# Patient Record
Sex: FEMALE | Race: WHITE | NOT HISPANIC OR LATINO
[De-identification: names, ages, dates, MRNs, and addresses within clinical notes are randomized per-mention and may not be internally consistent; named-entity substitution may affect disease eponyms.]

---

## 2017-09-13 ENCOUNTER — APPOINTMENT (OUTPATIENT)
Dept: OBGYN | Facility: CLINIC | Age: 42
End: 2017-09-13

## 2018-01-09 ENCOUNTER — APPOINTMENT (OUTPATIENT)
Dept: OBGYN | Facility: CLINIC | Age: 43
End: 2018-01-09

## 2018-03-05 ENCOUNTER — EMERGENCY (EMERGENCY)
Facility: HOSPITAL | Age: 43
LOS: 1 days | Discharge: HOME | End: 2018-03-05
Attending: EMERGENCY MEDICINE | Admitting: EMERGENCY MEDICINE
Payer: COMMERCIAL

## 2018-03-05 VITALS
TEMPERATURE: 99 F | OXYGEN SATURATION: 100 % | RESPIRATION RATE: 16 BRPM | HEART RATE: 70 BPM | DIASTOLIC BLOOD PRESSURE: 67 MMHG | SYSTOLIC BLOOD PRESSURE: 110 MMHG

## 2018-03-05 DIAGNOSIS — Z98.51 TUBAL LIGATION STATUS: Chronic | ICD-10-CM

## 2018-03-05 DIAGNOSIS — Z98.89 OTHER SPECIFIED POSTPROCEDURAL STATES: Chronic | ICD-10-CM

## 2018-03-05 DIAGNOSIS — D36.9 BENIGN NEOPLASM, UNSPECIFIED SITE: Chronic | ICD-10-CM

## 2018-03-05 LAB
ALBUMIN SERPL ELPH-MCNC: 4.3 G/DL — SIGNIFICANT CHANGE UP (ref 3.3–5)
ALP SERPL-CCNC: 44 U/L — SIGNIFICANT CHANGE UP (ref 40–120)
ALT FLD-CCNC: 18 U/L — SIGNIFICANT CHANGE UP (ref 4–33)
AST SERPL-CCNC: 35 U/L — HIGH (ref 4–32)
BASE EXCESS BLDV CALC-SCNC: 0.3 MMOL/L — SIGNIFICANT CHANGE UP
BASOPHILS # BLD AUTO: 0.03 K/UL — SIGNIFICANT CHANGE UP (ref 0–0.2)
BASOPHILS NFR BLD AUTO: 0.5 % — SIGNIFICANT CHANGE UP (ref 0–2)
BILIRUB SERPL-MCNC: < 0.2 MG/DL — LOW (ref 0.2–1.2)
BLOOD GAS VENOUS - CREATININE: 0.64 MG/DL — SIGNIFICANT CHANGE UP (ref 0.5–1.3)
BUN SERPL-MCNC: 8 MG/DL — SIGNIFICANT CHANGE UP (ref 7–23)
CALCIUM SERPL-MCNC: 8.9 MG/DL — SIGNIFICANT CHANGE UP (ref 8.4–10.5)
CHLORIDE BLDV-SCNC: 110 MMOL/L — HIGH (ref 96–108)
CHLORIDE SERPL-SCNC: 106 MMOL/L — SIGNIFICANT CHANGE UP (ref 98–107)
CO2 SERPL-SCNC: 24 MMOL/L — SIGNIFICANT CHANGE UP (ref 22–31)
CREAT SERPL-MCNC: 0.7 MG/DL — SIGNIFICANT CHANGE UP (ref 0.5–1.3)
D DIMER BLD IA.RAPID-MCNC: 201 NG/ML — SIGNIFICANT CHANGE UP
EOSINOPHIL # BLD AUTO: 0.13 K/UL — SIGNIFICANT CHANGE UP (ref 0–0.5)
EOSINOPHIL NFR BLD AUTO: 2 % — SIGNIFICANT CHANGE UP (ref 0–6)
GAS PNL BLDV: 139 MMOL/L — SIGNIFICANT CHANGE UP (ref 136–146)
GLUCOSE BLDV-MCNC: 93 — SIGNIFICANT CHANGE UP (ref 70–99)
GLUCOSE SERPL-MCNC: 90 MG/DL — SIGNIFICANT CHANGE UP (ref 70–99)
HCO3 BLDV-SCNC: 23 MMOL/L — SIGNIFICANT CHANGE UP (ref 20–27)
HCT VFR BLD CALC: 41.7 % — SIGNIFICANT CHANGE UP (ref 34.5–45)
HCT VFR BLDV CALC: 43.1 % — SIGNIFICANT CHANGE UP (ref 34.5–45)
HGB BLD-MCNC: 14.1 G/DL — SIGNIFICANT CHANGE UP (ref 11.5–15.5)
HGB BLDV-MCNC: 14 G/DL — SIGNIFICANT CHANGE UP (ref 11.5–15.5)
IMM GRANULOCYTES # BLD AUTO: 0.01 # — SIGNIFICANT CHANGE UP
IMM GRANULOCYTES NFR BLD AUTO: 0.2 % — SIGNIFICANT CHANGE UP (ref 0–1.5)
LACTATE BLDV-MCNC: 0.9 MMOL/L — SIGNIFICANT CHANGE UP (ref 0.5–2)
LYMPHOCYTES # BLD AUTO: 2.26 K/UL — SIGNIFICANT CHANGE UP (ref 1–3.3)
LYMPHOCYTES # BLD AUTO: 35 % — SIGNIFICANT CHANGE UP (ref 13–44)
MCHC RBC-ENTMCNC: 31.1 PG — SIGNIFICANT CHANGE UP (ref 27–34)
MCHC RBC-ENTMCNC: 33.8 % — SIGNIFICANT CHANGE UP (ref 32–36)
MCV RBC AUTO: 91.9 FL — SIGNIFICANT CHANGE UP (ref 80–100)
MONOCYTES # BLD AUTO: 0.35 K/UL — SIGNIFICANT CHANGE UP (ref 0–0.9)
MONOCYTES NFR BLD AUTO: 5.4 % — SIGNIFICANT CHANGE UP (ref 2–14)
NEUTROPHILS # BLD AUTO: 3.68 K/UL — SIGNIFICANT CHANGE UP (ref 1.8–7.4)
NEUTROPHILS NFR BLD AUTO: 56.9 % — SIGNIFICANT CHANGE UP (ref 43–77)
NRBC # FLD: 0 — SIGNIFICANT CHANGE UP
PCO2 BLDV: 46 MMHG — SIGNIFICANT CHANGE UP (ref 41–51)
PH BLDV: 7.36 PH — SIGNIFICANT CHANGE UP (ref 7.32–7.43)
PLATELET # BLD AUTO: 301 K/UL — SIGNIFICANT CHANGE UP (ref 150–400)
PMV BLD: 9.6 FL — SIGNIFICANT CHANGE UP (ref 7–13)
PO2 BLDV: 30 MMHG — LOW (ref 35–40)
POTASSIUM BLDV-SCNC: 3.7 MMOL/L — SIGNIFICANT CHANGE UP (ref 3.4–4.5)
POTASSIUM SERPL-MCNC: SIGNIFICANT CHANGE UP MMOL/L (ref 3.5–5.3)
POTASSIUM SERPL-SCNC: SIGNIFICANT CHANGE UP MMOL/L (ref 3.5–5.3)
PROT SERPL-MCNC: 7.1 G/DL — SIGNIFICANT CHANGE UP (ref 6–8.3)
RBC # BLD: 4.54 M/UL — SIGNIFICANT CHANGE UP (ref 3.8–5.2)
RBC # FLD: 11.9 % — SIGNIFICANT CHANGE UP (ref 10.3–14.5)
SAO2 % BLDV: 55.8 % — LOW (ref 60–85)
SODIUM SERPL-SCNC: 140 MMOL/L — SIGNIFICANT CHANGE UP (ref 135–145)
TROPONIN T SERPL-MCNC: < 0.06 NG/ML — SIGNIFICANT CHANGE UP (ref 0–0.06)
TROPONIN T SERPL-MCNC: < 0.06 NG/ML — SIGNIFICANT CHANGE UP (ref 0–0.06)
WBC # BLD: 6.46 K/UL — SIGNIFICANT CHANGE UP (ref 3.8–10.5)
WBC # FLD AUTO: 6.46 K/UL — SIGNIFICANT CHANGE UP (ref 3.8–10.5)

## 2018-03-05 PROCEDURE — 99285 EMERGENCY DEPT VISIT HI MDM: CPT | Mod: 25

## 2018-03-05 PROCEDURE — 93010 ELECTROCARDIOGRAM REPORT: CPT

## 2018-03-05 PROCEDURE — 71046 X-RAY EXAM CHEST 2 VIEWS: CPT | Mod: 26

## 2018-03-05 RX ORDER — ACETAMINOPHEN 500 MG
650 TABLET ORAL ONCE
Qty: 0 | Refills: 0 | Status: COMPLETED | OUTPATIENT
Start: 2018-03-05 | End: 2018-03-05

## 2018-03-05 RX ORDER — IBUPROFEN 200 MG
600 TABLET ORAL ONCE
Qty: 0 | Refills: 0 | Status: COMPLETED | OUTPATIENT
Start: 2018-03-05 | End: 2018-03-05

## 2018-03-05 RX ORDER — ASPIRIN/CALCIUM CARB/MAGNESIUM 324 MG
324 TABLET ORAL DAILY
Qty: 0 | Refills: 0 | Status: DISCONTINUED | OUTPATIENT
Start: 2018-03-05 | End: 2018-03-09

## 2018-03-05 RX ADMIN — Medication 600 MILLIGRAM(S): at 19:24

## 2018-03-05 RX ADMIN — Medication 600 MILLIGRAM(S): at 22:34

## 2018-03-05 RX ADMIN — Medication 650 MILLIGRAM(S): at 23:59

## 2018-03-05 RX ADMIN — Medication 324 MILLIGRAM(S): at 18:31

## 2018-03-05 NOTE — ED PROVIDER NOTE - PLAN OF CARE
Follow up with your Doctor in 1-2 days.  Follow up with your cardiologist in 1-2 days.  Return to the ER for any persistent/worsening or new symptoms, chest pain, shortness of breath, palpitations, dizziness or any concerning symptoms.

## 2018-03-05 NOTE — ED PROVIDER NOTE - FAMILY HISTORY
Family history of lung cancer     Family history of cervical cancer     Family history of carcinoma in situ of anal canal     Family history of hypertension     Family history of hyperlipidemia     Mother  Still living? Unknown  Family history of acute myocardial infarction, Age at diagnosis: Age Unknown

## 2018-03-05 NOTE — ED ADULT TRIAGE NOTE - CHIEF COMPLAINT QUOTE
Pt c/o of left side pain under her breast pain radiates to her back for the past three days pt also states she feels sob no acute respiratory distress noted o2sat 100% lungs clear on ascultation.

## 2018-03-05 NOTE — ED PROVIDER NOTE - MEDICAL DECISION MAKING DETAILS
42 yo F w/ L sided chest pain, worse with exertion, associated with shortness of breath. Will obtain cbc, cmp, vbg, delta trop, d dimer, cxr, ekg. Treat pain, reassess. 44 yo F w/ L sided chest pain, worse with exertion, associated with shortness of breath. Will obtain cbc, cmp, vbg, delta trop, d dimer, cxr, ekg. Treat pain, reassess.    Cabot: 43F with PMH of HTN, anxiety, depression, prior CP with positive stress test but negative cath in 2016 who comes in with 3 days of constant L sided CP radiating to the L back.  + pleuritic and exertional.  No N/V/diaphoresis. No PE risk factors. No FH of early heart disease. + smoker.  On exam, HDS, NAD, lungs cTAB, heart sounds normal, abd benign, no chest wall TTP, BLE with no edema or TTP, no skin rash.  DDx includes MSK pain, less likely ACS or PE.  EKG unchanged from prior.  Will check basic labs, trops x 2, dimer, CXR. Spoke with her cardiologist, Dr. Venegas, who is comfortable with this plan.

## 2018-03-05 NOTE — ED PROVIDER NOTE - PMH
Anemia  bitamin b12 deficiency  Anxiety States    Benign Hypertension    Carcinoid Tumor of Ovary, Benign     Deliv    Depression with anxiety    Hypertension  Dx   Ovarian cyst  bilateral  Ovarian Cyst    Tubal Ligation

## 2018-03-05 NOTE — ED PROVIDER NOTE - ATTENDING CONTRIBUTION TO CARE
I, Jennifer Cabot, MD, have performed a history and physical exam of the patient and discussed their management with the resident. I reviewed the resident's note and agree with the documented findings and plan of care. My medical decision making and observations are found above.    Cabot: 43F with PMH of HTN, anxiety, depression, prior CP with positive stress test but negative cath in 2016 who comes in with 3 days of constant L sided CP radiating to the L back.  + pleuritic and exertional.  No N/V/diaphoresis. No PE risk factors. No FH of early heart disease. + smoker.  On exam, HDS, NAD, lungs cTAB, heart sounds normal, abd benign, no chest wall TTP, BLE with no edema or TTP, no skin rash.  DDx includes MSK pain, less likely ACS or PE.  EKG unchanged from prior.  Will check basic labs, trops x 2, dimer, CXR. Spoke with her cardiologist, Dr. Venegas, who is comfortable with this plan.

## 2018-03-05 NOTE — ED PROVIDER NOTE - PROGRESS NOTE DETAILS
Spoke with patient's cardiologist Dr. Venegas. In light of previous negative cardiac cath and chest pain that is pleuritic in nature, would feel comfortable with discharge from ER after negative delta trop and following up with patient in the office. Patient is also in agreement with this plan.

## 2018-03-05 NOTE — ED ADULT NURSE NOTE - OBJECTIVE STATEMENT
Receive pt in intake, alert and oriented x 3 c/o L under breast pain and upper back pain.  denies sob, dizziness, chills.  IV placed.  LAbs sent.  VS see charting.  SKin intact

## 2018-03-05 NOTE — ED PROVIDER NOTE - OBJECTIVE STATEMENT
42 yo F w/ PMH of Anemia, vitamin B12 deficiency, Anxiety, HTN, Depression, presenting w/ chief complaint of L-sided chest pain. Pain is located under the L breast, and radiates to the back. Pain is 8/10 severity, sharp in quality, worse with exertion, worse with deep breaths, and associated with mild shortness of breath. Patient is a long time cigarette smoker, and has a positive family history of father with MI. Pain has been going on for 3 days, and began after coughing 3 days ago. Denies HA, fever, vision change, weakness, numbness, tingling, lightheadedness, dizziness, vertigo, ab pain, n/v/d/c, urinary symptoms. 42 yo F w/ PMH of Anemia, vitamin B12 deficiency, Anxiety, HTN, Depression, chest pain w/ positive stress test but negative cardiac cath, presenting w/ chief complaint of L-sided chest pain. Pain is located under the L breast, and radiates to the back. Pain is 8/10 severity, sharp in quality, worse with exertion, worse with deep breaths, and associated with mild shortness of breath. Patient is a long time cigarette smoker, and has a positive family history of father with MI. Pain has been going on for 3 days, and began after coughing 3 days ago. Denies HA, fever, vision change, weakness, numbness, tingling, lightheadedness, dizziness, vertigo, ab pain, n/v/d/c, urinary symptoms.

## 2018-03-05 NOTE — ED PROVIDER NOTE - CARE PLAN
Principal Discharge DX:	Chest pain Principal Discharge DX:	Chest pain  Assessment and plan of treatment:	Follow up with your Doctor in 1-2 days.  Follow up with your cardiologist in 1-2 days.  Return to the ER for any persistent/worsening or new symptoms, chest pain, shortness of breath, palpitations, dizziness or any concerning symptoms.

## 2018-03-05 NOTE — ED PROVIDER NOTE - PSH
Carcinoid Tumor of Ovary, Benign    Dermoid cyst  ovarian, bilateral  Previous  Delivery, Delivered    S/P     S/P ovarian cystectomy  bilateral  S/P tubal ligation    Status Post Ovarian Cystectomy    Tubal Ligation

## 2018-03-06 VITALS
OXYGEN SATURATION: 100 % | RESPIRATION RATE: 15 BRPM | SYSTOLIC BLOOD PRESSURE: 128 MMHG | DIASTOLIC BLOOD PRESSURE: 85 MMHG | HEART RATE: 76 BPM

## 2018-03-06 RX ORDER — CYCLOBENZAPRINE HYDROCHLORIDE 10 MG/1
1 TABLET, FILM COATED ORAL
Qty: 9 | Refills: 0
Start: 2018-03-06 | End: 2018-03-08

## 2018-03-17 ENCOUNTER — APPOINTMENT (OUTPATIENT)
Dept: RADIOLOGY | Facility: IMAGING CENTER | Age: 43
End: 2018-03-17

## 2018-03-30 ENCOUNTER — APPOINTMENT (OUTPATIENT)
Dept: PULMONOLOGY | Facility: CLINIC | Age: 43
End: 2018-03-30

## 2018-04-27 ENCOUNTER — APPOINTMENT (OUTPATIENT)
Dept: PULMONOLOGY | Facility: CLINIC | Age: 43
End: 2018-04-27

## 2018-07-09 ENCOUNTER — TRANSCRIPTION ENCOUNTER (OUTPATIENT)
Age: 43
End: 2018-07-09

## 2018-08-09 ENCOUNTER — EMERGENCY (EMERGENCY)
Facility: HOSPITAL | Age: 43
LOS: 1 days | Discharge: HOME | End: 2018-08-09
Attending: EMERGENCY MEDICINE
Payer: COMMERCIAL

## 2018-08-09 VITALS
SYSTOLIC BLOOD PRESSURE: 113 MMHG | RESPIRATION RATE: 16 BRPM | OXYGEN SATURATION: 98 % | TEMPERATURE: 98 F | DIASTOLIC BLOOD PRESSURE: 71 MMHG | HEART RATE: 73 BPM | WEIGHT: 134.04 LBS | HEIGHT: 61 IN

## 2018-08-09 VITALS
SYSTOLIC BLOOD PRESSURE: 122 MMHG | OXYGEN SATURATION: 98 % | RESPIRATION RATE: 18 BRPM | DIASTOLIC BLOOD PRESSURE: 81 MMHG | TEMPERATURE: 98 F | HEART RATE: 79 BPM

## 2018-08-09 DIAGNOSIS — Z98.89 OTHER SPECIFIED POSTPROCEDURAL STATES: Chronic | ICD-10-CM

## 2018-08-09 DIAGNOSIS — Z98.51 TUBAL LIGATION STATUS: Chronic | ICD-10-CM

## 2018-08-09 DIAGNOSIS — D36.9 BENIGN NEOPLASM, UNSPECIFIED SITE: Chronic | ICD-10-CM

## 2018-08-09 LAB
ALBUMIN SERPL ELPH-MCNC: 4.3 G/DL — SIGNIFICANT CHANGE UP (ref 3.5–5)
ALP SERPL-CCNC: 67 U/L — SIGNIFICANT CHANGE UP (ref 40–120)
ALT FLD-CCNC: 22 U/L DA — SIGNIFICANT CHANGE UP (ref 10–60)
ANION GAP SERPL CALC-SCNC: 7 MMOL/L — SIGNIFICANT CHANGE UP (ref 5–17)
AST SERPL-CCNC: 17 U/L — SIGNIFICANT CHANGE UP (ref 10–40)
BILIRUB SERPL-MCNC: 0.5 MG/DL — SIGNIFICANT CHANGE UP (ref 0.2–1.2)
BUN SERPL-MCNC: 10 MG/DL — SIGNIFICANT CHANGE UP (ref 7–18)
CALCIUM SERPL-MCNC: 9.2 MG/DL — SIGNIFICANT CHANGE UP (ref 8.4–10.5)
CHLORIDE SERPL-SCNC: 107 MMOL/L — SIGNIFICANT CHANGE UP (ref 96–108)
CO2 SERPL-SCNC: 24 MMOL/L — SIGNIFICANT CHANGE UP (ref 22–31)
CREAT SERPL-MCNC: 0.75 MG/DL — SIGNIFICANT CHANGE UP (ref 0.5–1.3)
GLUCOSE SERPL-MCNC: 89 MG/DL — SIGNIFICANT CHANGE UP (ref 70–99)
HCG SERPL-ACNC: <1 MIU/ML — SIGNIFICANT CHANGE UP
HCT VFR BLD CALC: 46.6 % — HIGH (ref 34.5–45)
HGB BLD-MCNC: 15.7 G/DL — HIGH (ref 11.5–15.5)
MAGNESIUM SERPL-MCNC: 2.1 MG/DL — SIGNIFICANT CHANGE UP (ref 1.6–2.6)
MCHC RBC-ENTMCNC: 31.5 PG — SIGNIFICANT CHANGE UP (ref 27–34)
MCHC RBC-ENTMCNC: 33.7 GM/DL — SIGNIFICANT CHANGE UP (ref 32–36)
MCV RBC AUTO: 93.7 FL — SIGNIFICANT CHANGE UP (ref 80–100)
PHOSPHATE SERPL-MCNC: 3.2 MG/DL — SIGNIFICANT CHANGE UP (ref 2.5–4.5)
PLATELET # BLD AUTO: 369 K/UL — SIGNIFICANT CHANGE UP (ref 150–400)
POTASSIUM SERPL-MCNC: 4.1 MMOL/L — SIGNIFICANT CHANGE UP (ref 3.5–5.3)
POTASSIUM SERPL-SCNC: 4.1 MMOL/L — SIGNIFICANT CHANGE UP (ref 3.5–5.3)
PROT SERPL-MCNC: 8.1 G/DL — SIGNIFICANT CHANGE UP (ref 6–8.3)
RBC # BLD: 4.97 M/UL — SIGNIFICANT CHANGE UP (ref 3.8–5.2)
RBC # FLD: 11.4 % — SIGNIFICANT CHANGE UP (ref 10.3–14.5)
SODIUM SERPL-SCNC: 138 MMOL/L — SIGNIFICANT CHANGE UP (ref 135–145)
WBC # BLD: 7.5 K/UL — SIGNIFICANT CHANGE UP (ref 3.8–10.5)
WBC # FLD AUTO: 7.5 K/UL — SIGNIFICANT CHANGE UP (ref 3.8–10.5)

## 2018-08-09 PROCEDURE — 80053 COMPREHEN METABOLIC PANEL: CPT

## 2018-08-09 PROCEDURE — 99284 EMERGENCY DEPT VISIT MOD MDM: CPT

## 2018-08-09 PROCEDURE — 83735 ASSAY OF MAGNESIUM: CPT

## 2018-08-09 PROCEDURE — 85027 COMPLETE CBC AUTOMATED: CPT

## 2018-08-09 PROCEDURE — 84702 CHORIONIC GONADOTROPIN TEST: CPT

## 2018-08-09 PROCEDURE — 36415 COLL VENOUS BLD VENIPUNCTURE: CPT

## 2018-08-09 PROCEDURE — 93005 ELECTROCARDIOGRAM TRACING: CPT

## 2018-08-09 PROCEDURE — 99284 EMERGENCY DEPT VISIT MOD MDM: CPT | Mod: 25

## 2018-08-09 PROCEDURE — 84100 ASSAY OF PHOSPHORUS: CPT

## 2018-08-09 RX ORDER — SODIUM CHLORIDE 9 MG/ML
1000 INJECTION INTRAMUSCULAR; INTRAVENOUS; SUBCUTANEOUS ONCE
Qty: 0 | Refills: 0 | Status: COMPLETED | OUTPATIENT
Start: 2018-08-09 | End: 2018-08-09

## 2018-08-09 RX ADMIN — SODIUM CHLORIDE 4000 MILLILITER(S): 9 INJECTION INTRAMUSCULAR; INTRAVENOUS; SUBCUTANEOUS at 12:51

## 2018-08-09 NOTE — ED PROVIDER NOTE - OBJECTIVE STATEMENT
42 y/o F pt with a significant PMHx of HTN, depression, anxiety, syndrome X and no significant PSHx presents to the ED c/o 2 hours of blurry vision in her R eye. Pt states her vision was normal when she woke up at 0500; when pt woke up again at 0700, her R eye felt blurry. Pt notes she also felt generally lightheaded over the past day. Pt denies change in vision color, focal visual field deficit, headache, eye pain, vertigo, chest pain, fever, chills, abd pain, bloody stools, melena, vomiting, ophthalmology Hx, contact use, cardiac Hx or any other complaints. Allergies: Cipro, Celexa, Fluoroquinolone antibiotics.

## 2018-08-09 NOTE — ED ADULT TRIAGE NOTE - CHIEF COMPLAINT QUOTE
C/o blurred vision in right eye since 7am, woke up at 5am and it was fine. I am also a little dizzy, denies eye pain.

## 2018-08-09 NOTE — ED PROVIDER NOTE - CHPI ED SYMPTOMS NEG
no vertigo, no fever, no chills, no change in vision color, no focal visual field deficit, no headache, no eye pain, no chest pain, no abd pain, no bloody stools, no melena, no vomiting

## 2018-08-09 NOTE — ED PROVIDER NOTE - MEDICAL DECISION MAKING DETAILS
44 y/o F pt presents with lightheadedness; will order electrolytes. No ACS risk factors; sx not consistent. Likely mild dehydration versus medication side affect. No other ophthalmology symptoms and visual acuity intact; will send to ophthalmology clinic once cleared for lightheadedness.

## 2018-08-09 NOTE — ED PROVIDER NOTE - PMH
Anemia  bitamin b12 deficiency  Anxiety States    Benign Hypertension    Carcinoid Tumor of Ovary, Benign     Deliv    Depression with anxiety    Hypertension  Dx   Ovarian cyst  bilateral  Ovarian Cyst    Syndrome X (cardiac)    Tubal Ligation

## 2018-08-09 NOTE — ED ADULT NURSE NOTE - NSIMPLEMENTINTERV_GEN_ALL_ED
Implemented All Universal Safety Interventions:  [place] to call system. Call bell, personal items and telephone within reach. Instruct patient to call for assistance. Room bathroom lighting operational. Non-slip footwear when patient is off stretcher. Physically safe environment: no spills, clutter or unnecessary equipment. Stretcher in lowest position, wheels locked, appropriate side rails in place.

## 2018-12-10 ENCOUNTER — TRANSCRIPTION ENCOUNTER (OUTPATIENT)
Age: 43
End: 2018-12-10

## 2018-12-21 ENCOUNTER — APPOINTMENT (OUTPATIENT)
Dept: PULMONOLOGY | Facility: CLINIC | Age: 43
End: 2018-12-21

## 2019-01-07 ENCOUNTER — APPOINTMENT (OUTPATIENT)
Dept: PULMONOLOGY | Facility: CLINIC | Age: 44
End: 2019-01-07

## 2019-01-14 ENCOUNTER — EMERGENCY (EMERGENCY)
Facility: HOSPITAL | Age: 44
LOS: 1 days | Discharge: HOME | End: 2019-01-14
Attending: EMERGENCY MEDICINE | Admitting: EMERGENCY MEDICINE
Payer: COMMERCIAL

## 2019-01-14 ENCOUNTER — APPOINTMENT (OUTPATIENT)
Dept: PULMONOLOGY | Facility: CLINIC | Age: 44
End: 2019-01-14
Payer: COMMERCIAL

## 2019-01-14 VITALS
RESPIRATION RATE: 16 BRPM | BODY MASS INDEX: 25.11 KG/M2 | DIASTOLIC BLOOD PRESSURE: 73 MMHG | HEIGHT: 61 IN | HEART RATE: 81 BPM | WEIGHT: 133 LBS | SYSTOLIC BLOOD PRESSURE: 114 MMHG | TEMPERATURE: 97.7 F

## 2019-01-14 VITALS
SYSTOLIC BLOOD PRESSURE: 142 MMHG | RESPIRATION RATE: 19 BRPM | OXYGEN SATURATION: 98 % | DIASTOLIC BLOOD PRESSURE: 95 MMHG | HEART RATE: 68 BPM | TEMPERATURE: 98 F

## 2019-01-14 VITALS
OXYGEN SATURATION: 98 % | TEMPERATURE: 99 F | RESPIRATION RATE: 18 BRPM | HEART RATE: 72 BPM | SYSTOLIC BLOOD PRESSURE: 129 MMHG | DIASTOLIC BLOOD PRESSURE: 85 MMHG

## 2019-01-14 VITALS — BODY MASS INDEX: 25.11 KG/M2 | HEIGHT: 61 IN | WEIGHT: 133 LBS

## 2019-01-14 DIAGNOSIS — D36.9 BENIGN NEOPLASM, UNSPECIFIED SITE: Chronic | ICD-10-CM

## 2019-01-14 DIAGNOSIS — Z86.59 PERSONAL HISTORY OF OTHER MENTAL AND BEHAVIORAL DISORDERS: ICD-10-CM

## 2019-01-14 DIAGNOSIS — Z98.51 TUBAL LIGATION STATUS: Chronic | ICD-10-CM

## 2019-01-14 DIAGNOSIS — Z86.79 PERSONAL HISTORY OF OTHER DISEASES OF THE CIRCULATORY SYSTEM: ICD-10-CM

## 2019-01-14 DIAGNOSIS — Z98.89 OTHER SPECIFIED POSTPROCEDURAL STATES: Chronic | ICD-10-CM

## 2019-01-14 DIAGNOSIS — F17.200 NICOTINE DEPENDENCE, UNSPECIFIED, UNCOMPLICATED: ICD-10-CM

## 2019-01-14 DIAGNOSIS — Z82.49 FAMILY HISTORY OF ISCHEMIC HEART DISEASE AND OTHER DISEASES OF THE CIRCULATORY SYSTEM: ICD-10-CM

## 2019-01-14 LAB
BASOPHILS # BLD AUTO: 0.03 K/UL — SIGNIFICANT CHANGE UP (ref 0–0.2)
BASOPHILS NFR BLD AUTO: 0.5 % — SIGNIFICANT CHANGE UP (ref 0–2)
EOSINOPHIL # BLD AUTO: 0.08 K/UL — SIGNIFICANT CHANGE UP (ref 0–0.5)
EOSINOPHIL NFR BLD AUTO: 1.2 % — SIGNIFICANT CHANGE UP (ref 0–6)
HCT VFR BLD CALC: 42.6 % — SIGNIFICANT CHANGE UP (ref 34.5–45)
HGB BLD-MCNC: 14 G/DL — SIGNIFICANT CHANGE UP (ref 11.5–15.5)
IMM GRANULOCYTES NFR BLD AUTO: 0.3 % — SIGNIFICANT CHANGE UP (ref 0–1.5)
LYMPHOCYTES # BLD AUTO: 2.73 K/UL — SIGNIFICANT CHANGE UP (ref 1–3.3)
LYMPHOCYTES # BLD AUTO: 41 % — SIGNIFICANT CHANGE UP (ref 13–44)
MCHC RBC-ENTMCNC: 30.1 PG — SIGNIFICANT CHANGE UP (ref 27–34)
MCHC RBC-ENTMCNC: 32.9 % — SIGNIFICANT CHANGE UP (ref 32–36)
MCV RBC AUTO: 91.6 FL — SIGNIFICANT CHANGE UP (ref 80–100)
MONOCYTES # BLD AUTO: 0.48 K/UL — SIGNIFICANT CHANGE UP (ref 0–0.9)
MONOCYTES NFR BLD AUTO: 7.2 % — SIGNIFICANT CHANGE UP (ref 2–14)
NEUTROPHILS # BLD AUTO: 3.32 K/UL — SIGNIFICANT CHANGE UP (ref 1.8–7.4)
NEUTROPHILS NFR BLD AUTO: 49.8 % — SIGNIFICANT CHANGE UP (ref 43–77)
NRBC # FLD: 0 K/UL — LOW (ref 25–125)
PLATELET # BLD AUTO: 320 K/UL — SIGNIFICANT CHANGE UP (ref 150–400)
PMV BLD: 9.8 FL — SIGNIFICANT CHANGE UP (ref 7–13)
RBC # BLD: 4.65 M/UL — SIGNIFICANT CHANGE UP (ref 3.8–5.2)
RBC # FLD: 12 % — SIGNIFICANT CHANGE UP (ref 10.3–14.5)
WBC # BLD: 6.66 K/UL — SIGNIFICANT CHANGE UP (ref 3.8–10.5)
WBC # FLD AUTO: 6.66 K/UL — SIGNIFICANT CHANGE UP (ref 3.8–10.5)

## 2019-01-14 PROCEDURE — 99243 OFF/OP CNSLTJ NEW/EST LOW 30: CPT | Mod: 25

## 2019-01-14 PROCEDURE — 94726 PLETHYSMOGRAPHY LUNG VOLUMES: CPT

## 2019-01-14 PROCEDURE — ZZZZZ: CPT

## 2019-01-14 PROCEDURE — 94729 DIFFUSING CAPACITY: CPT

## 2019-01-14 PROCEDURE — 94060 EVALUATION OF WHEEZING: CPT

## 2019-01-14 PROCEDURE — 99284 EMERGENCY DEPT VISIT MOD MDM: CPT | Mod: 25

## 2019-01-14 PROCEDURE — 71046 X-RAY EXAM CHEST 2 VIEWS: CPT | Mod: 26

## 2019-01-14 PROCEDURE — 93010 ELECTROCARDIOGRAM REPORT: CPT

## 2019-01-14 RX ORDER — ASPIRIN/CALCIUM CARB/MAGNESIUM 324 MG
162 TABLET ORAL ONCE
Qty: 0 | Refills: 0 | Status: COMPLETED | OUTPATIENT
Start: 2019-01-14 | End: 2019-01-14

## 2019-01-14 RX ORDER — METOPROLOL TARTRATE 50 MG/1
50 TABLET, FILM COATED ORAL
Refills: 0 | Status: ACTIVE | COMMUNITY

## 2019-01-14 RX ORDER — DESVENLAFAXINE SUCCINATE 100 MG/1
100 TABLET, EXTENDED RELEASE ORAL
Refills: 0 | Status: ACTIVE | COMMUNITY

## 2019-01-14 RX ORDER — AMLODIPINE BESYLATE 5 MG/1
5 TABLET ORAL
Refills: 0 | Status: ACTIVE | COMMUNITY

## 2019-01-14 RX ORDER — ALBUTEROL SULFATE 90 UG/1
108 (90 BASE) AEROSOL, METERED RESPIRATORY (INHALATION)
Qty: 2 | Refills: 3 | Status: ACTIVE | COMMUNITY
Start: 2019-01-14 | End: 1900-01-01

## 2019-01-14 RX ADMIN — Medication 162 MILLIGRAM(S): at 23:22

## 2019-01-14 NOTE — DISCUSSION/SUMMARY
[FreeTextEntry1] : 42yo female patient current smoker presenting today with dyspnea.  PFT shows mild reversible obstructive ventilatory impairment with normal diffusing capacity.  She likely has some degree of asthma.  Patient was prescribed Breo and ProAir to be used as discussed for the next month.  F/u in 1 month.\par Urged her to stop smoking

## 2019-01-14 NOTE — PHYSICAL EXAM
[General Appearance - In No Acute Distress] : no acute distress [Normal Conjunctiva] : the conjunctiva exhibited no abnormalities [Normal Oropharynx] : normal oropharynx [Neck Appearance] : the appearance of the neck was normal [Heart Rate And Rhythm] : heart rate and rhythm were normal [Heart Sounds] : normal S1 and S2 [Respiration, Rhythm And Depth] : normal respiratory rhythm and effort [Auscultation Breath Sounds / Voice Sounds] : lungs were clear to auscultation bilaterally [Abnormal Walk] : normal gait [Nail Clubbing] : no clubbing of the fingernails [Cyanosis, Localized] : no localized cyanosis [] : no rash [Skin Lesions] : no skin lesions [Sensation] : the sensory exam was normal to light touch and pinprick [Motor Exam] : the motor exam was normal [Affect] : the affect was normal [Mood] : the mood was normal [FreeTextEntry2] : no edema

## 2019-01-14 NOTE — HISTORY OF PRESENT ILLNESS
[FreeTextEntry1] : 44yo female patient current smoker presenting today with dyspnea.  Patient reports that she has become more short of breath with activity over the last few months.  She experiences dyspnea with walking 1 to 2 blocks.  Admits to occasional dry cough and wheeze.  Recent CXR showed clear lungs.  She currently smokes 15 to 20 cigarettes daily, x30 years.  Denies occupational exposures.  Medical history significant for HTN, anxiety, depression, and cardiac syndrome X.

## 2019-01-14 NOTE — ED ADULT TRIAGE NOTE - CHIEF COMPLAINT QUOTE
Pt c/o L chest discomfort  x 3 days. Pt spoke to psychiatrist and was told that she may be having serotonin poisoning. Pt also having chills, and feeling "shaky." VSS at this time. Pt c/o L chest discomfort  x 3 days. Pt spoke to psychiatrist and was told that she may be having serotonin poisoning. Pt also having chills, and feeling "shaky." VSS at this time. + mild tremors observed, appears mildly diaphoretic.

## 2019-01-14 NOTE — REVIEW OF SYSTEMS
[Cough] : cough [Dyspnea] : dyspnea [Wheezing] : wheezing [Hypertension] : ~T hypertension [Depression] : depression [Anxiety] : anxiety [Negative] : Endocrine [Sputum] : not coughing up ~M sputum [Hemoptysis] : no hemoptysis [Chest Tightness] : no chest tightness [Pleuritic Pain] : no pleuritic pain [Chest Discomfort] : no chest discomfort
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
## 2019-01-14 NOTE — ED ADULT NURSE NOTE - CHIEF COMPLAINT QUOTE
Pt c/o L chest discomfort  x 3 days. Pt spoke to psychiatrist and was told that she may be having serotonin poisoning. Pt also having chills, and feeling "shaky." VSS at this time. + mild tremors observed, appears mildly diaphoretic.

## 2019-01-14 NOTE — ED ADULT NURSE NOTE - OBJECTIVE STATEMENT
pt received alert and oriented x3. pmhx depression, asthma. htn.pt c.o having non radiating  left sided chest pain for 2 days and also feeling "shaky". respirations equal and unlabored. lungs cta. nsr on cm. Call bell in reach, warm blanket provided, bed in lowest position, side rails up x2,safety maintained. will continue to monitor. md at bedside for eval.

## 2019-01-14 NOTE — ED PROVIDER NOTE - ATTENDING CONTRIBUTION TO CARE
Pt with atypical chest pain after taking seroquel, is a smoker, but otherwise healthy. On exam, RRR, lungs cta, no calf tenderness, no edema, equal femoral pulses. EKG noted and will check labs.

## 2019-01-14 NOTE — CONSULT LETTER
[Dear  ___] : Dear ~JONAH, [Consult Letter:] : I had the pleasure of evaluating your patient, [unfilled]. [FreeTextEntry2] : Luis Elena MD

## 2019-01-14 NOTE — END OF VISIT
[FreeTextEntry3] : I agree with the nurse practitioners history, physical examination and plan of care. I personally elicited a history and examined the patient\par

## 2019-01-15 LAB
ALBUMIN SERPL ELPH-MCNC: 4.3 G/DL — SIGNIFICANT CHANGE UP (ref 3.3–5)
ALP SERPL-CCNC: 56 U/L — SIGNIFICANT CHANGE UP (ref 40–120)
ALT FLD-CCNC: 11 U/L — SIGNIFICANT CHANGE UP (ref 4–33)
ANION GAP SERPL CALC-SCNC: 12 MEQ/L — SIGNIFICANT CHANGE UP (ref 7–14)
AST SERPL-CCNC: 14 U/L — SIGNIFICANT CHANGE UP (ref 4–32)
BILIRUB SERPL-MCNC: 0.5 MG/DL — SIGNIFICANT CHANGE UP (ref 0.2–1.2)
BUN SERPL-MCNC: 9 MG/DL — SIGNIFICANT CHANGE UP (ref 7–23)
CALCIUM SERPL-MCNC: 9.2 MG/DL — SIGNIFICANT CHANGE UP (ref 8.4–10.5)
CHLORIDE SERPL-SCNC: 104 MMOL/L — SIGNIFICANT CHANGE UP (ref 98–107)
CO2 SERPL-SCNC: 22 MMOL/L — SIGNIFICANT CHANGE UP (ref 22–31)
CREAT SERPL-MCNC: 0.7 MG/DL — SIGNIFICANT CHANGE UP (ref 0.5–1.3)
GLUCOSE SERPL-MCNC: 93 MG/DL — SIGNIFICANT CHANGE UP (ref 70–99)
POTASSIUM SERPL-MCNC: 3.6 MMOL/L — SIGNIFICANT CHANGE UP (ref 3.5–5.3)
POTASSIUM SERPL-SCNC: 3.6 MMOL/L — SIGNIFICANT CHANGE UP (ref 3.5–5.3)
PROT SERPL-MCNC: 6.7 G/DL — SIGNIFICANT CHANGE UP (ref 6–8.3)
SODIUM SERPL-SCNC: 138 MMOL/L — SIGNIFICANT CHANGE UP (ref 135–145)
TROPONIN T, HIGH SENSITIVITY: < 6 NG/L — SIGNIFICANT CHANGE UP (ref ?–14)

## 2019-02-05 ENCOUNTER — EMERGENCY (EMERGENCY)
Facility: HOSPITAL | Age: 44
LOS: 1 days | Discharge: HOME | End: 2019-02-05
Attending: EMERGENCY MEDICINE | Admitting: EMERGENCY MEDICINE
Payer: COMMERCIAL

## 2019-02-05 VITALS
RESPIRATION RATE: 20 BRPM | OXYGEN SATURATION: 95 % | DIASTOLIC BLOOD PRESSURE: 99 MMHG | TEMPERATURE: 98 F | SYSTOLIC BLOOD PRESSURE: 147 MMHG | HEART RATE: 88 BPM

## 2019-02-05 VITALS
OXYGEN SATURATION: 99 % | DIASTOLIC BLOOD PRESSURE: 107 MMHG | HEART RATE: 100 BPM | TEMPERATURE: 98 F | RESPIRATION RATE: 20 BRPM | SYSTOLIC BLOOD PRESSURE: 149 MMHG

## 2019-02-05 VITALS — WEIGHT: 130.07 LBS

## 2019-02-05 DIAGNOSIS — Z98.51 TUBAL LIGATION STATUS: Chronic | ICD-10-CM

## 2019-02-05 DIAGNOSIS — Z98.89 OTHER SPECIFIED POSTPROCEDURAL STATES: Chronic | ICD-10-CM

## 2019-02-05 DIAGNOSIS — D36.9 BENIGN NEOPLASM, UNSPECIFIED SITE: Chronic | ICD-10-CM

## 2019-02-05 DIAGNOSIS — T74.21XA ADULT SEXUAL ABUSE, CONFIRMED, INITIAL ENCOUNTER: ICD-10-CM

## 2019-02-05 LAB
ALBUMIN SERPL ELPH-MCNC: 5 G/DL — SIGNIFICANT CHANGE UP (ref 3.3–5)
ALP SERPL-CCNC: 63 U/L — SIGNIFICANT CHANGE UP (ref 40–120)
ALT FLD-CCNC: 16 U/L — SIGNIFICANT CHANGE UP (ref 10–45)
ANION GAP SERPL CALC-SCNC: 15 MMOL/L — SIGNIFICANT CHANGE UP (ref 5–17)
APPEARANCE UR: SIGNIFICANT CHANGE UP
AST SERPL-CCNC: 18 U/L — SIGNIFICANT CHANGE UP (ref 10–40)
BACTERIA # UR AUTO: NEGATIVE — SIGNIFICANT CHANGE UP
BILIRUB SERPL-MCNC: 0.3 MG/DL — SIGNIFICANT CHANGE UP (ref 0.2–1.2)
BILIRUB UR-MCNC: NEGATIVE — SIGNIFICANT CHANGE UP
BLOOD UR QL VISUAL: NEGATIVE — SIGNIFICANT CHANGE UP
BUN SERPL-MCNC: 8 MG/DL — SIGNIFICANT CHANGE UP (ref 7–23)
CALCIUM SERPL-MCNC: 9.8 MG/DL — SIGNIFICANT CHANGE UP (ref 8.4–10.5)
CHLORIDE SERPL-SCNC: 102 MMOL/L — SIGNIFICANT CHANGE UP (ref 96–108)
CO2 SERPL-SCNC: 23 MMOL/L — SIGNIFICANT CHANGE UP (ref 22–31)
COLOR SPEC: YELLOW — SIGNIFICANT CHANGE UP
CREAT SERPL-MCNC: 0.71 MG/DL — SIGNIFICANT CHANGE UP (ref 0.5–1.3)
GLUCOSE SERPL-MCNC: 105 MG/DL — HIGH (ref 70–99)
GLUCOSE UR-MCNC: NEGATIVE — SIGNIFICANT CHANGE UP
HAV IGM SER-ACNC: SIGNIFICANT CHANGE UP
HBV CORE IGM SER-ACNC: SIGNIFICANT CHANGE UP
HBV SURFACE AG SER-ACNC: SIGNIFICANT CHANGE UP
HCT VFR BLD CALC: 45.7 % — HIGH (ref 34.5–45)
HCV AB S/CO SERPL IA: 0.03 S/CO — SIGNIFICANT CHANGE UP
HCV AB SERPL-IMP: SIGNIFICANT CHANGE UP
HGB BLD-MCNC: 15.9 G/DL — HIGH (ref 11.5–15.5)
HIV 1 & 2 AB SERPL IA.RAPID: SIGNIFICANT CHANGE UP
HIV 1+2 AB+HIV1 P24 AG SERPL QL IA: SIGNIFICANT CHANGE UP
HYALINE CASTS # UR AUTO: HIGH
KETONES UR-MCNC: NEGATIVE — SIGNIFICANT CHANGE UP
LEUKOCYTE ESTERASE UR-ACNC: NEGATIVE — SIGNIFICANT CHANGE UP
MCHC RBC-ENTMCNC: 31.3 PG — SIGNIFICANT CHANGE UP (ref 27–34)
MCHC RBC-ENTMCNC: 34.9 GM/DL — SIGNIFICANT CHANGE UP (ref 32–36)
MCV RBC AUTO: 89.6 FL — SIGNIFICANT CHANGE UP (ref 80–100)
NITRITE UR-MCNC: NEGATIVE — SIGNIFICANT CHANGE UP
PH UR: 6 — SIGNIFICANT CHANGE UP (ref 5–8)
PLATELET # BLD AUTO: 406 K/UL — HIGH (ref 150–400)
POTASSIUM SERPL-MCNC: 4 MMOL/L — SIGNIFICANT CHANGE UP (ref 3.5–5.3)
POTASSIUM SERPL-SCNC: 4 MMOL/L — SIGNIFICANT CHANGE UP (ref 3.5–5.3)
PROT SERPL-MCNC: 7.7 G/DL — SIGNIFICANT CHANGE UP (ref 6–8.3)
PROT UR-MCNC: 30 — SIGNIFICANT CHANGE UP
RBC # BLD: 5.1 M/UL — SIGNIFICANT CHANGE UP (ref 3.8–5.2)
RBC # FLD: 11.3 % — SIGNIFICANT CHANGE UP (ref 10.3–14.5)
RBC CASTS # UR COMP ASSIST: SIGNIFICANT CHANGE UP (ref 0–?)
SODIUM SERPL-SCNC: 140 MMOL/L — SIGNIFICANT CHANGE UP (ref 135–145)
SP GR SPEC: 1.02 — SIGNIFICANT CHANGE UP (ref 1–1.04)
SQUAMOUS # UR AUTO: SIGNIFICANT CHANGE UP
UROBILINOGEN FLD QL: SIGNIFICANT CHANGE UP
WBC # BLD: 7 K/UL — SIGNIFICANT CHANGE UP (ref 3.8–10.5)
WBC # FLD AUTO: 7 K/UL — SIGNIFICANT CHANGE UP (ref 3.8–10.5)
WBC UR QL: SIGNIFICANT CHANGE UP (ref 0–?)

## 2019-02-05 PROCEDURE — 85027 COMPLETE CBC AUTOMATED: CPT

## 2019-02-05 PROCEDURE — 99283 EMERGENCY DEPT VISIT LOW MDM: CPT

## 2019-02-05 PROCEDURE — 80074 ACUTE HEPATITIS PANEL: CPT

## 2019-02-05 PROCEDURE — 80053 COMPREHEN METABOLIC PANEL: CPT

## 2019-02-05 PROCEDURE — 96372 THER/PROPH/DIAG INJ SC/IM: CPT

## 2019-02-05 PROCEDURE — 99284 EMERGENCY DEPT VISIT MOD MDM: CPT | Mod: 25

## 2019-02-05 PROCEDURE — 86703 HIV-1/HIV-2 1 RESULT ANTBDY: CPT

## 2019-02-05 PROCEDURE — 87389 HIV-1 AG W/HIV-1&-2 AB AG IA: CPT

## 2019-02-05 PROCEDURE — 86780 TREPONEMA PALLIDUM: CPT

## 2019-02-05 PROCEDURE — 99284 EMERGENCY DEPT VISIT MOD MDM: CPT

## 2019-02-05 RX ORDER — RALTEGRAVIR 400 MG/1
1 TABLET, FILM COATED ORAL
Qty: 60 | Refills: 0
Start: 2019-02-05 | End: 2019-03-06

## 2019-02-05 RX ORDER — AZITHROMYCIN 500 MG/1
1000 TABLET, FILM COATED ORAL ONCE
Qty: 0 | Refills: 0 | Status: COMPLETED | OUTPATIENT
Start: 2019-02-05 | End: 2019-02-05

## 2019-02-05 RX ORDER — ONDANSETRON 8 MG/1
4 TABLET, FILM COATED ORAL ONCE
Qty: 0 | Refills: 0 | Status: COMPLETED | OUTPATIENT
Start: 2019-02-05 | End: 2019-02-05

## 2019-02-05 RX ORDER — EMTRICITABINE AND TENOFOVIR DISOPROXIL FUMARATE 200; 300 MG/1; MG/1
1 TABLET, FILM COATED ORAL ONCE
Qty: 0 | Refills: 0 | Status: COMPLETED | OUTPATIENT
Start: 2019-02-05 | End: 2019-02-05

## 2019-02-05 RX ORDER — METRONIDAZOLE 500 MG
2000 TABLET ORAL ONCE
Qty: 0 | Refills: 0 | Status: COMPLETED | OUTPATIENT
Start: 2019-02-05 | End: 2019-02-05

## 2019-02-05 RX ORDER — EMTRICITABINE AND TENOFOVIR DISOPROXIL FUMARATE 200; 300 MG/1; MG/1
1 TABLET, FILM COATED ORAL
Qty: 30 | Refills: 0
Start: 2019-02-05 | End: 2019-03-06

## 2019-02-05 RX ORDER — ONDANSETRON 8 MG/1
1 TABLET, FILM COATED ORAL
Qty: 90 | Refills: 0
Start: 2019-02-05 | End: 2019-03-06

## 2019-02-05 RX ORDER — CEFTRIAXONE 500 MG/1
250 INJECTION, POWDER, FOR SOLUTION INTRAMUSCULAR; INTRAVENOUS ONCE
Qty: 0 | Refills: 0 | Status: COMPLETED | OUTPATIENT
Start: 2019-02-05 | End: 2019-02-05

## 2019-02-05 RX ORDER — RALTEGRAVIR 400 MG/1
400 TABLET, FILM COATED ORAL ONCE
Qty: 0 | Refills: 0 | Status: COMPLETED | OUTPATIENT
Start: 2019-02-05 | End: 2019-02-05

## 2019-02-05 RX ADMIN — Medication 2000 MILLIGRAM(S): at 14:06

## 2019-02-05 RX ADMIN — CEFTRIAXONE 250 MILLIGRAM(S): 500 INJECTION, POWDER, FOR SOLUTION INTRAMUSCULAR; INTRAVENOUS at 14:05

## 2019-02-05 RX ADMIN — ONDANSETRON 4 MILLIGRAM(S): 8 TABLET, FILM COATED ORAL at 14:06

## 2019-02-05 RX ADMIN — EMTRICITABINE AND TENOFOVIR DISOPROXIL FUMARATE 1 TABLET(S): 200; 300 TABLET, FILM COATED ORAL at 14:05

## 2019-02-05 RX ADMIN — RALTEGRAVIR 400 MILLIGRAM(S): 400 TABLET, FILM COATED ORAL at 14:06

## 2019-02-05 RX ADMIN — AZITHROMYCIN 1000 MILLIGRAM(S): 500 TABLET, FILM COATED ORAL at 14:05
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
## 2019-02-05 NOTE — ED PROVIDER NOTE - PHYSICAL EXAMINATION
CON : NAD  EENT : EOMI, MMM  NECK : Full ROM  RESP : CTAB no increased WOB  EXT : No edema  NEURO : AAOX3
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
## 2019-02-05 NOTE — ED PROVIDER NOTE - NSFOLLOWUPCLINICS_GEN_ALL_ED_FT
[facility] - Infectious Disease  Infectious Disease  [address], [place] [address]  Phone: [phone]  Fax:   Follow Up Time:
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
## 2019-02-05 NOTE — ED PROVIDER NOTE - PMH
Cardiovascular/Thoracic Surgery Consultation: 3/9/2017     PMD:    Michele Aquino DO     Cardiologist: Oscar Colon MD    Requesting Physician: Oscar Colon MD    Reason for consultation: surgical evaluation of CAD    Chief Complaint: Abnormal Stress Test/Abnormal Heart Catheterization     Previous History:  Carrington Gordon is a 58 year old male with a past medical history of hypertension, dyslipidemia, right bundle branch block,  diabetes type 2 managed with oral agents (HgbA1c 7.4), chronic kidney disease Stage III (last creatinine 1.48, GFR 52), TIA 2013, severe PAD and current nicotine abuse. 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 Nova on 1/6/17, followed by radiation therapy with Dr. Ravi. As part of screening for metastasis, he underwent a CT angio on 1/27/17 demonstrating extensive atherosclerotic plaque in the visualized abdominal aorta resulting in moderate stenosis, an occlusion of the left renal artery with severe left renal atrophy, mild right renal artery stenosis with subtle decreased perfusion to the upper pole right kidney and mild stenosis of the SMA. Emphysema with paraseptal bulla without lymph node enlargement was noted per chest CT.  He was referred to Dr. Conway, who recommended an aortobifemoral bypass with a left common femoral endarterectomy. Surgery is scheduled for 3/27/17. A preoperative stress test was completed on 2/9/17 demonstrating inferoseptal ischemia with a normal ejection fraction. A follow up heart catheterization was completed on 2/22/17 demonstrating severe multivessel coronary artery disease including 25% stenosis of the left main, 55 % stenosis of the mid LAD, 75 % stenosis of the ramus intermedius, 80% stenosis of the mid to distal circumflex, 90% stenosis of the 2nd obtuse marginal, 50% stenosis of the proximal RCA and 100% stenosis of the mid RCA. The distal RCA was filled by  collaterals from the distal circumflex. A transthoracic echocardiogram was completed on 2/17/17 demonstrating borderline septal hypertrophy without obstruction and an ejection fraction of 62%. Per cardiology notes, the patient continued to deny exertional chest pain or dyspnea although his activity level remained limited due to bilateral lower extremity claudication symptoms. He was referred to my service to coronary artery bypass surgery.      HPI: He presents to to the clinic today accompanied by his step mother for surgical consultation regarding his coronary artery disease. The above history was reviewed, verified with the patient and noted to be accurate. He denies chest pain/pressure or difficulties breathing. Mr. Gordon expresses a desire to quit smoking and has started to decrease the frequency of his cigarette use. He continues to undergo radiation therapy for his left thigh Merkel cell carcinoma and is scheduled to complete therapy 3/20/17. Surgery with  will be delayed until myocardial revascularization is completed. He continues to experience claudication pain to his bilateral lower extremities. 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  Mr. Gordon is currently pain free and breathing at ease with no acute distress noted.     Past Medical History    Diabetes mellitus                                             Essential (primary) hypertension                              Hyperlipidemia                                                LBBB (left bundle branch block)                               Bell's palsy                                                  Malignant neoplasm                              01/06/2017      Comment: Merkel Cell of left thigh    Past Surgical History    EXTREMITY CYST EXCISION                         15 years *      Comment: groin    ALLERGIES:  No Known Allergies    Current Outpatient Prescriptions   Medication Sig   • fenofibrate micronized (LOFIBRA)  134 MG capsule TAKE 1 CAPSULE BY MOUTH DAILY BEFORE BREAKFAST   • BABY ASPIRIN PO Take 1 tablet by mouth daily.   • hydrochlorothiazide (HYDRODIURIL) 25 MG tablet Take 25 mg by mouth daily.   • glipiZIDE (GLUCOTROL) 10 MG tablet Take 10 mg by mouth 2 times daily (before meals).   • lisinopril (PRINIVIL,ZESTRIL) 40 MG tablet Take 40 mg by mouth daily.   • simvastatin (ZOCOR) 40 MG tablet Take 40 mg by mouth nightly.   • carvedilol (COREG) 6.25 MG tablet Take 6.25 mg by mouth 2 times daily (with meals).     No current facility-administered medications for this visit.        Social History     Social History   • Marital status: Single     Spouse name: N/A   • Number of children: N/A   • Years of education: N/A     Social History Main Topics   • Smoking status: Current Every Day Smoker     Packs/day: 1.50     Types: Cigarettes   • Smokeless tobacco: Never Used   • Alcohol use Yes      Comment: once per month   • Drug use: Yes     Special: Marijuana      Comment: last night 2/21/17   • Sexual activity: Not Currently     Other Topics Concern   • None     Social History Narrative       Living arrangements:   alone. Has one cat    Family History   Problem Relation Age of Onset   • Heart disease Father          Review of Systems:    General:denies  fevers, chills, weight change, night sweats, fatigue and appetite changes  Psych:denies  anxiety, depression, memory problems and insomnia  Neuro: reports lightheadedness with position changes. Paresthesias and numbness to bilateral lower extremities and left hand digits and denies seizures, headache, tremor, weakness, paralysis, dizziness and aphasia  HEENT: reports cold symptoms last week which have subsided and denies diplopia, blurred vision, eye pain, hearing loss, tinnitus, vertigo, sinusitis, congestion, epistaxis and dysphagia  Pulmonary: reports occasional cough and denies shortness of breath, wheezing, sputum production, hemoptysis and dyspnea on exertion  CV: reports  Anemia  bitamin b12 deficiency  Anxiety States    Benign Hypertension    Carcinoid Tumor of Ovary, Benign     Deliv    Depression with anxiety    Hypertension  Dx   Ovarian cyst  bilateral  Ovarian Cyst    Syndrome X (cardiac)    Tubal Ligation claudication and denies syncope, angina, palpitations, orthopnea, Paroxysmal nocturnal dyspnea and murmur  GI: denies nausea, vomiting, appetite, dysphagia, constipation, diarrhea, abdominal pain, heartburn, hematemesis and melena  : denies dysuria, frequency, hesitancy, urgency, infections, discharge, incontinence, nocturia and hematuria  MS: reports pain and joint stiffness and denies muscle weakness, instability, swelling, myalgias and fractures  Dermatology: reports left thigh 8 inch incision and denies rashes, sores, lumps, lesions and color changes  Endocrine: denies heat-cold intolerance, excessive sweating, polyuria, polydipsia and polyphagia  Heme/Lymph: denies anemia, easy bruising, bleeding, transfusions and lymphadenopathy    Physical Exam:    Visit Vitals   • /70 (BP Location: [facility], Patient Position: Sitting, Cuff Size: Regular)   • Pulse 70   • Resp 18   • Ht 5' 8\" (1.727 m)   • Wt 95.3 kg   • SpO2 98%   • BMI 31.93 kg/m2     Ht Readings from Last 1 Encounters:   03/09/17 5' 8\" (1.727 m)     Wt Readings from Last 1 Encounters:   03/09/17 95.3 kg     Body mass index is 31.93 kg/(m^2).      General: male, no acute distress, well developed, well nourished and well groomed  Eyes: normal sclerae, normal conjunctivae and normal lids  Mouth: dentition adequate repair, no cyanosis of oral mucosa and no pallor of oral mucosa  Neck: no trachea deviation/tenderness/masses and no JVD  Cardiovascular:normal sinus rhythm, no murmur, no carotid bruit and no edema  Extremities: no stasis changes/hx DVT/vein stripping/varicose veins in bilateral lower extremities, normal strength in bilateral upper extremities , normal gait/station and no cyanosis/clubbing of digits/nails in bilateral upper extremities  Respiratory: no wheezing/crackles/rhonchi/stridor and no accessory muscle use or retractions  Abdomen: no tenderness and no masses  Skin: warm, dry and left thigh with 8 inch incision well healed  Psych: alert,  no acute distress, oriented x 3 and normal mood and affect    Labs:  Labs reviewed, WNL except for highlighted values    Lab Results   Component Value Date    SODIUM 137 02/22/2017    POTASSIUM 4.6 02/22/2017    CHLORIDE 101 02/22/2017    CO2 27 02/22/2017    GLUCOSE 150 (H) 02/22/2017    BUN 24 (H) 02/22/2017    CREATININE 1.48 (H) 02/22/2017    CALCIUM 9.1 02/22/2017    ALBUMIN 3.3 (L) 02/22/2017    BILIRUBIN 0.3 02/22/2017    AST 19 12/29/2016    INR 1.0 02/22/2017     Lab Results   Component Value Date    WBC 11.3 (H) 02/22/2017    HGB 13.9 02/22/2017    HCT 40.6 02/22/2017     02/22/2017    TSH 1.915 02/22/2017       Diagnostics:  Heart Catheterization 2/22/17  Coronary Findings   Dominance: Right   Left Main    • Ost LM to LM lesion, 25% stenosed.      Left Anterior Descending    • Mid LAD lesion, 55% stenosed. FFR was performed. FFR measurement: 0.73.      Ramus Intermedius    • Ramus lesion, 75% stenosed.      Left Circumflex    • Mid Cx to Dist Cx lesion, 80% stenosed.    • Second Obtuse Marginal Branch    • 2nd Mrg lesion, 90% stenosed.      Right Coronary Artery   Dist RCA filled by collaterals from Dist Cx.    • Prox RCA lesion, 50% stenosed.    • Mid RCA lesion, 100% stenosed.      Coronary Diagrams   Diagnostic Diagram           Key Findings/Plan   Impression:  #1 severe multivessel coronary artery disease including moderate sized ramus intermedius, large marginal of the circumflex, mid right coronary artery, and a mid LAD lesion that his FFR positive.        Stress Test 2/9/17   IMPRESSION:  Apparent reversible ischemia in the inferoseptal wall.     Global ejection fraction: 75%.    Transthoracic Echocardiogram 2/7/17  IMPRESSION:  Normal left ventricular cavity size.  Borderline septal hypertrophy without obstruction.  Normal left ventricular systolic function.  Left ventricular ejection fraction, 62 %.  No regional wall motion abnormalities.  Probably normal diastolic function.  Normal right  ventricular size.  Mildly decreased right ventricular systolic function visually but all calculated systolic functional parameters are normal.  Aortic valve not well visualized but Doppler examination appears normal.  Right ventricular systolic pressure 8 mmHg.  Echo free space anterior to the right ventricle likely represents a fat pad.  Definity contrast was utilized to better visualize the endocardial definition.  No previous study.    Carotid Ultrasound 1/27/17  IMPRESSION:   1. No evidence of hemodynamically significant carotid stenosis.  2. Bilateral antegrade vertebral artery flow.    CT Angio Abdomen/Aorta 1/27/17  IMPRESSION:  1. Heavy atherosclerotic disease of the aorta with occluded right renal  artery and inferior mesenteric artery.  2. Occluded right common iliac artery with heavy disease of the right  external and internal iliac artery.  3. High-grade stenosis left common iliac artery proximally with heavy  disease of the left internal/external iliac artery. High-grade stenosis of  the left common femoral artery.  4. Three-vessel runoff bilaterally.    US ANKLE / BRACHIAL 1 OR 2 LEVEL EXTREMITY BILATERAL 1/27/17  IMPRESSION:   CARMELO of 0.45 on the right and 0.45 on the left consistent with severe  ischemia     Electrocardiogram 1/4/17  IMPRESSION:  Normal sinus rhythm   Right bundle branch block   Abnormal ECG   No previous ECGs available    CT Chest 1/4/17  IMPRESSION:  1. No evidence of metastatic disease in the chest.  2. Emphysema with paraseptal bulla.  3. No enlarged lymph nodes in the chest.  4. Extensive atherosclerotic plaque in the visualized abdominal aorta  resulting in moderate stenosis. Occlusion of the left renal artery with  severe left renal atrophy. Mild right renal artery stenosis with subtle  decreased perfusion to the upper pole right kidney. Mild stenosis of the  SMA.    Impression/Plan: Today I met Mr. Carrington Gordon who is a very pleasant 58 year old man with hypertension,  dyslipidemia, diabetes type 2, severe PAD, prior TIA and current nicotine abuse.  Work up has ultimately led to a heart catheterization demonstrating severe multivessel coronary artery disease. He therefore meets indications for surgical myocardial revascularization with endoscopic vein harvest. We have gone over the operation which will be likely a CABG x 4 using full cardiopulmonary bypass.  We have gone over risks including death and stroke.  He agrees and consented to proceed.  We will place him on the schedule in the near future.  He was encouraged to quit smoking prior to surgery. Standard preoperative testing to be completed prior to surgery with the addition of PFT's. Patient will continue Aspirin until day of surgery. I thank Dr. Colon very much for the opportunity to see Mr. Gordon.     Risks, benefits, and alternatives were discussed with the patient and he agrees to proceed.     Thank you for allowing us to participate in the care of your patient. If you have any questions or concerns, please do not hesitate to contact my office.     Dr. Teddy Michael    CC: MD Oscar Beckman MD    On 3/9/2017, I, Opal Pereira RN scribed the services personally performed by Dr. Teddy Michael.    The documentation recorded by the scribe accurately and completely reflects the service(s) I personally performed and the decisions made by me.
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
## 2019-02-06 LAB
C TRACH RRNA SPEC QL NAA+PROBE: SIGNIFICANT CHANGE UP
N GONORRHOEA RRNA SPEC QL NAA+PROBE: SIGNIFICANT CHANGE UP
SPECIMEN SOURCE: SIGNIFICANT CHANGE UP
T PALLIDUM AB TITR SER: NEGATIVE — SIGNIFICANT CHANGE UP

## 2019-02-18 ENCOUNTER — EMERGENCY (EMERGENCY)
Facility: HOSPITAL | Age: 44
LOS: 1 days | Discharge: HOME | End: 2019-02-18
Admitting: EMERGENCY MEDICINE
Payer: COMMERCIAL

## 2019-02-18 VITALS
RESPIRATION RATE: 16 BRPM | OXYGEN SATURATION: 100 % | SYSTOLIC BLOOD PRESSURE: 119 MMHG | TEMPERATURE: 99 F | DIASTOLIC BLOOD PRESSURE: 83 MMHG | HEART RATE: 88 BPM

## 2019-02-18 DIAGNOSIS — Z98.51 TUBAL LIGATION STATUS: Chronic | ICD-10-CM

## 2019-02-18 DIAGNOSIS — D36.9 BENIGN NEOPLASM, UNSPECIFIED SITE: Chronic | ICD-10-CM

## 2019-02-18 DIAGNOSIS — Z98.89 OTHER SPECIFIED POSTPROCEDURAL STATES: Chronic | ICD-10-CM

## 2019-02-18 PROCEDURE — 99283 EMERGENCY DEPT VISIT LOW MDM: CPT

## 2019-02-18 NOTE — ED PROVIDER NOTE - PMH
Anemia  bitamin b12 deficiency  Anxiety States    Benign Hypertension    Carcinoid Tumor of Ovary, Benign     Deliv    Depression with anxiety    Hypertension  Dx 2001  Ovarian cyst  bilateral  Ovarian Cyst    PTSD (post-traumatic stress disorder)    Syndrome X (cardiac)    Tubal Ligation
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
## 2019-02-18 NOTE — ED PROVIDER NOTE - CLINICAL SUMMARY MEDICAL DECISION MAKING FREE TEXT BOX
44 y/o female presents to  for depression.  Physical examination completed and unremarkable for any acute issues/problems requiring immediate interventions.  PT will f/u with the [facility] tomorrow for out patient f/u for PTSD.  Pt cleared for discharge with [facility] information with discharge paperwork.

## 2019-02-18 NOTE — ED ADULT NURSE NOTE - NSIMPLEMENTINTERV_GEN_ALL_ED
Implemented All Universal Safety Interventions:  [place] to call system. Call bell, personal items and telephone within reach. Instruct patient to call for assistance. Room bathroom lighting operational. Non-slip footwear when patient is off stretcher. Physically safe environment: no spills, clutter or unnecessary equipment. Stretcher in lowest position, wheels locked, appropriate side rails in place.

## 2019-02-18 NOTE — ED ADULT NURSE REASSESSMENT NOTE - NS ED NURSE REASSESS COMMENT FT1
Patient evaluated and cleared by BON Cage for discharge, discharge instructions given, pt verbalized understanding and left ER a&ox3.

## 2019-02-19 ENCOUNTER — OUTPATIENT (OUTPATIENT)
Dept: OUTPATIENT SERVICES | Facility: HOSPITAL | Age: 44
LOS: 1 days | Discharge: HOME | End: 2019-02-19

## 2019-02-19 DIAGNOSIS — Z98.51 TUBAL LIGATION STATUS: Chronic | ICD-10-CM

## 2019-02-19 DIAGNOSIS — Z98.89 OTHER SPECIFIED POSTPROCEDURAL STATES: Chronic | ICD-10-CM

## 2019-02-19 DIAGNOSIS — D36.9 BENIGN NEOPLASM, UNSPECIFIED SITE: Chronic | ICD-10-CM

## 2019-03-13 DIAGNOSIS — F32.9 MAJOR DEPRESSIVE DISORDER, SINGLE EPISODE, UNSPECIFIED: ICD-10-CM

## 2019-03-19 ENCOUNTER — APPOINTMENT (OUTPATIENT)
Dept: PULMONOLOGY | Facility: CLINIC | Age: 44
End: 2019-03-19

## 2019-03-22 ENCOUNTER — EMERGENCY (EMERGENCY)
Facility: HOSPITAL | Age: 44
LOS: 1 days | Discharge: HOME | End: 2019-03-22
Attending: EMERGENCY MEDICINE
Payer: COMMERCIAL

## 2019-03-22 VITALS
DIASTOLIC BLOOD PRESSURE: 92 MMHG | TEMPERATURE: 99 F | RESPIRATION RATE: 16 BRPM | OXYGEN SATURATION: 99 % | SYSTOLIC BLOOD PRESSURE: 128 MMHG | HEART RATE: 88 BPM

## 2019-03-22 VITALS
OXYGEN SATURATION: 100 % | SYSTOLIC BLOOD PRESSURE: 170 MMHG | RESPIRATION RATE: 18 BRPM | HEART RATE: 110 BPM | WEIGHT: 128.97 LBS | DIASTOLIC BLOOD PRESSURE: 90 MMHG | TEMPERATURE: 98 F | HEIGHT: 61 IN

## 2019-03-22 DIAGNOSIS — Z98.51 TUBAL LIGATION STATUS: Chronic | ICD-10-CM

## 2019-03-22 DIAGNOSIS — Z98.89 OTHER SPECIFIED POSTPROCEDURAL STATES: Chronic | ICD-10-CM

## 2019-03-22 DIAGNOSIS — D36.9 BENIGN NEOPLASM, UNSPECIFIED SITE: Chronic | ICD-10-CM

## 2019-03-22 PROBLEM — F43.10 POST-TRAUMATIC STRESS DISORDER, UNSPECIFIED: Chronic | Status: ACTIVE | Noted: 2019-02-18

## 2019-03-22 LAB
ALBUMIN SERPL ELPH-MCNC: 4 G/DL — SIGNIFICANT CHANGE UP (ref 3.5–5)
ALP SERPL-CCNC: 65 U/L — SIGNIFICANT CHANGE UP (ref 40–120)
ALT FLD-CCNC: 30 U/L DA — SIGNIFICANT CHANGE UP (ref 10–60)
ANION GAP SERPL CALC-SCNC: 3 MMOL/L — LOW (ref 5–17)
APPEARANCE UR: CLEAR — SIGNIFICANT CHANGE UP
AST SERPL-CCNC: 29 U/L — SIGNIFICANT CHANGE UP (ref 10–40)
BASOPHILS # BLD AUTO: 0.04 K/UL — SIGNIFICANT CHANGE UP (ref 0–0.2)
BASOPHILS NFR BLD AUTO: 0.3 % — SIGNIFICANT CHANGE UP (ref 0–2)
BILIRUB SERPL-MCNC: 0.2 MG/DL — SIGNIFICANT CHANGE UP (ref 0.2–1.2)
BILIRUB UR-MCNC: NEGATIVE — SIGNIFICANT CHANGE UP
BUN SERPL-MCNC: 11 MG/DL — SIGNIFICANT CHANGE UP (ref 7–18)
CALCIUM SERPL-MCNC: 8.3 MG/DL — LOW (ref 8.4–10.5)
CHLORIDE SERPL-SCNC: 111 MMOL/L — HIGH (ref 96–108)
CO2 SERPL-SCNC: 26 MMOL/L — SIGNIFICANT CHANGE UP (ref 22–31)
COLOR SPEC: YELLOW — SIGNIFICANT CHANGE UP
CREAT SERPL-MCNC: 0.66 MG/DL — SIGNIFICANT CHANGE UP (ref 0.5–1.3)
DIFF PNL FLD: NEGATIVE — SIGNIFICANT CHANGE UP
EOSINOPHIL # BLD AUTO: 0.06 K/UL — SIGNIFICANT CHANGE UP (ref 0–0.5)
EOSINOPHIL NFR BLD AUTO: 0.4 % — SIGNIFICANT CHANGE UP (ref 0–6)
GLUCOSE SERPL-MCNC: 102 MG/DL — HIGH (ref 70–99)
GLUCOSE UR QL: NEGATIVE — SIGNIFICANT CHANGE UP
HCG SERPL-ACNC: <1 MIU/ML — SIGNIFICANT CHANGE UP
HCT VFR BLD CALC: 42.3 % — SIGNIFICANT CHANGE UP (ref 34.5–45)
HGB BLD-MCNC: 14.3 G/DL — SIGNIFICANT CHANGE UP (ref 11.5–15.5)
IMM GRANULOCYTES NFR BLD AUTO: 0.3 % — SIGNIFICANT CHANGE UP (ref 0–1.5)
KETONES UR-MCNC: NEGATIVE — SIGNIFICANT CHANGE UP
LEUKOCYTE ESTERASE UR-ACNC: NEGATIVE — SIGNIFICANT CHANGE UP
LIDOCAIN IGE QN: 268 U/L — SIGNIFICANT CHANGE UP (ref 73–393)
LYMPHOCYTES # BLD AUTO: 1.75 K/UL — SIGNIFICANT CHANGE UP (ref 1–3.3)
LYMPHOCYTES # BLD AUTO: 11.8 % — LOW (ref 13–44)
MCHC RBC-ENTMCNC: 31 PG — SIGNIFICANT CHANGE UP (ref 27–34)
MCHC RBC-ENTMCNC: 33.8 GM/DL — SIGNIFICANT CHANGE UP (ref 32–36)
MCV RBC AUTO: 91.8 FL — SIGNIFICANT CHANGE UP (ref 80–100)
MONOCYTES # BLD AUTO: 0.82 K/UL — SIGNIFICANT CHANGE UP (ref 0–0.9)
MONOCYTES NFR BLD AUTO: 5.5 % — SIGNIFICANT CHANGE UP (ref 2–14)
NEUTROPHILS # BLD AUTO: 12.09 K/UL — HIGH (ref 1.8–7.4)
NEUTROPHILS NFR BLD AUTO: 81.7 % — HIGH (ref 43–77)
NITRITE UR-MCNC: NEGATIVE — SIGNIFICANT CHANGE UP
NRBC # BLD: 0 /100 WBCS — SIGNIFICANT CHANGE UP (ref 0–0)
PH UR: 5 — SIGNIFICANT CHANGE UP (ref 5–8)
PLATELET # BLD AUTO: 330 K/UL — SIGNIFICANT CHANGE UP (ref 150–400)
POTASSIUM SERPL-MCNC: 4.1 MMOL/L — SIGNIFICANT CHANGE UP (ref 3.5–5.3)
POTASSIUM SERPL-SCNC: 4.1 MMOL/L — SIGNIFICANT CHANGE UP (ref 3.5–5.3)
PROT SERPL-MCNC: 7.3 G/DL — SIGNIFICANT CHANGE UP (ref 6–8.3)
PROT UR-MCNC: NEGATIVE — SIGNIFICANT CHANGE UP
RBC # BLD: 4.61 M/UL — SIGNIFICANT CHANGE UP (ref 3.8–5.2)
RBC # FLD: 12.9 % — SIGNIFICANT CHANGE UP (ref 10.3–14.5)
SODIUM SERPL-SCNC: 140 MMOL/L — SIGNIFICANT CHANGE UP (ref 135–145)
SP GR SPEC: 1.01 — SIGNIFICANT CHANGE UP (ref 1.01–1.02)
UROBILINOGEN FLD QL: NEGATIVE — SIGNIFICANT CHANGE UP
WBC # BLD: 14.8 K/UL — HIGH (ref 3.8–10.5)
WBC # FLD AUTO: 14.8 K/UL — HIGH (ref 3.8–10.5)

## 2019-03-22 PROCEDURE — 81003 URINALYSIS AUTO W/O SCOPE: CPT

## 2019-03-22 PROCEDURE — 99284 EMERGENCY DEPT VISIT MOD MDM: CPT | Mod: 25

## 2019-03-22 PROCEDURE — 96374 THER/PROPH/DIAG INJ IV PUSH: CPT | Mod: XU

## 2019-03-22 PROCEDURE — 99285 EMERGENCY DEPT VISIT HI MDM: CPT

## 2019-03-22 PROCEDURE — 36415 COLL VENOUS BLD VENIPUNCTURE: CPT

## 2019-03-22 PROCEDURE — 74177 CT ABD & PELVIS W/CONTRAST: CPT | Mod: 26

## 2019-03-22 PROCEDURE — 80053 COMPREHEN METABOLIC PANEL: CPT

## 2019-03-22 PROCEDURE — 84702 CHORIONIC GONADOTROPIN TEST: CPT

## 2019-03-22 PROCEDURE — 96375 TX/PRO/DX INJ NEW DRUG ADDON: CPT

## 2019-03-22 PROCEDURE — 83690 ASSAY OF LIPASE: CPT

## 2019-03-22 PROCEDURE — 85027 COMPLETE CBC AUTOMATED: CPT

## 2019-03-22 PROCEDURE — 74177 CT ABD & PELVIS W/CONTRAST: CPT

## 2019-03-22 RX ORDER — ONDANSETRON 8 MG/1
4 TABLET, FILM COATED ORAL ONCE
Qty: 0 | Refills: 0 | Status: COMPLETED | OUTPATIENT
Start: 2019-03-22 | End: 2019-03-22

## 2019-03-22 RX ORDER — MORPHINE SULFATE 50 MG/1
4 CAPSULE, EXTENDED RELEASE ORAL ONCE
Qty: 0 | Refills: 0 | Status: DISCONTINUED | OUTPATIENT
Start: 2019-03-22 | End: 2019-03-22

## 2019-03-22 RX ORDER — KETOROLAC TROMETHAMINE 30 MG/ML
30 SYRINGE (ML) INJECTION ONCE
Qty: 0 | Refills: 0 | Status: DISCONTINUED | OUTPATIENT
Start: 2019-03-22 | End: 2019-03-22

## 2019-03-22 RX ORDER — SODIUM CHLORIDE 9 MG/ML
1000 INJECTION INTRAMUSCULAR; INTRAVENOUS; SUBCUTANEOUS ONCE
Qty: 0 | Refills: 0 | Status: COMPLETED | OUTPATIENT
Start: 2019-03-22 | End: 2019-03-22

## 2019-03-22 RX ADMIN — MORPHINE SULFATE 4 MILLIGRAM(S): 50 CAPSULE, EXTENDED RELEASE ORAL at 09:57

## 2019-03-22 RX ADMIN — SODIUM CHLORIDE 1000 MILLILITER(S): 9 INJECTION INTRAMUSCULAR; INTRAVENOUS; SUBCUTANEOUS at 09:57

## 2019-03-22 RX ADMIN — MORPHINE SULFATE 4 MILLIGRAM(S): 50 CAPSULE, EXTENDED RELEASE ORAL at 10:06

## 2019-03-22 RX ADMIN — ONDANSETRON 4 MILLIGRAM(S): 8 TABLET, FILM COATED ORAL at 09:57

## 2019-03-22 RX ADMIN — Medication 30 MILLIGRAM(S): at 12:15

## 2019-03-22 NOTE — ED PROVIDER NOTE - PROGRESS NOTE DETAILS
CT shows involuting cyst.  normal appendix.  pt given copy of all results.  Will d/c with analgesia, clinic follow up.

## 2019-03-22 NOTE — ED PROVIDER NOTE - OBJECTIVE STATEMENT
45 y/o F with an extensive PMHx which includes Anemia, HTN, Syndrome X, Depression, Anxiety and a PSHx of tubal ligation, , ovarian cystectomy presents to ED c/o abd pain x 3 days. Pain is located on both RLQ/LLQ but is worse on right. NKDA.

## 2019-03-22 NOTE — ED ADULT NURSE NOTE - OBJECTIVE STATEMENT
Pt states has RLQ abd pain with nausea and vomiting x 3 days, getting worse this morning  Denies fever, chills, vag bleed

## 2019-03-26 ENCOUNTER — EMERGENCY (EMERGENCY)
Facility: HOSPITAL | Age: 44
LOS: 1 days | Discharge: HOME | End: 2019-03-26
Attending: EMERGENCY MEDICINE
Payer: COMMERCIAL

## 2019-03-26 ENCOUNTER — APPOINTMENT (OUTPATIENT)
Dept: OBGYN | Facility: CLINIC | Age: 44
End: 2019-03-26
Payer: COMMERCIAL

## 2019-03-26 VITALS
OXYGEN SATURATION: 97 % | WEIGHT: 130.07 LBS | RESPIRATION RATE: 18 BRPM | HEART RATE: 101 BPM | TEMPERATURE: 98 F | DIASTOLIC BLOOD PRESSURE: 90 MMHG | SYSTOLIC BLOOD PRESSURE: 135 MMHG

## 2019-03-26 DIAGNOSIS — Z98.51 TUBAL LIGATION STATUS: Chronic | ICD-10-CM

## 2019-03-26 DIAGNOSIS — D36.9 BENIGN NEOPLASM, UNSPECIFIED SITE: Chronic | ICD-10-CM

## 2019-03-26 DIAGNOSIS — Z98.89 OTHER SPECIFIED POSTPROCEDURAL STATES: Chronic | ICD-10-CM

## 2019-03-26 LAB
ALBUMIN SERPL ELPH-MCNC: 4.2 G/DL — SIGNIFICANT CHANGE UP (ref 3.3–5)
ALP SERPL-CCNC: 57 U/L — SIGNIFICANT CHANGE UP (ref 40–120)
ALT FLD-CCNC: 16 U/L — SIGNIFICANT CHANGE UP (ref 10–45)
ANION GAP SERPL CALC-SCNC: 14 MMOL/L — SIGNIFICANT CHANGE UP (ref 5–17)
APPEARANCE UR: CLEAR — SIGNIFICANT CHANGE UP
AST SERPL-CCNC: 18 U/L — SIGNIFICANT CHANGE UP (ref 10–40)
BACTERIA # UR AUTO: NEGATIVE — SIGNIFICANT CHANGE UP
BASOPHILS # BLD AUTO: 0 K/UL — SIGNIFICANT CHANGE UP (ref 0–0.2)
BASOPHILS NFR BLD AUTO: 0.4 % — SIGNIFICANT CHANGE UP (ref 0–2)
BILIRUB SERPL-MCNC: 0.2 MG/DL — SIGNIFICANT CHANGE UP (ref 0.2–1.2)
BILIRUB UR-MCNC: NEGATIVE — SIGNIFICANT CHANGE UP
BUN SERPL-MCNC: 16 MG/DL — SIGNIFICANT CHANGE UP (ref 7–23)
CALCIUM SERPL-MCNC: 9.5 MG/DL — SIGNIFICANT CHANGE UP (ref 8.4–10.5)
CHLORIDE SERPL-SCNC: 104 MMOL/L — SIGNIFICANT CHANGE UP (ref 96–108)
CO2 SERPL-SCNC: 23 MMOL/L — SIGNIFICANT CHANGE UP (ref 22–31)
COLOR SPEC: SIGNIFICANT CHANGE UP
CREAT SERPL-MCNC: 0.64 MG/DL — SIGNIFICANT CHANGE UP (ref 0.5–1.3)
DIFF PNL FLD: NEGATIVE — SIGNIFICANT CHANGE UP
EOSINOPHIL # BLD AUTO: 0.1 K/UL — SIGNIFICANT CHANGE UP (ref 0–0.5)
EOSINOPHIL NFR BLD AUTO: 1.7 % — SIGNIFICANT CHANGE UP (ref 0–6)
EPI CELLS # UR: 1 /HPF — SIGNIFICANT CHANGE UP
GAS PNL BLDV: SIGNIFICANT CHANGE UP
GLUCOSE SERPL-MCNC: 99 MG/DL — SIGNIFICANT CHANGE UP (ref 70–99)
GLUCOSE UR QL: NEGATIVE — SIGNIFICANT CHANGE UP
HCG SERPL-ACNC: <2 MIU/ML — SIGNIFICANT CHANGE UP
HCT VFR BLD CALC: 41.8 % — SIGNIFICANT CHANGE UP (ref 34.5–45)
HGB BLD-MCNC: 14.3 G/DL — SIGNIFICANT CHANGE UP (ref 11.5–15.5)
HYALINE CASTS # UR AUTO: 0 /LPF — SIGNIFICANT CHANGE UP (ref 0–2)
KETONES UR-MCNC: NEGATIVE — SIGNIFICANT CHANGE UP
LEUKOCYTE ESTERASE UR-ACNC: NEGATIVE — SIGNIFICANT CHANGE UP
LIDOCAIN IGE QN: 47 U/L — SIGNIFICANT CHANGE UP (ref 7–60)
LYMPHOCYTES # BLD AUTO: 2.6 K/UL — SIGNIFICANT CHANGE UP (ref 1–3.3)
LYMPHOCYTES # BLD AUTO: 36.5 % — SIGNIFICANT CHANGE UP (ref 13–44)
MCHC RBC-ENTMCNC: 31.5 PG — SIGNIFICANT CHANGE UP (ref 27–34)
MCHC RBC-ENTMCNC: 34.1 GM/DL — SIGNIFICANT CHANGE UP (ref 32–36)
MCV RBC AUTO: 92.2 FL — SIGNIFICANT CHANGE UP (ref 80–100)
MONOCYTES # BLD AUTO: 0.5 K/UL — SIGNIFICANT CHANGE UP (ref 0–0.9)
MONOCYTES NFR BLD AUTO: 6.9 % — SIGNIFICANT CHANGE UP (ref 2–14)
NEUTROPHILS # BLD AUTO: 3.9 K/UL — SIGNIFICANT CHANGE UP (ref 1.8–7.4)
NEUTROPHILS NFR BLD AUTO: 54.6 % — SIGNIFICANT CHANGE UP (ref 43–77)
NITRITE UR-MCNC: NEGATIVE — SIGNIFICANT CHANGE UP
PH UR: 6 — SIGNIFICANT CHANGE UP (ref 5–8)
PLATELET # BLD AUTO: 353 K/UL — SIGNIFICANT CHANGE UP (ref 150–400)
POTASSIUM SERPL-MCNC: 4.1 MMOL/L — SIGNIFICANT CHANGE UP (ref 3.5–5.3)
POTASSIUM SERPL-SCNC: 4.1 MMOL/L — SIGNIFICANT CHANGE UP (ref 3.5–5.3)
PROT SERPL-MCNC: 6.8 G/DL — SIGNIFICANT CHANGE UP (ref 6–8.3)
PROT UR-MCNC: NEGATIVE — SIGNIFICANT CHANGE UP
RBC # BLD: 4.53 M/UL — SIGNIFICANT CHANGE UP (ref 3.8–5.2)
RBC # FLD: 12 % — SIGNIFICANT CHANGE UP (ref 10.3–14.5)
RBC CASTS # UR COMP ASSIST: 0 /HPF — SIGNIFICANT CHANGE UP (ref 0–4)
SODIUM SERPL-SCNC: 141 MMOL/L — SIGNIFICANT CHANGE UP (ref 135–145)
SP GR SPEC: 1.02 — SIGNIFICANT CHANGE UP (ref 1.01–1.02)
UROBILINOGEN FLD QL: NEGATIVE — SIGNIFICANT CHANGE UP
WBC # BLD: 7.2 K/UL — SIGNIFICANT CHANGE UP (ref 3.8–10.5)
WBC # FLD AUTO: 7.2 K/UL — SIGNIFICANT CHANGE UP (ref 3.8–10.5)
WBC UR QL: 0 /HPF — SIGNIFICANT CHANGE UP (ref 0–5)

## 2019-03-26 PROCEDURE — 85014 HEMATOCRIT: CPT

## 2019-03-26 PROCEDURE — 93975 VASCULAR STUDY: CPT | Mod: 26

## 2019-03-26 PROCEDURE — 84295 ASSAY OF SERUM SODIUM: CPT

## 2019-03-26 PROCEDURE — 99284 EMERGENCY DEPT VISIT MOD MDM: CPT | Mod: 25

## 2019-03-26 PROCEDURE — 99203 OFFICE O/P NEW LOW 30 MIN: CPT

## 2019-03-26 PROCEDURE — 93975 VASCULAR STUDY: CPT

## 2019-03-26 PROCEDURE — 84702 CHORIONIC GONADOTROPIN TEST: CPT

## 2019-03-26 PROCEDURE — 96375 TX/PRO/DX INJ NEW DRUG ADDON: CPT

## 2019-03-26 PROCEDURE — 83605 ASSAY OF LACTIC ACID: CPT

## 2019-03-26 PROCEDURE — 83690 ASSAY OF LIPASE: CPT

## 2019-03-26 PROCEDURE — 74177 CT ABD & PELVIS W/CONTRAST: CPT | Mod: 26

## 2019-03-26 PROCEDURE — 74177 CT ABD & PELVIS W/CONTRAST: CPT

## 2019-03-26 PROCEDURE — 96374 THER/PROPH/DIAG INJ IV PUSH: CPT | Mod: XU

## 2019-03-26 PROCEDURE — 82435 ASSAY OF BLOOD CHLORIDE: CPT

## 2019-03-26 PROCEDURE — 80053 COMPREHEN METABOLIC PANEL: CPT

## 2019-03-26 PROCEDURE — 82330 ASSAY OF CALCIUM: CPT

## 2019-03-26 PROCEDURE — 85027 COMPLETE CBC AUTOMATED: CPT

## 2019-03-26 PROCEDURE — 82803 BLOOD GASES ANY COMBINATION: CPT

## 2019-03-26 PROCEDURE — 82947 ASSAY GLUCOSE BLOOD QUANT: CPT

## 2019-03-26 PROCEDURE — 81001 URINALYSIS AUTO W/SCOPE: CPT

## 2019-03-26 PROCEDURE — 87086 URINE CULTURE/COLONY COUNT: CPT

## 2019-03-26 PROCEDURE — 99284 EMERGENCY DEPT VISIT MOD MDM: CPT

## 2019-03-26 PROCEDURE — 76830 TRANSVAGINAL US NON-OB: CPT | Mod: 26

## 2019-03-26 PROCEDURE — 76830 TRANSVAGINAL US NON-OB: CPT

## 2019-03-26 PROCEDURE — 84132 ASSAY OF SERUM POTASSIUM: CPT

## 2019-03-26 RX ORDER — ACETAMINOPHEN 500 MG
975 TABLET ORAL ONCE
Qty: 0 | Refills: 0 | Status: COMPLETED | OUTPATIENT
Start: 2019-03-26 | End: 2019-03-26

## 2019-03-26 RX ORDER — SODIUM CHLORIDE 9 MG/ML
1000 INJECTION INTRAMUSCULAR; INTRAVENOUS; SUBCUTANEOUS ONCE
Qty: 0 | Refills: 0 | Status: COMPLETED | OUTPATIENT
Start: 2019-03-26 | End: 2019-03-26

## 2019-03-26 RX ORDER — ONDANSETRON 8 MG/1
4 TABLET, FILM COATED ORAL ONCE
Qty: 0 | Refills: 0 | Status: COMPLETED | OUTPATIENT
Start: 2019-03-26 | End: 2019-03-26

## 2019-03-26 RX ORDER — FAMOTIDINE 10 MG/ML
20 INJECTION INTRAVENOUS ONCE
Qty: 0 | Refills: 0 | Status: COMPLETED | OUTPATIENT
Start: 2019-03-26 | End: 2019-03-26

## 2019-03-26 RX ADMIN — Medication 975 MILLIGRAM(S): at 18:58

## 2019-03-26 RX ADMIN — FAMOTIDINE 20 MILLIGRAM(S): 10 INJECTION INTRAVENOUS at 18:56

## 2019-03-26 RX ADMIN — ONDANSETRON 4 MILLIGRAM(S): 8 TABLET, FILM COATED ORAL at 19:00

## 2019-03-26 RX ADMIN — SODIUM CHLORIDE 2000 MILLILITER(S): 9 INJECTION INTRAMUSCULAR; INTRAVENOUS; SUBCUTANEOUS at 18:56

## 2019-03-26 RX ADMIN — Medication 30 MILLILITER(S): at 18:59

## 2019-03-26 NOTE — ED ADULT NURSE NOTE - NSIMPLEMENTINTERV_GEN_ALL_ED
Implemented All Universal Safety Interventions:  [place] to call system. Call bell, personal items and telephone within reach. Instruct patient to call for assistance. Room bathroom lighting operational. Non-slip footwear when patient is off stretcher. Physically safe environment: no spills, clutter or unnecessary equipment. Stretcher in lowest position, wheels locked, appropriate side rails in place.
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
## 2019-03-26 NOTE — ED PROVIDER NOTE - PROGRESS NOTE DETAILS
pt re-evaluated. continues to have pain. ct reveals no appendicitis, does show heterogeneous L adnexa. TVUS recommended. discussed with patient obtaining outpatient vs in ED, she wishes to have performed now. ordered. to monitor. - Shane Baltazar MD Resident: patient is upset, feels "nothing can be done," wants to go home. Tolerated PO here, ambulatory, appears well. Labs and imaging results reviewed with patient. Will dc home with outpatient follow-up. Return precautions reviewed.

## 2019-03-26 NOTE — ED ADULT NURSE REASSESSMENT NOTE - NS ED NURSE REASSESS COMMENT FT1
Report received from Dayne GAMEZ, VS repeated. Patient resting comfortably, denies chest pain/SOB/pain. Pending CT scan,provided with oral contrast, pt verbalized understanding of contrast prior to CT scan

## 2019-03-26 NOTE — ED ADULT NURSE NOTE - OBJECTIVE STATEMENT
44 y f came to the ed c/o rlq abdominal pain. states going to a different ed and receiving a ct scan. patient states pain continued although she was d/c from the ed. patient is a/ox3. denies any fevers, chills, chest pain, sob. states pain started last wednesday. c/o nausea. skin is warm and dry.

## 2019-03-26 NOTE — ED PROVIDER NOTE - SHIFT CHANGE DETAILS
Signed to to Dr Kulkarni at this time, pending TVUS, re-eval, plan for likely d/c. - Shane Baltazar MD

## 2019-03-26 NOTE — ED PROVIDER NOTE - CHPI ED SYMPTOMS NEG
no sick contact, no recent travel, no rash, no hematemesis/no fever/no blood in stool/no diarrhea/no nausea/no burning urination

## 2019-03-26 NOTE — ED PROVIDER NOTE - ATTENDING CONTRIBUTION TO CARE
44F, , pmh bipolar disorder, ptsd, htn, asthma, cardiac syndrome x, s/p total hysterectomy with only L ovary remaining, prseents with worsening RLQ pain, nausea, vomiting. Sx x 6 days. [facility] d/c'd s/p ct showing no acute pathology. No fever, diarrhea, melena, hematochezia, dysuria, hematuria.    PE: NAD, NCAT, MMM, Trachea midline, Normal conjunctiva, lungs CTAB, S1/S2 RRR, Normal perfusion, 2+ radial pulses bilat, Abdomen Soft, +RLQ ttp, No rebound/guarding, No LE edema, No deformity of extremities, No rashes,  No focal motor or sensory deficits.     +RLQ ttp, concerning for appendicitis, ddx also includes but not limited to diverticulitis, colitis, abd strain. Obtain labs, ct a/p, give analgesia as needed, re-eval. - Shane Baltazar MD

## 2019-03-26 NOTE — ED ADULT NURSE NOTE - NS ED NOTE  TALK SOMEONE YN
Called pt and advised of below. They are done with their antibiotics.      The patient indicates understanding of these issues and agrees with the plan.  Faxed forms to [employer].    Laura Ding RN  [facility]    
Called pt and advised that her labs were indicative of pertussis, however, not definitive.  Advised completion of zpak before going out in public.  Sent over tessalon pearles and robitussin with codeine for cough relief.  If worsening please RTC.  Recommended Tdap booster once feeling better.      Please report to [facility].        Martita Armendariz MS, PA-C    
Reason for Call:  Request for results:    Name of test or procedure: pertusis results lab    Date of test of procedure: 9/26/2018    Location of the test or procedure: [place]    OK to leave the result message on voice mail or with a family member? YES    Phone number Patient can be reached at:  Home number on file [phone] (home)    Additional comments: wants call today to review because she has had to miss events per instruction to wait for results.    Call taken on 9/29/2018 at 10:10 AM by Ping Man    
No

## 2019-03-26 NOTE — ED PROVIDER NOTE - OBJECTIVE STATEMENT
44F, /A4, with PMHx depression, anxiety, bipolar disorder, PTSD, cardiac syndrome X, HTN, asthma, PSHx of total hysterectomy (L ovary remaining), , Lamictal, rameron, trazedol, metoprolol, amlodipine, presents to the ED with complaints of progressively worsening RLQ pain and emesis, onset x6 days ago. Pt was seen at [facility] x4 days ago where CT was remarkable for L ovarian cysts. Pt was discharged home with WBC of ~15 and pain control medication. Endorses episode of shooting, sharp pain yesterday. Admits to chills. Pt was directed to the ED with concern for appendicitis. Denies fever, nausea, blood in stool, hematemesis, diarrhea, burning urination, sick contact, recent travel, rash, or any other complaints at this time. Last BM this morning. Allergies: cipro. 44F, /A4, with PMHx depression, anxiety, bipolar disorder, PTSD, cardiac syndrome X, HTN, asthma, PSHx of total hysterectomy (L ovary remaining), , Lamictal, rameron, trazedol, metoprolol, amlodipine, presents to the ED with complaints of progressively worsening RLQ pain and emesis, onset x6 days ago. Pt was seen at [facility] x3 days ago where CT was remarkable for L ovarian cysts and free fluid in the pelvis. Endorses episode of shooting, sharp pain yesterday. Admits to chills. Pt was directed to the ED with concern for appendicitis. Denies fever, nausea, blood in stool, hematemesis, diarrhea, burning urination, sick contact, recent travel, rash, or any other complaints at this time. Last BM this morning. Allergies: cipro.

## 2019-03-26 NOTE — ED PROVIDER NOTE - NSFOLLOWUPINSTRUCTIONS_ED_ALL_ED_FT
Stay well-hydrated. Follow-up with your primary care doctor in 24-48hrs. Return immediately to the emergency department if any worsening or concerning symptoms.

## 2019-03-26 NOTE — ED PROVIDER NOTE - ABDOMINAL TENDER
+McBurney's point tenderness, +obturator sign, +psoas sign, +Rovsing's sign, Negative Rios's/epigastric

## 2019-03-27 VITALS
OXYGEN SATURATION: 98 % | TEMPERATURE: 98 F | HEART RATE: 85 BPM | SYSTOLIC BLOOD PRESSURE: 106 MMHG | RESPIRATION RATE: 16 BRPM | DIASTOLIC BLOOD PRESSURE: 65 MMHG

## 2019-03-27 LAB
CULTURE RESULTS: NO GROWTH — SIGNIFICANT CHANGE UP
SPECIMEN SOURCE: SIGNIFICANT CHANGE UP

## 2019-05-09 NOTE — ED ADULT TRIAGE NOTE - AS O2 DELIVERY
Debridement Text (Will Only Render In Visit Note If You Select Debridement Option Under Who Performed The Pdt Field): Prior to application of the photodynamic medication the hyperkeratotic lesions were curetted to make them more amenable to therapy. room air

## 2019-06-13 ENCOUNTER — TRANSCRIPTION ENCOUNTER (OUTPATIENT)
Age: 44
End: 2019-06-13

## 2019-10-04 NOTE — ED PROVIDER NOTE - CHIEF COMPLAINT
You can reach your [place] Care Team any time of the day by calling [phone]. This number will put you in touch with the 24 hour nurse line if the clinic is closed.    To contact your OB/GYN Surgery Scheduler please call [phone]. This is a direct number for your care team between 8 a.m. and 4 p.m. Monday through Friday.    [facility] is open for your convenience: [phone]  Monday through Friday 8 a.m. to 8:30 p.m.  Saturday 9 a.m. to 6 p.m.  Sunday Noon to 6 p.m.    They are closed on all major holidays.     The patient is a 43y Female complaining of see chief complaint quote.

## 2020-02-10 ENCOUNTER — EMERGENCY (EMERGENCY)
Facility: HOSPITAL | Age: 45
LOS: 1 days | Discharge: HOME | End: 2020-02-10
Attending: STUDENT IN AN ORGANIZED HEALTH CARE EDUCATION/TRAINING PROGRAM | Admitting: STUDENT IN AN ORGANIZED HEALTH CARE EDUCATION/TRAINING PROGRAM
Payer: COMMERCIAL

## 2020-02-10 VITALS
OXYGEN SATURATION: 100 % | RESPIRATION RATE: 16 BRPM | DIASTOLIC BLOOD PRESSURE: 83 MMHG | HEART RATE: 72 BPM | SYSTOLIC BLOOD PRESSURE: 130 MMHG | TEMPERATURE: 98 F

## 2020-02-10 VITALS
RESPIRATION RATE: 16 BRPM | HEART RATE: 91 BPM | DIASTOLIC BLOOD PRESSURE: 85 MMHG | SYSTOLIC BLOOD PRESSURE: 128 MMHG | OXYGEN SATURATION: 100 % | TEMPERATURE: 98 F

## 2020-02-10 DIAGNOSIS — D36.9 BENIGN NEOPLASM, UNSPECIFIED SITE: Chronic | ICD-10-CM

## 2020-02-10 DIAGNOSIS — Z98.89 OTHER SPECIFIED POSTPROCEDURAL STATES: Chronic | ICD-10-CM

## 2020-02-10 DIAGNOSIS — Z98.51 TUBAL LIGATION STATUS: Chronic | ICD-10-CM

## 2020-02-10 LAB
ALBUMIN SERPL ELPH-MCNC: 4.5 G/DL — SIGNIFICANT CHANGE UP (ref 3.3–5)
ALP SERPL-CCNC: 57 U/L — SIGNIFICANT CHANGE UP (ref 40–120)
ALT FLD-CCNC: 8 U/L — SIGNIFICANT CHANGE UP (ref 4–33)
ANION GAP SERPL CALC-SCNC: 9 MMO/L — SIGNIFICANT CHANGE UP (ref 7–14)
APPEARANCE UR: SIGNIFICANT CHANGE UP
AST SERPL-CCNC: 15 U/L — SIGNIFICANT CHANGE UP (ref 4–32)
BACTERIA # UR AUTO: SIGNIFICANT CHANGE UP
BASOPHILS # BLD AUTO: 0.03 K/UL — SIGNIFICANT CHANGE UP (ref 0–0.2)
BASOPHILS NFR BLD AUTO: 0.3 % — SIGNIFICANT CHANGE UP (ref 0–2)
BILIRUB SERPL-MCNC: 0.3 MG/DL — SIGNIFICANT CHANGE UP (ref 0.2–1.2)
BILIRUB UR-MCNC: NEGATIVE — SIGNIFICANT CHANGE UP
BLD GP AB SCN SERPL QL: NEGATIVE — SIGNIFICANT CHANGE UP
BLOOD UR QL VISUAL: NEGATIVE — SIGNIFICANT CHANGE UP
BUN SERPL-MCNC: 6 MG/DL — LOW (ref 7–23)
CALCIUM SERPL-MCNC: 10.2 MG/DL — SIGNIFICANT CHANGE UP (ref 8.4–10.5)
CHLORIDE SERPL-SCNC: 106 MMOL/L — SIGNIFICANT CHANGE UP (ref 98–107)
CO2 SERPL-SCNC: 23 MMOL/L — SIGNIFICANT CHANGE UP (ref 22–31)
COLOR SPEC: YELLOW — SIGNIFICANT CHANGE UP
CREAT SERPL-MCNC: 0.74 MG/DL — SIGNIFICANT CHANGE UP (ref 0.5–1.3)
EOSINOPHIL # BLD AUTO: 0.1 K/UL — SIGNIFICANT CHANGE UP (ref 0–0.5)
EOSINOPHIL NFR BLD AUTO: 1.1 % — SIGNIFICANT CHANGE UP (ref 0–6)
GLUCOSE SERPL-MCNC: 79 MG/DL — SIGNIFICANT CHANGE UP (ref 70–99)
GLUCOSE UR-MCNC: NEGATIVE — SIGNIFICANT CHANGE UP
HCT VFR BLD CALC: 44.6 % — SIGNIFICANT CHANGE UP (ref 34.5–45)
HGB BLD-MCNC: 14.7 G/DL — SIGNIFICANT CHANGE UP (ref 11.5–15.5)
HYALINE CASTS # UR AUTO: NEGATIVE — SIGNIFICANT CHANGE UP
IMM GRANULOCYTES NFR BLD AUTO: 0.3 % — SIGNIFICANT CHANGE UP (ref 0–1.5)
INR BLD: 0.99 — SIGNIFICANT CHANGE UP (ref 0.88–1.17)
KETONES UR-MCNC: NEGATIVE — SIGNIFICANT CHANGE UP
LEUKOCYTE ESTERASE UR-ACNC: NEGATIVE — SIGNIFICANT CHANGE UP
LIDOCAIN IGE QN: 101.9 U/L — HIGH (ref 7–60)
LYMPHOCYTES # BLD AUTO: 1.99 K/UL — SIGNIFICANT CHANGE UP (ref 1–3.3)
LYMPHOCYTES # BLD AUTO: 22.8 % — SIGNIFICANT CHANGE UP (ref 13–44)
MCHC RBC-ENTMCNC: 30.3 PG — SIGNIFICANT CHANGE UP (ref 27–34)
MCHC RBC-ENTMCNC: 33 % — SIGNIFICANT CHANGE UP (ref 32–36)
MCV RBC AUTO: 92 FL — SIGNIFICANT CHANGE UP (ref 80–100)
MONOCYTES # BLD AUTO: 0.42 K/UL — SIGNIFICANT CHANGE UP (ref 0–0.9)
MONOCYTES NFR BLD AUTO: 4.8 % — SIGNIFICANT CHANGE UP (ref 2–14)
NEUTROPHILS # BLD AUTO: 6.14 K/UL — SIGNIFICANT CHANGE UP (ref 1.8–7.4)
NEUTROPHILS NFR BLD AUTO: 70.7 % — SIGNIFICANT CHANGE UP (ref 43–77)
NITRITE UR-MCNC: NEGATIVE — SIGNIFICANT CHANGE UP
NRBC # FLD: 0 K/UL — SIGNIFICANT CHANGE UP (ref 0–0)
PH UR: 6.5 — SIGNIFICANT CHANGE UP (ref 5–8)
PLATELET # BLD AUTO: 363 K/UL — SIGNIFICANT CHANGE UP (ref 150–400)
PMV BLD: 8.8 FL — SIGNIFICANT CHANGE UP (ref 7–13)
POTASSIUM SERPL-MCNC: 4.4 MMOL/L — SIGNIFICANT CHANGE UP (ref 3.5–5.3)
POTASSIUM SERPL-SCNC: 4.4 MMOL/L — SIGNIFICANT CHANGE UP (ref 3.5–5.3)
PROT SERPL-MCNC: 6.9 G/DL — SIGNIFICANT CHANGE UP (ref 6–8.3)
PROT UR-MCNC: 20 — SIGNIFICANT CHANGE UP
PROTHROM AB SERPL-ACNC: 11 SEC — SIGNIFICANT CHANGE UP (ref 9.8–13.1)
RBC # BLD: 4.85 M/UL — SIGNIFICANT CHANGE UP (ref 3.8–5.2)
RBC # FLD: 12.4 % — SIGNIFICANT CHANGE UP (ref 10.3–14.5)
RBC CASTS # UR COMP ASSIST: SIGNIFICANT CHANGE UP (ref 0–?)
RH IG SCN BLD-IMP: POSITIVE — SIGNIFICANT CHANGE UP
SODIUM SERPL-SCNC: 138 MMOL/L — SIGNIFICANT CHANGE UP (ref 135–145)
SP GR SPEC: 1.02 — SIGNIFICANT CHANGE UP (ref 1–1.04)
SQUAMOUS # UR AUTO: SIGNIFICANT CHANGE UP
UROBILINOGEN FLD QL: NORMAL — SIGNIFICANT CHANGE UP
WBC # BLD: 8.71 K/UL — SIGNIFICANT CHANGE UP (ref 3.8–10.5)
WBC # FLD AUTO: 8.71 K/UL — SIGNIFICANT CHANGE UP (ref 3.8–10.5)
WBC UR QL: SIGNIFICANT CHANGE UP (ref 0–?)

## 2020-02-10 PROCEDURE — 76830 TRANSVAGINAL US NON-OB: CPT | Mod: 26

## 2020-02-10 PROCEDURE — 99284 EMERGENCY DEPT VISIT MOD MDM: CPT

## 2020-02-10 PROCEDURE — 74177 CT ABD & PELVIS W/CONTRAST: CPT | Mod: 26

## 2020-02-10 RX ORDER — MORPHINE SULFATE 50 MG/1
4 CAPSULE, EXTENDED RELEASE ORAL ONCE
Refills: 0 | Status: DISCONTINUED | OUTPATIENT
Start: 2020-02-10 | End: 2020-02-10

## 2020-02-10 RX ORDER — SODIUM CHLORIDE 9 MG/ML
1000 INJECTION INTRAMUSCULAR; INTRAVENOUS; SUBCUTANEOUS ONCE
Refills: 0 | Status: COMPLETED | OUTPATIENT
Start: 2020-02-10 | End: 2020-02-10

## 2020-02-10 RX ORDER — FLUCONAZOLE 150 MG/1
1 TABLET ORAL
Qty: 1 | Refills: 0
Start: 2020-02-10 | End: 2020-02-10

## 2020-02-10 RX ORDER — KETOROLAC TROMETHAMINE 30 MG/ML
15 SYRINGE (ML) INJECTION ONCE
Refills: 0 | Status: DISCONTINUED | OUTPATIENT
Start: 2020-02-10 | End: 2020-02-10

## 2020-02-10 RX ORDER — MORPHINE SULFATE 50 MG/1
2 CAPSULE, EXTENDED RELEASE ORAL ONCE
Refills: 0 | Status: DISCONTINUED | OUTPATIENT
Start: 2020-02-10 | End: 2020-02-10

## 2020-02-10 RX ORDER — ONDANSETRON 8 MG/1
4 TABLET, FILM COATED ORAL ONCE
Refills: 0 | Status: COMPLETED | OUTPATIENT
Start: 2020-02-10 | End: 2020-02-10

## 2020-02-10 RX ORDER — IBUPROFEN 200 MG
1 TABLET ORAL
Qty: 16 | Refills: 0
Start: 2020-02-10 | End: 2020-02-17

## 2020-02-10 RX ADMIN — MORPHINE SULFATE 2 MILLIGRAM(S): 50 CAPSULE, EXTENDED RELEASE ORAL at 15:14

## 2020-02-10 RX ADMIN — SODIUM CHLORIDE 1000 MILLILITER(S): 9 INJECTION INTRAMUSCULAR; INTRAVENOUS; SUBCUTANEOUS at 11:00

## 2020-02-10 RX ADMIN — MORPHINE SULFATE 4 MILLIGRAM(S): 50 CAPSULE, EXTENDED RELEASE ORAL at 11:55

## 2020-02-10 RX ADMIN — ONDANSETRON 4 MILLIGRAM(S): 8 TABLET, FILM COATED ORAL at 12:00

## 2020-02-10 RX ADMIN — SODIUM CHLORIDE 1000 MILLILITER(S): 9 INJECTION INTRAMUSCULAR; INTRAVENOUS; SUBCUTANEOUS at 16:05

## 2020-02-10 RX ADMIN — MORPHINE SULFATE 4 MILLIGRAM(S): 50 CAPSULE, EXTENDED RELEASE ORAL at 11:00

## 2020-02-10 RX ADMIN — MORPHINE SULFATE 2 MILLIGRAM(S): 50 CAPSULE, EXTENDED RELEASE ORAL at 15:44

## 2020-02-10 RX ADMIN — Medication 15 MILLIGRAM(S): at 16:06

## 2020-02-10 RX ADMIN — Medication 15 MILLIGRAM(S): at 16:36

## 2020-02-10 NOTE — ED PROVIDER NOTE - MUSCULOSKELETAL, MLM
Spine appears normal, range of motion is not limited, no muscle or joint tenderness., FROM of bilateral upper and lower extremities

## 2020-02-10 NOTE — ED PROVIDER NOTE - PROGRESS NOTE DETAILS
Labs reviewed. Lipase 101.9 slightly elevated. Transvaginal sonogram- Hemorrhagic left ovarian cyst measuring up to 2.7 cm. Moderate complex free fluid in the pelvis, likely hemorrhagic. No evidence of ovarian torsion. CT a/p reviewed--no appendicitis. Abdomen reassessed--Will discharge patient home with primary care and Gynecology follow up in 2-3 days. Strict ER return instructions given. Discussed with Dr. Hamilton CT a/p reviewed--no appendicitis. Abdomen reassessed--tender suprapubic, but patient admits that she it is improved. She is agreeable to getting motrin 800mg q12 for pain control as well as Diflucan for a yeast infection. Will discharge patient home with primary care and Gynecology follow up in 2-3 days.  She was given morphine 2mg at 3:15pm. Will discharge at 7pm. Patient has steady gait, alert and oriented. Strict ER return instructions given.  Discussed with Dr. Hamilton who agreed with discharge planning. CT a/p reviewed--no appendicitis. Abdomen reassessed--tender suprapubic, but patient admits that she it is improved. She is agreeable to getting Motrin 800mg q12 for pain control as well as Diflucan for a yeast infection. Will discharge patient home with primary care and Gynecology follow up in 2-3 days.  She was given morphine 2mg at 3:15pm. Will discharge at 7pm. Patient has steady gait, alert and oriented. Strict ER return instructions given.  Discussed with Dr. Hamilton who agreed with discharge planning. Patient wishes to go home. IV heplock removed from right AC. D/c instructions provided. Nurse, Marcelina made aware. Patient left ER with steady gait.

## 2020-02-10 NOTE — ED PROVIDER NOTE - PATIENT PORTAL LINK FT
You can access the FollowMyHealth Patient Portal offered by [facility] by registering at the following website: [email or web address]. By joining [employer]’s FollowMyHealth portal, you will also be able to view your health information using other applications (apps) compatible with our system.

## 2020-02-10 NOTE — ED PROVIDER NOTE - ATTENDING CONTRIBUTION TO CARE
I performed a history and physical exam of the patient and discussed their management with the PA/NP.  I reviewed the ACP's note and agree with the documented findings and plan of care except as noted below. My medical decision making and observations are as follows:    44F pmh HTN, s/p hysterectomy (but believes she still has right ovary), bipolar d/o, anxiety, ptsd p/w acute onset suprapubic and RLQ pain associated with 3 episodes of vomiting yesterday.  Denies current nausea/vomiting, f/c, diarrhea.  Denies dysuria but feels "pressure" with urination.  Denies vaginal discharge or bleeding.  Pt appears mildly uncomfortable, heart rrr, lungs cta, abd soft nondistended but with TTP to RLQ and suprapubic region, no cva ttp.  Concern for appendicitis, torsion, possible pyelo - will check labs, urine, TVUS, CT, give pain control and reassess.

## 2020-02-10 NOTE — ED PROVIDER NOTE - OBJECTIVE STATEMENT
44 year old female with PMH of GERD, HTN, cardiac X syndrome ("heart spasms" as per patient), Bipolar, Anxiety, PTSD presents to ED on her own with complaint of constant sharp suprapubic pain with radiation to RLQ x 1 day with associated nausea and three episodes of vomiting. Prior to arrival she took Tums, GasX and Laxative without relief of symptoms. She has a history of hysterectomy, right oophorectomy in 2013.   Sexually active with male partner.   Denies fever, chest pain, sob, diarrhea, urinary symptoms, vaginal discharge, back pain.

## 2020-02-10 NOTE — ED ADULT TRIAGE NOTE - CHIEF COMPLAINT QUOTE
Patient c/o pelvic pain since yesterday. Pain increases with urination and certain movements. History of HTN, ovarian cysts, hysterectomy. Denies vaginal bleeding discharge, hematuria, dysuria.

## 2020-02-10 NOTE — ED PROVIDER NOTE - NSFOLLOWUPINSTRUCTIONS_ED_ALL_ED_FT
-Please take medications as discussed and read pharmacy insert for instructions.   -Please follow up with your Gynecologist in 2-3 days.   -Please return to ER if you have worsening symptoms including fever of 100.4 and higher not relieved by anti-fever medications, chest pain, shortness of breath, nausea, vomiting, abdominal pain, back pain, extremity weakness, headache, dizziness, urinary symptoms, cough, coughing up blood.

## 2020-02-10 NOTE — ED ADULT NURSE NOTE - OBJECTIVE STATEMENT
Pt received to intake room 4. Pt comes to ED c/o lower abdominal pain & "some" nausea with 1 episode of vomiting starting yesterday. Pt states she thought she had "gas pains," took other the counter gas relief medications but did not have pain relief. Pain continued into today, pt appears uncomfortable, states she is "unable to tolerate the pain." Abdomen is soft but tender to lower quadrants bilat, respirations even & unlabored, lung sounds clear, heart sounds normal. States her last bowel movement was last night after taking laxatives (as a way to relieve pain) and had a "small amount" come out. States she is irregular and can go "6 days without having a bowel movement," which is her norm. Denies chest pain, dyspnea, diarrhea, weakness, dizziness, headache, palpitations, cough, fever, chills. Hx of hysterectomy. Reports smoking 1 pack of cigarettes per day, uses ETOH and marijuana "socially." Pt is A&Ox4, ambulates independently. 20 G IV placed to right AC.

## 2020-02-10 NOTE — SBIRT NOTE ADULT - NSSBIRTINJURYRES_GEN_A_CORE
Progress Notes by Charlie Underwood MD at 04/10/18 08:46 AM     Author:  Charlie Underwood MD Service:  (none) Author Type:  Physician     Filed:  04/10/18 08:57 AM Encounter Date:  4/10/2018 Status:  Signed     :  Charlie Underwood MD (Physician)            SUBJECTIVE  This is a 68 year old patient status post cataract surgery times one day in the right eye.  Patient is[LL1.1T] doing well and no complaints[LL1.1M]. Patient is taking[LL1.1T] Vigamox, Ketorolac and Prednisolone[LL1.1M] four times a day in the operated eye.    OBJECTIVE:  Vision                       RIGHT:[LL1.1T] without correction[LL1.1M]  20/70[LL1.2M]    ph=[LL1.1T] NI[LL1.2M]    Tonometry    Method[LL1.1T]  Goldmann with Flurox[LL1.1M]   Time: 8:46 AM                       RIGHT:[LL1.1T]  19[LL1.2M] mm Hg[LL1.1T]    Electronically Signed by:    Alyssa Sahu , 4/10/2018[LL1.3T]    Cornea                    RIGHT:[LL1.1T] 2+ edema[JC1.1M]  Anterior Chamber    RIGHT:[LL1.1T] 2+ cells[JC1.1M]  Lens                        RIGHT:[LL1.1T] - PCIOL centered[JC1.1M]                                       ASSESSMENT:  Imp - Status cataract surgery in[LL1.1T] the right eye[JC1.1M] times one day. Pt.[LL1.1T] has corneal edema[JC1.1M].    PLAN  Use[LL1.1T] Vigamox, Ketorolac and Prednisolone[JC1.1M] four times a day in[LL1.1T] the right eye[JC1.1M]. F/U in[LL1.1T] 2 weeks[JC1.1M].[LL1.1T]    Electronically Signed by:    Charlie Underwood MD , 4/10/2018[JC1.1T]       Revision History        User Key Date/Time User Provider Type Action    > JC1.1 04/10/18 08:57 AM Charlie Underwood MD Physician Sign     LL1.2 04/10/18 08:51 AM Alyssa Sahu Technician Sign at close encounter     LL1.3 04/10/18 08:47 AM Alyssa Sahu Technician      LL1.1 04/10/18 08:46 AM Alyssa Sahu Technician     M - Manual, T - Template            
No

## 2020-02-11 LAB
BACTERIA UR CULT: SIGNIFICANT CHANGE UP
SPECIMEN SOURCE: SIGNIFICANT CHANGE UP

## 2020-04-01 ENCOUNTER — APPOINTMENT (OUTPATIENT)
Dept: OBGYN | Facility: CLINIC | Age: 45
End: 2020-04-01

## 2020-04-15 ENCOUNTER — TRANSCRIPTION ENCOUNTER (OUTPATIENT)
Age: 45
End: 2020-04-15

## 2020-07-15 ENCOUNTER — APPOINTMENT (OUTPATIENT)
Dept: OBGYN | Facility: CLINIC | Age: 45
End: 2020-07-15

## 2020-07-30 ENCOUNTER — APPOINTMENT (OUTPATIENT)
Dept: PULMONOLOGY | Facility: CLINIC | Age: 45
End: 2020-07-30

## 2020-08-02 ENCOUNTER — EMERGENCY (EMERGENCY)
Facility: HOSPITAL | Age: 45
LOS: 1 days | Discharge: HOME | End: 2020-08-02
Attending: EMERGENCY MEDICINE | Admitting: EMERGENCY MEDICINE
Payer: COMMERCIAL

## 2020-08-02 VITALS
SYSTOLIC BLOOD PRESSURE: 152 MMHG | RESPIRATION RATE: 17 BRPM | TEMPERATURE: 98 F | DIASTOLIC BLOOD PRESSURE: 103 MMHG | HEART RATE: 87 BPM | OXYGEN SATURATION: 100 %

## 2020-08-02 VITALS
SYSTOLIC BLOOD PRESSURE: 155 MMHG | OXYGEN SATURATION: 100 % | RESPIRATION RATE: 16 BRPM | DIASTOLIC BLOOD PRESSURE: 98 MMHG | HEART RATE: 86 BPM

## 2020-08-02 DIAGNOSIS — Z98.89 OTHER SPECIFIED POSTPROCEDURAL STATES: Chronic | ICD-10-CM

## 2020-08-02 DIAGNOSIS — Z98.51 TUBAL LIGATION STATUS: Chronic | ICD-10-CM

## 2020-08-02 DIAGNOSIS — D36.9 BENIGN NEOPLASM, UNSPECIFIED SITE: Chronic | ICD-10-CM

## 2020-08-02 LAB
ALBUMIN SERPL ELPH-MCNC: 4.6 G/DL — SIGNIFICANT CHANGE UP (ref 3.3–5)
ALP SERPL-CCNC: 71 U/L — SIGNIFICANT CHANGE UP (ref 40–120)
ALT FLD-CCNC: 11 U/L — SIGNIFICANT CHANGE UP (ref 4–33)
AMPHET UR-MCNC: NEGATIVE — SIGNIFICANT CHANGE UP
ANION GAP SERPL CALC-SCNC: 14 MMO/L — SIGNIFICANT CHANGE UP (ref 7–14)
APAP SERPL-MCNC: < 15 UG/ML — LOW (ref 15–25)
APPEARANCE UR: CLEAR — SIGNIFICANT CHANGE UP
AST SERPL-CCNC: 18 U/L — SIGNIFICANT CHANGE UP (ref 4–32)
BARBITURATES UR SCN-MCNC: NEGATIVE — SIGNIFICANT CHANGE UP
BASOPHILS # BLD AUTO: 0.03 K/UL — SIGNIFICANT CHANGE UP (ref 0–0.2)
BASOPHILS NFR BLD AUTO: 0.2 % — SIGNIFICANT CHANGE UP (ref 0–2)
BENZODIAZ UR-MCNC: NEGATIVE — SIGNIFICANT CHANGE UP
BILIRUB SERPL-MCNC: 0.4 MG/DL — SIGNIFICANT CHANGE UP (ref 0.2–1.2)
BILIRUB UR-MCNC: NEGATIVE — SIGNIFICANT CHANGE UP
BLOOD UR QL VISUAL: NEGATIVE — SIGNIFICANT CHANGE UP
BUN SERPL-MCNC: 7 MG/DL — SIGNIFICANT CHANGE UP (ref 7–23)
CALCIUM SERPL-MCNC: 9.4 MG/DL — SIGNIFICANT CHANGE UP (ref 8.4–10.5)
CANNABINOIDS UR-MCNC: POSITIVE — SIGNIFICANT CHANGE UP
CHLORIDE SERPL-SCNC: 104 MMOL/L — SIGNIFICANT CHANGE UP (ref 98–107)
CO2 SERPL-SCNC: 20 MMOL/L — LOW (ref 22–31)
COCAINE METAB.OTHER UR-MCNC: NEGATIVE — SIGNIFICANT CHANGE UP
COLOR SPEC: SIGNIFICANT CHANGE UP
CREAT SERPL-MCNC: 0.81 MG/DL — SIGNIFICANT CHANGE UP (ref 0.5–1.3)
EOSINOPHIL # BLD AUTO: 0.06 K/UL — SIGNIFICANT CHANGE UP (ref 0–0.5)
EOSINOPHIL NFR BLD AUTO: 0.4 % — SIGNIFICANT CHANGE UP (ref 0–6)
ETHANOL BLD-MCNC: < 10 MG/DL — SIGNIFICANT CHANGE UP
GLUCOSE SERPL-MCNC: 107 MG/DL — HIGH (ref 70–99)
GLUCOSE UR-MCNC: NEGATIVE — SIGNIFICANT CHANGE UP
HCT VFR BLD CALC: 44.7 % — SIGNIFICANT CHANGE UP (ref 34.5–45)
HGB BLD-MCNC: 15.1 G/DL — SIGNIFICANT CHANGE UP (ref 11.5–15.5)
HIV COMBO RESULT: SIGNIFICANT CHANGE UP
HIV1+2 AB SPEC QL: SIGNIFICANT CHANGE UP
IMM GRANULOCYTES NFR BLD AUTO: 0.4 % — SIGNIFICANT CHANGE UP (ref 0–1.5)
KETONES UR-MCNC: NEGATIVE — SIGNIFICANT CHANGE UP
LEUKOCYTE ESTERASE UR-ACNC: NEGATIVE — SIGNIFICANT CHANGE UP
LITHIUM SERPL-MCNC: 0.9 MMOL/L — SIGNIFICANT CHANGE UP (ref 0.6–1.2)
LYMPHOCYTES # BLD AUTO: 1.46 K/UL — SIGNIFICANT CHANGE UP (ref 1–3.3)
LYMPHOCYTES # BLD AUTO: 10.4 % — LOW (ref 13–44)
MCHC RBC-ENTMCNC: 30.7 PG — SIGNIFICANT CHANGE UP (ref 27–34)
MCHC RBC-ENTMCNC: 33.8 % — SIGNIFICANT CHANGE UP (ref 32–36)
MCV RBC AUTO: 90.9 FL — SIGNIFICANT CHANGE UP (ref 80–100)
METHADONE UR-MCNC: NEGATIVE — SIGNIFICANT CHANGE UP
MONOCYTES # BLD AUTO: 0.54 K/UL — SIGNIFICANT CHANGE UP (ref 0–0.9)
MONOCYTES NFR BLD AUTO: 3.8 % — SIGNIFICANT CHANGE UP (ref 2–14)
NEUTROPHILS # BLD AUTO: 11.9 K/UL — HIGH (ref 1.8–7.4)
NEUTROPHILS NFR BLD AUTO: 84.8 % — HIGH (ref 43–77)
NITRITE UR-MCNC: NEGATIVE — SIGNIFICANT CHANGE UP
NRBC # FLD: 0 K/UL — SIGNIFICANT CHANGE UP (ref 0–0)
OPIATES UR-MCNC: NEGATIVE — SIGNIFICANT CHANGE UP
OXYCODONE UR-MCNC: NEGATIVE — SIGNIFICANT CHANGE UP
PCP UR-MCNC: NEGATIVE — SIGNIFICANT CHANGE UP
PH UR: 6.5 — SIGNIFICANT CHANGE UP (ref 5–8)
PLATELET # BLD AUTO: 407 K/UL — HIGH (ref 150–400)
PMV BLD: 8.7 FL — SIGNIFICANT CHANGE UP (ref 7–13)
POTASSIUM SERPL-MCNC: 4 MMOL/L — SIGNIFICANT CHANGE UP (ref 3.5–5.3)
POTASSIUM SERPL-SCNC: 4 MMOL/L — SIGNIFICANT CHANGE UP (ref 3.5–5.3)
PROT SERPL-MCNC: 6.8 G/DL — SIGNIFICANT CHANGE UP (ref 6–8.3)
PROT UR-MCNC: NEGATIVE — SIGNIFICANT CHANGE UP
RBC # BLD: 4.92 M/UL — SIGNIFICANT CHANGE UP (ref 3.8–5.2)
RBC # FLD: 12.2 % — SIGNIFICANT CHANGE UP (ref 10.3–14.5)
SALICYLATES SERPL-MCNC: < 5 MG/DL — LOW (ref 15–30)
SODIUM SERPL-SCNC: 138 MMOL/L — SIGNIFICANT CHANGE UP (ref 135–145)
SP GR SPEC: 1.01 — SIGNIFICANT CHANGE UP (ref 1–1.04)
TSH SERPL-MCNC: 1.65 UIU/ML — SIGNIFICANT CHANGE UP (ref 0.27–4.2)
UROBILINOGEN FLD QL: NORMAL — SIGNIFICANT CHANGE UP
WBC # BLD: 14.04 K/UL — HIGH (ref 3.8–10.5)
WBC # FLD AUTO: 14.04 K/UL — HIGH (ref 3.8–10.5)

## 2020-08-02 PROCEDURE — 99285 EMERGENCY DEPT VISIT HI MDM: CPT | Mod: 25

## 2020-08-02 PROCEDURE — 93010 ELECTROCARDIOGRAM REPORT: CPT

## 2020-08-02 RX ORDER — SODIUM CHLORIDE 9 MG/ML
1000 INJECTION INTRAMUSCULAR; INTRAVENOUS; SUBCUTANEOUS ONCE
Refills: 0 | Status: COMPLETED | OUTPATIENT
Start: 2020-08-02 | End: 2020-08-02

## 2020-08-02 RX ADMIN — Medication 1 MILLIGRAM(S): at 09:52

## 2020-08-02 RX ADMIN — SODIUM CHLORIDE 1000 MILLILITER(S): 9 INJECTION INTRAMUSCULAR; INTRAVENOUS; SUBCUTANEOUS at 09:53

## 2020-08-02 NOTE — ED PROVIDER NOTE - PROGRESS NOTE DETAILS
Pt no longer shaking/tremoring, states she feels better, admitted upon re-eval that her brother recently had accidental overdose, states this may have attributed to her anxiety. Labs/EKG reviewed, stable to be discharged.

## 2020-08-02 NOTE — ED PROVIDER NOTE - PLAN OF CARE
Advance activity as tolerated.  Continue all previously prescribed medications as directed unless otherwise instructed.  Follow up with your primary care physician in 48-72 hours- bring copies of your results.  Return to the ER for worsening or persistent symptoms, and/or ANY NEW OR CONCERNING SYMPTOMS. If you have issues obtaining follow up, please call: [phone] to obtain a doctor or specialist who takes your insurance in your area.  You may call [phone] to make an appointment with the internal medicine clinic.

## 2020-08-02 NOTE — ED PROVIDER NOTE - CARE PLAN
Principal Discharge DX:	Anxiety  Assessment and plan of treatment:	Advance activity as tolerated.  Continue all previously prescribed medications as directed unless otherwise instructed.  Follow up with your primary care physician in 48-72 hours- bring copies of your results.  Return to the ER for worsening or persistent symptoms, and/or ANY NEW OR CONCERNING SYMPTOMS. If you have issues obtaining follow up, please call: [phone] to obtain a doctor or specialist who takes your insurance in your area.  You may call [phone] to make an appointment with the internal medicine clinic.

## 2020-08-02 NOTE — ED PROVIDER NOTE - ATTENDING CONTRIBUTION TO CARE
Attending Statement: I have reviewed and agree with all pertinent clinical information, including history and physical exam and agree with treatment plan of the PA, except as noted.  44yo M PMH of GERD, HTN, cardiac X syndrome ("heart spasms" as per patient), Bipolar, Anxiety, PTSD pw feeling "shaking" and "feeling hangover" x one week. Worse this morning, states "woke up and was entire body shaking" "like hang over" no trauma. no cp no change in vision no slurred speech no focal weakness or numbness no abdominal pain. PT states only change in med was increase in lithium from 600 to 900mg  Denies any A/V hallucination. no SI or HI. Adherent to meds. Denies hx of HIV   Vital signs noted. laying in bed, no distress. EOMI. MMM. no tongue fasciculation. no slurred speech no facial asymmetry supple neck. normal S1-S2 No resp distress. able to speak in full and clear sentences. no wheeze, rales or stridor. soft nontender abdomen. no  rebound. no guarding. no sign of trauma. no CVAT no pedal edema. no calf tenderness. normal pulses bilateral feet. AOx3, no focal deficits. CN 2-12 grossly intact. nl gait. no meningeal signs. neg pronator drift not tremulous  plan labs, ekg, tox, re assess

## 2020-08-02 NOTE — ED PROVIDER NOTE - NSFOLLOWUPINSTRUCTIONS_ED_ALL_ED_FT
Advance activity as tolerated.  Continue all previously prescribed medications as directed unless otherwise instructed.  Follow up with your primary care physician in 48-72 hours- bring copies of your results.  Return to the ER for worsening or persistent symptoms, and/or ANY NEW OR CONCERNING SYMPTOMS. If you have issues obtaining follow up, please call: [phone] to obtain a doctor or specialist who takes your insurance in your area.  You may call [phone] to make an appointment with the internal medicine clinic.

## 2020-08-02 NOTE — ED ADULT TRIAGE NOTE - CHIEF COMPLAINT QUOTE
Patient is on Lithium and it was recently increased from 600mg to 900mg for three weeks and she has been experienced increased shaking, confused, nausea and she states she just does not feel right.
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
## 2020-08-02 NOTE — ED PROVIDER NOTE - CLINICAL SUMMARY MEDICAL DECISION MAKING FREE TEXT BOX
A:  -46yo F w/ pmhx of anxiety/depression, HTN, 20 pack year smoking hx, Syndrome X?(cardiac), anemia presents to the ED c/o shaking, tremors and confusion x 3 weeks, worsening over last few days.   P:  -Labs, tsh, tox, ekg, ivf/ativan

## 2020-08-02 NOTE — ED PROVIDER NOTE - PATIENT PORTAL LINK FT
You can access the FollowMyHealth Patient Portal offered by [facility] by registering at the following website: [email or web address]. By joining [employer]’s FollowMyHealth portal, you will also be able to view your health information using other applications (apps) compatible with our system.

## 2020-08-02 NOTE — ED PROVIDER NOTE - OBJECTIVE STATEMENT
46yo F w/ pmhx of anxiety/depression, HTN, 20 pack year smoking hx, Syndrome X?(cardiac), anemia presents to the ED c/o shaking, tremors and confusion x 3 weeks, worsening over last few days. Pt states her Psychiatrist increased dose of Lithium from 600-->900mgs to which the patient is attributing her symptoms. States she is anxious presently, feels like she is "hungover," smokes marijuana daily and drinks 1-2 beers daily. Pt denies chesdt pain 44yo F w/ pmhx of anxiety/depression, HTN, 20 pack year smoking hx, Syndrome X?(cardiac), anemia presents to the ED c/o shaking, tremors and confusion x 3 weeks, worsening over last few days. Pt states her Psychiatrist increased dose of Lithium from 600-->900mgs to which the patient is attributing her symptoms. States she is anxious presently, feels like she is "hungover," smokes marijuana daily and drinks 1-2 beers daily. Pt denies chest pain, sob, cough, fevers, chills, HA, dizziness, abd pain, n/v/d, dysuria, no SI/HI.  Psych: Dr. Moe ([place])  Cards: Dr. Venegas

## 2020-12-15 NOTE — ED PROVIDER NOTE - RESPIRATORY, MLM
I reviewed the H&P, I examined the patient, and there are no changes in the patient's condition.     Breath sounds clear and equal bilaterally.

## 2020-12-31 ENCOUNTER — TRANSCRIPTION ENCOUNTER (OUTPATIENT)
Age: 45
End: 2020-12-31

## 2021-02-05 ENCOUNTER — TRANSCRIPTION ENCOUNTER (OUTPATIENT)
Age: 46
End: 2021-02-05

## 2021-03-08 ENCOUNTER — TRANSCRIPTION ENCOUNTER (OUTPATIENT)
Age: 46
End: 2021-03-08

## 2021-08-24 ENCOUNTER — EMERGENCY (EMERGENCY)
Facility: HOSPITAL | Age: 46
LOS: 1 days | Discharge: HOME | End: 2021-08-24
Attending: EMERGENCY MEDICINE | Admitting: EMERGENCY MEDICINE
Payer: COMMERCIAL

## 2021-08-24 ENCOUNTER — TRANSCRIPTION ENCOUNTER (OUTPATIENT)
Age: 46
End: 2021-08-24

## 2021-08-24 VITALS
TEMPERATURE: 98 F | SYSTOLIC BLOOD PRESSURE: 126 MMHG | HEART RATE: 50 BPM | HEIGHT: 61 IN | RESPIRATION RATE: 18 BRPM | DIASTOLIC BLOOD PRESSURE: 79 MMHG | OXYGEN SATURATION: 100 %

## 2021-08-24 DIAGNOSIS — Z98.89 OTHER SPECIFIED POSTPROCEDURAL STATES: Chronic | ICD-10-CM

## 2021-08-24 DIAGNOSIS — Z98.51 TUBAL LIGATION STATUS: Chronic | ICD-10-CM

## 2021-08-24 DIAGNOSIS — D36.9 BENIGN NEOPLASM, UNSPECIFIED SITE: Chronic | ICD-10-CM

## 2021-08-24 LAB
ALBUMIN SERPL ELPH-MCNC: 4.8 G/DL — SIGNIFICANT CHANGE UP (ref 3.3–5)
ALP SERPL-CCNC: 50 U/L — SIGNIFICANT CHANGE UP (ref 40–120)
ALT FLD-CCNC: 12 U/L — SIGNIFICANT CHANGE UP (ref 4–33)
ANION GAP SERPL CALC-SCNC: 13 MMOL/L — SIGNIFICANT CHANGE UP (ref 7–14)
AST SERPL-CCNC: 18 U/L — SIGNIFICANT CHANGE UP (ref 4–32)
BASOPHILS # BLD AUTO: 0.02 K/UL — SIGNIFICANT CHANGE UP (ref 0–0.2)
BASOPHILS NFR BLD AUTO: 0.4 % — SIGNIFICANT CHANGE UP (ref 0–2)
BILIRUB SERPL-MCNC: 0.3 MG/DL — SIGNIFICANT CHANGE UP (ref 0.2–1.2)
BUN SERPL-MCNC: 9 MG/DL — SIGNIFICANT CHANGE UP (ref 7–23)
CALCIUM SERPL-MCNC: 9.5 MG/DL — SIGNIFICANT CHANGE UP (ref 8.4–10.5)
CHLORIDE SERPL-SCNC: 106 MMOL/L — SIGNIFICANT CHANGE UP (ref 98–107)
CO2 SERPL-SCNC: 20 MMOL/L — LOW (ref 22–31)
CREAT SERPL-MCNC: 0.73 MG/DL — SIGNIFICANT CHANGE UP (ref 0.5–1.3)
D DIMER BLD IA.RAPID-MCNC: <150 NG/ML DDU — SIGNIFICANT CHANGE UP
EOSINOPHIL # BLD AUTO: 0.05 K/UL — SIGNIFICANT CHANGE UP (ref 0–0.5)
EOSINOPHIL NFR BLD AUTO: 0.9 % — SIGNIFICANT CHANGE UP (ref 0–6)
GLUCOSE SERPL-MCNC: 106 MG/DL — HIGH (ref 70–99)
HCT VFR BLD CALC: 41.2 % — SIGNIFICANT CHANGE UP (ref 34.5–45)
HGB BLD-MCNC: 13.5 G/DL — SIGNIFICANT CHANGE UP (ref 11.5–15.5)
IANC: 3.13 K/UL — SIGNIFICANT CHANGE UP (ref 1.5–8.5)
IMM GRANULOCYTES NFR BLD AUTO: 0.2 % — SIGNIFICANT CHANGE UP (ref 0–1.5)
LITHIUM SERPL-MCNC: 0.3 MMOL/L — LOW (ref 0.6–1.2)
LYMPHOCYTES # BLD AUTO: 1.79 K/UL — SIGNIFICANT CHANGE UP (ref 1–3.3)
LYMPHOCYTES # BLD AUTO: 33.7 % — SIGNIFICANT CHANGE UP (ref 13–44)
MCHC RBC-ENTMCNC: 29.9 PG — SIGNIFICANT CHANGE UP (ref 27–34)
MCHC RBC-ENTMCNC: 32.8 GM/DL — SIGNIFICANT CHANGE UP (ref 32–36)
MCV RBC AUTO: 91.4 FL — SIGNIFICANT CHANGE UP (ref 80–100)
MONOCYTES # BLD AUTO: 0.31 K/UL — SIGNIFICANT CHANGE UP (ref 0–0.9)
MONOCYTES NFR BLD AUTO: 5.8 % — SIGNIFICANT CHANGE UP (ref 2–14)
NEUTROPHILS # BLD AUTO: 3.13 K/UL — SIGNIFICANT CHANGE UP (ref 1.8–7.4)
NEUTROPHILS NFR BLD AUTO: 59 % — SIGNIFICANT CHANGE UP (ref 43–77)
NRBC # BLD: 0 /100 WBCS — SIGNIFICANT CHANGE UP
NRBC # FLD: 0 K/UL — SIGNIFICANT CHANGE UP
PLATELET # BLD AUTO: 292 K/UL — SIGNIFICANT CHANGE UP (ref 150–400)
POTASSIUM SERPL-MCNC: 4.4 MMOL/L — SIGNIFICANT CHANGE UP (ref 3.5–5.3)
POTASSIUM SERPL-SCNC: 4.4 MMOL/L — SIGNIFICANT CHANGE UP (ref 3.5–5.3)
PROT SERPL-MCNC: 7.4 G/DL — SIGNIFICANT CHANGE UP (ref 6–8.3)
RBC # BLD: 4.51 M/UL — SIGNIFICANT CHANGE UP (ref 3.8–5.2)
RBC # FLD: 12.2 % — SIGNIFICANT CHANGE UP (ref 10.3–14.5)
SARS-COV-2 RNA SPEC QL NAA+PROBE: SIGNIFICANT CHANGE UP
SODIUM SERPL-SCNC: 139 MMOL/L — SIGNIFICANT CHANGE UP (ref 135–145)
TROPONIN T, HIGH SENSITIVITY RESULT: <6 NG/L — SIGNIFICANT CHANGE UP
WBC # BLD: 5.31 K/UL — SIGNIFICANT CHANGE UP (ref 3.8–10.5)
WBC # FLD AUTO: 5.31 K/UL — SIGNIFICANT CHANGE UP (ref 3.8–10.5)

## 2021-08-24 PROCEDURE — 71046 X-RAY EXAM CHEST 2 VIEWS: CPT | Mod: 26

## 2021-08-24 PROCEDURE — 99220: CPT | Mod: 25

## 2021-08-24 PROCEDURE — 93010 ELECTROCARDIOGRAM REPORT: CPT

## 2021-08-24 RX ORDER — PANTOPRAZOLE SODIUM 20 MG/1
40 TABLET, DELAYED RELEASE ORAL
Refills: 0 | Status: DISCONTINUED | OUTPATIENT
Start: 2021-08-24 | End: 2021-08-27

## 2021-08-24 RX ORDER — LITHIUM CARBONATE 300 MG/1
300 TABLET, EXTENDED RELEASE ORAL DAILY
Refills: 0 | Status: DISCONTINUED | OUTPATIENT
Start: 2021-08-24 | End: 2021-08-27

## 2021-08-24 RX ORDER — OLANZAPINE 15 MG/1
5 TABLET, FILM COATED ORAL DAILY
Refills: 0 | Status: DISCONTINUED | OUTPATIENT
Start: 2021-08-24 | End: 2021-08-27

## 2021-08-24 RX ORDER — FLUOXETINE HCL 10 MG
20 CAPSULE ORAL DAILY
Refills: 0 | Status: DISCONTINUED | OUTPATIENT
Start: 2021-08-24 | End: 2021-08-27

## 2021-08-24 RX ORDER — AMLODIPINE BESYLATE 2.5 MG/1
10 TABLET ORAL DAILY
Refills: 0 | Status: DISCONTINUED | OUTPATIENT
Start: 2021-08-24 | End: 2021-08-27

## 2021-08-24 RX ORDER — DIAZEPAM 5 MG
5 TABLET ORAL THREE TIMES A DAY
Refills: 0 | Status: DISCONTINUED | OUTPATIENT
Start: 2021-08-24 | End: 2021-08-26

## 2021-08-24 RX ORDER — ASPIRIN/CALCIUM CARB/MAGNESIUM 324 MG
162 TABLET ORAL ONCE
Refills: 0 | Status: COMPLETED | OUTPATIENT
Start: 2021-08-24 | End: 2021-08-24

## 2021-08-24 RX ADMIN — OLANZAPINE 5 MILLIGRAM(S): 15 TABLET, FILM COATED ORAL at 22:42

## 2021-08-24 RX ADMIN — Medication 162 MILLIGRAM(S): at 12:30

## 2021-08-24 NOTE — ED CDU PROVIDER INITIAL DAY NOTE - ATTENDING CONTRIBUTION TO CARE
46 year old with palp cp and cardiac syndrome x. will monitor on tele overnight, echo in am, Dr. Venegas

## 2021-08-24 NOTE — ED ADULT TRIAGE NOTE - CHIEF COMPLAINT QUOTE
Pt c/o 3 days of palpitations,chest tightness, SOB and dizziness  Pt went to urgicenter this AM and was refered to ER for EKG changes  pt states she has cardiac syndrome X and HTN

## 2021-08-24 NOTE — ED ADULT NURSE NOTE - NSFALLRSKASSESSDT_ED_ALL_ED
24-Aug-2021 12:26 Spine appears normal, range of motion is not limited, no muscle or joint tenderness

## 2021-08-24 NOTE — ED CDU PROVIDER INITIAL DAY NOTE - OBJECTIVE STATEMENT
45 y/o female with a hx of Cardiac X Syndrome, Bipolar, HTN, Anxiety presents to the ER c/o 3 days of chest pressure, palpitations, shortness of breath and lightheadedness.  Pt was seen at Urgent Care today and sent  to the ER for evaluation for low HR on EKG 50bpm, pt takes Metoprolol 50mg daily.  Pt denies fevers, chills, cough, calf pain, OCP use, nausea, vomiting, abdominal pain.  Cardiologist Dr Tan Venegas.    CDU Note: Agree with above. 45 y/o female hx cardiac x syndrome presenting to ER w/ palpitations cp and stiles. in ER had abnormal ekg (same as previous) and negative ddimer as well as troponin. ER had disccusion with patients cardiologist dr venegas who agreed with plan for CDU - tele, echo am. Pt. currently resting comfortably.

## 2021-08-24 NOTE — ED ADULT NURSE NOTE - CAS DISCH TRANSFER METHOD
Mild hypernatremia with Na level 143 likely due to poor oral intake and super imposed CHF with volume overload   - Na level 143  - Continue hold diuretic therapy  - Continue D5w@60-70cc/hr for now with monitoring respiratory status  - Monitor BMP daily Private car

## 2021-08-24 NOTE — ED CDU PROVIDER INITIAL DAY NOTE - DETAILS
47 y/o female w/ cardiac x syndrome c/o palpitations cp stiles  -tele  -echo  -discussion with Dr. Venegas (cardiologist) AM

## 2021-08-24 NOTE — ED ADULT NURSE NOTE - OBJECTIVE STATEMENT
pt received to 25, aox4.  pt c/o intermittent chest pain and SOB x 3 days.  pt reports chest pain as a tight feeling.  pt was seen at urgent care and sent to ED for evaluation of bradycardia and abnormal EKG.  SL placed, labs sent, v/s as noted.  pt on tele, MD at bedside.

## 2021-08-24 NOTE — ED PROVIDER NOTE - NSICDXFAMILYHX_GEN_ALL_CORE_FT
FAMILY HISTORY:  Family history of carcinoma in situ of anal canal  Family history of cervical cancer  Family history of hyperlipidemia  Family history of hypertension  Family history of lung cancer    Mother  Still living? Unknown  Family history of acute myocardial infarction, Age at diagnosis: Age Unknown

## 2021-08-24 NOTE — ED PROVIDER NOTE - CLINICAL SUMMARY MEDICAL DECISION MAKING FREE TEXT BOX
47 y/o female with a hx of Cardiac Syndrome, Bipolar, HTN, Anxiety presents to the ER c/o 3 days of chest pressure, palpitations, shortness of breath and lightheadedness.  Pt was seen at Urgent Care today and sent  to the ER for evaluation for low HR on EKG 50bpm, pt takes Metoprolol 50mg daily.  Pt is well appearing, NAD, will check labs, cardiac monitor, follow up cardiology.

## 2021-08-24 NOTE — ED CDU PROVIDER INITIAL DAY NOTE - WR ORDER DATE AND TIME 1
Paged Jaimee's: [phone]    Voided 300cc, post-void residual is 365 cc.    Provider, Yen, called back. Let pt remain asleep and rest for tonight.    24-Aug-2021 12:07

## 2021-08-24 NOTE — ED ADULT NURSE NOTE - NSICDXPASTMEDICALHX_GEN_ALL_CORE_FT
PAST MEDICAL HISTORY:  Anemia bitamin b12 deficiency    Anxiety States     Benign Hypertension     Carcinoid Tumor of Ovary, Benign      Deliv     Depression with anxiety     Hypertension Dx     Ovarian cyst bilateral    Ovarian Cyst     PTSD (post-traumatic stress disorder)     Syndrome X (cardiac)     Tubal Ligation

## 2021-08-24 NOTE — ED PROVIDER NOTE - PROGRESS NOTE DETAILS
Gabriella Moeller MS4:    Pt continues to have SOB, AG, and chest tightness. Now on 2L NC for pt comfort. Continues to sat 100%. Repeat exam unchanged. Bradycardic with regular rhythm. No increased work of breathing. Trop and D-dimer negative. CXR showed no consolidation or infiltrate. Awaiting urine pregnancy test EVANGELINA Lopez: s/w pt's cardiologist Dr Venegas advised to place in CDU for cardiac monitoring, echo, advised possibly decreasing Metoprolol dose will hold for now.  Dr Venegas to see patient tomorrow.  Pt agreeable with plan.  Awaiting CDU eval. Gabriella Sunni MS4:    Pt continues to have SOB, AG, and chest tightness. Now on 2L NC for pt comfort. Continues to sat 100%. Repeat exam unchanged. Bradycardic with regular rhythm. No increased work of breathing. Trop and D-dimer negative. CXR showed no consolidation or infiltrate. Will speak with pt's cardiologist to discuss further management. EVANGELINA Lopez: pt accepted by EVANGELINA Danielle, pt aware and agreeable with plan.

## 2021-08-24 NOTE — ED PROVIDER NOTE - NSICDXPASTSURGICALHX_GEN_ALL_CORE_FT
PAST SURGICAL HISTORY:  Carcinoid Tumor of Ovary, Benign     Dermoid cyst ovarian, bilateral    Previous  Delivery, Delivered     S/P      S/P ovarian cystectomy bilateral    S/P tubal ligation     Status Post Ovarian Cystectomy     Tubal Ligation

## 2021-08-24 NOTE — ED PROVIDER NOTE - OBJECTIVE STATEMENT
45 y/o female with a hx of Cardiac Syndrome, Bipolar, HTN, Anxiety presents to the ER c/o 3 days of chest pressure, palpitations, shortness of breath and lightheadedness.  Pt was seen at Urgent Care today and sent  to the ER for evaluation for low HR on EKG 50bpm, pt takes Metoprolol 50mg daily.  Pt denies fevers, chills, cough, calf pain, OCP use, nausea, vomiting, abdominal pain.  Cardiologist Dr Tan Venegas.

## 2021-08-24 NOTE — ED PROVIDER NOTE - ATTENDING CONTRIBUTION TO CARE
47 year old with cardiac syndrome x presents with chest pain. borderline [place], ekg, trops labs, tele, cards cx. acs vs pna vs ptx vs msk vs cardiac syndrome x. low risk for pe via wells

## 2021-08-25 LAB
ALBUMIN SERPL ELPH-MCNC: 4.4 G/DL — SIGNIFICANT CHANGE UP (ref 3.3–5)
ALP SERPL-CCNC: 46 U/L — SIGNIFICANT CHANGE UP (ref 40–120)
ALT FLD-CCNC: 12 U/L — SIGNIFICANT CHANGE UP (ref 4–33)
ANION GAP SERPL CALC-SCNC: 12 MMOL/L — SIGNIFICANT CHANGE UP (ref 7–14)
AST SERPL-CCNC: 15 U/L — SIGNIFICANT CHANGE UP (ref 4–32)
BASOPHILS # BLD AUTO: 0.02 K/UL — SIGNIFICANT CHANGE UP (ref 0–0.2)
BASOPHILS NFR BLD AUTO: 0.4 % — SIGNIFICANT CHANGE UP (ref 0–2)
BILIRUB SERPL-MCNC: 0.3 MG/DL — SIGNIFICANT CHANGE UP (ref 0.2–1.2)
BUN SERPL-MCNC: 8 MG/DL — SIGNIFICANT CHANGE UP (ref 7–23)
CALCIUM SERPL-MCNC: 9.5 MG/DL — SIGNIFICANT CHANGE UP (ref 8.4–10.5)
CHLORIDE SERPL-SCNC: 105 MMOL/L — SIGNIFICANT CHANGE UP (ref 98–107)
CO2 SERPL-SCNC: 23 MMOL/L — SIGNIFICANT CHANGE UP (ref 22–31)
CREAT SERPL-MCNC: 0.77 MG/DL — SIGNIFICANT CHANGE UP (ref 0.5–1.3)
EOSINOPHIL # BLD AUTO: 0.09 K/UL — SIGNIFICANT CHANGE UP (ref 0–0.5)
EOSINOPHIL NFR BLD AUTO: 2 % — SIGNIFICANT CHANGE UP (ref 0–6)
GLUCOSE SERPL-MCNC: 87 MG/DL — SIGNIFICANT CHANGE UP (ref 70–99)
HCT VFR BLD CALC: 41.1 % — SIGNIFICANT CHANGE UP (ref 34.5–45)
HGB BLD-MCNC: 13.6 G/DL — SIGNIFICANT CHANGE UP (ref 11.5–15.5)
IANC: 2.09 K/UL — SIGNIFICANT CHANGE UP (ref 1.5–8.5)
IMM GRANULOCYTES NFR BLD AUTO: 0.2 % — SIGNIFICANT CHANGE UP (ref 0–1.5)
LYMPHOCYTES # BLD AUTO: 2.03 K/UL — SIGNIFICANT CHANGE UP (ref 1–3.3)
LYMPHOCYTES # BLD AUTO: 44.5 % — HIGH (ref 13–44)
MCHC RBC-ENTMCNC: 30.4 PG — SIGNIFICANT CHANGE UP (ref 27–34)
MCHC RBC-ENTMCNC: 33.1 GM/DL — SIGNIFICANT CHANGE UP (ref 32–36)
MCV RBC AUTO: 91.9 FL — SIGNIFICANT CHANGE UP (ref 80–100)
MONOCYTES # BLD AUTO: 0.32 K/UL — SIGNIFICANT CHANGE UP (ref 0–0.9)
MONOCYTES NFR BLD AUTO: 7 % — SIGNIFICANT CHANGE UP (ref 2–14)
NEUTROPHILS # BLD AUTO: 2.09 K/UL — SIGNIFICANT CHANGE UP (ref 1.8–7.4)
NEUTROPHILS NFR BLD AUTO: 45.9 % — SIGNIFICANT CHANGE UP (ref 43–77)
NRBC # BLD: 0 /100 WBCS — SIGNIFICANT CHANGE UP
NRBC # FLD: 0 K/UL — SIGNIFICANT CHANGE UP
PLATELET # BLD AUTO: 249 K/UL — SIGNIFICANT CHANGE UP (ref 150–400)
POTASSIUM SERPL-MCNC: 3.6 MMOL/L — SIGNIFICANT CHANGE UP (ref 3.5–5.3)
POTASSIUM SERPL-SCNC: 3.6 MMOL/L — SIGNIFICANT CHANGE UP (ref 3.5–5.3)
PROT SERPL-MCNC: 6.8 G/DL — SIGNIFICANT CHANGE UP (ref 6–8.3)
RBC # BLD: 4.47 M/UL — SIGNIFICANT CHANGE UP (ref 3.8–5.2)
RBC # FLD: 12.1 % — SIGNIFICANT CHANGE UP (ref 10.3–14.5)
SODIUM SERPL-SCNC: 140 MMOL/L — SIGNIFICANT CHANGE UP (ref 135–145)
WBC # BLD: 4.56 K/UL — SIGNIFICANT CHANGE UP (ref 3.8–10.5)
WBC # FLD AUTO: 4.56 K/UL — SIGNIFICANT CHANGE UP (ref 3.8–10.5)

## 2021-08-25 PROCEDURE — 93306 TTE W/DOPPLER COMPLETE: CPT | Mod: 26

## 2021-08-25 PROCEDURE — 99226: CPT

## 2021-08-25 RX ORDER — SODIUM CHLORIDE 9 MG/ML
1000 INJECTION INTRAMUSCULAR; INTRAVENOUS; SUBCUTANEOUS ONCE
Refills: 0 | Status: COMPLETED | OUTPATIENT
Start: 2021-08-25 | End: 2021-08-25

## 2021-08-25 RX ORDER — LEVOTHYROXINE SODIUM 125 MCG
1 TABLET ORAL
Qty: 30 | Refills: 0
Start: 2021-08-25 | End: 2021-09-23

## 2021-08-25 RX ADMIN — Medication 20 MILLIGRAM(S): at 06:20

## 2021-08-25 RX ADMIN — PANTOPRAZOLE SODIUM 40 MILLIGRAM(S): 20 TABLET, DELAYED RELEASE ORAL at 06:24

## 2021-08-25 RX ADMIN — OLANZAPINE 5 MILLIGRAM(S): 15 TABLET, FILM COATED ORAL at 22:32

## 2021-08-25 RX ADMIN — Medication 5 MILLIGRAM(S): at 22:32

## 2021-08-25 RX ADMIN — SODIUM CHLORIDE 1000 MILLILITER(S): 9 INJECTION INTRAMUSCULAR; INTRAVENOUS; SUBCUTANEOUS at 06:32

## 2021-08-25 RX ADMIN — LITHIUM CARBONATE 300 MILLIGRAM(S): 300 TABLET, EXTENDED RELEASE ORAL at 22:32

## 2021-08-25 RX ADMIN — Medication 5 MILLIGRAM(S): at 05:58

## 2021-08-25 NOTE — CONSULT NOTE ADULT - ASSESSMENT
AG  SOB    ruled out for PE and acs  obtain echo , stress test     Chest pain   plan as above    HTN  cont current meds     E cig smoking   counseling given

## 2021-08-25 NOTE — CONSULT NOTE ADULT - SUBJECTIVE AND OBJECTIVE BOX
CHIEF COMPLAINT:Patient is a 46y old  Female who presents with a chief complaint of     HISTORY OF PRESENT ILLNESS:    46 female with history as below   has been having sob, AG and chest pain   she smokes E cig  no n/v  no dizziness  no palpitation     PAST MEDICAL & SURGICAL HISTORY:  Benign Hypertension    Anxiety States     Deliv    Tubal Ligation    Carcinoid Tumor of Ovary, Benign    Ovarian Cyst    Hypertension  Dx     Ovarian cyst  bilateral    Depression with anxiety    Anemia  bitamin b12 deficiency    Syndrome X (cardiac)    PTSD (post-traumatic stress disorder)    Carcinoid Tumor of Ovary, Benign    Previous  Delivery, Delivered    Tubal Ligation    Status Post Ovarian Cystectomy    S/P ovarian cystectomy  bilateral    Dermoid cyst  ovarian, bilateral    S/P       S/P tubal ligation            MEDICATIONS:  amLODIPine   Tablet 10 milliGRAM(s) Oral daily        diazepam    Tablet 5 milliGRAM(s) Oral three times a day PRN  FLUoxetine 20 milliGRAM(s) Oral daily  lithium 300 milliGRAM(s) Oral daily  OLANZapine 5 milliGRAM(s) Oral daily    pantoprazole    Tablet 40 milliGRAM(s) Oral before breakfast      sodium chloride 0.9% Bolus 1000 milliLiter(s) IV Bolus once      FAMILY HISTORY:  Family history of lung cancer    Family history of cervical cancer    Family history of carcinoma in situ of anal canal    Family history of hypertension    Family history of hyperlipidemia    Family history of acute myocardial infarction (Mother)        Non-contributory    SOCIAL HISTORY:    No tobacco, drugs or etoh    Allergies    Celexa (Rash)  Cipro (Short breath)  fluoroquinolone antibiotics (Rash)    Intolerances    	    REVIEW OF SYSTEMS:  as above  The rest of the 14 points ROS reviewed and except above they are unremarkable.        PHYSICAL EXAM:  T(C): 36.8 (21 @ 02:04), Max: 37 (21 @ 22:36)  HR: 56 (21 @ 02:04) (44 - 56)  BP: 119/58 (21 @ 02:04) (119/58 - 143/92)  RR: 14 (21 @ 02:04) (14 - 18)  SpO2: 100% (21 @ 02:04) (99% - 100%)  Wt(kg): --  I&O's Summary      JVP: Normal  Neck: supple  Lung: clear   CV: S1 S2 , Murmur:  Abd: soft  Ext: No edema  neuro: Awake / alert  Psych: flat affect  Skin: normal      LABS/DATA:    TELEMETRY: 	    ECG:  	   	  CARDIAC MARKERS:                        <6 <<== 08--21 @ 12:38                              13.5   5.31  )-----------( 292      ( 24 Aug 2021 12:38 )             41.2         139  |  106  |  9   ----------------------------<  106<H>  4.4   |  20<L>  |  0.73    Ca    9.5      24 Aug 2021 12:38    TPro  7.4  /  Alb  4.8  /  TBili  0.3  /  DBili  x   /  AST  18  /  ALT  12  /  AlkPhos  50      proBNP:   Lipid Profile:   HgA1c:   TSH:

## 2021-08-26 VITALS
OXYGEN SATURATION: 100 % | HEART RATE: 80 BPM | RESPIRATION RATE: 16 BRPM | DIASTOLIC BLOOD PRESSURE: 78 MMHG | TEMPERATURE: 98 F | SYSTOLIC BLOOD PRESSURE: 113 MMHG

## 2021-08-26 PROCEDURE — 93018 CV STRESS TEST I&R ONLY: CPT | Mod: GC

## 2021-08-26 PROCEDURE — 78452 HT MUSCLE IMAGE SPECT MULT: CPT | Mod: 26

## 2021-08-26 PROCEDURE — 93016 CV STRESS TEST SUPVJ ONLY: CPT | Mod: GC

## 2021-08-26 PROCEDURE — 99217: CPT

## 2021-08-26 RX ADMIN — Medication 5 MILLIGRAM(S): at 05:53

## 2021-08-26 RX ADMIN — PANTOPRAZOLE SODIUM 40 MILLIGRAM(S): 20 TABLET, DELAYED RELEASE ORAL at 05:53

## 2021-08-26 RX ADMIN — Medication 5 MILLIGRAM(S): at 12:32

## 2021-08-26 RX ADMIN — Medication 20 MILLIGRAM(S): at 05:53

## 2021-08-26 NOTE — ED CDU PROVIDER SUBSEQUENT DAY NOTE - NSICDXPASTSURGICALHX_GEN_ALL_CORE_FT
PAST SURGICAL HISTORY:  Carcinoid Tumor of Ovary, Benign     Dermoid cyst ovarian, bilateral    Previous  Delivery, Delivered     S/P      S/P ovarian cystectomy bilateral    S/P tubal ligation     Status Post Ovarian Cystectomy     Tubal Ligation     
PAST SURGICAL HISTORY:  Carcinoid Tumor of Ovary, Benign     Dermoid cyst ovarian, bilateral    Previous  Delivery, Delivered     S/P      S/P ovarian cystectomy bilateral    S/P tubal ligation     Status Post Ovarian Cystectomy     Tubal Ligation

## 2021-08-26 NOTE — PROGRESS NOTE ADULT - ASSESSMENT
AG  SOB    ruled out for PE and acs  echo unremarkable  awaiting stress test     Chest pain   plan as above    HTN  cont current meds     E cig smoking   counseling given

## 2021-08-26 NOTE — ED CDU PROVIDER DISPOSITION NOTE - ATTENDING CONTRIBUTION TO CARE
I have made the decision to discharge this patient to the CDU as documented in my Provider note I have reviewed the note  written by Presley [facility], on that visit day. I have supervised and participated as necessary in the performance of procedures indicated for patient management and was available at all phases of the patient´s visit when needed.    Patient cleared by cardiology for d/c ST normal (only 5 mets) seen by Dr Venegas in CDU and Dr Fisher  Plan  d/c with followup   RTED PRN

## 2021-08-26 NOTE — PROGRESS NOTE ADULT - SUBJECTIVE AND OBJECTIVE BOX
DATE OF SERVICE: 08-26-21 @ 07:02    Subjective: Patient seen and examined. No new events except as noted.     SUBJECTIVE/ROS:  No chest pain, dyspnea, palpitation, or dizziness.       MEDICATIONS:  MEDICATIONS  (STANDING):  amLODIPine   Tablet 10 milliGRAM(s) Oral daily  FLUoxetine 20 milliGRAM(s) Oral daily  lithium 300 milliGRAM(s) Oral daily  OLANZapine 5 milliGRAM(s) Oral daily  pantoprazole    Tablet 40 milliGRAM(s) Oral before breakfast      PHYSICAL EXAM:  T(C): 36.8 (08-26-21 @ 05:20), Max: 36.8 (08-26-21 @ 05:20)  HR: 64 (08-26-21 @ 05:20) (60 - 74)  BP: 102/67 (08-26-21 @ 05:20) (100/60 - 129/88)  RR: 16 (08-26-21 @ 05:20) (14 - 18)  SpO2: 100% (08-26-21 @ 05:20) (98% - 100%)  Wt(kg): --  I&O's Summary          JVP: Normal  Neck: supple  Lung: clear   CV: S1 S2 , Murmur:  Abd: soft  Ext: No edema  neuro: Awake / alert  Psych: flat affect  Skin: normal``    LABS/DATA:    CARDIAC MARKERS:                                13.6   4.56  )-----------( 249      ( 25 Aug 2021 07:40 )             41.1     08-25    140  |  105  |  8   ----------------------------<  87  3.6   |  23  |  0.77    Ca    9.5      25 Aug 2021 07:40    TPro  6.8  /  Alb  4.4  /  TBili  0.3  /  DBili  x   /  AST  15  /  ALT  12  /  AlkPhos  46  08-25    proBNP:   Lipid Profile:   HgA1c:   TSH:     TELE:  EKG:

## 2021-08-26 NOTE — ED CDU PROVIDER DISPOSITION NOTE - CLINICAL COURSE
Patient cleared by cardiology for d/c ST normal (only 5 mets) seen by Dr Venegas in CDU and Dr Fisher  Plan  d/c with followup   RTED PRN

## 2021-08-26 NOTE — ED CDU PROVIDER DISPOSITION NOTE - PATIENT PORTAL LINK FT
You can access the FollowMyHealth Patient Portal offered by [facility] by registering at the following website: [email or web address]. By joining [employer]’s FollowMyHealth portal, you will also be able to view your health information using other applications (apps) compatible with our system.

## 2021-08-26 NOTE — ED CDU PROVIDER DISPOSITION NOTE - NSFOLLOWUPINSTRUCTIONS_ED_ALL_ED_FT
Follow up with your primary doctor and Cardiologist. Bring this packet which includes copies of your results.  Advance activity as tolerated.  Continue all previously prescribed medications as directed.  Follow up with your primary care physician in 48-72 hours- bring copies of your results.  Return to the ER for worsening or persistent symptoms, and/or ANY NEW OR CONCERNING SYMPTOMS. If you have issues obtaining follow up, please call: [phone] to obtain a doctor or specialist who takes your insurance in your area.  You may call [phone] to make an appointment with the internal medicine clinic.

## 2021-08-26 NOTE — ED CDU PROVIDER SUBSEQUENT DAY NOTE - NSICDXPASTMEDICALHX_GEN_ALL_CORE_FT
PAST MEDICAL HISTORY:  Anemia bitamin b12 deficiency    Anxiety States     Benign Hypertension     Carcinoid Tumor of Ovary, Benign      Deliv     Depression with anxiety     Hypertension Dx     Ovarian cyst bilateral    Ovarian Cyst     PTSD (post-traumatic stress disorder)     Syndrome X (cardiac)     Tubal Ligation     
PAST MEDICAL HISTORY:  Anemia bitamin b12 deficiency    Anxiety States     Benign Hypertension     Carcinoid Tumor of Ovary, Benign      Deliv     Depression with anxiety     Hypertension Dx     Ovarian cyst bilateral    Ovarian Cyst     PTSD (post-traumatic stress disorder)     Syndrome X (cardiac)     Tubal Ligation

## 2021-08-26 NOTE — ED CDU PROVIDER SUBSEQUENT DAY NOTE - MEDICAL DECISION MAKING DETAILS
47 y/o female hx cardiac x syndrome presenting to ER w/ palpitations cp and stiles. in ER had abnormal ekg (same as previous) and negative ddimer as well as troponin. ER had discursion with patients cardiologist Dr Venegas who agreed with plan for CDU - tele, echo. TTE showed no gross abnormalities.  Pt was then seen by Dr. Fisher while in CDU, who suggested a stress test. Stress test to be obtained this morning.
47 y/o female hx cardiac x syndrome presenting to ER w/ palpitations cp and stiles. in ER had abnormal ekg (same as previous) and negative ddimer as well as troponin. ER had discursion with patients cardiologist Dr Venegas who agreed with plan for CDU - tele, echo am.   In the interim, no acute events overnight. Pt is resting comfortably. In no acute distress .Continue current CDU care plan.

## 2021-08-26 NOTE — ED CDU PROVIDER SUBSEQUENT DAY NOTE - HISTORY
47 y/o female hx cardiac x syndrome presenting to ER w/ palpitations cp and stiles. in ER had abnormal ekg (same as previous) and negative ddimer as well as troponin. ER had discursion with patients cardiologist Dr Venegas who agreed with plan for CDU - tele, echo am.   In the interim, no acute events overnight. Pt is resting comfortably. In no acute distress .Continue current CDU care plan.
45 y/o female hx cardiac x syndrome presenting to ER w/ palpitations cp and stiles. in ER had abnormal ekg (same as previous) and negative ddimer as well as troponin. ER had discursion with patients cardiologist Dr Venegas who agreed with plan for CDU - tele, echo. TTE showed no gross abnormalities. Pt was then seen by Dr. Fisher while in CDU, who suggested a stress test. Stress test to be obtained this morning.   In the interim, no acute events overnight. Pt is resting comfortably. In no acute distress .Continue current CDU care plan.

## 2021-08-26 NOTE — ED CDU PROVIDER SUBSEQUENT DAY NOTE - ATTENDING CONTRIBUTION TO CARE
I performed a face-to-face evaluation of the patient and performed a history and physical examination. I agree with the history and physical examination.    H/o Prinzmetal angina. P/w constant CP after drinking a lot of ETOH a few nights ago. Lab results unremarkable. HR 58 (on MTP, as h/o tachycardia). Seen by Cards, who recommend stress test. Will keep overnight for stress test.
I have made the decision to admit this patient to the CDU as documented in my Provider note I have reviewed the note  written by Presley LÓPEZ, on that visit day. I have supervised and participated as necessary in the performance of procedures indicated for patient management and was available at all phases of the patient´s visit when needed.    Please see the provider note for the details of the decision to admit  Vital Signs Stable  PE unchanged at time of admission  ECHO/stress/trops/ekg's normal/unchanged  await cards input  Plan  will follow cards reccs  probable outpt management unless counseled otherwise

## 2021-08-26 NOTE — ED CDU PROVIDER SUBSEQUENT DAY NOTE - PROGRESS NOTE DETAILS
Pt has been stable while in CDU, awaiting AM stress test, no events on telemetry. Pt is well appearing and would like to go home, nuclear portion of stress is normal, pt is only able to achieve 5 mets likely due to cardiac X syndrome, will D/C with Cardiology F/U. Pt is well appearing and would like to go home, nuclear portion of stress is normal, pt is only able to achieve 5 mets likely due to cardiac X syndrome, will D/C with Cardiology F/U. Discussed with Dr. Fisher who agrees with plan.

## 2021-08-26 NOTE — ED CDU PROVIDER SUBSEQUENT DAY NOTE - NSICDXFAMILYHX_GEN_ALL_CORE_FT
FAMILY HISTORY:  Family history of carcinoma in situ of anal canal  Family history of cervical cancer  Family history of hyperlipidemia  Family history of hypertension  Family history of lung cancer    Mother  Still living? Unknown  Family history of acute myocardial infarction, Age at diagnosis: Age Unknown    
FAMILY HISTORY:  Family history of carcinoma in situ of anal canal  Family history of cervical cancer  Family history of hyperlipidemia  Family history of hypertension  Family history of lung cancer    Mother  Still living? Unknown  Family history of acute myocardial infarction, Age at diagnosis: Age Unknown

## 2021-08-30 ENCOUNTER — APPOINTMENT (OUTPATIENT)
Dept: CARDIOLOGY | Facility: CLINIC | Age: 46
End: 2021-08-30
Payer: COMMERCIAL

## 2021-08-30 ENCOUNTER — NON-APPOINTMENT (OUTPATIENT)
Age: 46
End: 2021-08-30

## 2021-08-30 VITALS
DIASTOLIC BLOOD PRESSURE: 80 MMHG | HEART RATE: 68 BPM | SYSTOLIC BLOOD PRESSURE: 110 MMHG | BODY MASS INDEX: 28.34 KG/M2 | WEIGHT: 150 LBS | OXYGEN SATURATION: 98 %

## 2021-08-30 DIAGNOSIS — I10 ESSENTIAL (PRIMARY) HYPERTENSION: ICD-10-CM

## 2021-08-30 DIAGNOSIS — J45.20 MILD INTERMITTENT ASTHMA, UNCOMPLICATED: ICD-10-CM

## 2021-08-30 PROCEDURE — 93000 ELECTROCARDIOGRAM COMPLETE: CPT

## 2021-08-30 PROCEDURE — 99204 OFFICE O/P NEW MOD 45 MIN: CPT

## 2021-08-30 NOTE — DISCUSSION/SUMMARY
[FreeTextEntry1] : I reassured her that her cardiac status was stable.  I urged her to be active and exercise.  She should continue on Norvasc 5 mg but stopped the beta-blocker which she has because she had a significant bradycardia\par \par Routine follow with me again in 3 months unless is a change in his status

## 2021-08-30 NOTE — REASON FOR VISIT
[FreeTextEntry1] : Margi Stewart 46 years old recently hospitalized at [facility] for shortness of breath previously followed by me in my previous office

## 2021-08-30 NOTE — PHYSICAL EXAM
[Well Developed] : well developed [Well Nourished] : well nourished [Normal Conjunctiva] : normal conjunctiva [No Carotid Bruit] : no carotid bruit [Normal S1, S2] : normal S1, S2 [No Murmur] : no murmur [Clear Lung Fields] : clear lung fields [Soft] : abdomen soft [Non Tender] : non-tender [No Edema] : no edema [Normal DP B/L] : normal DP B/L [de-identified] : Overweight

## 2021-08-30 NOTE — ASSESSMENT
[FreeTextEntry1] : Margi Stewart has normal coronary arteries with perhaps some element of catheter induced spasm in 2017, mild hypertension and a history of asthma.  She now presented with shortness of breath and atypical chest pain with a normal noninvasive cardiac work-up including echo nuclear stress test enzymes D-dimer etc.\par \par Presently her blood pressure is under good control her EKG is stable and her symptoms minimal

## 2021-11-13 ENCOUNTER — EMERGENCY (EMERGENCY)
Facility: HOSPITAL | Age: 46
LOS: 1 days | Discharge: HOME | End: 2021-11-13
Attending: STUDENT IN AN ORGANIZED HEALTH CARE EDUCATION/TRAINING PROGRAM
Payer: COMMERCIAL

## 2021-11-13 VITALS
TEMPERATURE: 98 F | HEART RATE: 100 BPM | OXYGEN SATURATION: 98 % | RESPIRATION RATE: 18 BRPM | SYSTOLIC BLOOD PRESSURE: 123 MMHG | DIASTOLIC BLOOD PRESSURE: 76 MMHG

## 2021-11-13 VITALS
HEIGHT: 61 IN | DIASTOLIC BLOOD PRESSURE: 81 MMHG | RESPIRATION RATE: 16 BRPM | OXYGEN SATURATION: 98 % | HEART RATE: 80 BPM | SYSTOLIC BLOOD PRESSURE: 127 MMHG | TEMPERATURE: 98 F

## 2021-11-13 DIAGNOSIS — Z98.89 OTHER SPECIFIED POSTPROCEDURAL STATES: Chronic | ICD-10-CM

## 2021-11-13 DIAGNOSIS — D36.9 BENIGN NEOPLASM, UNSPECIFIED SITE: Chronic | ICD-10-CM

## 2021-11-13 DIAGNOSIS — Z98.51 TUBAL LIGATION STATUS: Chronic | ICD-10-CM

## 2021-11-13 LAB
ALBUMIN SERPL ELPH-MCNC: 3.7 G/DL — SIGNIFICANT CHANGE UP (ref 3.5–5)
ALP SERPL-CCNC: 57 U/L — SIGNIFICANT CHANGE UP (ref 40–120)
ALT FLD-CCNC: 23 U/L DA — SIGNIFICANT CHANGE UP (ref 10–60)
ANION GAP SERPL CALC-SCNC: 7 MMOL/L — SIGNIFICANT CHANGE UP (ref 5–17)
APTT BLD: 28.4 SEC — SIGNIFICANT CHANGE UP (ref 27.5–35.5)
AST SERPL-CCNC: 22 U/L — SIGNIFICANT CHANGE UP (ref 10–40)
BASOPHILS # BLD AUTO: 0.04 K/UL — SIGNIFICANT CHANGE UP (ref 0–0.2)
BASOPHILS NFR BLD AUTO: 0.4 % — SIGNIFICANT CHANGE UP (ref 0–2)
BILIRUB SERPL-MCNC: 0.2 MG/DL — SIGNIFICANT CHANGE UP (ref 0.2–1.2)
BLD GP AB SCN SERPL QL: SIGNIFICANT CHANGE UP
BUN SERPL-MCNC: 14 MG/DL — SIGNIFICANT CHANGE UP (ref 7–18)
CALCIUM SERPL-MCNC: 9.2 MG/DL — SIGNIFICANT CHANGE UP (ref 8.4–10.5)
CHLORIDE SERPL-SCNC: 105 MMOL/L — SIGNIFICANT CHANGE UP (ref 96–108)
CO2 SERPL-SCNC: 27 MMOL/L — SIGNIFICANT CHANGE UP (ref 22–31)
CREAT SERPL-MCNC: 0.88 MG/DL — SIGNIFICANT CHANGE UP (ref 0.5–1.3)
EOSINOPHIL # BLD AUTO: 0.01 K/UL — SIGNIFICANT CHANGE UP (ref 0–0.5)
EOSINOPHIL NFR BLD AUTO: 0.1 % — SIGNIFICANT CHANGE UP (ref 0–6)
GLUCOSE SERPL-MCNC: 103 MG/DL — HIGH (ref 70–99)
HCG UR QL: NEGATIVE — SIGNIFICANT CHANGE UP
HCT VFR BLD CALC: 38 % — SIGNIFICANT CHANGE UP (ref 34.5–45)
HGB BLD-MCNC: 12.8 G/DL — SIGNIFICANT CHANGE UP (ref 11.5–15.5)
IMM GRANULOCYTES NFR BLD AUTO: 0.3 % — SIGNIFICANT CHANGE UP (ref 0–1.5)
INR BLD: 0.93 RATIO — SIGNIFICANT CHANGE UP (ref 0.88–1.16)
LYMPHOCYTES # BLD AUTO: 1.43 K/UL — SIGNIFICANT CHANGE UP (ref 1–3.3)
LYMPHOCYTES # BLD AUTO: 15.5 % — SIGNIFICANT CHANGE UP (ref 13–44)
MCHC RBC-ENTMCNC: 30.3 PG — SIGNIFICANT CHANGE UP (ref 27–34)
MCHC RBC-ENTMCNC: 33.7 GM/DL — SIGNIFICANT CHANGE UP (ref 32–36)
MCV RBC AUTO: 89.8 FL — SIGNIFICANT CHANGE UP (ref 80–100)
MONOCYTES # BLD AUTO: 0.44 K/UL — SIGNIFICANT CHANGE UP (ref 0–0.9)
MONOCYTES NFR BLD AUTO: 4.8 % — SIGNIFICANT CHANGE UP (ref 2–14)
NEUTROPHILS # BLD AUTO: 7.27 K/UL — SIGNIFICANT CHANGE UP (ref 1.8–7.4)
NEUTROPHILS NFR BLD AUTO: 78.9 % — HIGH (ref 43–77)
NRBC # BLD: 0 /100 WBCS — SIGNIFICANT CHANGE UP (ref 0–0)
PLATELET # BLD AUTO: 293 K/UL — SIGNIFICANT CHANGE UP (ref 150–400)
POTASSIUM SERPL-MCNC: 4.2 MMOL/L — SIGNIFICANT CHANGE UP (ref 3.5–5.3)
POTASSIUM SERPL-SCNC: 4.2 MMOL/L — SIGNIFICANT CHANGE UP (ref 3.5–5.3)
PROT SERPL-MCNC: 7.3 G/DL — SIGNIFICANT CHANGE UP (ref 6–8.3)
PROTHROM AB SERPL-ACNC: 11.1 SEC — SIGNIFICANT CHANGE UP (ref 10.6–13.6)
RBC # BLD: 4.23 M/UL — SIGNIFICANT CHANGE UP (ref 3.8–5.2)
RBC # FLD: 12.4 % — SIGNIFICANT CHANGE UP (ref 10.3–14.5)
SODIUM SERPL-SCNC: 139 MMOL/L — SIGNIFICANT CHANGE UP (ref 135–145)
WBC # BLD: 9.22 K/UL — SIGNIFICANT CHANGE UP (ref 3.8–10.5)
WBC # FLD AUTO: 9.22 K/UL — SIGNIFICANT CHANGE UP (ref 3.8–10.5)

## 2021-11-13 PROCEDURE — 99284 EMERGENCY DEPT VISIT MOD MDM: CPT

## 2021-11-13 PROCEDURE — 36415 COLL VENOUS BLD VENIPUNCTURE: CPT

## 2021-11-13 PROCEDURE — 73630 X-RAY EXAM OF FOOT: CPT

## 2021-11-13 PROCEDURE — 86900 BLOOD TYPING SEROLOGIC ABO: CPT

## 2021-11-13 PROCEDURE — 85025 COMPLETE CBC W/AUTO DIFF WBC: CPT

## 2021-11-13 PROCEDURE — 86850 RBC ANTIBODY SCREEN: CPT

## 2021-11-13 PROCEDURE — 81025 URINE PREGNANCY TEST: CPT

## 2021-11-13 PROCEDURE — 85730 THROMBOPLASTIN TIME PARTIAL: CPT

## 2021-11-13 PROCEDURE — 73610 X-RAY EXAM OF ANKLE: CPT

## 2021-11-13 PROCEDURE — 73590 X-RAY EXAM OF LOWER LEG: CPT | Mod: 26,RT

## 2021-11-13 PROCEDURE — 73610 X-RAY EXAM OF ANKLE: CPT | Mod: 26,RT

## 2021-11-13 PROCEDURE — 73700 CT LOWER EXTREMITY W/O DYE: CPT | Mod: MA

## 2021-11-13 PROCEDURE — 86901 BLOOD TYPING SEROLOGIC RH(D): CPT

## 2021-11-13 PROCEDURE — 80053 COMPREHEN METABOLIC PANEL: CPT

## 2021-11-13 PROCEDURE — 73700 CT LOWER EXTREMITY W/O DYE: CPT | Mod: 26,RT,MA

## 2021-11-13 PROCEDURE — 73630 X-RAY EXAM OF FOOT: CPT | Mod: 26,RT

## 2021-11-13 PROCEDURE — 96374 THER/PROPH/DIAG INJ IV PUSH: CPT

## 2021-11-13 PROCEDURE — 73590 X-RAY EXAM OF LOWER LEG: CPT

## 2021-11-13 PROCEDURE — 99284 EMERGENCY DEPT VISIT MOD MDM: CPT | Mod: 25

## 2021-11-13 PROCEDURE — 85610 PROTHROMBIN TIME: CPT

## 2021-11-13 RX ORDER — SODIUM CHLORIDE 9 MG/ML
3 INJECTION INTRAMUSCULAR; INTRAVENOUS; SUBCUTANEOUS EVERY 8 HOURS
Refills: 0 | Status: DISCONTINUED | OUTPATIENT
Start: 2021-11-13 | End: 2021-11-16

## 2021-11-13 RX ORDER — MORPHINE SULFATE 50 MG/1
4 CAPSULE, EXTENDED RELEASE ORAL ONCE
Refills: 0 | Status: DISCONTINUED | OUTPATIENT
Start: 2021-11-13 | End: 2021-11-13

## 2021-11-13 RX ORDER — OXYCODONE AND ACETAMINOPHEN 5; 325 MG/1; MG/1
1 TABLET ORAL ONCE
Refills: 0 | Status: DISCONTINUED | OUTPATIENT
Start: 2021-11-13 | End: 2021-11-13

## 2021-11-13 RX ORDER — IBUPROFEN 200 MG
400 TABLET ORAL ONCE
Refills: 0 | Status: COMPLETED | OUTPATIENT
Start: 2021-11-13 | End: 2021-11-13

## 2021-11-13 RX ADMIN — MORPHINE SULFATE 4 MILLIGRAM(S): 50 CAPSULE, EXTENDED RELEASE ORAL at 07:13

## 2021-11-13 RX ADMIN — MORPHINE SULFATE 4 MILLIGRAM(S): 50 CAPSULE, EXTENDED RELEASE ORAL at 07:43

## 2021-11-13 RX ADMIN — OXYCODONE AND ACETAMINOPHEN 1 TABLET(S): 5; 325 TABLET ORAL at 04:55

## 2021-11-13 RX ADMIN — Medication 400 MILLIGRAM(S): at 04:55

## 2021-11-13 NOTE — ED ADULT NURSE NOTE - OBJECTIVE STATEMENT
pt presents to ED with complaints of right ankle pain. S/P fall. Pt states " I fell down the stairs about 10 steps and I think I' m sleepwalking". Denies any other complaints.

## 2021-11-13 NOTE — ED PROVIDER NOTE - PHYSICAL EXAMINATION
Vital Signs Reviewed  GEN: Comfortable, NAD, AAOx3  HEENT: NCAT, MMM, Neck Supple  RESP: CTAB, No rales/rhonchi/wheezing  CV: RRR, S1S2, No murmurs  ABD: No TTP, Soft, ND, No masses, No CVA Tenderness  Extrem/Skin: Pain and swelling on lateral R  ankle and foot, No skin breaks, No other signs of trauma, Unable to bear weight on R foot, Equal pulses bilat, No cyanosis/edema/rashes  Neuro: No focal deficits

## 2021-11-13 NOTE — ED PROVIDER NOTE - PATIENT PORTAL LINK FT
You can access the FollowMyHealth Patient Portal offered by [facility] by registering at the following website: [email or web address]. By joining [employer]’s FollowMyHealth portal, you will also be able to view your health information using other applications (apps) compatible with our system.

## 2021-11-13 NOTE — ED PROVIDER NOTE - CLINICAL SUMMARY MEDICAL DECISION MAKING FREE TEXT BOX
Pt p/w traumatic ankle pain and no other pain or injuries. XRs and pain meds pending. Pt stable. Will reassess.

## 2021-11-13 NOTE — ED PROVIDER NOTE - NSFOLLOWUPINSTRUCTIONS_ED_ALL_ED_FT
You were seen in the emergency department for ankle pain and were found to have a calcaneal (heel) fracture.     Please follow-up with the podiatrist in the next week.     Please keep your splint clean and dry.     If you have any worsening symptoms, severe leg pain, swelling, numbness, or your foot becomes blue or cold, please return to the emergency department.

## 2021-11-13 NOTE — ED PROVIDER NOTE - NSFOLLOWUPCLINICS_GEN_ALL_ED_FT
[facility]  Podiatry/Wound Care  [address] [place] [address]  Phone: [phone]  Fax: [phone]

## 2021-11-13 NOTE — ED PROVIDER NOTE - PROGRESS NOTE DETAILS
patient signed out to me by Dr. Holm pending podiatry evaluation. patient seen and splinted by podiatry. stable for discharge and clinic follow-up for surgical repair. bartolome Hidalgo

## 2021-11-13 NOTE — CONSULT NOTE ADULT - SUBJECTIVE AND OBJECTIVE BOX
Podiatry HPI: 46 y.o female was evaluated in the ED with attending Dr. Hunter. Pt states she fell down the stairs while sleepwaking. Pt admits to 10/10 pain to the R foot. Denies N/V/F/C/SOB. Denies other pedal complaints.     PMH:Benign Hypertension    Anxiety States     Deliv    Tubal Ligation    Carcinoid Tumor of Ovary, Benign    Ovarian Cyst    Hypertension    Ovarian cyst    Depression with anxiety    Anemia    Syndrome X (cardiac)    PTSD (post-traumatic stress disorder)      Allergies: Celexa (Rash)  Cipro (Short breath)  fluoroquinolone antibiotics (Rash)    Medications: sodium chloride 0.9% lock flush 3 milliLiter(s) IV Push every 8 hours    FH:No pertinent family history in first degree relatives    Family history of lung cancer    Family history of cervical cancer    Family history of carcinoma in situ of anal canal    Family history of hypertension    Family history of hyperlipidemia    Family history of acute myocardial infarction (Mother)      PSX: Carcinoid Tumor of Ovary, Benign    Previous  Delivery, Delivered    Tubal Ligation    Status Post Ovarian Cystectomy    S/P ovarian cystectomy    Dermoid cyst    S/P     S/P tubal ligation      SH: Social History:      Vital Signs Last 24 Hrs  T(C): 36.6 (2021 08:23), Max: 36.6 (2021 08:23)  T(F): 97.9 (2021 08:23), Max: 97.9 (2021 08:23)  HR: 100 (2021 08:23) (80 - 100)  BP: 123/76 (2021 08:23) (123/76 - 127/81)  BP(mean): --  RR: 18 (2021 08:23) (16 - 18)  SpO2: 98% (2021 08:23) (98% - 98%)    LABS                        12.8   9.22  )-----------( 293      ( 2021 07:27 )             38.0               1113    139  |  105  |  14  ----------------------------<  103<H>  4.2   |  27  |  0.88    Ca    9.2      2021 07:27    TPro  7.3  /  Alb  3.7  /  TBili  0.2  /  DBili  x   /  AST  22  /  ALT  23  /  AlkPhos  57       WBC Count: 9.22 K/uL (21 @ 07:27)      ROS  REVIEW OF SYSTEMS: Negative unless stated otherwise in the HPI      PHYSICAL EXAM  GEN: EWA MARTIN is a pleasant well-nourished, well developed 46y Female in no acute distress, alert awake, and oriented to person, place and time.   LE Focused:    Vasc: Pulses palpable   Derm: No open lesions, no clinical signs of infection   Neuro: Protective sensations intact   MSK: Pt is guarding right foot, edema noted to medial and lateral aspects of ankle, no POP of R calf, no skin tenting noted     Imaging: Xrays pending official read     EXAM:  CT FOOT ONLY RT                            PROCEDURE DATE:  2021          INTERPRETATION:  CLINICAL INDICATION: Trauma, calcaneal fracture.    TECHNIQUE: CT axial images of the right foot were obtained without intravenous contrast. Coronal and sagittal reformatted images were also obtained. 3-D images were obtained from a separate workstation.    CONTRAST/COMPLICATIONS:  IV Contrast: None  Complications: None    COMPARISON: XR: 2021. CT: None. MR: None.    FINDINGS: Evaluation of soft tissue is limited without intravenous contrast.    Bones: Acute, comminuted fracture of the calcaneus throughout the calcaneus with fracture extension to the sustentaculum talus and subtalar joint. No dislocation.    Mild contour deformity of the proximal navicula with small, well-corticated ossific density, which may be related to old trauma.  Nonspecific small sclerotic focus at the anterior talus. Bipartite accessory navicula.    Soft tissue: Ankle/foot soft tissue stranding/edema. Diffuse muscle bulk loss with fatty replacement. Tibiotalar/subtalar hemarthrosis.    IMPRESSION:    Acute, comminuted fracture of the right calcaneus as described.        A:     P:  Patient evaluated, chart reviewed  Xrays reviewed  Discussed with Attending

## 2021-11-13 NOTE — CONSULT NOTE ADULT - ASSESSMENT
A:   Acute, comminuted fracture of the right calcaneus    P:  Patient evaluated, chart reviewed  Xrays pending offical read   Assessed Right foot. Applied posterior splint   Advised pt strict non WB to R foot   Pt to keep dressing clean dry and intact   D/w pt pathology, prognosis and surgical intervention. Answered all of pts questions.   Pt to f/u at [facility], tues or thursday. Phone   Discussed with and evaluated bedside with attending Dr. Hunter

## 2021-11-13 NOTE — ED PROVIDER NOTE - OBJECTIVE STATEMENT
45 yo F h/o anxiety, HTN, no blood thinners p/w fall and pain in the R ankle and R foot. Pt states that she was sleepwalking when she felt herself falling and woke up while falling down her stairs at home. Pt states that she only has pain in her R ankle/foot and did not hit her head or incur any other injuries. Pt felt well before the event and denies LOC, vomiting, other pain/injuries, etoh/drug use, fever/infectious symptoms, severe headache or neck stiffness, focal numbness/weakness, other recent illness or hospitalizations.

## 2021-11-13 NOTE — ED ADULT NURSE NOTE - NSIMPLEMENTINTERV_GEN_ALL_ED
Implemented All Fall Risk Interventions:  [place] to call system. Call bell, personal items and telephone within reach. Instruct patient to call for assistance. Room bathroom lighting operational. Non-slip footwear when patient is off stretcher. Physically safe environment: no spills, clutter or unnecessary equipment. Stretcher in lowest position, wheels locked, appropriate side rails in place. Provide visual cue, wrist band, yellow gown, etc. Monitor gait and stability. Monitor for mental status changes and reorient to person, place, and time. Review medications for side effects contributing to fall risk. Reinforce activity limits and safety measures with patient and family.

## 2021-11-15 ENCOUNTER — INPATIENT (INPATIENT)
Facility: HOSPITAL | Age: 46
LOS: 2 days | Discharge: HOME | End: 2021-11-18
Attending: GENERAL PRACTICE | Admitting: GENERAL PRACTICE
Payer: COMMERCIAL

## 2021-11-15 ENCOUNTER — TRANSCRIPTION ENCOUNTER (OUTPATIENT)
Age: 46
End: 2021-11-15

## 2021-11-15 VITALS
OXYGEN SATURATION: 100 % | RESPIRATION RATE: 17 BRPM | DIASTOLIC BLOOD PRESSURE: 104 MMHG | TEMPERATURE: 98 F | HEART RATE: 84 BPM | SYSTOLIC BLOOD PRESSURE: 158 MMHG | HEIGHT: 61 IN

## 2021-11-15 DIAGNOSIS — Z98.89 OTHER SPECIFIED POSTPROCEDURAL STATES: Chronic | ICD-10-CM

## 2021-11-15 DIAGNOSIS — D36.9 BENIGN NEOPLASM, UNSPECIFIED SITE: Chronic | ICD-10-CM

## 2021-11-15 DIAGNOSIS — S92.009A UNSPECIFIED FRACTURE OF UNSPECIFIED CALCANEUS, INITIAL ENCOUNTER FOR CLOSED FRACTURE: ICD-10-CM

## 2021-11-15 DIAGNOSIS — Z98.51 TUBAL LIGATION STATUS: Chronic | ICD-10-CM

## 2021-11-15 LAB
ALBUMIN SERPL ELPH-MCNC: 4.7 G/DL — SIGNIFICANT CHANGE UP (ref 3.3–5)
ALP SERPL-CCNC: 57 U/L — SIGNIFICANT CHANGE UP (ref 40–120)
ALT FLD-CCNC: 17 U/L — SIGNIFICANT CHANGE UP (ref 4–33)
ANION GAP SERPL CALC-SCNC: 13 MMOL/L — SIGNIFICANT CHANGE UP (ref 7–14)
APTT BLD: 29.8 SEC — SIGNIFICANT CHANGE UP (ref 27–36.3)
AST SERPL-CCNC: 29 U/L — SIGNIFICANT CHANGE UP (ref 4–32)
BASOPHILS # BLD AUTO: 0.02 K/UL — SIGNIFICANT CHANGE UP (ref 0–0.2)
BASOPHILS NFR BLD AUTO: 0.3 % — SIGNIFICANT CHANGE UP (ref 0–2)
BILIRUB SERPL-MCNC: 0.4 MG/DL — SIGNIFICANT CHANGE UP (ref 0.2–1.2)
BUN SERPL-MCNC: 5 MG/DL — LOW (ref 7–23)
CALCIUM SERPL-MCNC: 9.5 MG/DL — SIGNIFICANT CHANGE UP (ref 8.4–10.5)
CHLORIDE SERPL-SCNC: 100 MMOL/L — SIGNIFICANT CHANGE UP (ref 98–107)
CO2 SERPL-SCNC: 25 MMOL/L — SIGNIFICANT CHANGE UP (ref 22–31)
CREAT SERPL-MCNC: 0.67 MG/DL — SIGNIFICANT CHANGE UP (ref 0.5–1.3)
EOSINOPHIL # BLD AUTO: 0.09 K/UL — SIGNIFICANT CHANGE UP (ref 0–0.5)
EOSINOPHIL NFR BLD AUTO: 1.3 % — SIGNIFICANT CHANGE UP (ref 0–6)
GLUCOSE SERPL-MCNC: 96 MG/DL — SIGNIFICANT CHANGE UP (ref 70–99)
HCT VFR BLD CALC: 37.3 % — SIGNIFICANT CHANGE UP (ref 34.5–45)
HGB BLD-MCNC: 12.8 G/DL — SIGNIFICANT CHANGE UP (ref 11.5–15.5)
IANC: 4.15 K/UL — SIGNIFICANT CHANGE UP (ref 1.5–8.5)
IMM GRANULOCYTES NFR BLD AUTO: 0.3 % — SIGNIFICANT CHANGE UP (ref 0–1.5)
INR BLD: 0.99 RATIO — SIGNIFICANT CHANGE UP (ref 0.88–1.16)
LYMPHOCYTES # BLD AUTO: 2.08 K/UL — SIGNIFICANT CHANGE UP (ref 1–3.3)
LYMPHOCYTES # BLD AUTO: 30.2 % — SIGNIFICANT CHANGE UP (ref 13–44)
MCHC RBC-ENTMCNC: 30.8 PG — SIGNIFICANT CHANGE UP (ref 27–34)
MCHC RBC-ENTMCNC: 34.3 GM/DL — SIGNIFICANT CHANGE UP (ref 32–36)
MCV RBC AUTO: 89.9 FL — SIGNIFICANT CHANGE UP (ref 80–100)
MONOCYTES # BLD AUTO: 0.52 K/UL — SIGNIFICANT CHANGE UP (ref 0–0.9)
MONOCYTES NFR BLD AUTO: 7.6 % — SIGNIFICANT CHANGE UP (ref 2–14)
NEUTROPHILS # BLD AUTO: 4.15 K/UL — SIGNIFICANT CHANGE UP (ref 1.8–7.4)
NEUTROPHILS NFR BLD AUTO: 60.3 % — SIGNIFICANT CHANGE UP (ref 43–77)
NRBC # BLD: 0 /100 WBCS — SIGNIFICANT CHANGE UP
NRBC # FLD: 0 K/UL — SIGNIFICANT CHANGE UP
PLATELET # BLD AUTO: 295 K/UL — SIGNIFICANT CHANGE UP (ref 150–400)
POTASSIUM SERPL-MCNC: 3.5 MMOL/L — SIGNIFICANT CHANGE UP (ref 3.5–5.3)
POTASSIUM SERPL-SCNC: 3.5 MMOL/L — SIGNIFICANT CHANGE UP (ref 3.5–5.3)
PROT SERPL-MCNC: 7.6 G/DL — SIGNIFICANT CHANGE UP (ref 6–8.3)
PROTHROM AB SERPL-ACNC: 11.3 SEC — SIGNIFICANT CHANGE UP (ref 10.6–13.6)
RBC # BLD: 4.15 M/UL — SIGNIFICANT CHANGE UP (ref 3.8–5.2)
RBC # FLD: 12.3 % — SIGNIFICANT CHANGE UP (ref 10.3–14.5)
SODIUM SERPL-SCNC: 138 MMOL/L — SIGNIFICANT CHANGE UP (ref 135–145)
WBC # BLD: 6.88 K/UL — SIGNIFICANT CHANGE UP (ref 3.8–10.5)
WBC # FLD AUTO: 6.88 K/UL — SIGNIFICANT CHANGE UP (ref 3.8–10.5)

## 2021-11-15 PROCEDURE — 73650 X-RAY EXAM OF HEEL: CPT | Mod: 26,RT

## 2021-11-15 PROCEDURE — 70450 CT HEAD/BRAIN W/O DYE: CPT | Mod: 26,MA

## 2021-11-15 PROCEDURE — 73610 X-RAY EXAM OF ANKLE: CPT | Mod: 26,RT

## 2021-11-15 PROCEDURE — 93010 ELECTROCARDIOGRAM REPORT: CPT

## 2021-11-15 PROCEDURE — 99284 EMERGENCY DEPT VISIT MOD MDM: CPT | Mod: 25

## 2021-11-15 PROCEDURE — 72131 CT LUMBAR SPINE W/O DYE: CPT | Mod: 26,MA

## 2021-11-15 RX ORDER — ACETAMINOPHEN 500 MG
650 TABLET ORAL EVERY 6 HOURS
Refills: 0 | Status: DISCONTINUED | OUTPATIENT
Start: 2021-11-15 | End: 2021-11-18

## 2021-11-15 RX ORDER — MORPHINE SULFATE 50 MG/1
4 CAPSULE, EXTENDED RELEASE ORAL ONCE
Refills: 0 | Status: DISCONTINUED | OUTPATIENT
Start: 2021-11-15 | End: 2021-11-15

## 2021-11-15 RX ORDER — ONDANSETRON 8 MG/1
4 TABLET, FILM COATED ORAL EVERY 8 HOURS
Refills: 0 | Status: DISCONTINUED | OUTPATIENT
Start: 2021-11-15 | End: 2021-11-18

## 2021-11-15 RX ORDER — MORPHINE SULFATE 50 MG/1
2 CAPSULE, EXTENDED RELEASE ORAL ONCE
Refills: 0 | Status: DISCONTINUED | OUTPATIENT
Start: 2021-11-15 | End: 2021-11-15

## 2021-11-15 RX ORDER — LANOLIN ALCOHOL/MO/W.PET/CERES
3 CREAM (GRAM) TOPICAL AT BEDTIME
Refills: 0 | Status: DISCONTINUED | OUTPATIENT
Start: 2021-11-15 | End: 2021-11-18

## 2021-11-15 RX ADMIN — MORPHINE SULFATE 4 MILLIGRAM(S): 50 CAPSULE, EXTENDED RELEASE ORAL at 22:42

## 2021-11-15 RX ADMIN — MORPHINE SULFATE 4 MILLIGRAM(S): 50 CAPSULE, EXTENDED RELEASE ORAL at 20:07

## 2021-11-15 RX ADMIN — MORPHINE SULFATE 4 MILLIGRAM(S): 50 CAPSULE, EXTENDED RELEASE ORAL at 16:07

## 2021-11-15 RX ADMIN — MORPHINE SULFATE 2 MILLIGRAM(S): 50 CAPSULE, EXTENDED RELEASE ORAL at 22:55

## 2021-11-15 NOTE — ED PROVIDER NOTE - OBJECTIVE STATEMENT
pushpa: 47 yo woman who had trip and fall 3 days ago, went to [facility] seen by podiatry, had xray showing comminuted calcaneal fx, splinted and sent for f/u today.  her outside f/u (Dr Brandon Grajeda, [facility]) said to follow with ed today for casting and further planning.  pt has been told injury is operative.  pt c/o severe pain    pmhx: htn, cardiac x syndrome, anxiety, depression, bipolar      denies N/V/abd pain/ HA/ fever/ chest pain/ SOB/ dysuria/ urgency/ pushpa: 45 yo woman who had trip and fall 3 days ago, went to [facility] seen by podiatry, had xray showing comminuted calcaneal fx, splinted and sent for f/u today.  her outside f/u (Dr Brandon Grajeda, [facility]) said to follow with ed today for casting and further planning.  pt has been told injury is operative.  pt c/o severe pain    also: pt does not recall fall. pt started new medication (fluvoxamine) and took it and had 1 glass of wine.  pt is adamant it wasn't more.  but then pt was "sleepwalking" and doesn't recall anything until fall, so unclear if palpitations, loc or head trauma.    she denies abd pain, back pain, neck pain or headache now.    pmhx: htn, cardiac x syndrome, anxiety, depression, bipolar      denies N/V/abd pain/ HA/ fever/ chest pain/ SOB/ dysuria/ urgency/

## 2021-11-15 NOTE — H&P ADULT - ASSESSMENT
45 yo F with Pmhx of depression, anxiety, PTSD, Cardiac X syndrome, and HTN presents to the ED after a fall, found to have R calcaneal fracture, pending surgical intervention.

## 2021-11-15 NOTE — H&P ADULT - NSHPPHYSICALEXAM_GEN_ALL_CORE
Vital Signs Last 24 Hrs  T(C): 36.4 (15 Nov 2021 22:58), Max: 36.8 (15 Nov 2021 14:20)  T(F): 97.6 (15 Nov 2021 22:58), Max: 98.3 (15 Nov 2021 14:20)  HR: 70 (15 Nov 2021 22:58) (63 - 84)  BP: 143/79 (15 Nov 2021 22:58) (143/79 - 158/104)  BP(mean): --  ABP: --  ABP(mean): --  RR: 16 (15 Nov 2021 22:58) (16 - 18)  SpO2: 98% (15 Nov 2021 22:58) (98% - 100%)      GENERAL: In moderate distress due to pain  HEENT:  Atraumatic, Normocephalic, Conjunctiva and sclera clear, oral mucosa moist, clear w/o any exudate   NECK: Supple, No JVD  CHEST/LUNG: Breathing comfortably, Clear to auscultation bilaterally; No wheeze  HEART: Regular rate and rhythm; No murmurs, rubs, or gallops  ABDOMEN: Soft, Nontender, Nondistended; Bowel sounds present  EXTREMITIES:  2+ Peripheral Pulses, R foot in cast.   PSYCH: AAOx3, normal affect  NEUROLOGY: non-focal, unable to move R LE due to pain  SKIN: No rashes or lesions

## 2021-11-15 NOTE — ED ADULT TRIAGE NOTE - CHIEF COMPLAINT QUOTE
pt with known acute comminuted fx of the R Calcaneus, foot wrapped for fhh. pt went to ortho MD outpt today and the ortho MD sent her the ED to have it re casted and set.

## 2021-11-15 NOTE — ED PROVIDER NOTE - PHYSICAL EXAMINATION
pt alert and can phonate well  h at/nc  perrl, conj clear, sclera anicteric,  neck supple  cor rrr pos s1s2  lungs clear to asno wheeze  abd soft no r/g/t  ext right leg in a splint  neueo awake, lucid normal gait moves all extremities with strength  psych normal affect  vs 158/104 pt alert and can phonate well  h at/nc  perrl, conj clear, sclera anicteric,  neck supple, no tenderness  cor rrr pos s1s2  lungs clear to asno wheeze, no chest wall tenderness  back no central spinal tenderenss  abd soft no r/g/t  ext right leg in a splint, examined by podiatry, toes are warm and pink  neueo awake, lucid normal gait moves all extremities with strength  psych normal affect  vs 158/104

## 2021-11-15 NOTE — ED PROVIDER NOTE - CARE PLAN
1 Principal Discharge DX:	Calcaneal fracture   Principal Discharge DX:	Calcaneal fracture  Secondary Diagnosis:	Syncope

## 2021-11-15 NOTE — H&P ADULT - NSHPSOCIALHISTORY_GEN_ALL_CORE
She smokes e-cigarettes daily. She drinks alcohol occasionally. She also uses medical marijuana for anxiety.

## 2021-11-15 NOTE — H&P ADULT - PROBLEM SELECTOR PLAN 1
Pt had an unwitnessed fall during sleep walking   -Xray R foot showed R calcaneal fracture   -Podiatry recs appreciated. Inpatient vs outpatient surgical intervention   -Pain control with oxycodon and dilaudid PRN  -Non WB to RLE  -PT consult   -Fall precautions
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
## 2021-11-15 NOTE — ED PROVIDER NOTE - CLINICAL SUMMARY MEDICAL DECISION MAKING FREE TEXT BOX
pushpa: calcaneal fx.  followed with [facility] this am, but didn't have films, came here bc pain is intolerable and pt told it would be operative.  zackatraaliyah (saw  in [facility]) will see her here. pushpa: calcaneal fx.  followed with [facility] this am, but didn't have films, came here bc pain is intolerable and pt told it would be operative.  posdiatry (saw  in [facility]) will see her here.    plan: admit pt for pain control and eval for compartment syndrome.  of note, pt does not recall fall and chart review pt did not have eval of ls spine or head at [facility].  for completeness will eval for spinal fx given calcaneal fx, ecg is sr, no proarrhythmic features, will ct head.  then plan is if ok to admit to medicine and have podiatry with plan for hopefully inpt surgery

## 2021-11-15 NOTE — ED ADULT NURSE NOTE - NS ED NOTE ABUSE SUSPICION NEGLECT YN
SUBJECTIVE:  Cj Shabazz is a 66 year old male who comes in today for a cystourethroscopy for bladder wall thickening. He is doing well. no Gh. no Dysuria. No problems with urination. No flank pains.  He drinks 3-4 bottles water daily - each is 16 oz. Was drinking 5-6 in the summertime.    He had US - showed persistent cysts in the kidney - MRI did not demonstrate any concerning areas and recommended rpt US in 6 months. He had a CT scan in December as well which demonstrated some stones in the kidneys.  Recent CT 10/8/20 - demonstrated renal cysts and R renal stone.   He has some frequency and urgency when it is cold outside. He has a good stream. No straining. No intermittency. .      OBJECTIVE:  Procedure:    Patient was placed in a supine position on the examination table. After routine prep, drape and local anesthesia, the flexible cystoscope was inserted into the bladder.  Residual urine was minimal.  The bladder mucosa was inspected throughout and was pink with no evidence of tumors, stones, or diverticula.  The ureteral orifices are normal in position and configuration.  The prostatic urethra is 3 cms in length with Mild lateral lobe obstruction. approx 1 cm growth of the prostate into the bladder and minimal to no median lobe. If he had Urolift - would have apical stacking.  The anterior urethra is normal.                                                    PLAN:  Your cystoscopy was normal - no masses in the bladder. Normal urethra. Mild blockage from the prostate.   Follow up in 12 months for evaluation of urinary symptoms.      Electronically signed by: Rudolph Watters DO  12/10/2020    
No
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
## 2021-11-15 NOTE — H&P ADULT - NSHPLABSRESULTS_GEN_ALL_CORE
12.8   6.88  )-----------( 295      ( 15 Nov 2021 16:30 )             37.3     Hgb Trend: 12.8<--, 12.8<--  11-15    138  |  100  |  5<L>  ----------------------------<  96  3.5   |  25  |  0.67    Ca    9.5      15 Nov 2021 16:30    TPro  7.6  /  Alb  4.7  /  TBili  0.4  /  DBili  x   /  AST  29  /  ALT  17  /  AlkPhos  57  11-15    Creatinine Trend: 0.67<--, 0.88<--  PT/INR - ( 15 Nov 2021 16:30 )   PT: 11.3 sec;   INR: 0.99 ratio         PTT - ( 15 Nov 2021 16:30 )  PTT:29.8 sec      Xray R foot     < from: Xray Ankle Complete 3 Views, Right (11.15.21 @ 15:48) >    PROCEDURE DATE:  Nov 15 2021         INTERPRETATION:  EXAMINATION: XR ANKLE 3 VIEWS RIGHT, XR CALCANEUS RIGHT    CLINICAL INDICATION:Calcaneal fracture.    COMPARISON: CT right foot dated 11/13/2021    TECHNIQUE:  Right calcaneus radiographs, 2 views  Right ankle radiographs, 3 views    INTERPRETATION:  There is an acute comminuted intra-articular fracture of the calcaneus redemonstrated with extension to the subtalar joint and with flattening of Boehler's angle, as seen on recent CT. There is a well-corticated fragment at the dorsal navicular, which appears chronic. There is bimalleolar edema. The talar dome is intact. The ankle mortise is congruent.    IMPRESSION: Comminuted intra-articular fracture of the calcaneus, as seen on recent CT.    --- End of Report ---        < end of copied text >

## 2021-11-15 NOTE — ED ADULT NURSE NOTE - OBJECTIVE STATEMENT
45 yo woman who had trip and fall 3 days ago, went to [facility] seen by podiatry, had xray showing comminuted calcaneal fx, splinted told she would need surgery at some point. PIV placed, labs sent, VS as documented, will continue to monitor.

## 2021-11-16 ENCOUNTER — TRANSCRIPTION ENCOUNTER (OUTPATIENT)
Age: 46
End: 2021-11-16

## 2021-11-16 DIAGNOSIS — Z29.9 ENCOUNTER FOR PROPHYLACTIC MEASURES, UNSPECIFIED: ICD-10-CM

## 2021-11-16 DIAGNOSIS — I10 ESSENTIAL (PRIMARY) HYPERTENSION: ICD-10-CM

## 2021-11-16 DIAGNOSIS — I20.8 OTHER FORMS OF ANGINA PECTORIS: ICD-10-CM

## 2021-11-16 DIAGNOSIS — S92.009A UNSPECIFIED FRACTURE OF UNSPECIFIED CALCANEUS, INITIAL ENCOUNTER FOR CLOSED FRACTURE: ICD-10-CM

## 2021-11-16 DIAGNOSIS — F43.10 POST-TRAUMATIC STRESS DISORDER, UNSPECIFIED: ICD-10-CM

## 2021-11-16 LAB
ANION GAP SERPL CALC-SCNC: 15 MMOL/L — HIGH (ref 7–14)
BASOPHILS # BLD AUTO: 0.03 K/UL — SIGNIFICANT CHANGE UP (ref 0–0.2)
BASOPHILS NFR BLD AUTO: 0.5 % — SIGNIFICANT CHANGE UP (ref 0–2)
BUN SERPL-MCNC: 7 MG/DL — SIGNIFICANT CHANGE UP (ref 7–23)
CALCIUM SERPL-MCNC: 9.6 MG/DL — SIGNIFICANT CHANGE UP (ref 8.4–10.5)
CHLORIDE SERPL-SCNC: 101 MMOL/L — SIGNIFICANT CHANGE UP (ref 98–107)
CO2 SERPL-SCNC: 23 MMOL/L — SIGNIFICANT CHANGE UP (ref 22–31)
COVID-19 SPIKE DOMAIN AB INTERP: POSITIVE
COVID-19 SPIKE DOMAIN ANTIBODY RESULT: >250 U/ML — HIGH
CREAT SERPL-MCNC: 0.61 MG/DL — SIGNIFICANT CHANGE UP (ref 0.5–1.3)
EOSINOPHIL # BLD AUTO: 0.11 K/UL — SIGNIFICANT CHANGE UP (ref 0–0.5)
EOSINOPHIL NFR BLD AUTO: 1.7 % — SIGNIFICANT CHANGE UP (ref 0–6)
GLUCOSE SERPL-MCNC: 101 MG/DL — HIGH (ref 70–99)
HCT VFR BLD CALC: 41.7 % — SIGNIFICANT CHANGE UP (ref 34.5–45)
HGB BLD-MCNC: 13.5 G/DL — SIGNIFICANT CHANGE UP (ref 11.5–15.5)
IANC: 3.69 K/UL — SIGNIFICANT CHANGE UP (ref 1.5–8.5)
IMM GRANULOCYTES NFR BLD AUTO: 0.3 % — SIGNIFICANT CHANGE UP (ref 0–1.5)
LYMPHOCYTES # BLD AUTO: 2.19 K/UL — SIGNIFICANT CHANGE UP (ref 1–3.3)
LYMPHOCYTES # BLD AUTO: 33.5 % — SIGNIFICANT CHANGE UP (ref 13–44)
MCHC RBC-ENTMCNC: 29.7 PG — SIGNIFICANT CHANGE UP (ref 27–34)
MCHC RBC-ENTMCNC: 32.4 GM/DL — SIGNIFICANT CHANGE UP (ref 32–36)
MCV RBC AUTO: 91.9 FL — SIGNIFICANT CHANGE UP (ref 80–100)
MONOCYTES # BLD AUTO: 0.49 K/UL — SIGNIFICANT CHANGE UP (ref 0–0.9)
MONOCYTES NFR BLD AUTO: 7.5 % — SIGNIFICANT CHANGE UP (ref 2–14)
NEUTROPHILS # BLD AUTO: 3.69 K/UL — SIGNIFICANT CHANGE UP (ref 1.8–7.4)
NEUTROPHILS NFR BLD AUTO: 56.5 % — SIGNIFICANT CHANGE UP (ref 43–77)
NRBC # BLD: 0 /100 WBCS — SIGNIFICANT CHANGE UP
NRBC # FLD: 0 K/UL — SIGNIFICANT CHANGE UP
PLATELET # BLD AUTO: 302 K/UL — SIGNIFICANT CHANGE UP (ref 150–400)
POTASSIUM SERPL-MCNC: 3.6 MMOL/L — SIGNIFICANT CHANGE UP (ref 3.5–5.3)
POTASSIUM SERPL-SCNC: 3.6 MMOL/L — SIGNIFICANT CHANGE UP (ref 3.5–5.3)
RBC # BLD: 4.54 M/UL — SIGNIFICANT CHANGE UP (ref 3.8–5.2)
RBC # FLD: 12.2 % — SIGNIFICANT CHANGE UP (ref 10.3–14.5)
SARS-COV-2 IGG+IGM SERPL QL IA: >250 U/ML — HIGH
SARS-COV-2 IGG+IGM SERPL QL IA: POSITIVE
SARS-COV-2 RNA SPEC QL NAA+PROBE: SIGNIFICANT CHANGE UP
SODIUM SERPL-SCNC: 139 MMOL/L — SIGNIFICANT CHANGE UP (ref 135–145)
WBC # BLD: 6.53 K/UL — SIGNIFICANT CHANGE UP (ref 3.8–10.5)
WBC # FLD AUTO: 6.53 K/UL — SIGNIFICANT CHANGE UP (ref 3.8–10.5)

## 2021-11-16 PROCEDURE — 99232 SBSQ HOSP IP/OBS MODERATE 35: CPT

## 2021-11-16 PROCEDURE — 99223 1ST HOSP IP/OBS HIGH 75: CPT

## 2021-11-16 PROCEDURE — 99253 IP/OBS CNSLTJ NEW/EST LOW 45: CPT

## 2021-11-16 RX ORDER — METOPROLOL TARTRATE 50 MG
50 TABLET ORAL DAILY
Refills: 0 | Status: DISCONTINUED | OUTPATIENT
Start: 2021-11-16 | End: 2021-11-18

## 2021-11-16 RX ORDER — ENOXAPARIN SODIUM 100 MG/ML
40 INJECTION SUBCUTANEOUS DAILY
Refills: 0 | Status: DISCONTINUED | OUTPATIENT
Start: 2021-11-16 | End: 2021-11-18

## 2021-11-16 RX ORDER — HYDROMORPHONE HYDROCHLORIDE 2 MG/ML
0.5 INJECTION INTRAMUSCULAR; INTRAVENOUS; SUBCUTANEOUS EVERY 6 HOURS
Refills: 0 | Status: DISCONTINUED | OUTPATIENT
Start: 2021-11-16 | End: 2021-11-17

## 2021-11-16 RX ORDER — PANTOPRAZOLE SODIUM 20 MG/1
40 TABLET, DELAYED RELEASE ORAL
Refills: 0 | Status: DISCONTINUED | OUTPATIENT
Start: 2021-11-16 | End: 2021-11-18

## 2021-11-16 RX ORDER — FLUVOXAMINE MALEATE 25 MG/1
50 TABLET ORAL AT BEDTIME
Refills: 0 | Status: DISCONTINUED | OUTPATIENT
Start: 2021-11-16 | End: 2021-11-18

## 2021-11-16 RX ORDER — OXYCODONE HYDROCHLORIDE 5 MG/1
10 TABLET ORAL EVERY 6 HOURS
Refills: 0 | Status: DISCONTINUED | OUTPATIENT
Start: 2021-11-16 | End: 2021-11-17

## 2021-11-16 RX ORDER — DIAZEPAM 5 MG
10 TABLET ORAL
Refills: 0 | Status: DISCONTINUED | OUTPATIENT
Start: 2021-11-16 | End: 2021-11-18

## 2021-11-16 RX ORDER — OLANZAPINE 15 MG/1
5 TABLET, FILM COATED ORAL AT BEDTIME
Refills: 0 | Status: DISCONTINUED | OUTPATIENT
Start: 2021-11-16 | End: 2021-11-18

## 2021-11-16 RX ORDER — AMLODIPINE BESYLATE 2.5 MG/1
5 TABLET ORAL DAILY
Refills: 0 | Status: DISCONTINUED | OUTPATIENT
Start: 2021-11-16 | End: 2021-11-18

## 2021-11-16 RX ADMIN — HYDROMORPHONE HYDROCHLORIDE 0.5 MILLIGRAM(S): 2 INJECTION INTRAMUSCULAR; INTRAVENOUS; SUBCUTANEOUS at 01:13

## 2021-11-16 RX ADMIN — Medication 50 MILLIGRAM(S): at 05:33

## 2021-11-16 RX ADMIN — OXYCODONE HYDROCHLORIDE 10 MILLIGRAM(S): 5 TABLET ORAL at 05:32

## 2021-11-16 RX ADMIN — HYDROMORPHONE HYDROCHLORIDE 0.5 MILLIGRAM(S): 2 INJECTION INTRAMUSCULAR; INTRAVENOUS; SUBCUTANEOUS at 21:37

## 2021-11-16 RX ADMIN — OXYCODONE HYDROCHLORIDE 10 MILLIGRAM(S): 5 TABLET ORAL at 17:46

## 2021-11-16 RX ADMIN — Medication 650 MILLIGRAM(S): at 07:53

## 2021-11-16 RX ADMIN — HYDROMORPHONE HYDROCHLORIDE 0.5 MILLIGRAM(S): 2 INJECTION INTRAMUSCULAR; INTRAVENOUS; SUBCUTANEOUS at 01:44

## 2021-11-16 RX ADMIN — ENOXAPARIN SODIUM 40 MILLIGRAM(S): 100 INJECTION SUBCUTANEOUS at 17:47

## 2021-11-16 RX ADMIN — HYDROMORPHONE HYDROCHLORIDE 0.5 MILLIGRAM(S): 2 INJECTION INTRAMUSCULAR; INTRAVENOUS; SUBCUTANEOUS at 15:49

## 2021-11-16 RX ADMIN — Medication 10 MILLIGRAM(S): at 01:12

## 2021-11-16 RX ADMIN — HYDROMORPHONE HYDROCHLORIDE 0.5 MILLIGRAM(S): 2 INJECTION INTRAMUSCULAR; INTRAVENOUS; SUBCUTANEOUS at 15:10

## 2021-11-16 RX ADMIN — OXYCODONE HYDROCHLORIDE 10 MILLIGRAM(S): 5 TABLET ORAL at 06:02

## 2021-11-16 RX ADMIN — Medication 10 MILLIGRAM(S): at 17:47

## 2021-11-16 RX ADMIN — FLUVOXAMINE MALEATE 50 MILLIGRAM(S): 25 TABLET ORAL at 21:37

## 2021-11-16 RX ADMIN — OLANZAPINE 5 MILLIGRAM(S): 15 TABLET, FILM COATED ORAL at 21:37

## 2021-11-16 RX ADMIN — HYDROMORPHONE HYDROCHLORIDE 0.5 MILLIGRAM(S): 2 INJECTION INTRAMUSCULAR; INTRAVENOUS; SUBCUTANEOUS at 08:00

## 2021-11-16 RX ADMIN — FLUVOXAMINE MALEATE 50 MILLIGRAM(S): 25 TABLET ORAL at 01:12

## 2021-11-16 RX ADMIN — AMLODIPINE BESYLATE 5 MILLIGRAM(S): 2.5 TABLET ORAL at 05:33

## 2021-11-16 RX ADMIN — PANTOPRAZOLE SODIUM 40 MILLIGRAM(S): 20 TABLET, DELAYED RELEASE ORAL at 05:33

## 2021-11-16 RX ADMIN — OLANZAPINE 5 MILLIGRAM(S): 15 TABLET, FILM COATED ORAL at 01:12

## 2021-11-16 NOTE — DISCHARGE NOTE PROVIDER - CARE PROVIDER_API CALL
podiatry,   Phone: (   )    -  Fax: (   )    -  Follow Up Time:    Waterhouse, Joseph C (DPM)  Surgery  [address] [place] [address]  Phone: [phone]  Fax: [phone]  Follow Up Time: 1-3 days    Bonnie Elena  Internal Medicine  [address] [place] [address], [facility] [address]  [place] [address]  Phone: [phone]  Fax: [phone]  Follow Up Time:
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
## 2021-11-16 NOTE — PHYSICAL THERAPY INITIAL EVALUATION ADULT - RANGE OF MOTION EXAMINATION, REHAB EVAL
right ankle ROM not assessed (+) Cast/bilateral upper extremity ROM was WFL (within functional limits)/bilateral lower extremity ROM was WFL (within functional limits)

## 2021-11-16 NOTE — DISCHARGE NOTE PROVIDER - NSDCCAREPROVSEEN_GEN_ALL_CORE_FT
Kelechi Varela Hoorbod Delshadfar Hoorbod Delshadfar Hoorbod Delshadfar Jerome Koss Stephen Dini Joseph Waterhouse Samantha Trepal Ivana Ilic Joseph Abdelmalak Ambreen Sharif Farah Naz Marianne Haughey Dinesh Rai  User ADM  Team [facility] Medicine ACP
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
## 2021-11-16 NOTE — PHYSICAL THERAPY INITIAL EVALUATION ADULT - DISCHARGE DISPOSITION, PT EVAL
anticipated discharge to home with home PT services to address current functional limitations to optimize safety within the home environment./home w/ home PT

## 2021-11-16 NOTE — DISCHARGE NOTE PROVIDER - NSDCCPCAREPLAN_GEN_ALL_CORE_FT
PRINCIPAL DISCHARGE DIAGNOSIS  Diagnosis: Calcaneal fracture  Assessment and Plan of Treatment:        PRINCIPAL DISCHARGE DIAGNOSIS  Diagnosis: Calcaneal fracture  Assessment and Plan of Treatment: Please follow up with Dr. Waterhouse [phone] within the next 2 days for continued assessment and care. Please continue to be non weight bearing on the right leg at this time.      SECONDARY DISCHARGE DIAGNOSES  Diagnosis: Calcaneal fracture  Assessment and Plan of Treatment: Please continue to follow up with your outpatient cardiologist and primary care doctor.    Diagnosis: Essential hypertension  Assessment and Plan of Treatment: Continue current blood pressure medication regimen as directed. Monitor for any visual changes, headaches or dizziness.  Monitor blood pressure regularly.  Follow up with your PCP for further management for high blood pressure, please call to make appointment within 1 week of discharge

## 2021-11-16 NOTE — PHYSICAL THERAPY INITIAL EVALUATION ADULT - GROSSLY INTACT, SENSORY
light touch sensation, unable to formally assessed right ankle sensation (+) cast/Left UE/Right UE/Left LE/Right LE/Grossly Intact

## 2021-11-16 NOTE — PATIENT PROFILE ADULT - HEALTH LITERACY
Final Anesthesia Post-op Assessment    Patient: Jimi Crain  Procedure(s) Performed: RIGHT PHACO WIH IOL  Anesthesia type: Monitor Anesthesia Care    Vital Last Value   Temperature 36.4 °C (97.5 °F) (05/23/18 1050)   Pulse 56 (05/23/18 1050)   Respiratory Rate 18 (05/23/18 1050)   Non-Invasive   Blood Pressure 155/71 (05/23/18 1050)   Arterial  Blood Pressure     Pulse Oximetry 96 % (05/23/18 1050)     Last 24 I/O:   Intake/Output Summary (Last 24 hours) at 05/23/18 1213  Last data filed at 05/23/18 1213   Gross per 24 hour   Intake              300 ml   Output                0 ml   Net              300 ml           Mental status recovered: patient participates in evaluation.  VS noted and are stable. See RN flowsheet for current VS.  Respiratory function satisfactory; airway patent.  Cardiovascular function and hydration status satisfactory.  Adequate pain control.  Nausea and vomiting control satisfactory.  No apparent anesthetic complications.   no

## 2021-11-16 NOTE — CONSULT NOTE ADULT - ASSESSMENT
45 yo f with right calcaneal comminuted fracture  -pt seen and evaluated  -no open fracture, neurovascular intact  -RFXR: Comminuted intra-articular fracture of the calcaneus  -Tenderness to palpation to right heel, pt able to move R ankle and digit ROM with mild pain, right foot lateral, plantar foot ecchymosis, no tenting, no open fracture  -applied posterior splint to RLE  -Instructed pt to stay non WB to RLE  -pt may need surgical intervention as outpatient pending swelling resolution  -will follow  -discussed with attending
1. fall whild sleep waalking  2. no evidence of cardiac cause for fall  3. normal coronaries possible syndrome x    Recommend  check orthostatics  keep hydrated  discharge planning

## 2021-11-16 NOTE — PHYSICAL THERAPY INITIAL EVALUATION ADULT - GAIT DISTANCE, PT EVAL
5 feet x 2 attempts with hopping gait pattern, distance limited in ED secondary to tubes/lines. further distance to be assessed as feasible

## 2021-11-16 NOTE — PHYSICAL THERAPY INITIAL EVALUATION ADULT - ADDITIONAL COMMENTS
Discharge instructions and prescriptions reviewed. Patient remains alert and denies pain. BP remains elevated; however, patient has not taken medications for a while due to need for prescription filling. Has a new primary appointment next week. Needs met. No acute distress noted. Pt. has been ambulating with the use of crutches for the past 1-2 days     Pt. left comfortable in bed with all tubes/lines intact, call bell in reach and in NAD.

## 2021-11-16 NOTE — PHYSICAL THERAPY INITIAL EVALUATION ADULT - PERTINENT HX OF CURRENT PROBLEM, REHAB EVAL
46 year old Female with Pmhx of depression, anxiety, PTSD, Cardiac X syndrome, and HTN presents to the ED after a fall. s/p right calcaneal fracture

## 2021-11-16 NOTE — DISCHARGE NOTE PROVIDER - NSDCMRMEDTOKEN_GEN_ALL_CORE_FT
amLODIPine 5 mg oral tablet: 1 tab(s) orally once a day  fluvoxaMINE 50 mg oral tablet: 1 tab(s) orally once a day (at bedtime)  metoprolol succinate 50 mg oral tablet, extended release: 1 tab(s) orally once a day  pantoprazole 40 mg oral delayed release tablet: 1 tab(s) orally once a day  Valium 10 mg oral tablet: 1 tab(s) orally 2 times a day  ZyPREXA 5 mg oral tablet: 1 tab(s) orally once a day (at bedtime)   acetaminophen 325 mg oral tablet: 2 tab(s) orally every 6 hours, As needed, Temp greater or equal to 38C (100.4F), Mild Pain (1 - 3)  amLODIPine 5 mg oral tablet: 1 tab(s) orally once a day  fluvoxaMINE 50 mg oral tablet: 1 tab(s) orally once a day (at bedtime)  HYDROmorphone 2 mg oral tablet: 1 tab(s) orally every 6 hours, As Needed -Moderate Pain (4 - 6) MDD:8mg  metoprolol succinate 50 mg oral tablet, extended release: 1 tab(s) orally once a day  pantoprazole 40 mg oral delayed release tablet: 1 tab(s) orally once a day  Valium 10 mg oral tablet: 1 tab(s) orally 2 times a day  ZyPREXA 5 mg oral tablet: 1 tab(s) orally once a day (at bedtime)   acetaminophen 325 mg oral tablet: 2 tab(s) orally every 6 hours, As needed, Temp greater or equal to 38C (100.4F), Mild Pain (1 - 3)  amLODIPine 5 mg oral tablet: 1 tab(s) orally once a day  fluvoxaMINE 50 mg oral tablet: 1 tab(s) orally once a day (at bedtime)  HYDROmorphone 2 mg oral tablet: 1 tab(s) orally every 6 hours, As Needed -Moderate Pain (4 - 6) MDD:8mg  metoprolol succinate 50 mg oral tablet, extended release: 1 tab(s) orally once a day  pantoprazole 40 mg oral delayed release tablet: 1 tab(s) orally once a day  Physical Therapy:   Valium 10 mg oral tablet: 1 tab(s) orally 2 times a day  ZyPREXA 5 mg oral tablet: 1 tab(s) orally once a day (at bedtime)   amLODIPine 5 mg oral tablet: 1 tab(s) orally once a day  fluvoxaMINE 50 mg oral tablet: 1 tab(s) orally once a day (at bedtime)  metoprolol succinate 50 mg oral tablet, extended release: 1 tab(s) orally once a day   Outpatient Physical Therapy: 3-5x weekly x 1 month    Non-Weight bearing RLE.    Weight Bearing as tolerated LLE  oxycodone-acetaminophen 5 mg-325 mg oral tablet: 1-2  tab(s) orally every 6 hours, As needed, Moderate Pain (4 - 6) MDD:8 tabs  pantoprazole 40 mg oral delayed release tablet: 1 tab(s) orally once a day   Physical Therapy:   polyethylene glycol 3350 oral powder for reconstitution: 17 gram(s) orally once a day, As Needed  senna oral tablet: 2 tab(s) orally once a day (at bedtime), As Needed  Valium 10 mg oral tablet: 1 tab(s) orally 2 times a day (Filled 11/13/21 x #60 tabs: 1 tab BID)  ZyPREXA 5 mg oral tablet: 1 tab(s) orally once a day (at bedtime)

## 2021-11-16 NOTE — PHYSICAL THERAPY INITIAL EVALUATION ADULT - MANUAL MUSCLE TESTING RESULTS, REHAB EVAL
Bilateral UE muscle strength grossly 3/5 Throughout; Bilateral LE muscle strength grossly 3/5 Throughout available range of motion.

## 2021-11-16 NOTE — DISCHARGE NOTE PROVIDER - PROVIDER TOKENS
FREE:[LAST:[podiatry],PHONE:[(   )    -],FAX:[(   )    -]] PROVIDER:[TOKEN:[[id number]],FOLLOWUP:[1-3 days]],PROVIDER:[TOKEN:[[id number]]]

## 2021-11-17 LAB
ANION GAP SERPL CALC-SCNC: 9 MMOL/L — SIGNIFICANT CHANGE UP (ref 7–14)
BASOPHILS # BLD AUTO: 0.02 K/UL — SIGNIFICANT CHANGE UP (ref 0–0.2)
BASOPHILS NFR BLD AUTO: 0.4 % — SIGNIFICANT CHANGE UP (ref 0–2)
BUN SERPL-MCNC: 4 MG/DL — LOW (ref 7–23)
CALCIUM SERPL-MCNC: 9 MG/DL — SIGNIFICANT CHANGE UP (ref 8.4–10.5)
CHLORIDE SERPL-SCNC: 102 MMOL/L — SIGNIFICANT CHANGE UP (ref 98–107)
CO2 SERPL-SCNC: 26 MMOL/L — SIGNIFICANT CHANGE UP (ref 22–31)
CREAT SERPL-MCNC: 0.66 MG/DL — SIGNIFICANT CHANGE UP (ref 0.5–1.3)
EOSINOPHIL # BLD AUTO: 0.06 K/UL — SIGNIFICANT CHANGE UP (ref 0–0.5)
EOSINOPHIL NFR BLD AUTO: 1.3 % — SIGNIFICANT CHANGE UP (ref 0–6)
GLUCOSE SERPL-MCNC: 99 MG/DL — SIGNIFICANT CHANGE UP (ref 70–99)
HCT VFR BLD CALC: 34.8 % — SIGNIFICANT CHANGE UP (ref 34.5–45)
HGB BLD-MCNC: 11.5 G/DL — SIGNIFICANT CHANGE UP (ref 11.5–15.5)
IANC: 2.28 K/UL — SIGNIFICANT CHANGE UP (ref 1.5–8.5)
IMM GRANULOCYTES NFR BLD AUTO: 0.2 % — SIGNIFICANT CHANGE UP (ref 0–1.5)
LYMPHOCYTES # BLD AUTO: 1.85 K/UL — SIGNIFICANT CHANGE UP (ref 1–3.3)
LYMPHOCYTES # BLD AUTO: 40.3 % — SIGNIFICANT CHANGE UP (ref 13–44)
MCHC RBC-ENTMCNC: 29.9 PG — SIGNIFICANT CHANGE UP (ref 27–34)
MCHC RBC-ENTMCNC: 33 GM/DL — SIGNIFICANT CHANGE UP (ref 32–36)
MCV RBC AUTO: 90.6 FL — SIGNIFICANT CHANGE UP (ref 80–100)
MONOCYTES # BLD AUTO: 0.37 K/UL — SIGNIFICANT CHANGE UP (ref 0–0.9)
MONOCYTES NFR BLD AUTO: 8.1 % — SIGNIFICANT CHANGE UP (ref 2–14)
NEUTROPHILS # BLD AUTO: 2.28 K/UL — SIGNIFICANT CHANGE UP (ref 1.8–7.4)
NEUTROPHILS NFR BLD AUTO: 49.7 % — SIGNIFICANT CHANGE UP (ref 43–77)
NRBC # BLD: 0 /100 WBCS — SIGNIFICANT CHANGE UP
NRBC # FLD: 0 K/UL — SIGNIFICANT CHANGE UP
PLATELET # BLD AUTO: 262 K/UL — SIGNIFICANT CHANGE UP (ref 150–400)
POTASSIUM SERPL-MCNC: 3.3 MMOL/L — LOW (ref 3.5–5.3)
POTASSIUM SERPL-SCNC: 3.3 MMOL/L — LOW (ref 3.5–5.3)
RBC # BLD: 3.84 M/UL — SIGNIFICANT CHANGE UP (ref 3.8–5.2)
RBC # FLD: 12.2 % — SIGNIFICANT CHANGE UP (ref 10.3–14.5)
SODIUM SERPL-SCNC: 137 MMOL/L — SIGNIFICANT CHANGE UP (ref 135–145)
WBC # BLD: 4.59 K/UL — SIGNIFICANT CHANGE UP (ref 3.8–10.5)
WBC # FLD AUTO: 4.59 K/UL — SIGNIFICANT CHANGE UP (ref 3.8–10.5)

## 2021-11-17 RX ORDER — POTASSIUM CHLORIDE 20 MEQ
40 PACKET (EA) ORAL ONCE
Refills: 0 | Status: COMPLETED | OUTPATIENT
Start: 2021-11-17 | End: 2021-11-17

## 2021-11-17 RX ORDER — IPRATROPIUM/ALBUTEROL SULFATE 18-103MCG
3 AEROSOL WITH ADAPTER (GRAM) INHALATION ONCE
Refills: 0 | Status: COMPLETED | OUTPATIENT
Start: 2021-11-17 | End: 2021-11-17

## 2021-11-17 RX ORDER — HYDROMORPHONE HYDROCHLORIDE 2 MG/ML
2 INJECTION INTRAMUSCULAR; INTRAVENOUS; SUBCUTANEOUS EVERY 4 HOURS
Refills: 0 | Status: DISCONTINUED | OUTPATIENT
Start: 2021-11-17 | End: 2021-11-18

## 2021-11-17 RX ADMIN — FLUVOXAMINE MALEATE 50 MILLIGRAM(S): 25 TABLET ORAL at 21:08

## 2021-11-17 RX ADMIN — HYDROMORPHONE HYDROCHLORIDE 0.5 MILLIGRAM(S): 2 INJECTION INTRAMUSCULAR; INTRAVENOUS; SUBCUTANEOUS at 06:40

## 2021-11-17 RX ADMIN — OXYCODONE HYDROCHLORIDE 10 MILLIGRAM(S): 5 TABLET ORAL at 10:16

## 2021-11-17 RX ADMIN — HYDROMORPHONE HYDROCHLORIDE 0.5 MILLIGRAM(S): 2 INJECTION INTRAMUSCULAR; INTRAVENOUS; SUBCUTANEOUS at 05:40

## 2021-11-17 RX ADMIN — AMLODIPINE BESYLATE 5 MILLIGRAM(S): 2.5 TABLET ORAL at 05:40

## 2021-11-17 RX ADMIN — Medication 10 MILLIGRAM(S): at 17:26

## 2021-11-17 RX ADMIN — HYDROMORPHONE HYDROCHLORIDE 0.5 MILLIGRAM(S): 2 INJECTION INTRAMUSCULAR; INTRAVENOUS; SUBCUTANEOUS at 12:00

## 2021-11-17 RX ADMIN — Medication 3 MILLILITER(S): at 15:04

## 2021-11-17 RX ADMIN — PANTOPRAZOLE SODIUM 40 MILLIGRAM(S): 20 TABLET, DELAYED RELEASE ORAL at 05:41

## 2021-11-17 RX ADMIN — HYDROMORPHONE HYDROCHLORIDE 2 MILLIGRAM(S): 2 INJECTION INTRAMUSCULAR; INTRAVENOUS; SUBCUTANEOUS at 15:30

## 2021-11-17 RX ADMIN — Medication 650 MILLIGRAM(S): at 23:08

## 2021-11-17 RX ADMIN — Medication 40 MILLIEQUIVALENT(S): at 13:30

## 2021-11-17 RX ADMIN — Medication 3 MILLIGRAM(S): at 22:37

## 2021-11-17 RX ADMIN — OXYCODONE HYDROCHLORIDE 10 MILLIGRAM(S): 5 TABLET ORAL at 09:22

## 2021-11-17 RX ADMIN — Medication 50 MILLIGRAM(S): at 05:40

## 2021-11-17 RX ADMIN — OXYCODONE HYDROCHLORIDE 10 MILLIGRAM(S): 5 TABLET ORAL at 02:16

## 2021-11-17 RX ADMIN — ENOXAPARIN SODIUM 40 MILLIGRAM(S): 100 INJECTION SUBCUTANEOUS at 13:04

## 2021-11-17 RX ADMIN — Medication 650 MILLIGRAM(S): at 22:36

## 2021-11-17 RX ADMIN — HYDROMORPHONE HYDROCHLORIDE 2 MILLIGRAM(S): 2 INJECTION INTRAMUSCULAR; INTRAVENOUS; SUBCUTANEOUS at 21:00

## 2021-11-17 RX ADMIN — HYDROMORPHONE HYDROCHLORIDE 2 MILLIGRAM(S): 2 INJECTION INTRAMUSCULAR; INTRAVENOUS; SUBCUTANEOUS at 14:50

## 2021-11-17 RX ADMIN — Medication 10 MILLIGRAM(S): at 05:40

## 2021-11-17 RX ADMIN — OLANZAPINE 5 MILLIGRAM(S): 15 TABLET, FILM COATED ORAL at 21:08

## 2021-11-17 RX ADMIN — HYDROMORPHONE HYDROCHLORIDE 0.5 MILLIGRAM(S): 2 INJECTION INTRAMUSCULAR; INTRAVENOUS; SUBCUTANEOUS at 11:10

## 2021-11-17 RX ADMIN — OXYCODONE HYDROCHLORIDE 10 MILLIGRAM(S): 5 TABLET ORAL at 01:16

## 2021-11-17 RX ADMIN — HYDROMORPHONE HYDROCHLORIDE 2 MILLIGRAM(S): 2 INJECTION INTRAMUSCULAR; INTRAVENOUS; SUBCUTANEOUS at 20:36
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
## 2021-11-17 NOTE — PROGRESS NOTE ADULT - SUBJECTIVE AND OBJECTIVE BOX
Podiatry pager #: [phone] ([place])/ [phone] ([facility])    Patient is a 46y old  Female who presents with a chief complaint of Fall (16 Nov 2021 18:48)       INTERVAL HPI/OVERNIGHT EVENTS:  Patient seen and evaluated at bedside.  Pt is resting comfortable in NAD. Denies N/V/F/C.     Allergies    Celexa (Rash)  Cipro (Short breath)  fluoroquinolone antibiotics (Rash)    Intolerances        Vital Signs Last 24 Hrs  T(C): 36.7 (16 Nov 2021 11:04), Max: 36.9 (16 Nov 2021 05:56)  T(F): 98 (16 Nov 2021 11:04), Max: 98.4 (16 Nov 2021 05:56)  HR: 94 (16 Nov 2021 11:04) (63 - 94)  BP: 96/69 (16 Nov 2021 11:04) (96/69 - 153/103)  BP(mean): --  RR: 18 (16 Nov 2021 11:04) (16 - 18)  SpO2: 99% (16 Nov 2021 11:04) (96% - 100%)    LABS:                        13.5   6.53  )-----------( 302      ( 16 Nov 2021 06:23 )             41.7     11-16    139  |  101  |  7   ----------------------------<  101<H>  3.6   |  23  |  0.61    Ca    9.6      16 Nov 2021 06:23    TPro  7.6  /  Alb  4.7  /  TBili  0.4  /  DBili  x   /  AST  29  /  ALT  17  /  AlkPhos  57  11-15    PT/INR - ( 15 Nov 2021 16:30 )   PT: 11.3 sec;   INR: 0.99 ratio         PTT - ( 15 Nov 2021 16:30 )  PTT:29.8 sec    CAPILLARY BLOOD GLUCOSE          Lower Extremity Physical Exam:  Vascular:  CFT <3 seconds B/L,   pt able to move R digits ROM with mild pain  pt pain improved, kept RLE posterior splint clean, dry and intact    RADIOLOGY & ADDITIONAL TESTS:  
Patient is a 46y old  Female who presents with a chief complaint of Fall (16 Nov 2021 19:09)       INTERVAL HPI/OVERNIGHT EVENTS:  Patient seen and evaluated at bedside.  Pt is resting comfortable in NAD. Denies N/V/F/C.  Pain rated at X/10    Allergies    Celexa (Rash)  Cipro (Short breath)  fluoroquinolone antibiotics (Rash)    Intolerances        Vital Signs Last 24 Hrs  T(C): 36.9 (17 Nov 2021 05:38), Max: 36.9 (17 Nov 2021 05:38)  T(F): 98.5 (17 Nov 2021 05:38), Max: 98.5 (17 Nov 2021 05:38)  HR: 97 (17 Nov 2021 05:38) (66 - 97)  BP: 127/90 (17 Nov 2021 05:38) (123/73 - 128/78)  BP(mean): --  RR: 17 (17 Nov 2021 05:38) (17 - 18)  SpO2: 96% (17 Nov 2021 05:38) (96% - 97%)    LABS:                        11.5   4.59  )-----------( 262      ( 17 Nov 2021 06:52 )             34.8     11-17    137  |  102  |  4<L>  ----------------------------<  99  3.3<L>   |  26  |  0.66    Ca    9.0      17 Nov 2021 06:52    TPro  7.6  /  Alb  4.7  /  TBili  0.4  /  DBili  x   /  AST  29  /  ALT  17  /  AlkPhos  57  11-15    PT/INR - ( 15 Nov 2021 16:30 )   PT: 11.3 sec;   INR: 0.99 ratio         PTT - ( 15 Nov 2021 16:30 )  PTT:29.8 sec    CAPILLARY BLOOD GLUCOSE          Lower Extremity Physical Exam:  Vascular: DP/PT 2/4 B/L, CFT <3 seconds B/L, Temperature gradient warm to cool B/L, right foot edema  Neuro: Epicritic sensation intact to the level of ankle B/L  Musculoskeletal/Ortho: tenderness to palpation to right heel, pt able to move R ankle and digit ROM with mild-moderate pain  Skin: right foot lateral, plantar foot ecchymosis, edema to the dorsal foot and lateral ankle, no tenting, no open fracture    RADIOLOGY & ADDITIONAL TESTS:

## 2021-11-18 ENCOUNTER — TRANSCRIPTION ENCOUNTER (OUTPATIENT)
Age: 46
End: 2021-11-18

## 2021-11-18 VITALS
RESPIRATION RATE: 18 BRPM | SYSTOLIC BLOOD PRESSURE: 107 MMHG | DIASTOLIC BLOOD PRESSURE: 61 MMHG | TEMPERATURE: 98 F | OXYGEN SATURATION: 100 % | HEART RATE: 75 BPM

## 2021-11-18 LAB
ANION GAP SERPL CALC-SCNC: 13 MMOL/L — SIGNIFICANT CHANGE UP (ref 7–14)
BUN SERPL-MCNC: 4 MG/DL — LOW (ref 7–23)
CALCIUM SERPL-MCNC: 9.4 MG/DL — SIGNIFICANT CHANGE UP (ref 8.4–10.5)
CHLORIDE SERPL-SCNC: 104 MMOL/L — SIGNIFICANT CHANGE UP (ref 98–107)
CO2 SERPL-SCNC: 22 MMOL/L — SIGNIFICANT CHANGE UP (ref 22–31)
CREAT SERPL-MCNC: 0.6 MG/DL — SIGNIFICANT CHANGE UP (ref 0.5–1.3)
GLUCOSE SERPL-MCNC: 97 MG/DL — SIGNIFICANT CHANGE UP (ref 70–99)
HCT VFR BLD CALC: 35.5 % — SIGNIFICANT CHANGE UP (ref 34.5–45)
HGB BLD-MCNC: 12.3 G/DL — SIGNIFICANT CHANGE UP (ref 11.5–15.5)
MAGNESIUM SERPL-MCNC: 2.1 MG/DL — SIGNIFICANT CHANGE UP (ref 1.6–2.6)
MCHC RBC-ENTMCNC: 30.8 PG — SIGNIFICANT CHANGE UP (ref 27–34)
MCHC RBC-ENTMCNC: 34.6 GM/DL — SIGNIFICANT CHANGE UP (ref 32–36)
MCV RBC AUTO: 88.8 FL — SIGNIFICANT CHANGE UP (ref 80–100)
NRBC # BLD: 0 /100 WBCS — SIGNIFICANT CHANGE UP
NRBC # FLD: 0 K/UL — SIGNIFICANT CHANGE UP
PHOSPHATE SERPL-MCNC: 2.8 MG/DL — SIGNIFICANT CHANGE UP (ref 2.5–4.5)
PLATELET # BLD AUTO: 311 K/UL — SIGNIFICANT CHANGE UP (ref 150–400)
POTASSIUM SERPL-MCNC: 3.6 MMOL/L — SIGNIFICANT CHANGE UP (ref 3.5–5.3)
POTASSIUM SERPL-SCNC: 3.6 MMOL/L — SIGNIFICANT CHANGE UP (ref 3.5–5.3)
RBC # BLD: 4 M/UL — SIGNIFICANT CHANGE UP (ref 3.8–5.2)
RBC # FLD: 12.2 % — SIGNIFICANT CHANGE UP (ref 10.3–14.5)
SODIUM SERPL-SCNC: 139 MMOL/L — SIGNIFICANT CHANGE UP (ref 135–145)
WBC # BLD: 4.5 K/UL — SIGNIFICANT CHANGE UP (ref 3.8–10.5)
WBC # FLD AUTO: 4.5 K/UL — SIGNIFICANT CHANGE UP (ref 3.8–10.5)

## 2021-11-18 RX ORDER — ACETAMINOPHEN 500 MG
2 TABLET ORAL
Qty: 0 | Refills: 0 | DISCHARGE
Start: 2021-11-18

## 2021-11-18 RX ORDER — HYDROMORPHONE HYDROCHLORIDE 2 MG/ML
1 INJECTION INTRAMUSCULAR; INTRAVENOUS; SUBCUTANEOUS
Qty: 12 | Refills: 0
Start: 2021-11-18 | End: 2021-11-20

## 2021-11-18 RX ADMIN — PANTOPRAZOLE SODIUM 40 MILLIGRAM(S): 20 TABLET, DELAYED RELEASE ORAL at 05:25

## 2021-11-18 RX ADMIN — Medication 10 MILLIGRAM(S): at 05:25

## 2021-11-18 RX ADMIN — Medication 50 MILLIGRAM(S): at 05:25

## 2021-11-18 RX ADMIN — ENOXAPARIN SODIUM 40 MILLIGRAM(S): 100 INJECTION SUBCUTANEOUS at 11:29

## 2021-11-18 RX ADMIN — Medication 650 MILLIGRAM(S): at 07:39

## 2021-11-18 RX ADMIN — HYDROMORPHONE HYDROCHLORIDE 2 MILLIGRAM(S): 2 INJECTION INTRAMUSCULAR; INTRAVENOUS; SUBCUTANEOUS at 05:25

## 2021-11-18 RX ADMIN — AMLODIPINE BESYLATE 5 MILLIGRAM(S): 2.5 TABLET ORAL at 05:26

## 2021-11-18 RX ADMIN — HYDROMORPHONE HYDROCHLORIDE 2 MILLIGRAM(S): 2 INJECTION INTRAMUSCULAR; INTRAVENOUS; SUBCUTANEOUS at 11:29

## 2021-11-18 RX ADMIN — HYDROMORPHONE HYDROCHLORIDE 2 MILLIGRAM(S): 2 INJECTION INTRAMUSCULAR; INTRAVENOUS; SUBCUTANEOUS at 06:00

## 2021-11-18 RX ADMIN — Medication 650 MILLIGRAM(S): at 06:39

## 2021-11-18 NOTE — CHART NOTE - NSCHARTNOTEFT_GEN_A_CORE
Reference #: [id number]    Last dispensed  Percocet 5/325 on 11/13 4 day supply  diazepam 10mg on 11/13 30 day supply

## 2021-11-18 NOTE — DISCHARGE NOTE NURSING/CASE MANAGEMENT/SOCIAL WORK - NSFLUVACAGEDISCH_IMM_ALL_CORE
BRAT- diet.  Avoid high fiber and meat products.     Labs today.      Gastroenterology Due for Colonoscopy: [facility]: [phone]       Adult
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
## 2021-11-19 LAB
COVID-19 NUCLEOCAPSID GAM AB INTERP: POSITIVE
COVID-19 NUCLEOCAPSID TOTAL GAM ANTIBODY RESULT: 7.38 INDEX — HIGH
SARS-COV-2 IGG+IGM SERPL QL IA: 7.38 INDEX — HIGH
SARS-COV-2 IGG+IGM SERPL QL IA: POSITIVE

## 2021-11-22 ENCOUNTER — TRANSCRIPTION ENCOUNTER (OUTPATIENT)
Age: 46
End: 2021-11-22

## 2021-11-22 ENCOUNTER — INPATIENT (INPATIENT)
Facility: HOSPITAL | Age: 46
LOS: 1 days | Discharge: HOME | End: 2021-11-24
Attending: GENERAL PRACTICE | Admitting: GENERAL PRACTICE
Payer: COMMERCIAL

## 2021-11-22 VITALS
HEART RATE: 88 BPM | DIASTOLIC BLOOD PRESSURE: 90 MMHG | OXYGEN SATURATION: 100 % | TEMPERATURE: 98 F | SYSTOLIC BLOOD PRESSURE: 128 MMHG | RESPIRATION RATE: 16 BRPM

## 2021-11-22 DIAGNOSIS — S92.001A UNSPECIFIED FRACTURE OF RIGHT CALCANEUS, INITIAL ENCOUNTER FOR CLOSED FRACTURE: ICD-10-CM

## 2021-11-22 DIAGNOSIS — Z98.51 TUBAL LIGATION STATUS: Chronic | ICD-10-CM

## 2021-11-22 DIAGNOSIS — D36.9 BENIGN NEOPLASM, UNSPECIFIED SITE: Chronic | ICD-10-CM

## 2021-11-22 DIAGNOSIS — Z98.89 OTHER SPECIFIED POSTPROCEDURAL STATES: Chronic | ICD-10-CM

## 2021-11-22 LAB
ALBUMIN SERPL ELPH-MCNC: 4.6 G/DL — SIGNIFICANT CHANGE UP (ref 3.3–5)
ALP SERPL-CCNC: 65 U/L — SIGNIFICANT CHANGE UP (ref 40–120)
ALT FLD-CCNC: 29 U/L — SIGNIFICANT CHANGE UP (ref 4–33)
ANION GAP SERPL CALC-SCNC: 11 MMOL/L — SIGNIFICANT CHANGE UP (ref 7–14)
APTT BLD: 33.5 SEC — SIGNIFICANT CHANGE UP (ref 27–36.3)
AST SERPL-CCNC: 33 U/L — HIGH (ref 4–32)
BASOPHILS # BLD AUTO: 0.02 K/UL — SIGNIFICANT CHANGE UP (ref 0–0.2)
BASOPHILS NFR BLD AUTO: 0.4 % — SIGNIFICANT CHANGE UP (ref 0–2)
BILIRUB SERPL-MCNC: 0.3 MG/DL — SIGNIFICANT CHANGE UP (ref 0.2–1.2)
BLD GP AB SCN SERPL QL: NEGATIVE — SIGNIFICANT CHANGE UP
BUN SERPL-MCNC: 10 MG/DL — SIGNIFICANT CHANGE UP (ref 7–23)
CALCIUM SERPL-MCNC: 9.6 MG/DL — SIGNIFICANT CHANGE UP (ref 8.4–10.5)
CHLORIDE SERPL-SCNC: 104 MMOL/L — SIGNIFICANT CHANGE UP (ref 98–107)
CO2 SERPL-SCNC: 23 MMOL/L — SIGNIFICANT CHANGE UP (ref 22–31)
CREAT SERPL-MCNC: 0.65 MG/DL — SIGNIFICANT CHANGE UP (ref 0.5–1.3)
EOSINOPHIL # BLD AUTO: 0.1 K/UL — SIGNIFICANT CHANGE UP (ref 0–0.5)
EOSINOPHIL NFR BLD AUTO: 1.9 % — SIGNIFICANT CHANGE UP (ref 0–6)
GLUCOSE SERPL-MCNC: 95 MG/DL — SIGNIFICANT CHANGE UP (ref 70–99)
HCG SERPL-ACNC: <5 MIU/ML — SIGNIFICANT CHANGE UP
HCT VFR BLD CALC: 39.4 % — SIGNIFICANT CHANGE UP (ref 34.5–45)
HGB BLD-MCNC: 12.6 G/DL — SIGNIFICANT CHANGE UP (ref 11.5–15.5)
IANC: 3.04 K/UL — SIGNIFICANT CHANGE UP (ref 1.5–8.5)
IMM GRANULOCYTES NFR BLD AUTO: 0.2 % — SIGNIFICANT CHANGE UP (ref 0–1.5)
INR BLD: 1.07 RATIO — SIGNIFICANT CHANGE UP (ref 0.88–1.16)
LYMPHOCYTES # BLD AUTO: 1.64 K/UL — SIGNIFICANT CHANGE UP (ref 1–3.3)
LYMPHOCYTES # BLD AUTO: 30.8 % — SIGNIFICANT CHANGE UP (ref 13–44)
MCHC RBC-ENTMCNC: 29.6 PG — SIGNIFICANT CHANGE UP (ref 27–34)
MCHC RBC-ENTMCNC: 32 GM/DL — SIGNIFICANT CHANGE UP (ref 32–36)
MCV RBC AUTO: 92.7 FL — SIGNIFICANT CHANGE UP (ref 80–100)
MONOCYTES # BLD AUTO: 0.51 K/UL — SIGNIFICANT CHANGE UP (ref 0–0.9)
MONOCYTES NFR BLD AUTO: 9.6 % — SIGNIFICANT CHANGE UP (ref 2–14)
NEUTROPHILS # BLD AUTO: 3.04 K/UL — SIGNIFICANT CHANGE UP (ref 1.8–7.4)
NEUTROPHILS NFR BLD AUTO: 57.1 % — SIGNIFICANT CHANGE UP (ref 43–77)
NRBC # BLD: 0 /100 WBCS — SIGNIFICANT CHANGE UP
NRBC # FLD: 0 K/UL — SIGNIFICANT CHANGE UP
PLATELET # BLD AUTO: 409 K/UL — HIGH (ref 150–400)
POTASSIUM SERPL-MCNC: 3.7 MMOL/L — SIGNIFICANT CHANGE UP (ref 3.5–5.3)
POTASSIUM SERPL-SCNC: 3.7 MMOL/L — SIGNIFICANT CHANGE UP (ref 3.5–5.3)
PROT SERPL-MCNC: 7.8 G/DL — SIGNIFICANT CHANGE UP (ref 6–8.3)
PROTHROM AB SERPL-ACNC: 12.2 SEC — SIGNIFICANT CHANGE UP (ref 10.6–13.6)
RBC # BLD: 4.25 M/UL — SIGNIFICANT CHANGE UP (ref 3.8–5.2)
RBC # FLD: 12.9 % — SIGNIFICANT CHANGE UP (ref 10.3–14.5)
RH IG SCN BLD-IMP: POSITIVE — SIGNIFICANT CHANGE UP
SARS-COV-2 RNA SPEC QL NAA+PROBE: SIGNIFICANT CHANGE UP
SODIUM SERPL-SCNC: 138 MMOL/L — SIGNIFICANT CHANGE UP (ref 135–145)
WBC # BLD: 5.32 K/UL — SIGNIFICANT CHANGE UP (ref 3.8–10.5)
WBC # FLD AUTO: 5.32 K/UL — SIGNIFICANT CHANGE UP (ref 3.8–10.5)

## 2021-11-22 PROCEDURE — 99285 EMERGENCY DEPT VISIT HI MDM: CPT

## 2021-11-22 PROCEDURE — 73610 X-RAY EXAM OF ANKLE: CPT | Mod: 26,RT

## 2021-11-22 PROCEDURE — 73630 X-RAY EXAM OF FOOT: CPT | Mod: 26,RT

## 2021-11-22 PROCEDURE — 71045 X-RAY EXAM CHEST 1 VIEW: CPT | Mod: 26

## 2021-11-22 RX ORDER — METOPROLOL TARTRATE 50 MG
25 TABLET ORAL DAILY
Refills: 0 | Status: DISCONTINUED | OUTPATIENT
Start: 2021-11-22 | End: 2021-11-24

## 2021-11-22 RX ORDER — PANTOPRAZOLE SODIUM 20 MG/1
40 TABLET, DELAYED RELEASE ORAL
Refills: 0 | Status: DISCONTINUED | OUTPATIENT
Start: 2021-11-22 | End: 2021-11-24

## 2021-11-22 RX ORDER — AMLODIPINE BESYLATE 2.5 MG/1
5 TABLET ORAL DAILY
Refills: 0 | Status: DISCONTINUED | OUTPATIENT
Start: 2021-11-22 | End: 2021-11-24

## 2021-11-22 RX ORDER — OLANZAPINE 15 MG/1
5 TABLET, FILM COATED ORAL AT BEDTIME
Refills: 0 | Status: DISCONTINUED | OUTPATIENT
Start: 2021-11-22 | End: 2021-11-24

## 2021-11-22 RX ORDER — HYDROMORPHONE HYDROCHLORIDE 2 MG/ML
1 INJECTION INTRAMUSCULAR; INTRAVENOUS; SUBCUTANEOUS EVERY 6 HOURS
Refills: 0 | Status: DISCONTINUED | OUTPATIENT
Start: 2021-11-22 | End: 2021-11-22

## 2021-11-22 RX ORDER — METOPROLOL TARTRATE 50 MG
50 TABLET ORAL DAILY
Refills: 0 | Status: DISCONTINUED | OUTPATIENT
Start: 2021-11-22 | End: 2021-11-22

## 2021-11-22 RX ORDER — FLUVOXAMINE MALEATE 25 MG/1
50 TABLET ORAL AT BEDTIME
Refills: 0 | Status: DISCONTINUED | OUTPATIENT
Start: 2021-11-22 | End: 2021-11-24

## 2021-11-22 RX ORDER — HEPARIN SODIUM 5000 [USP'U]/ML
5000 INJECTION INTRAVENOUS; SUBCUTANEOUS EVERY 8 HOURS
Refills: 0 | Status: DISCONTINUED | OUTPATIENT
Start: 2021-11-22 | End: 2021-11-23

## 2021-11-22 RX ORDER — SODIUM CHLORIDE 9 MG/ML
1000 INJECTION, SOLUTION INTRAVENOUS
Refills: 0 | Status: DISCONTINUED | OUTPATIENT
Start: 2021-11-22 | End: 2021-11-24

## 2021-11-22 RX ORDER — MORPHINE SULFATE 50 MG/1
4 CAPSULE, EXTENDED RELEASE ORAL ONCE
Refills: 0 | Status: DISCONTINUED | OUTPATIENT
Start: 2021-11-22 | End: 2021-11-22

## 2021-11-22 RX ORDER — HYDROMORPHONE HYDROCHLORIDE 2 MG/ML
1 INJECTION INTRAMUSCULAR; INTRAVENOUS; SUBCUTANEOUS EVERY 4 HOURS
Refills: 0 | Status: DISCONTINUED | OUTPATIENT
Start: 2021-11-22 | End: 2021-11-23

## 2021-11-22 RX ORDER — ACETAMINOPHEN 500 MG
650 TABLET ORAL EVERY 6 HOURS
Refills: 0 | Status: DISCONTINUED | OUTPATIENT
Start: 2021-11-22 | End: 2021-11-24

## 2021-11-22 RX ADMIN — HEPARIN SODIUM 5000 UNIT(S): 5000 INJECTION INTRAVENOUS; SUBCUTANEOUS at 21:02

## 2021-11-22 RX ADMIN — OLANZAPINE 5 MILLIGRAM(S): 15 TABLET, FILM COATED ORAL at 21:10

## 2021-11-22 RX ADMIN — MORPHINE SULFATE 4 MILLIGRAM(S): 50 CAPSULE, EXTENDED RELEASE ORAL at 12:15

## 2021-11-22 RX ADMIN — HYDROMORPHONE HYDROCHLORIDE 1 MILLIGRAM(S): 2 INJECTION INTRAMUSCULAR; INTRAVENOUS; SUBCUTANEOUS at 16:29

## 2021-11-22 RX ADMIN — HYDROMORPHONE HYDROCHLORIDE 1 MILLIGRAM(S): 2 INJECTION INTRAMUSCULAR; INTRAVENOUS; SUBCUTANEOUS at 22:02

## 2021-11-22 RX ADMIN — MORPHINE SULFATE 4 MILLIGRAM(S): 50 CAPSULE, EXTENDED RELEASE ORAL at 11:59

## 2021-11-22 RX ADMIN — HYDROMORPHONE HYDROCHLORIDE 1 MILLIGRAM(S): 2 INJECTION INTRAMUSCULAR; INTRAVENOUS; SUBCUTANEOUS at 21:02

## 2021-11-22 RX ADMIN — SODIUM CHLORIDE 60 MILLILITER(S): 9 INJECTION, SOLUTION INTRAVENOUS at 23:22

## 2021-11-22 RX ADMIN — SODIUM CHLORIDE 60 MILLILITER(S): 9 INJECTION, SOLUTION INTRAVENOUS at 16:25

## 2021-11-22 RX ADMIN — Medication 650 MILLIGRAM(S): at 19:23

## 2021-11-22 NOTE — H&P ADULT - NSHPPHYSICALEXAM_GEN_ALL_CORE
Vital Signs Last 24 Hrs  T(C): 36.8 (22 Nov 2021 12:36), Max: 36.8 (22 Nov 2021 12:36)  T(F): 98.3 (22 Nov 2021 12:36), Max: 98.3 (22 Nov 2021 12:36)  HR: 84 (22 Nov 2021 12:36) (84 - 88)  BP: 117/83 (22 Nov 2021 12:36) (117/83 - 128/90)  BP(mean): --  RR: 18 (22 Nov 2021 12:36) (16 - 18)  SpO2: 100% (22 Nov 2021 12:36) (100% - 100%)    Appearance: Normal	  HEENT:   Normal oral mucosa, PERRL, EOMI	  Lymphatic: No lymphadenopathy , no edema  Cardiovascular: Normal S1 S2, No JVD  Respiratory: Lungs clear to auscultation, normal effort 	  Gastrointestinal:  Soft, Non-tender, + BS	  Skin: No rashes, No ecchymoses, No cyanosis, warm to touch  Musculoskeletal: Normal range of motion, normal strength  Psychiatry:  Mood & affect appropriate  Ext: No edema Vital Signs Last 24 Hrs  T(C): 36.8 (22 Nov 2021 12:36), Max: 36.8 (22 Nov 2021 12:36)  T(F): 98.3 (22 Nov 2021 12:36), Max: 98.3 (22 Nov 2021 12:36)  HR: 84 (22 Nov 2021 12:36) (84 - 88)  BP: 117/83 (22 Nov 2021 12:36) (117/83 - 128/90)  BP(mean): --  RR: 18 (22 Nov 2021 12:36) (16 - 18)  SpO2: 100% (22 Nov 2021 12:36) (100% - 100%)    Appearance: Normal	  HEENT:   Normal oral mucosa, PERRL, EOMI	  Lymphatic: No lymphadenopathy , no edema  Cardiovascular: Normal S1 S2, No JVD  Respiratory: Lungs clear to auscultation, normal effort 	  Gastrointestinal:  Soft, Non-tender, + BS	  Skin: No rashes, No ecchymoses, No cyanosis, warm to touch  Musculoskeletal: Normal range of motion, normal strength  Psychiatry:  Mood & affect appropriate  Ext: RLE cast
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
## 2021-11-22 NOTE — CONSULT NOTE ADULT - SUBJECTIVE AND OBJECTIVE BOX
Podiatry pager #: [phone]/ [phone]    Patient is a 46y old  Female who presents with a chief complaint of     HPI:      PAST MEDICAL & SURGICAL HISTORY:  Benign Hypertension    Anxiety States     Deliv    Tubal Ligation    Carcinoid Tumor of Ovary, Benign    Ovarian Cyst    Hypertension  Dx     Ovarian cyst  bilateral    Depression with anxiety    Anemia  bitamin b12 deficiency    Syndrome X (cardiac)    PTSD (post-traumatic stress disorder)    Carcinoid Tumor of Ovary, Benign    Previous  Delivery, Delivered    Tubal Ligation    Status Post Ovarian Cystectomy    S/P ovarian cystectomy  bilateral    Dermoid cyst  ovarian, bilateral    S/P       S/P tubal ligation        MEDICATIONS  (STANDING):    MEDICATIONS  (PRN):      Allergies    Celexa (Rash)  Cipro (Short breath)  fluoroquinolone antibiotics (Rash)    Intolerances        VITALS:    Vital Signs Last 24 Hrs  T(C): 36.7 (2021 09:52), Max: 36.7 (2021 09:52)  T(F): 98 (2021 09:52), Max: 98 (2021 09:52)  HR: 88 (2021 09:52) (88 - 88)  BP: 128/90 (2021 09:52) (128/90 - 128/90)  BP(mean): --  RR: 16 (2021 09:52) (16 - 16)  SpO2: 100% (2021 09:52) (100% - 100%)    LABS:                CAPILLARY BLOOD GLUCOSE              LOWER EXTREMITY PHYSICAL EXAM:  Vascular: DP/PT 2/4 B/L, CFT <3 seconds B/L, Temperature gradient warm to cool B/L, right foot edema  Neuro: Epicritic sensation intact to the level of ankle B/L  Musculoskeletal/Ortho: tenderness to palpation to right heel, pt able to move R ankle and digit ROM with mild-moderate pain  Skin: right foot lateral, plantar foot ecchymosis, edema to the dorsal foot and lateral ankle, no tenting, no open fracture        RADIOLOGY & ADDITIONAL STUDIES:     Podiatry pager #: [phone]/ [phone]    Patient is a 46y old  Female who presents with a chief complaint of     HPI:      PAST MEDICAL & SURGICAL HISTORY:  Benign Hypertension    Anxiety States     Deliv    Tubal Ligation    Carcinoid Tumor of Ovary, Benign    Ovarian Cyst    Hypertension  Dx     Ovarian cyst  bilateral    Depression with anxiety    Anemia  bitamin b12 deficiency    Syndrome X (cardiac)    PTSD (post-traumatic stress disorder)    Carcinoid Tumor of Ovary, Benign    Previous  Delivery, Delivered    Tubal Ligation    Status Post Ovarian Cystectomy    S/P ovarian cystectomy  bilateral    Dermoid cyst  ovarian, bilateral    S/P       S/P tubal ligation        MEDICATIONS  (STANDING):    MEDICATIONS  (PRN):      Allergies    Celexa (Rash)  Cipro (Short breath)  fluoroquinolone antibiotics (Rash)    Intolerances        VITALS:    Vital Signs Last 24 Hrs  T(C): 36.7 (2021 09:52), Max: 36.7 (2021 09:52)  T(F): 98 (2021 09:52), Max: 98 (2021 09:52)  HR: 88 (2021 09:52) (88 - 88)  BP: 128/90 (2021 09:52) (128/90 - 128/90)  BP(mean): --  RR: 16 (2021 09:52) (16 - 16)  SpO2: 100% (2021 09:52) (100% - 100%)    LABS:                CAPILLARY BLOOD GLUCOSE              LOWER EXTREMITY PHYSICAL EXAM:  Vascular: DP/PT 2/4 B/L, CFT <3 seconds B/L, Temperature gradient warm to cool B/L, right foot edema  Neuro: Epicritic sensation intact to the level of ankle B/L  Musculoskeletal/Ortho: tenderness to palpation to right heel, pt able to move R ankle and digit ROM with mild-moderate pain  Skin: right foot lateral, plantar foot ecchymosis, edema to the dorsal foot and lateral ankle, no tenting, no open fracture        RADIOLOGY & ADDITIONAL STUDIES:      EXAM:  CT FOOT ONLY RT                            PROCEDURE DATE:  2021          INTERPRETATION:  CLINICAL INDICATION: Trauma, calcaneal fracture.    TECHNIQUE: CT axial images of the right foot were obtained without intravenous contrast. Coronal and sagittal reformatted images were also obtained. 3-D images were obtained from a separate workstation.    CONTRAST/COMPLICATIONS:  IV Contrast: None  Complications: None    COMPARISON: XR: 2021. CT: None. MR: None.    FINDINGS: Evaluation of soft tissue is limited without intravenous contrast.    Bones: Acute, comminuted fracture of the calcaneus throughout the calcaneus with fracture extension to the sustentaculum talus and subtalar joint. No dislocation.    Mild contour deformity of the proximal navicula with small, well-corticated ossific density, which may be related to old trauma.  Nonspecific small sclerotic focus at the anterior talus. Bipartite accessory navicula.    Soft tissue: Ankle/foot soft tissue stranding/edema. Diffuse muscle bulk loss with fatty replacement. Tibiotalar/subtalar hemarthrosis.    IMPRESSION:    Acute, comminuted fracture of the right calcaneus as described.    --- End of Report ---            DUNIA ANGLIN MD; Attending Radiologist  This document has been electronically signed. 2021  6:14AM

## 2021-11-22 NOTE — ED ADULT NURSE NOTE - CHIEF COMPLAINT QUOTE
Pt. states she was sent for admission for right heel surgery scheduled for today. c/o pain from right foot radiating up into leg. Took oxycodone prior to arrival.     Amadeo () [phone]

## 2021-11-22 NOTE — ED ADULT TRIAGE NOTE - CHIEF COMPLAINT QUOTE
Pt. states she was sent for admission for right heel surgery scheduled for today. c/o pain from right foot radiating up into leg. Took oxycodone prior to arrival.     Amadeo () [phone]

## 2021-11-22 NOTE — ED PROVIDER NOTE - ATTENDING CONTRIBUTION TO CARE
Attending Attestation: Dr. Blank  I have personally performed a history and physical examination of the patient and discussed management with the resident as well as the patient.  I reviewed the resident's note and agree with the documented findings and plan of care.  I have authored and modified critical sections of the Provider Note, including but not limited to HPI, Physical Exam and MDM. 46 yr old F with known R closed comminuted calcaneal fracture sustained ~10 days ago presents for scheduled ORIF with podiatry Dr. Waterhouse tomorrow. Splint taken down & replaced at bedside with podiatry. RLE neurovascularly intact, compartments soft. Will obtain pre-op labs, provide pain control, x-rays, and admit.

## 2021-11-22 NOTE — CHART NOTE - NSCHARTNOTEFT_GEN_A_CORE
Confidential Drug Utilization Report  Search Terms: Margi Stewart, 1975Search Date: 11/22/2021 22:41:22 PM  Searching on behalf of: [facility]  The Drug Utilization Report below displays all of the controlled substance prescriptions, if any, that your patient has filled in the last twelve months. The information displayed on this report is compiled from pharmacy submissions to the Department, and accurately reflects the information as submitted by the pharmacies.    This report was requested by: Grant Coreas ii | Reference #: [id number]    Others' Prescriptions  Patient Name: Margi StewartBirth Date: 1975  Address: [address] [place] [id number]: Female  Rx Written	Rx Dispensed	Drug	Quantity	Days Supply	Prescriber Name	Prescriber Rosario #	Payment Method	Dispenser  11/18/2021	11/18/2021	hydromorphone 2 mg tablet	12	3	Haydee Tejeda	[id number]	[id number]  03/30/2021	11/13/2021	curaleaf 90% indica (20:1) 2.5mgthc & 0.125mgcbd/dose vape	1	6	Daija Rivera MD	[id number]	[facility]  03/30/2021	11/13/2021	curaleaf 90% indica (20:1) 2.5mgthc & 0.125mgcbd/dose vape	1	6	Daija Rivera MD	[id number]	[facility]  03/30/2021	11/13/2021	curaleaf 90% sativa (20:1) 2.5mgthc and 0.125 mgcbd/dose vap	1	6	Daija Rivera MD	[id number]	[facility]  03/30/2021	11/13/2021	curaleaf 90% hybrid (20:1) 2.5mgthc & 0.125mgcbd/dose vape	1	6	Daija Rivera MD	[id number]	[facility]  11/04/2021	11/13/2021	diazepam 10 mg tablet	60	30	Kyrie Nassar MD	[id number]	[facility]"[employer] #[id number]  11/13/2021	11/13/2021	oxycodone-acetaminophen 5-325 mg tab	12	4	[facility]	[id number]	Insurance	[facility] #[id number]  03/30/2021	10/30/2021	curaleaf 90% hybrid (20:1) 2.5mgthc & 0.125mgcbd/dose vape	1	6	MiguelDaija MD	[id number]	[place]	[facility]  03/30/2021	10/30/2021	curaleaf 90% sativa (20:1) 2.5mgthc and 0.125 mgcbd/dose vap	1	6	MiguelDaija MD	[id number]	[place]	[facility]  03/30/2021	10/30/2021	curaleaf hybrid (20%) 2.5mg thc,0.25mg cbd/dose grd flw r	1	6	MiguelDaija MD	[id number]	[place]	[facility]  03/30/2021	10/16/2021	curaleaf 90% hybrid (20:1) 2.5mgthc & 0.125mgcbd/dose vape	1	3	MiguelDaija MD	[id number]	[place]	[facility]  03/30/2021	10/16/2021	curaleaf 90% sativa (20:1) 2.5mgthc & 0.125 mg cbd/dose vape	1	3	MiguelDaija MD	[id number]	[place]	[facility]  03/30/2021	10/16/2021	curaleaf hybrid (20%) 2.5mg thc,0.25mg cbd/dose grd flw rf	1	3	MiguelDaija MD	[id number]	[place]	[facility]  03/30/2021	10/16/2021	curaleaf 90% indica (20:1) 2.5mgthc & 0.125mgcbd/dose vape	1	3	Daija Rivera MD	[id number]	[place]	[facility]  03/30/2021	10/02/2021	curaleaf 90% indica (20:1) 2.5mgthc & 0.125mgcbd/dose vape	1	6	Miguel Daija GAMEZ MD	[id number]	[place]	[facility]  03/30/2021	10/02/2021	curaleaf 90% sativa (20:1) 2.5mgthc and 0.125 mgcbd/dose vap	1	6	Miguel Daija GAMEZ MD	[id number]	[place]	[facility]  03/30/2021	10/02/2021	curaleaf hybrid (20%) 2.5mg thc,0.25mg cbd/dose grd flw r	1	6	Miguel Daija GAMEZ MD	[id number]	[facility]  03/30/2021	10/02/2021	select squeeze (20:1) 5mg thc and 0.25mg cbd/1ml watermelon	1	2	Miguel Daija GAMEZ MD	[id number]	[place]	[facility]  09/01/2021	09/20/2021	diazepam 10 mg tablet	60	30	Kyrie Nassar MD	[id number]	[facility] #[id number]  03/30/2021	09/04/2021	curaleaf 90% sativa (20:1) 2.5mgthc and 0.125 mgcbd/dose vap	1	6	Miguel Daija GAMEZ MD	[id number]	[facility]  03/30/2021	09/04/2021	curaleaf 90% sativa (20:1) 2.5mgthc and 0.125 mgcbd/dose vap	1	6	Miguel Daija GAMEZ MD	[id number]	[place]	[facility]  03/30/2021	08/28/2021	curaleaf (16:1) 4mgthc and 0.25 mgcbd/microtab peppermint	1	2	MiguelDaija MD	[id number]	[place]	[facility]  03/30/2021	08/28/2021	curaleaf hybrid (20%) 2.5mg thc,0.25mg cbd/dose grd flw r	1	6	MiguelDaija MD	[id number]	[place]	[facility]  03/30/2021	08/28/2021	curaleaf 90% sativa (20:1) 2.5mgthc and 0.125 mgcbd/dose vap	1	6	Miguel Daija GAMEZ MD	[id number]	[place]	[facility]  03/30/2021	08/28/2021	curaleaf indica 75% (20:1) 2.5mg thc / 0.125mg cbd/dose vape	1	2	Miguel Daija GAMEZ MD	[id number]	[place]	[facility]  07/29/2021	08/19/2021	diazepam 5 mg tablet	90	30	Kyrie Nassar MD	[id number]	[facility] #[id number]  03/30/2021	08/14/2021	curaleaf 90% hybrid (20:1) 2.5mgthc & 0.125mgcbd/dose vape	1	3	Miguel Daija GAMEZ MD	[id number]	[facility]  03/30/2021	08/14/2021	curaleaf hybrid 75% (20:1) 2.5mg thc/0.125mg cbd/dose vape	1	2	Miguel Daija GAMEZ MD	[id number]	[facility]  03/30/2021	08/14/2021	curaleaf 90% indica (20:1) 2.5mgthc & 0.125mgcbd/dose vape	1	6	Miguel Daija GAMEZ MD	[id number]	[place]	[facility]  03/30/2021	08/06/2021	curaleaf 90% indica (20:1) 2.5mgthc & 0.125mgcbd/dose vape	1	6	Miguel Daija GAMEZ MD	[id number]	[place]	[facility]  03/30/2021	08/06/2021	curaleaf 90% hybrid (20:1) 2.5mgthc & 0.125mgcbd/dose vape	1	3	Miguel Daija GAMEZ MD	[id number]	[place]	[facility]  03/30/2021	08/06/2021	curaleaf 90% sativa (20:1) 2.5mgthc and 0.125 mgcbd/dose vap	1	3	Miguel Daija GAMEZ MD	[id number]	[place]	[facility]  03/30/2021	08/06/2021	curaleaf indica 75% (20:1) 2.5mg thc / 0.125mg cbd/dose vape	1	2	Miguel Daija GAMEZ MD	[id number]	[place]	[facility]  03/30/2021	07/29/2021	curaleaf 90% hybrid (20:1) 2.5mgthc & 0.125mgcbd/dose vape	1	3	Miguel Daija GAMEZ MD	[id number]	[place]	[facility]  03/30/2021	07/29/2021	curaleaf 90% sativa (20:1) 2.5mgthc and 0.125 mgcbd/dose vap	1	3	Miguel Daija GAMEZ MD	[id number]	[place]	[facility]  03/30/2021	07/29/2021	curaleaf 90% indica (20:1) 2.5mgthc & 0.125mgcbd/dose vape	1	3	Miguel Daija GAMEZ MD	[id number]	[place]	[facility]  03/30/2021	07/14/2021	curaleaf hybrid (20%) 2.5mg thc,0.25mg cbd/dose grd flw r	1	6	Miguel Daiaj GAMEZ MD	[id number]	[place]	[facility]  03/30/2021	07/14/2021	curaleaf 90% hybrid (20:1) 2.5mgthc & 0.125mgcbd/dose vape	1	3	Miguel Daija GAMEZ MD	[id number]	[place]	[facility]  03/30/2021	07/14/2021	curaleaf 90% indica (20:1) 2.5mgthc & 0.125mgcbd/dose vape	1	6	Miguel Daija GAMEZ MD	[id number]	[place]	[facility]  06/29/2021	07/08/2021	diazepam 5 mg tablet	90	30	Kyrie Nassar MD	[id number]	[facility] #[id number]  03/30/2021	07/06/2021	curaleaf 90% hybrid (20:1) 2.5mgthc & 0.125mgcbd/dose vape	1	3	Miguel Daija GAMEZ MD	[id number]	[place]	[facility]  03/30/2021	07/06/2021	curaleaf 90% hybrid (20:1) 2.5mgthc & 0.125mgcbd/dose vape	1	3	Miguel Daija GAMEZ MD	[id number]	[place]	[facility]  03/30/2021	07/06/2021	curaleaf 90% sativa (20:1) 2.5mgthc and 0.125 mgcbd/dose vap	1	3	Miguel Daija GAMEZ MD	[id number]	[place]	[facility]  03/30/2021	06/28/2021	curaleaf 90% indica (20:1) 2.5mgthc & 0.125mgcbd/dose vape	1	6	Miguel Daija GAMEZ MD	[id number]	[place]	[facility]  03/30/2021	06/19/2021	curaleaf 90% sativa (20:1) 2.5mgthc & 0.125 mg cbd/dose vape	1	3	Miguel Daija GAMEZ MD	[id number]	[place]	[facility]  03/30/2021	06/19/2021	curaleaf 90% indica (20:1) 2.5mgthc & 0.125mgcbd/dose vape	1	3	Miguel Daija GAMEZ MD	[id number]	[place]	[facility]  03/30/2021	06/13/2021	curaleaf 90% sativa (20:1) 2.5mgthc & 0.125 mg cbd/dose vape	1	3	Miguel Daija GAMEZ MD	[id number]	[place]	[facility]  03/30/2021	06/12/2021	curaleaf 90% sativa (20:1) 2.5mgthc & 0.125 mg cbd/dose vape	1	3	MiguelDaija MD	[id number]	[place]	[facility]  03/30/2021	06/12/2021	curaleaf indica (20%) 2.5mg thc:0.25mg cbd/dose grd flw rf	1	6	Miguel Daija GAMEZ MD	[id number]	[facility]  03/30/2021	06/06/2021	curaleaf hyb (25%) 2.5mg thc-0.125mg cbd/dose grd flw refill	1	6	Miguel Daiaj GAMEZ MD	[id number]	[place]	[facility]  03/30/2021	06/06/2021	curaleaf 90% sativa (20:1) 2.5mgthc & 0.125 mg cbd/dose vape	1	3	Miguel Daija GAMEZ MD	[id number]	[place]	[facility]  06/02/2021	06/03/2021	diazepam 5 mg tablet	90	30	Kyrie Nassar MD	[id number]	[facility] #[id number]  03/30/2021	05/30/2021	se live resin indica (20:1) 2.5mgthc & 0.125mgcbd/dose vape	1	3	Miguel Daija GAMEZ MD	[id number]	[facility]  03/30/2021	05/30/2021	curaleaf 90% sativa (20:1) 2.5mgthc & 0.125 mg cbd/dose vape	1	3	Miguel Daija GAMEZ MD	[id number]	[place]	[facility]  03/30/2021	05/23/2021	se live resin hybrid (20:1) 2.5mgthc & 0.125mgcbd/dose vape	1	3	Miguel Daija GAMEZ MD	[id number]	[place]	[facility]  03/30/2021	05/16/2021	curaleaf 90% hybrid (20:1) 2.5mgthc & 0.125mgcbd/dose vape	1	3	Miguel Daija GAMEZ MD	[id number]	[place]	[facility]  03/30/2021	05/16/2021	curaleaf 90% indica (20:1) 2.5mgthc & 0.125mgcbd/dose vape	1	3	Daija Rivera Massiel GAMEZ MD	[id number]	[place]	[facility]  03/30/2021	05/16/2021	curaleaf hybrid (20%) 2.5mg thc,0.25mg cbd/dose grd flw r	1	6	Daija Rivera Massiel GAMEZ MD	[id number]	[place]	[facility]  03/30/2021	05/06/2021	curaleaf 90% hybrid (20:1) 2.5mgthc & 0.125mgcbd/dose vape	1	3	Daija Rivera Massiel	[id number]	[place]	[facility]  03/30/2021	05/06/2021	curaleaf 90% sativa (20:1) 2.5mgthc & 0.125 mg cbd/dose vape	1	3	Dana Riveramckinley Rankin	[id number]	[place]	[facility]  03/30/2021	05/06/2021	curaleaf indica (20%) 2.5mg thc:0.25mg cbd/dose grd flw rf	1	6	Dana Riveramckinley Rankin	[id number]	[place]	[facility]  03/30/2021	04/24/2021	curaleaf 90% indica (20:1) 2.5mgthc & 0.125mgcbd/dose vape	1	3	Dana Riveramckinley Rankin	[id number]	[place]	[facility]  03/30/2021	04/24/2021	curaleaf (20:1) 5mgthc and 0.25 mgcbd/curachew orange	1	2	Miguel Daija Rankin	[id number]	[place]	[facility]  03/30/2021	04/24/2021	curaleaf 90% hybrid (20:1) 2.5mgthc & 0.125mgcbd/dose vape	1	3	Miguel Daija Rankin	[id number]	[place]	[facility]  03/30/2021	04/24/2021	curaleaf sativa (20%) 2.5mg thc, 0.008mg cbd/dose grd flw rf	1	6	MiguelDaija hernandez	[id number]	[place]	[facility]  03/30/2021	04/24/2021	curaleaf hybrid (20%) 2.5mg thc,0.25mg cbd/dose grd flw r	1	6	Daija Rivera	[id number]	[place]	[facility]  04/01/2021	04/24/2021	diazepam 5 mg tablet	90	30	YoungKyrie MD	[id number]	[facility] #[id number]  03/30/2021	04/17/2021	curaleaf 90% hybrid (20:1) 2.5mgthc & 0.125mgcbd/dose vape	1	3	Daija Rivera	[id number]	[facility]  03/30/2021	04/17/2021	curaleaf 90% indica (20:1) 2.5mgthc & 0.125mgcbd/dose vape	1	3	Daija Rivera	[id number]	[facility]  03/30/2021	04/17/2021	curaleaf hybrid (20%) 2.5mg thc,0.25mg cbd/dose grd flw r	1	6	Daija Rivera	[id number]	[facility]  03/30/2021	04/17/2021	curaleaf (1:20) 0.24mg thc and 5mg cbd/0.5 ml tct orange	1	2	Daija Riveraa	[id number]	[facility]    Patient Name: Margi StewartBirth Date: 1975  Address: [address] [place] [id number]: Female  Rx Written	Rx Dispensed	Drug	Quantity	Days Supply	Prescriber Name	Prescriber Rosario #	Payment Method	Dispenser  03/04/2021	03/15/2021	diazepam 5 mg tablet	90	30	Kyrie Nassar MD	[id number]	[facility] #[id number]  01/28/2021	02/04/2021	diazepam 5 mg tablet	90	30	YoungKyrie MD	[id number]	[facility] #[id number]  12/30/2020	01/04/2021	diazepam 5 mg tablet	90	30	YoungKyrie MD	[id number]	[facility] #[id number]  11/19/2020	11/29/2020	diazepam 5 mg tablet	90	30	Kyrie Nassar MD	[id number]	[facility] #[id number]  * - Drugs marked with an asterisk are compound drugs. If the compound drug is made up of more than one controlled substance, then each controlled substance will be a separate row in the table.

## 2021-11-22 NOTE — ED PROVIDER NOTE - NS ED ROS FT
General: Denies fevers  CV: Denies chest pain  Resp: Denies SOB  GI: Denies abdominal pain, nausea, vomiting  : Denies flank pain  Skin: Denies new rashes  Neuro: Denies headache, focal weakness  MSK: +R foot pain

## 2021-11-22 NOTE — ED PROVIDER NOTE - OBJECTIVE STATEMENT
45 yo F with Pmhx of depression, anxiety, PTSD, Cardiac X syndrome, and HTN presents for admission in anticipation of ORIF with podiatry of known R comminuted calcaneal fracture. Pt sustained this injury ~10 days ago during presumed fall during sleep walking. Patient has already been admitted/evaluated for this fall previously with no other injuries identified (documented within our system), and was placed in a posterior splint. Pt denies any new traumas since. Has been taking Percocets without adequate pain relief. Denies numbness, tingling, weakness. 45 yo F with h/o depression, anxiety, PTSD, Cardiac X syndrome, and HTN presents for admission in anticipation of ORIF with podiatry of known R comminuted calcaneal fracture - sent in by podiatry. Pt sustained this injury ~10 days ago during presumed fall during sleep walking. Patient has already been admitted/evaluated for this fall previously with no other injuries identified (documented within our system), and was placed in a posterior splint. Pt denies any new traumas since. Has been taking Percocet without adequate pain relief. Denies numbness, tingling, weakness.

## 2021-11-22 NOTE — H&P ADULT - HISTORY OF PRESENT ILLNESS
Patient is a 47 Y/O Female with Pmhx of Depression, Anxiety, PTSD, Cardiac X syndrome, and HTN presents to the ED after a fall, found to have R calcaneal fracture. presenting for scheduled for R foot calcaneus ORIF by podiatry. doing well. denies of any complaints

## 2021-11-22 NOTE — ED ADULT NURSE NOTE - OBJECTIVE STATEMENT
Pt. is A+OX4 states she was sent for admission for right heel surgery scheduled for today. She c/o pain from right foot radiating up into leg. Took oxycodone prior to arrival. VS stable. NAD noted.

## 2021-11-22 NOTE — CONSULT NOTE ADULT - ASSESSMENT
45 yo f with right calcaneal comminuted fracture  - Pt seen and evaluated  - Xrays pending   - Please admit to medicine under Dr. Tejeda  - Recommend IV ancef   - Please keep NPO for operation tomorrow   - Pt scheduled for R foot calcaneus ORIF tomorrow at 5PM with Dr. Waterhouse   - Pt may need surgical intervention as outpatient pending swelling resolution but stable for discharge from podiatry standpoint  - Seen with attending   45 yo f with right calcaneal comminuted fracture  - Pt seen and evaluated  - Labs pending   - Xrays pending   - CT: Acute, comminuted fracture of the right calcaneus  - Exam: tenderness to palpation to right heel, pt able to move R ankle and digit ROM with mild-moderate pain, right foot lateral, plantar foot ecchymosis, edema to the dorsal foot and lateral ankle, no tenting, no open fracture  - Compartments soft on exam   - Please admit to medicine under Dr. Tejeda  - Recommend IV ancef   - Please keep NPO for operation tomorrow   - Pre op order's submitted   - Pt scheduled for R foot calcaneus ORIF tomorrow at 5PM with Dr. Waterhouse   - Please document medical clearance under general anesthesia for OR tomorrow   - Discussed with attending   45 yo f with right calcaneal comminuted fracture  - Pt seen and evaluated  - Labs pending   - Xrays pending   - CT: Acute, comminuted fracture of the right calcaneus  - Exam: tenderness to palpation to right heel, pt able to move R ankle and digit ROM with mild-moderate pain, right foot lateral, plantar foot ecchymosis, edema to the dorsal foot and lateral ankle, no tenting, no open fracture  - Compartments soft on exam   - Please admit to medicine under Dr. Tejeda  - Please keep NPO for operation tomorrow   - Pre op order's submitted   - Pt scheduled for R foot calcaneus ORIF tomorrow at 5PM with Dr. Waterhouse   - Please document medical clearance under general anesthesia for OR tomorrow   - Discussed with attending   Oculoplastic Surgeon Procedure Text (E): After obtaining clear surgical margins the patient was sent to oculoplastics for surgical repair.  The patient understands they will receive post-surgical care and follow-up from the referring physician's office.

## 2021-11-22 NOTE — H&P ADULT - NSHPREVIEWOFSYSTEMS_GEN_ALL_CORE
REVIEW OF SYSTEMS:    CONSTITUTIONAL: No weakness, fevers or chills  EYES/ENT: No visual changes;  No vertigo or throat pain   NECK: No pain or stiffness  RESPIRATORY: No cough, wheezing, hemoptysis; No shortness of breath  CARDIOVASCULAR: No chest pain or palpitations  GASTROINTESTINAL: No abdominal or epigastric pain. No nausea, vomiting, or hematemesis; No diarrhea or constipation. No melena or hematochezia.  GENITOURINARY: No dysuria, frequency or hematuria  NEUROLOGICAL: No numbness or weakness  SKIN: RR LE soft cast   All other review of systems is negative unless indicated above.

## 2021-11-22 NOTE — ED PROVIDER NOTE - CLINICAL SUMMARY MEDICAL DECISION MAKING FREE TEXT BOX
46F with known R closed comminuted calcaneal fracture sustained ~10 days ago presents for scheduled ORIF with podiatry Dr. Waterhouse tomorrow. Splint taken down & replaced at bedside with podiatry. RLE neurovascularly intact, compartments soft. Will obtain pre-op labs, provide pain control, x-rays, and admit 46 yr old F with known R closed comminuted calcaneal fracture sustained ~10 days ago presents for scheduled ORIF with podiatry Dr. Waterhouse tomorrow. Splint taken down & replaced at bedside with podiatry. RLE neurovascularly intact, compartments soft. Will obtain pre-op labs, provide pain control, x-rays, and admit.

## 2021-11-23 ENCOUNTER — TRANSCRIPTION ENCOUNTER (OUTPATIENT)
Age: 46
End: 2021-11-23

## 2021-11-23 LAB
ANION GAP SERPL CALC-SCNC: 11 MMOL/L — SIGNIFICANT CHANGE UP (ref 7–14)
APTT BLD: 32.3 SEC — SIGNIFICANT CHANGE UP (ref 27–36.3)
BLD GP AB SCN SERPL QL: NEGATIVE — SIGNIFICANT CHANGE UP
BUN SERPL-MCNC: 8 MG/DL — SIGNIFICANT CHANGE UP (ref 7–23)
CALCIUM SERPL-MCNC: 9.5 MG/DL — SIGNIFICANT CHANGE UP (ref 8.4–10.5)
CHLORIDE SERPL-SCNC: 108 MMOL/L — HIGH (ref 98–107)
CO2 SERPL-SCNC: 21 MMOL/L — LOW (ref 22–31)
COVID-19 NUCLEOCAPSID GAM AB INTERP: POSITIVE
COVID-19 NUCLEOCAPSID TOTAL GAM ANTIBODY RESULT: 6.75 INDEX — HIGH
COVID-19 SPIKE DOMAIN AB INTERP: POSITIVE
COVID-19 SPIKE DOMAIN ANTIBODY RESULT: >250 U/ML — HIGH
CREAT SERPL-MCNC: 0.55 MG/DL — SIGNIFICANT CHANGE UP (ref 0.5–1.3)
GLUCOSE SERPL-MCNC: 93 MG/DL — SIGNIFICANT CHANGE UP (ref 70–99)
HCT VFR BLD CALC: 34.5 % — SIGNIFICANT CHANGE UP (ref 34.5–45)
HGB BLD-MCNC: 11.7 G/DL — SIGNIFICANT CHANGE UP (ref 11.5–15.5)
INR BLD: 1.04 RATIO — SIGNIFICANT CHANGE UP (ref 0.88–1.16)
MAGNESIUM SERPL-MCNC: 1.9 MG/DL — SIGNIFICANT CHANGE UP (ref 1.6–2.6)
MCHC RBC-ENTMCNC: 30.4 PG — SIGNIFICANT CHANGE UP (ref 27–34)
MCHC RBC-ENTMCNC: 33.9 GM/DL — SIGNIFICANT CHANGE UP (ref 32–36)
MCV RBC AUTO: 89.6 FL — SIGNIFICANT CHANGE UP (ref 80–100)
NRBC # BLD: 0 /100 WBCS — SIGNIFICANT CHANGE UP
NRBC # FLD: 0 K/UL — SIGNIFICANT CHANGE UP
PHOSPHATE SERPL-MCNC: 2.9 MG/DL — SIGNIFICANT CHANGE UP (ref 2.5–4.5)
PLATELET # BLD AUTO: 357 K/UL — SIGNIFICANT CHANGE UP (ref 150–400)
POTASSIUM SERPL-MCNC: 3.7 MMOL/L — SIGNIFICANT CHANGE UP (ref 3.5–5.3)
POTASSIUM SERPL-SCNC: 3.7 MMOL/L — SIGNIFICANT CHANGE UP (ref 3.5–5.3)
PROTHROM AB SERPL-ACNC: 11.9 SEC — SIGNIFICANT CHANGE UP (ref 10.6–13.6)
RBC # BLD: 3.85 M/UL — SIGNIFICANT CHANGE UP (ref 3.8–5.2)
RBC # FLD: 12.8 % — SIGNIFICANT CHANGE UP (ref 10.3–14.5)
RH IG SCN BLD-IMP: POSITIVE — SIGNIFICANT CHANGE UP
SARS-COV-2 IGG+IGM SERPL QL IA: 6.75 INDEX — HIGH
SARS-COV-2 IGG+IGM SERPL QL IA: >250 U/ML — HIGH
SARS-COV-2 IGG+IGM SERPL QL IA: POSITIVE
SARS-COV-2 IGG+IGM SERPL QL IA: POSITIVE
SODIUM SERPL-SCNC: 140 MMOL/L — SIGNIFICANT CHANGE UP (ref 135–145)
WBC # BLD: 4.6 K/UL — SIGNIFICANT CHANGE UP (ref 3.8–10.5)
WBC # FLD AUTO: 4.6 K/UL — SIGNIFICANT CHANGE UP (ref 3.8–10.5)

## 2021-11-23 PROCEDURE — 73630 X-RAY EXAM OF FOOT: CPT | Mod: 26,RT

## 2021-11-23 RX ORDER — ONDANSETRON 8 MG/1
4 TABLET, FILM COATED ORAL ONCE
Refills: 0 | Status: COMPLETED | OUTPATIENT
Start: 2021-11-23 | End: 2021-11-23

## 2021-11-23 RX ORDER — MORPHINE SULFATE 50 MG/1
4 CAPSULE, EXTENDED RELEASE ORAL ONCE
Refills: 0 | Status: DISCONTINUED | OUTPATIENT
Start: 2021-11-23 | End: 2021-11-23

## 2021-11-23 RX ORDER — SODIUM CHLORIDE 9 MG/ML
1000 INJECTION, SOLUTION INTRAVENOUS
Refills: 0 | Status: DISCONTINUED | OUTPATIENT
Start: 2021-11-23 | End: 2021-11-23

## 2021-11-23 RX ORDER — MORPHINE SULFATE 50 MG/1
4 CAPSULE, EXTENDED RELEASE ORAL EVERY 4 HOURS
Refills: 0 | Status: DISCONTINUED | OUTPATIENT
Start: 2021-11-23 | End: 2021-11-24

## 2021-11-23 RX ORDER — HEPARIN SODIUM 5000 [USP'U]/ML
5000 INJECTION INTRAVENOUS; SUBCUTANEOUS EVERY 8 HOURS
Refills: 0 | Status: DISCONTINUED | OUTPATIENT
Start: 2021-11-24 | End: 2021-11-24

## 2021-11-23 RX ORDER — DIAZEPAM 5 MG
5 TABLET ORAL ONCE
Refills: 0 | Status: DISCONTINUED | OUTPATIENT
Start: 2021-11-23 | End: 2021-11-23

## 2021-11-23 RX ORDER — HYDROMORPHONE HYDROCHLORIDE 2 MG/ML
0.5 INJECTION INTRAMUSCULAR; INTRAVENOUS; SUBCUTANEOUS
Refills: 0 | Status: DISCONTINUED | OUTPATIENT
Start: 2021-11-23 | End: 2021-11-23

## 2021-11-23 RX ADMIN — MORPHINE SULFATE 4 MILLIGRAM(S): 50 CAPSULE, EXTENDED RELEASE ORAL at 10:42

## 2021-11-23 RX ADMIN — Medication 5 MILLIGRAM(S): at 05:51

## 2021-11-23 RX ADMIN — MORPHINE SULFATE 4 MILLIGRAM(S): 50 CAPSULE, EXTENDED RELEASE ORAL at 00:25

## 2021-11-23 RX ADMIN — MORPHINE SULFATE 4 MILLIGRAM(S): 50 CAPSULE, EXTENDED RELEASE ORAL at 10:18

## 2021-11-23 RX ADMIN — MORPHINE SULFATE 4 MILLIGRAM(S): 50 CAPSULE, EXTENDED RELEASE ORAL at 00:10

## 2021-11-23 RX ADMIN — HYDROMORPHONE HYDROCHLORIDE 0.5 MILLIGRAM(S): 2 INJECTION INTRAMUSCULAR; INTRAVENOUS; SUBCUTANEOUS at 20:01

## 2021-11-23 RX ADMIN — AMLODIPINE BESYLATE 5 MILLIGRAM(S): 2.5 TABLET ORAL at 05:51

## 2021-11-23 RX ADMIN — Medication 5 MILLIGRAM(S): at 00:10

## 2021-11-23 RX ADMIN — Medication 650 MILLIGRAM(S): at 23:20

## 2021-11-23 RX ADMIN — HYDROMORPHONE HYDROCHLORIDE 1 MILLIGRAM(S): 2 INJECTION INTRAMUSCULAR; INTRAVENOUS; SUBCUTANEOUS at 08:15

## 2021-11-23 RX ADMIN — Medication 650 MILLIGRAM(S): at 22:28

## 2021-11-23 RX ADMIN — MORPHINE SULFATE 4 MILLIGRAM(S): 50 CAPSULE, EXTENDED RELEASE ORAL at 21:46

## 2021-11-23 RX ADMIN — HYDROMORPHONE HYDROCHLORIDE 0.5 MILLIGRAM(S): 2 INJECTION INTRAMUSCULAR; INTRAVENOUS; SUBCUTANEOUS at 20:30

## 2021-11-23 RX ADMIN — HYDROMORPHONE HYDROCHLORIDE 0.5 MILLIGRAM(S): 2 INJECTION INTRAMUSCULAR; INTRAVENOUS; SUBCUTANEOUS at 20:25

## 2021-11-23 RX ADMIN — MORPHINE SULFATE 4 MILLIGRAM(S): 50 CAPSULE, EXTENDED RELEASE ORAL at 04:24

## 2021-11-23 RX ADMIN — ONDANSETRON 4 MILLIGRAM(S): 8 TABLET, FILM COATED ORAL at 20:01

## 2021-11-23 RX ADMIN — PANTOPRAZOLE SODIUM 40 MILLIGRAM(S): 20 TABLET, DELAYED RELEASE ORAL at 05:53

## 2021-11-23 RX ADMIN — OLANZAPINE 5 MILLIGRAM(S): 15 TABLET, FILM COATED ORAL at 22:28

## 2021-11-23 RX ADMIN — MORPHINE SULFATE 4 MILLIGRAM(S): 50 CAPSULE, EXTENDED RELEASE ORAL at 22:00

## 2021-11-23 RX ADMIN — SODIUM CHLORIDE 75 MILLILITER(S): 9 INJECTION, SOLUTION INTRAVENOUS at 19:50

## 2021-11-23 RX ADMIN — Medication 25 MILLIGRAM(S): at 05:51

## 2021-11-23 RX ADMIN — HYDROMORPHONE HYDROCHLORIDE 1 MILLIGRAM(S): 2 INJECTION INTRAMUSCULAR; INTRAVENOUS; SUBCUTANEOUS at 07:57

## 2021-11-23 RX ADMIN — MORPHINE SULFATE 4 MILLIGRAM(S): 50 CAPSULE, EXTENDED RELEASE ORAL at 04:40

## 2021-11-23 NOTE — DISCHARGE NOTE PROVIDER - NSDCCAREPROVSEEN_GEN_ALL_CORE_FT
Kelechi Varela  [facility] ACP Ken Calle  User ADM  [facility]  Team [facility] Medicine ACP

## 2021-11-23 NOTE — PROGRESS NOTE ADULT - ASSESSMENT
Plan:   Pt is scheduled for R calcaneus ORIF with Dr. Waterhouse at 5PM. Patient is aware of procedure and is NPO since midnight.  CXR on sunrise.  EKG on sunrise.  Medical/Cardiac clearance since 11/22 and documented in chart.  Consent signed and in chart.  Procedure was explained to patient in detail. All alternatives, risks and complications were discussed. All questions answered

## 2021-11-23 NOTE — BRIEF OPERATIVE NOTE - NSICDXBRIEFPREOP_GEN_ALL_CORE_FT
Introduction Text (Please End With A Colon): Liquid Nitrogen
PRE-OP DIAGNOSIS:  Closed right calcaneal fracture 23-Nov-2021 19:12:10  Rosa Anaya  
Detail Level: Zone
Procedure To Be Performed At Next Visit: Cryotherapy

## 2021-11-23 NOTE — PROGRESS NOTE ADULT - SUBJECTIVE AND OBJECTIVE BOX
Patient is a 46y old  Female who presents with a chief complaint of Please admit to Dr. Tejeda for R calcaneus ORIF tomorrow (22 Nov 2021 10:24)      INTERVAL HPI/OVERNIGHT EVENTS:   Pt is scheduled for R calcaneus ORIF with Dr. Waterhouse at 5PM. Patient is aware of procedure and is NPO since midnight.    MEDICATIONS  (STANDING):  amLODIPine   Tablet 5 milliGRAM(s) Oral daily  fluvoxaMINE 50 milliGRAM(s) Oral at bedtime  heparin   Injectable 5000 Unit(s) SubCutaneous every 8 hours  lactated ringers. 1000 milliLiter(s) (60 mL/Hr) IV Continuous <Continuous>  metoprolol succinate ER 25 milliGRAM(s) Oral daily  OLANZapine 5 milliGRAM(s) Oral at bedtime  pantoprazole    Tablet 40 milliGRAM(s) Oral before breakfast    MEDICATIONS  (PRN):  acetaminophen     Tablet .. 650 milliGRAM(s) Oral every 6 hours PRN Moderate Pain (4 - 6)  HYDROmorphone   Tablet 1 milliGRAM(s) Oral every 4 hours PRN Severe Pain (7 - 10)      Allergies    Celexa (Rash)  Cipro (Short breath)  fluoroquinolone antibiotics (Rash)    Intolerances        Vital Signs Last 24 Hrs  T(C): 36.8 (23 Nov 2021 06:02), Max: 36.8 (22 Nov 2021 12:36)  T(F): 98.2 (23 Nov 2021 06:02), Max: 98.3 (22 Nov 2021 12:36)  HR: 82 (23 Nov 2021 06:02) (79 - 88)  BP: 128/86 (23 Nov 2021 06:02) (117/83 - 137/82)  BP(mean): --  RR: 16 (23 Nov 2021 06:02) (16 - 18)  SpO2: 100% (23 Nov 2021 06:02) (98% - 100%)    LABS:                        11.7   4.60  )-----------( 357      ( 23 Nov 2021 07:39 )             34.5     11-23    140  |  108<H>  |  8   ----------------------------<  93  3.7   |  21<L>  |  0.55    Ca    9.5      23 Nov 2021 07:39  Phos  2.9     11-23  Mg     1.90     11-23    TPro  7.8  /  Alb  4.6  /  TBili  0.3  /  DBili  x   /  AST  33<H>  /  ALT  29  /  AlkPhos  65  11-22    PT/INR - ( 23 Nov 2021 07:39 )   PT: 11.9 sec;   INR: 1.04 ratio         PTT - ( 23 Nov 2021 07:39 )  PTT:32.3 sec    CAPILLARY BLOOD GLUCOSE          RADIOLOGY & ADDITIONAL TESTS:    .
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
## 2021-11-23 NOTE — DISCHARGE NOTE PROVIDER - NSDCMRMEDTOKEN_GEN_ALL_CORE_FT
amLODIPine 5 mg oral tablet: 1 tab(s) orally once a day  fluvoxaMINE 50 mg oral tablet: 1 tab(s) orally once a day (at bedtime)  HYDROmorphone 2 mg oral tablet: 1 tab(s) orally every 6 hours, As Needed -Moderate Pain (4 - 6) MDD:8mg (Filled 11/18/21 x #12 tabs)  metoprolol succinate 50 mg oral tablet, extended release: 1 tab(s) orally once a day (Filled via [facility] in June/2021 x 3months)  pantoprazole 40 mg oral delayed release tablet: 1 tab(s) orally once a day (Filled via [facility] in July/2021 x 3 months)  Physical Therapy:   Valium 10 mg oral tablet: 1 tab(s) orally 2 times a day (Filled 11/13/21 x #60 tabs: 1 tab BID)  ZyPREXA 5 mg oral tablet: 1 tab(s) orally once a day (at bedtime)   amLODIPine 5 mg oral tablet: 1 tab(s) orally once a day  fluvoxaMINE 50 mg oral tablet: 1 tab(s) orally once a day (at bedtime)  metoprolol succinate 50 mg oral tablet, extended release: 1 tab(s) orally once a day (Filled via [facility] in June/2021 x 3months)  Outpatient Physical Therapy: 3-5x weekly x 1 month    Non-Weight bearing RLE.    Weight Bearing as tolerated LLE  oxycodone-acetaminophen 5 mg-325 mg oral tablet: 1-2  tab(s) orally every 6 hours, As needed, Moderate Pain (4 - 6) MDD:8 tabs  pantoprazole 40 mg oral delayed release tablet: 1 tab(s) orally once a day (Filled via [facility] in July/2021 x 3 months)  Physical Therapy:   polyethylene glycol 3350 oral powder for reconstitution: 17 gram(s) orally once a day, As Needed  senna oral tablet: 2 tab(s) orally once a day (at bedtime), As Needed  Valium 10 mg oral tablet: 1 tab(s) orally 2 times a day (Filled 11/13/21 x #60 tabs: 1 tab BID)  ZyPREXA 5 mg oral tablet: 1 tab(s) orally once a day (at bedtime)   amLODIPine 5 mg oral tablet: 1 tab(s) orally once a day  fluvoxaMINE 50 mg oral tablet: 1 tab(s) orally once a day (at bedtime)  metoprolol succinate 50 mg oral tablet, extended release: 1 tab(s) orally once a day   Outpatient Physical Therapy: 3-5x weekly x 1 month    Non-Weight bearing RLE.    Weight Bearing as tolerated LLE  oxycodone-acetaminophen 5 mg-325 mg oral tablet: 1-2  tab(s) orally every 6 hours, As needed, Moderate Pain (4 - 6) MDD:8 tabs  pantoprazole 40 mg oral delayed release tablet: 1 tab(s) orally once a day   Physical Therapy:   polyethylene glycol 3350 oral powder for reconstitution: 17 gram(s) orally once a day, As Needed  senna oral tablet: 2 tab(s) orally once a day (at bedtime), As Needed  Valium 10 mg oral tablet: 1 tab(s) orally 2 times a day (Filled 11/13/21 x #60 tabs: 1 tab BID)  ZyPREXA 5 mg oral tablet: 1 tab(s) orally once a day (at bedtime)
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
## 2021-11-23 NOTE — DISCHARGE NOTE PROVIDER - NSDCFUADDAPPT_GEN_ALL_CORE_FT
Podiatry Discharge Instructions:  Follow up: Please follow up with Dr. Waterhouse within 1 week of discharge from the hospital, please call [phone] for appointment and discuss that you recently were seen in the hospital.  Wound Care: Please leave your dressing clean dry intact until your follow up appointment   Weight bearing: Non-weight bearing to the R foot in cast with the use of crutches

## 2021-11-23 NOTE — DISCHARGE NOTE PROVIDER - CARE PROVIDER_API CALL
Waterhouse, Joseph C (DPM)  Surgery  [address] [place] [address]  Phone: [phone]  Fax: [phone]  Follow Up Time:     Bonnie Elena  Internal Medicine  [address] [place] [address], [facility] [address]  [place] [address]  Phone: [phone]  Fax: [phone]  Follow Up Time:

## 2021-11-23 NOTE — BRIEF OPERATIVE NOTE - ASSISTANT(S)
Rosa Anaya PGY3, Paddy Rodriguez PGY2, Jimmy Wood MS4 Rosa Anaya PGY3, Paddy Rodriguez PGY3, Jimmy Wood MS4

## 2021-11-23 NOTE — DISCHARGE NOTE PROVIDER - NSDCCPCAREPLAN_GEN_ALL_CORE_FT
PRINCIPAL DISCHARGE DIAGNOSIS  Diagnosis: Right calcaneal fracture  Assessment and Plan of Treatment: You had surgery on 11/23/21. Keep dressing dry and intact prior to follow up with Podiatry.  Follow up with Podiatry team in 1 week from discharge, call to schedule an appointment.   Non-weight bearing to Right lower extremity. Weight bearing as tolerated to left lower extremity.  Continue Outpatient Physical therapy 3-5x weekly.        SECONDARY DISCHARGE DIAGNOSES  Diagnosis: HTN (hypertension)  Assessment and Plan of Treatment: continue medications as prescribed  Follow up with your Primary Care Doctor within 1 week of discharge. Call to schedule an appointment      Diagnosis: Ingrown left big toenail  Assessment and Plan of Treatment: Follow up with Podiatry team in 1 week from discharge, call to schedule an appointment.

## 2021-11-23 NOTE — PHARMACOTHERAPY INTERVENTION NOTE - COMMENTS
Medication list/dosages confirmed with outpatient pharmacies ([facility]).   Of Note:  - [facility] ref# [id number] and Per [facility]: Valium 10mg BID dispensed to pt on 11/13/21 x #60 tabs.   - Metoprolol ER 50mg last dispensed to pt in [place]/2021 x 3 months  - Pantoprazole 40mg last dispensed to pt in July/2021 x 3 months

## 2021-11-24 ENCOUNTER — TRANSCRIPTION ENCOUNTER (OUTPATIENT)
Age: 46
End: 2021-11-24

## 2021-11-24 VITALS
OXYGEN SATURATION: 100 % | RESPIRATION RATE: 16 BRPM | SYSTOLIC BLOOD PRESSURE: 138 MMHG | HEART RATE: 102 BPM | TEMPERATURE: 98 F | DIASTOLIC BLOOD PRESSURE: 82 MMHG

## 2021-11-24 LAB
ANION GAP SERPL CALC-SCNC: 13 MMOL/L — SIGNIFICANT CHANGE UP (ref 7–14)
BUN SERPL-MCNC: 8 MG/DL — SIGNIFICANT CHANGE UP (ref 7–23)
CALCIUM SERPL-MCNC: 9.4 MG/DL — SIGNIFICANT CHANGE UP (ref 8.4–10.5)
CHLORIDE SERPL-SCNC: 104 MMOL/L — SIGNIFICANT CHANGE UP (ref 98–107)
CO2 SERPL-SCNC: 18 MMOL/L — LOW (ref 22–31)
CREAT SERPL-MCNC: 0.51 MG/DL — SIGNIFICANT CHANGE UP (ref 0.5–1.3)
GLUCOSE SERPL-MCNC: 103 MG/DL — HIGH (ref 70–99)
HCT VFR BLD CALC: 34 % — LOW (ref 34.5–45)
HGB BLD-MCNC: 11.7 G/DL — SIGNIFICANT CHANGE UP (ref 11.5–15.5)
MAGNESIUM SERPL-MCNC: 1.9 MG/DL — SIGNIFICANT CHANGE UP (ref 1.6–2.6)
MCHC RBC-ENTMCNC: 31 PG — SIGNIFICANT CHANGE UP (ref 27–34)
MCHC RBC-ENTMCNC: 34.4 GM/DL — SIGNIFICANT CHANGE UP (ref 32–36)
MCV RBC AUTO: 89.9 FL — SIGNIFICANT CHANGE UP (ref 80–100)
NRBC # BLD: 0 /100 WBCS — SIGNIFICANT CHANGE UP
NRBC # FLD: 0 K/UL — SIGNIFICANT CHANGE UP
PHOSPHATE SERPL-MCNC: 2.6 MG/DL — SIGNIFICANT CHANGE UP (ref 2.5–4.5)
PLATELET # BLD AUTO: 409 K/UL — HIGH (ref 150–400)
POTASSIUM SERPL-MCNC: 4.1 MMOL/L — SIGNIFICANT CHANGE UP (ref 3.5–5.3)
POTASSIUM SERPL-SCNC: 4.1 MMOL/L — SIGNIFICANT CHANGE UP (ref 3.5–5.3)
RBC # BLD: 3.78 M/UL — LOW (ref 3.8–5.2)
RBC # FLD: 12.8 % — SIGNIFICANT CHANGE UP (ref 10.3–14.5)
SODIUM SERPL-SCNC: 135 MMOL/L — SIGNIFICANT CHANGE UP (ref 135–145)
WBC # BLD: 8.23 K/UL — SIGNIFICANT CHANGE UP (ref 3.8–10.5)
WBC # FLD AUTO: 8.23 K/UL — SIGNIFICANT CHANGE UP (ref 3.8–10.5)

## 2021-11-24 RX ORDER — METOPROLOL TARTRATE 50 MG
50 TABLET ORAL DAILY
Refills: 0 | Status: DISCONTINUED | OUTPATIENT
Start: 2021-11-25 | End: 2021-11-24

## 2021-11-24 RX ORDER — METOPROLOL TARTRATE 50 MG
1 TABLET ORAL
Qty: 0 | Refills: 0 | DISCHARGE

## 2021-11-24 RX ORDER — OXYCODONE AND ACETAMINOPHEN 5; 325 MG/1; MG/1
1 TABLET ORAL EVERY 6 HOURS
Refills: 0 | Status: DISCONTINUED | OUTPATIENT
Start: 2021-11-24 | End: 2021-11-24

## 2021-11-24 RX ORDER — OXYCODONE AND ACETAMINOPHEN 5; 325 MG/1; MG/1
2 TABLET ORAL EVERY 6 HOURS
Refills: 0 | Status: DISCONTINUED | OUTPATIENT
Start: 2021-11-24 | End: 2021-11-24

## 2021-11-24 RX ORDER — BENZOCAINE AND MENTHOL 5; 1 G/100ML; G/100ML
1 LIQUID ORAL ONCE
Refills: 0 | Status: DISCONTINUED | OUTPATIENT
Start: 2021-11-24 | End: 2021-11-24

## 2021-11-24 RX ORDER — POLYETHYLENE GLYCOL 3350 17 G/17G
17 POWDER, FOR SOLUTION ORAL
Qty: 0 | Refills: 0 | DISCHARGE
Start: 2021-11-24

## 2021-11-24 RX ORDER — POLYETHYLENE GLYCOL 3350 17 G/17G
17 POWDER, FOR SOLUTION ORAL DAILY
Refills: 0 | Status: DISCONTINUED | OUTPATIENT
Start: 2021-11-24 | End: 2021-11-24

## 2021-11-24 RX ORDER — SENNA PLUS 8.6 MG/1
2 TABLET ORAL AT BEDTIME
Refills: 0 | Status: DISCONTINUED | OUTPATIENT
Start: 2021-11-24 | End: 2021-11-24

## 2021-11-24 RX ORDER — DIAZEPAM 5 MG
10 TABLET ORAL
Refills: 0 | Status: DISCONTINUED | OUTPATIENT
Start: 2021-11-24 | End: 2021-11-24

## 2021-11-24 RX ORDER — PANTOPRAZOLE SODIUM 20 MG/1
1 TABLET, DELAYED RELEASE ORAL
Qty: 30 | Refills: 0
Start: 2021-11-24 | End: 2021-12-23

## 2021-11-24 RX ORDER — METOPROLOL TARTRATE 50 MG
25 TABLET ORAL ONCE
Refills: 0 | Status: COMPLETED | OUTPATIENT
Start: 2021-11-24 | End: 2021-11-24

## 2021-11-24 RX ORDER — PANTOPRAZOLE SODIUM 20 MG/1
1 TABLET, DELAYED RELEASE ORAL
Qty: 0 | Refills: 0 | DISCHARGE

## 2021-11-24 RX ORDER — SENNA PLUS 8.6 MG/1
2 TABLET ORAL
Qty: 0 | Refills: 0 | DISCHARGE
Start: 2021-11-24

## 2021-11-24 RX ORDER — METOPROLOL TARTRATE 50 MG
1 TABLET ORAL
Qty: 30 | Refills: 0
Start: 2021-11-24 | End: 2021-12-23

## 2021-11-24 RX ADMIN — Medication 650 MILLIGRAM(S): at 07:12

## 2021-11-24 RX ADMIN — Medication 25 MILLIGRAM(S): at 06:20

## 2021-11-24 RX ADMIN — OXYCODONE AND ACETAMINOPHEN 2 TABLET(S): 5; 325 TABLET ORAL at 10:10

## 2021-11-24 RX ADMIN — Medication 25 MILLIGRAM(S): at 07:23

## 2021-11-24 RX ADMIN — PANTOPRAZOLE SODIUM 40 MILLIGRAM(S): 20 TABLET, DELAYED RELEASE ORAL at 06:20

## 2021-11-24 RX ADMIN — MORPHINE SULFATE 4 MILLIGRAM(S): 50 CAPSULE, EXTENDED RELEASE ORAL at 06:20

## 2021-11-24 RX ADMIN — HEPARIN SODIUM 5000 UNIT(S): 5000 INJECTION INTRAVENOUS; SUBCUTANEOUS at 06:20

## 2021-11-24 RX ADMIN — MORPHINE SULFATE 4 MILLIGRAM(S): 50 CAPSULE, EXTENDED RELEASE ORAL at 02:24

## 2021-11-24 RX ADMIN — POLYETHYLENE GLYCOL 3350 17 GRAM(S): 17 POWDER, FOR SOLUTION ORAL at 11:35

## 2021-11-24 RX ADMIN — Medication 650 MILLIGRAM(S): at 07:52

## 2021-11-24 RX ADMIN — MORPHINE SULFATE 4 MILLIGRAM(S): 50 CAPSULE, EXTENDED RELEASE ORAL at 02:09

## 2021-11-24 RX ADMIN — OXYCODONE AND ACETAMINOPHEN 2 TABLET(S): 5; 325 TABLET ORAL at 11:00

## 2021-11-24 RX ADMIN — AMLODIPINE BESYLATE 5 MILLIGRAM(S): 2.5 TABLET ORAL at 06:20

## 2021-11-24 RX ADMIN — Medication 10 MILLIGRAM(S): at 07:12

## 2021-11-24 RX ADMIN — MORPHINE SULFATE 4 MILLIGRAM(S): 50 CAPSULE, EXTENDED RELEASE ORAL at 06:35

## 2021-11-24 NOTE — DISCHARGE NOTE NURSING/CASE MANAGEMENT/SOCIAL WORK - NSDCFUADDAPPT_GEN_ALL_CORE_FT
Podiatry Discharge Instructions:  Follow up: Please follow up with Dr. Waterhouse within 1 week of discharge from the hospital, please call [phone] for appointment and discuss that you recently were seen in the hospital.  Wound Care: Please leave your dressing clean dry intact until your follow up appointment   Weight bearing: Non-weight bearing to the R foot in cast with the use of crutches

## 2021-11-24 NOTE — PROGRESS NOTE ADULT - SUBJECTIVE AND OBJECTIVE BOX
ANESTHESIA POSTOP CHECK    46y Female POSTOP DAY 1     Vital Signs Last 24 Hrs  T(C): 36.6 (24 Nov 2021 06:26), Max: 37.1 (23 Nov 2021 19:00)  T(F): 97.8 (24 Nov 2021 06:26), Max: 98.8 (23 Nov 2021 19:00)  HR: 102 (24 Nov 2021 06:26) (76 - 123)  BP: 138/82 (24 Nov 2021 06:26) (107/73 - 144/79)  BP(mean): 93 (23 Nov 2021 21:00) (81 - 94)  RR: 16 (24 Nov 2021 06:26) (11 - 23)  SpO2: 100% (24 Nov 2021 06:26) (94% - 100%)  I&O's Summary      [X ] NO APPARENT ANESTHESIA COMPLICATIONS      Comments:

## 2021-11-24 NOTE — PHYSICAL THERAPY INITIAL EVALUATION ADULT - PERTINENT HX OF CURRENT PROBLEM, REHAB EVAL
Patient admitted s/p fall down 15 steps while sleepwalking (2nd time in life and 2nd time that same night) after taking a new medication for Bipolar DO & drinking 1 glass of wine.

## 2021-11-24 NOTE — DISCHARGE NOTE NURSING/CASE MANAGEMENT/SOCIAL WORK - CAREGIVER PHONE NUMBER
----- Message from Sergey Allen sent at 5/25/2020  1:39 PM CDT -----  Please call pt she has some question regarding her signing release form for her mammo [phone]   [phone]

## 2021-11-24 NOTE — PROGRESS NOTE ADULT - ASSESSMENT
M E D I C A L   A T T E N D I N G    F O L L O W    U P   N O T E  (11-24-21 )                                     ------------------------------------------------------------------------------------------------    patient evaluated by me, case discussed with team, chart, medications, and physical exam reviewed, labs / tests  and vitals reviewed by me, as bellow.   Patient is stable for discharge today. patient  doing ok post op , ceared by podiatric Sx for Discharge.. continue pain mgmt   Patient to follow up with  Podiatry, PMD..   See discharge document for full note.  discussed with NP..                              ( note written / Date of service  11-24-21 )    ==================>> MEDICATIONS <<====================    amLODIPine   Tablet 5 milliGRAM(s) Oral daily  artificial tears (preservative free) Ophthalmic Solution 1 Drop(s) Both EYES once  benzocaine 15 mG/menthol 3.6 mG Lozenge 1 Lozenge Oral once  diazepam    Tablet 10 milliGRAM(s) Oral two times a day  fluvoxaMINE 50 milliGRAM(s) Oral at bedtime  heparin   Injectable 5000 Unit(s) SubCutaneous every 8 hours  OLANZapine 5 milliGRAM(s) Oral at bedtime  pantoprazole    Tablet 40 milliGRAM(s) Oral before breakfast  polyethylene glycol 3350 17 Gram(s) Oral daily  senna 2 Tablet(s) Oral at bedtime    MEDICATIONS  (PRN):  oxycodone    5 mG/acetaminophen 325 mG 1 Tablet(s) Oral every 6 hours PRN Moderate Pain (4 - 6)  oxycodone    5 mG/acetaminophen 325 mG 2 Tablet(s) Oral every 6 hours PRN Severe Pain (7 - 10)    ___________  Active diet:  Diet, Regular:   DASH/TLC Sodium & Cholesterol Restricted (DASH)  ___________________    ==================>> VITAL SIGNS <<==================    T(C): 36.6 (11-24-21 @ 06:26), Max: 37.1 (11-23-21 @ 19:00)  HR: 102 (11-24-21 @ 06:26) (76 - 123)  BP: 138/82 (11-24-21 @ 06:26) (107/73 - 144/79)  BP(mean): 93 (11-23-21 @ 21:00)  RR: 16 (11-24-21 @ 06:26) (11 - 23)  SpO2: 100% (11-24-21 @ 06:26) (94% - 100%)      ==================>> LAB AND IMAGING <<==================                        11.7   8.23  )-----------( 409      ( 24 Nov 2021 06:37 )             34.0        11-24    135  |  104  |  8   ----------------------------<  103<H>  4.1   |  18<L>  |  0.51    Ca    9.4      24 Nov 2021 06:37  Phos  2.6     11-24  Mg     1.90     11-24    TPro  7.8  /  Alb  4.6  /  TBili  0.3  /  DBili  x   /  AST  33<H>  /  ALT  29  /  AlkPhos  65  11-22    PT/INR - ( 23 Nov 2021 07:39 )   PT: 11.9 sec;   INR: 1.04 ratio         PTT - ( 23 Nov 2021 07:39 )  PTT:32.3 sec                 WBC count:   8.23 <<== ,  4.60 <<== ,  5.32 <<==   Hemoglobin:   11.7 <<==,  11.7 <<==,  12.6 <<==  platelets:  409 <==, 357 <==, 409 <==    Creatinine:  0.51  <<==, 0.55  <<==, 0.65  <<==  Sodium:   135  <==, 140  <==, 138  <==

## 2021-11-24 NOTE — DISCHARGE NOTE NURSING/CASE MANAGEMENT/SOCIAL WORK - PATIENT PORTAL LINK FT
You can access the FollowMyHealth Patient Portal offered by [facility] by registering at the following website: [email or web address]. By joining [employer]’s FollowMyHealth portal, you will also be able to view your health information using other applications (apps) compatible with our system.

## 2021-11-24 NOTE — PROGRESS NOTE ADULT - ASSESSMENT
45 yo f with right calcaneal comminuted fracture  - Pt seen and evaluated  - No leukocytosis, afebrile, VSS  - Exam:11/24: s/p R foot calcaneal ORIF: sutures intact, no signs of dehiscence no hematoma, no clinical signs of infection to the R foot  - Pain well controlled   - Recommend ASA 81 mg BID  - Pt is stable to d/c from podiatry standpoint   - Please follow up with Dr. Waterhouse within 1 week of discharge ([phone])  - Discussed with attending   47 yo f with right calcaneal comminuted fracture  - Pt seen and evaluated  - No leukocytosis, afebrile, VSS  - 11/24: s/p R foot calcaneal ORIF: sutures intact, no signs of dehiscence no hematoma, no clinical signs of infection to the R foot  - 11/24: s/p L foot partial nail avulsion of the medial and lateral borders of hallux, no discharge, no erythema, no signs of infection  - Pain well controlled   - Recommend ASA 81 mg BID  - Pt is stable to d/c from podiatry standpoint   - Please follow up with Dr. Waterhouse within 1 week of discharge ([phone])  - Discussed with attending   45 yo f with right calcaneal comminuted fracture  - Pt seen and evaluated  - No leukocytosis, afebrile, VSS  - 11/23: s/p R foot calcaneal ORIF: sutures intact, no signs of dehiscence no hematoma, no clinical signs of infection to the R foot  - 11/23: s/p L foot partial nail avulsion of the medial and lateral borders of hallux, no discharge, no erythema, no signs of infection  - Pain well controlled   - Recommend ASA 81 mg BID  - Pt is stable to d/c from podiatry standpoint   - Please follow up with Dr. Waterhouse within 1 week of discharge ([phone])  - Discussed with attending

## 2021-11-24 NOTE — PROGRESS NOTE ADULT - SUBJECTIVE AND OBJECTIVE BOX
Patient is a 46y old  Female who presents with a chief complaint of Please admit to Dr. Tejeda for R calcaneus ORIF tomorrow (22 Nov 2021 10:24)       INTERVAL HPI/OVERNIGHT EVENTS:  Patient seen and evaluated at bedside.  Pt is resting comfortable in NAD. Denies N/V/F/C.  Pain rated at 3/10    Allergies    Celexa (Rash)  Cipro (Short breath)  fluoroquinolone antibiotics (Rash)    Intolerances        Vital Signs Last 24 Hrs  T(C): 36.6 (24 Nov 2021 06:26), Max: 37.1 (23 Nov 2021 19:00)  T(F): 97.8 (24 Nov 2021 06:26), Max: 98.8 (23 Nov 2021 19:00)  HR: 102 (24 Nov 2021 06:26) (76 - 123)  BP: 138/82 (24 Nov 2021 06:26) (107/73 - 144/79)  BP(mean): 93 (23 Nov 2021 21:00) (81 - 94)  RR: 16 (24 Nov 2021 06:26) (11 - 23)  SpO2: 100% (24 Nov 2021 06:26) (94% - 100%)    LABS:                        11.7   8.23  )-----------( 409      ( 24 Nov 2021 06:37 )             34.0     11-24    135  |  104  |  8   ----------------------------<  103<H>  4.1   |  18<L>  |  0.51    Ca    9.4      24 Nov 2021 06:37  Phos  2.6     11-24  Mg     1.90     11-24    TPro  7.8  /  Alb  4.6  /  TBili  0.3  /  DBili  x   /  AST  33<H>  /  ALT  29  /  AlkPhos  65  11-22    PT/INR - ( 23 Nov 2021 07:39 )   PT: 11.9 sec;   INR: 1.04 ratio         PTT - ( 23 Nov 2021 07:39 )  PTT:32.3 sec    CAPILLARY BLOOD GLUCOSE          Lower Extremity Physical Exam:  Vascular: DP/PT 2/4 B/L, CFT <3 seconds B/L, Temperature gradient warm to cool B/L, right foot edema  Neuro: Epicritic sensation intact to the level of ankle B/L  Musculoskeletal/Ortho: tenderness to palpation to right heel, pt able to move R ankle and digit ROM with mild-moderate pain  Skin:     11/24: s/p R foot calcaneal ORIF: sutures intact, no signs of dehiscence no hematoma, no clinical signs of infection to the R foot      RADIOLOGY & ADDITIONAL TESTS:       Patient is a 46y old  Female who presents with a chief complaint of Please admit to Dr. Tejeda for R calcaneus ORIF tomorrow (22 Nov 2021 10:24)       INTERVAL HPI/OVERNIGHT EVENTS:  Patient seen and evaluated at bedside.  Pt is resting comfortable in NAD. Denies N/V/F/C.  Pain rated at 3/10    Allergies    Celexa (Rash)  Cipro (Short breath)  fluoroquinolone antibiotics (Rash)    Intolerances        Vital Signs Last 24 Hrs  T(C): 36.6 (24 Nov 2021 06:26), Max: 37.1 (23 Nov 2021 19:00)  T(F): 97.8 (24 Nov 2021 06:26), Max: 98.8 (23 Nov 2021 19:00)  HR: 102 (24 Nov 2021 06:26) (76 - 123)  BP: 138/82 (24 Nov 2021 06:26) (107/73 - 144/79)  BP(mean): 93 (23 Nov 2021 21:00) (81 - 94)  RR: 16 (24 Nov 2021 06:26) (11 - 23)  SpO2: 100% (24 Nov 2021 06:26) (94% - 100%)    LABS:                        11.7   8.23  )-----------( 409      ( 24 Nov 2021 06:37 )             34.0     11-24    135  |  104  |  8   ----------------------------<  103<H>  4.1   |  18<L>  |  0.51    Ca    9.4      24 Nov 2021 06:37  Phos  2.6     11-24  Mg     1.90     11-24    TPro  7.8  /  Alb  4.6  /  TBili  0.3  /  DBili  x   /  AST  33<H>  /  ALT  29  /  AlkPhos  65  11-22    PT/INR - ( 23 Nov 2021 07:39 )   PT: 11.9 sec;   INR: 1.04 ratio         PTT - ( 23 Nov 2021 07:39 )  PTT:32.3 sec    CAPILLARY BLOOD GLUCOSE          Lower Extremity Physical Exam:  Vascular: DP/PT 2/4 B/L, CFT <3 seconds B/L, Temperature gradient warm to cool B/L, right foot edema  Neuro: Epicritic sensation intact to the level of ankle B/L  Musculoskeletal/Ortho: tenderness to palpation to right heel, pt able to move R ankle and digit ROM with mild-moderate pain  Skin:     11/24: s/p R foot calcaneal ORIF: sutures intact, no signs of dehiscence no hematoma, no clinical signs of infection to the R foot  11/24: s/p L foot partial nail avulsion of the medial and lateral borders of hallux, no discharge, no erythema, no signs of infection      RADIOLOGY & ADDITIONAL TESTS:       Patient is a 46y old  Female who presents with a chief complaint of Please admit to Dr. Tejeda for R calcaneus ORIF tomorrow (22 Nov 2021 10:24)       INTERVAL HPI/OVERNIGHT EVENTS:  Patient seen and evaluated at bedside.  Pt is resting comfortable in NAD. Denies N/V/F/C.  Pain rated at 3/10    Allergies    Celexa (Rash)  Cipro (Short breath)  fluoroquinolone antibiotics (Rash)    Intolerances        Vital Signs Last 24 Hrs  T(C): 36.6 (24 Nov 2021 06:26), Max: 37.1 (23 Nov 2021 19:00)  T(F): 97.8 (24 Nov 2021 06:26), Max: 98.8 (23 Nov 2021 19:00)  HR: 102 (24 Nov 2021 06:26) (76 - 123)  BP: 138/82 (24 Nov 2021 06:26) (107/73 - 144/79)  BP(mean): 93 (23 Nov 2021 21:00) (81 - 94)  RR: 16 (24 Nov 2021 06:26) (11 - 23)  SpO2: 100% (24 Nov 2021 06:26) (94% - 100%)    LABS:                        11.7   8.23  )-----------( 409      ( 24 Nov 2021 06:37 )             34.0     11-24    135  |  104  |  8   ----------------------------<  103<H>  4.1   |  18<L>  |  0.51    Ca    9.4      24 Nov 2021 06:37  Phos  2.6     11-24  Mg     1.90     11-24    TPro  7.8  /  Alb  4.6  /  TBili  0.3  /  DBili  x   /  AST  33<H>  /  ALT  29  /  AlkPhos  65  11-22    PT/INR - ( 23 Nov 2021 07:39 )   PT: 11.9 sec;   INR: 1.04 ratio         PTT - ( 23 Nov 2021 07:39 )  PTT:32.3 sec    CAPILLARY BLOOD GLUCOSE          Lower Extremity Physical Exam:  Vascular: DP/PT 2/4 B/L, CFT <3 seconds B/L, Temperature gradient warm to cool B/L, right foot edema  Neuro: Epicritic sensation intact to the level of ankle B/L  Musculoskeletal/Ortho: tenderness to palpation to right heel, pt able to move R ankle and digit ROM with mild-moderate pain  Skin:     11/23: s/p R foot calcaneal ORIF: sutures intact, no signs of dehiscence no hematoma, no clinical signs of infection to the R foot  11/23: s/p L foot partial nail avulsion of the medial and lateral borders of hallux, no discharge, no erythema, no signs of infection      RADIOLOGY & ADDITIONAL TESTS:

## 2021-11-24 NOTE — ED PROVIDER NOTE - HISTORY ATTESTATION, MLM
I have reviewed and confirmed nurses' notes...

## 2021-11-24 NOTE — PHYSICAL THERAPY INITIAL EVALUATION ADULT - ADDITIONAL COMMENTS
Patient ascended/descended 4 steps via seated technique with contact guard of 1 to maintain NWB and patient verbalized understanding to only negotiate stairs with assistance at home, as well.

## 2021-11-25 ENCOUNTER — INPATIENT (INPATIENT)
Facility: HOSPITAL | Age: 46
LOS: 5 days | Discharge: HOME | End: 2021-12-01
Attending: HOSPITALIST | Admitting: HOSPITALIST
Payer: COMMERCIAL

## 2021-11-25 VITALS
HEART RATE: 98 BPM | DIASTOLIC BLOOD PRESSURE: 88 MMHG | OXYGEN SATURATION: 99 % | HEIGHT: 61 IN | RESPIRATION RATE: 18 BRPM | TEMPERATURE: 99 F | SYSTOLIC BLOOD PRESSURE: 123 MMHG

## 2021-11-25 DIAGNOSIS — Z98.51 TUBAL LIGATION STATUS: Chronic | ICD-10-CM

## 2021-11-25 DIAGNOSIS — Z98.89 OTHER SPECIFIED POSTPROCEDURAL STATES: Chronic | ICD-10-CM

## 2021-11-25 DIAGNOSIS — D36.9 BENIGN NEOPLASM, UNSPECIFIED SITE: Chronic | ICD-10-CM

## 2021-11-25 PROCEDURE — 99285 EMERGENCY DEPT VISIT HI MDM: CPT

## 2021-11-25 RX ORDER — MORPHINE SULFATE 50 MG/1
4 CAPSULE, EXTENDED RELEASE ORAL ONCE
Refills: 0 | Status: DISCONTINUED | OUTPATIENT
Start: 2021-11-25 | End: 2021-11-25

## 2021-11-25 NOTE — ED PROVIDER NOTE - OBJECTIVE STATEMENT
46 year old female s/p foot surgery presenting with right foot pain. Pain began after the patient returned home from the hospital yesterday. Pain is throbbing, constant, radiating up the leg, 10/10. Associated tingling in the toes. Denies fever, CP, SOB, nausea, vomiting.

## 2021-11-25 NOTE — ED PROVIDER NOTE - PHYSICAL EXAMINATION
gen: Appears uncomfortable  Mentation: AAO x 3  psych: mood appropriate  HEENT: airway patent, conjunctivae clear bilaterally  Cardio: RRR, no m/r/g  Resp: normal BS b/l  GI: soft/nondistended/nontender  Neuro: sensation and motor function grossly intact  Skin: No evidence of rash  MSK: Right foot and ADELIA in cast, capillary refill in toes less than 1 second

## 2021-11-25 NOTE — ED PROVIDER NOTE - PROGRESS NOTE DETAILS
Patient feels better with pain medication. Podiatry recommends inpatient treatment for pain control and beginning Ancef. DO Jean Marie (PGY-1)

## 2021-11-25 NOTE — ED ADULT NURSE NOTE - NSIMPLEMENTINTERV_GEN_ALL_ED
Implemented All Fall Risk Interventions:  [place] to call system. Call bell, personal items and telephone within reach. Instruct patient to call for assistance. Room bathroom lighting operational. Non-slip footwear when patient is off stretcher. Physically safe environment: no spills, clutter or unnecessary equipment. Stretcher in lowest position, wheels locked, appropriate side rails in place. Provide visual cue, wrist band, yellow gown, etc. Monitor gait and stability. Monitor for mental status changes and reorient to person, place, and time. Review medications for side effects contributing to fall risk. Reinforce activity limits and safety measures with patient and family.

## 2021-11-25 NOTE — ED ADULT TRIAGE NOTE - CHIEF COMPLAINT QUOTE
Pt st" I had surgery on Rt foot yesterday...shattered heel...having pain that is getting worse...and toes feel tingly.....called Surgeon J. Waterhouse told to go to ER." Pt arrives with rt lower leg in cast ...appears uncomfortable,

## 2021-11-25 NOTE — ED PROVIDER NOTE - ATTENDING CONTRIBUTION TO CARE
Seen and examined, pt. s/p foot surgery c/o inc. in pain unrelieved by oral pain meds, inc. swelling, no fever/chills, no spreading redness. Called surgeon and told to go to ED. Pt. denies N/V, no abd/back pain. MMM, clear lungs, heart reg, abd soft, NT to palp. RLE in short leg cast, NT hip/thigh, no redness to skin of thigh, good cap refill and warm toes visible distal to cast. Painful movements, no pain with passive toe movements. Podiatry at bedside.

## 2021-11-25 NOTE — ED ADULT NURSE NOTE - OBJECTIVE STATEMENT
Pt received to room 22 a/o x 3 c/o right lower leg pain and right foot numbness and tingling. Pt has DCed from here yesterday after having surgery on her right ankle. Pt states it feels more swollen when then she left here and had pins and needles to her right foot. pt noted to have feeling and able to move toes. Unable to feel pulse due to cast. Respirations even and unlabored. Lung sounds clear with equal chest rise bilaterally. ABD is soft, non tender, non distended with normal active bowel sounds No complaints of chest pain, headache, nausea, dizziness, vomiting  SOB, fever, chills verbalized.

## 2021-11-25 NOTE — ED PROVIDER NOTE - CLINICAL SUMMARY MEDICAL DECISION MAKING FREE TEXT BOX
Patient is here for pain control at the recommendation of her surgeon. Will give pain medication, get labs, imaging, and contact podiatry.

## 2021-11-26 DIAGNOSIS — I20.8 OTHER FORMS OF ANGINA PECTORIS: ICD-10-CM

## 2021-11-26 DIAGNOSIS — Z29.9 ENCOUNTER FOR PROPHYLACTIC MEASURES, UNSPECIFIED: ICD-10-CM

## 2021-11-26 DIAGNOSIS — M79.671 PAIN IN RIGHT FOOT: ICD-10-CM

## 2021-11-26 DIAGNOSIS — Z98.890 OTHER SPECIFIED POSTPROCEDURAL STATES: Chronic | ICD-10-CM

## 2021-11-26 DIAGNOSIS — F41.8 OTHER SPECIFIED ANXIETY DISORDERS: ICD-10-CM

## 2021-11-26 DIAGNOSIS — I10 ESSENTIAL (PRIMARY) HYPERTENSION: ICD-10-CM

## 2021-11-26 LAB
ALBUMIN SERPL ELPH-MCNC: 4.1 G/DL — SIGNIFICANT CHANGE UP (ref 3.3–5)
ALP SERPL-CCNC: 73 U/L — SIGNIFICANT CHANGE UP (ref 40–120)
ALT FLD-CCNC: 18 U/L — SIGNIFICANT CHANGE UP (ref 4–33)
ANION GAP SERPL CALC-SCNC: 11 MMOL/L — SIGNIFICANT CHANGE UP (ref 7–14)
ANION GAP SERPL CALC-SCNC: 12 MMOL/L — SIGNIFICANT CHANGE UP (ref 7–14)
AST SERPL-CCNC: 25 U/L — SIGNIFICANT CHANGE UP (ref 4–32)
BASE EXCESS BLDV CALC-SCNC: -1.2 MMOL/L — SIGNIFICANT CHANGE UP (ref -2–3)
BASOPHILS # BLD AUTO: 0.03 K/UL — SIGNIFICANT CHANGE UP (ref 0–0.2)
BASOPHILS NFR BLD AUTO: 0.5 % — SIGNIFICANT CHANGE UP (ref 0–2)
BILIRUB SERPL-MCNC: <0.2 MG/DL — SIGNIFICANT CHANGE UP (ref 0.2–1.2)
BLOOD GAS VENOUS COMPREHENSIVE RESULT: SIGNIFICANT CHANGE UP
BUN SERPL-MCNC: 11 MG/DL — SIGNIFICANT CHANGE UP (ref 7–23)
BUN SERPL-MCNC: 9 MG/DL — SIGNIFICANT CHANGE UP (ref 7–23)
CALCIUM SERPL-MCNC: 9.2 MG/DL — SIGNIFICANT CHANGE UP (ref 8.4–10.5)
CALCIUM SERPL-MCNC: 9.4 MG/DL — SIGNIFICANT CHANGE UP (ref 8.4–10.5)
CHLORIDE BLDV-SCNC: 106 MMOL/L — SIGNIFICANT CHANGE UP (ref 96–108)
CHLORIDE SERPL-SCNC: 103 MMOL/L — SIGNIFICANT CHANGE UP (ref 98–107)
CHLORIDE SERPL-SCNC: 103 MMOL/L — SIGNIFICANT CHANGE UP (ref 98–107)
CO2 BLDV-SCNC: 24.8 MMOL/L — SIGNIFICANT CHANGE UP (ref 22–26)
CO2 SERPL-SCNC: 21 MMOL/L — LOW (ref 22–31)
CO2 SERPL-SCNC: 22 MMOL/L — SIGNIFICANT CHANGE UP (ref 22–31)
CREAT SERPL-MCNC: 0.57 MG/DL — SIGNIFICANT CHANGE UP (ref 0.5–1.3)
CREAT SERPL-MCNC: 0.7 MG/DL — SIGNIFICANT CHANGE UP (ref 0.5–1.3)
EOSINOPHIL # BLD AUTO: 0.07 K/UL — SIGNIFICANT CHANGE UP (ref 0–0.5)
EOSINOPHIL NFR BLD AUTO: 1.1 % — SIGNIFICANT CHANGE UP (ref 0–6)
GAS PNL BLDV: 136 MMOL/L — SIGNIFICANT CHANGE UP (ref 136–145)
GLUCOSE BLDV-MCNC: 105 MG/DL — HIGH (ref 70–99)
GLUCOSE SERPL-MCNC: 106 MG/DL — HIGH (ref 70–99)
GLUCOSE SERPL-MCNC: 116 MG/DL — HIGH (ref 70–99)
HCO3 BLDV-SCNC: 24 MMOL/L — SIGNIFICANT CHANGE UP (ref 22–29)
HCT VFR BLD CALC: 36.5 % — SIGNIFICANT CHANGE UP (ref 34.5–45)
HCT VFR BLDA CALC: 36 % — SIGNIFICANT CHANGE UP (ref 34.5–46.5)
HGB BLD CALC-MCNC: 11.9 G/DL — SIGNIFICANT CHANGE UP (ref 11.5–15.5)
HGB BLD-MCNC: 11.6 G/DL — SIGNIFICANT CHANGE UP (ref 11.5–15.5)
IANC: 3.76 K/UL — SIGNIFICANT CHANGE UP (ref 1.5–8.5)
IMM GRANULOCYTES NFR BLD AUTO: 0.3 % — SIGNIFICANT CHANGE UP (ref 0–1.5)
INR BLD: 1.01 RATIO — SIGNIFICANT CHANGE UP (ref 0.88–1.16)
LACTATE BLDV-MCNC: 0.9 MMOL/L — SIGNIFICANT CHANGE UP (ref 0.5–2)
LYMPHOCYTES # BLD AUTO: 1.87 K/UL — SIGNIFICANT CHANGE UP (ref 1–3.3)
LYMPHOCYTES # BLD AUTO: 29.6 % — SIGNIFICANT CHANGE UP (ref 13–44)
MAGNESIUM SERPL-MCNC: 2.1 MG/DL — SIGNIFICANT CHANGE UP (ref 1.6–2.6)
MCHC RBC-ENTMCNC: 29.8 PG — SIGNIFICANT CHANGE UP (ref 27–34)
MCHC RBC-ENTMCNC: 31.8 GM/DL — LOW (ref 32–36)
MCV RBC AUTO: 93.8 FL — SIGNIFICANT CHANGE UP (ref 80–100)
MONOCYTES # BLD AUTO: 0.56 K/UL — SIGNIFICANT CHANGE UP (ref 0–0.9)
MONOCYTES NFR BLD AUTO: 8.9 % — SIGNIFICANT CHANGE UP (ref 2–14)
NEUTROPHILS # BLD AUTO: 3.76 K/UL — SIGNIFICANT CHANGE UP (ref 1.8–7.4)
NEUTROPHILS NFR BLD AUTO: 59.6 % — SIGNIFICANT CHANGE UP (ref 43–77)
NRBC # BLD: 0 /100 WBCS — SIGNIFICANT CHANGE UP
NRBC # FLD: 0 K/UL — SIGNIFICANT CHANGE UP
PCO2 BLDV: 39 MMHG — SIGNIFICANT CHANGE UP (ref 39–42)
PH BLDV: 7.39 — SIGNIFICANT CHANGE UP (ref 7.32–7.43)
PHOSPHATE SERPL-MCNC: 3.4 MG/DL — SIGNIFICANT CHANGE UP (ref 2.5–4.5)
PLATELET # BLD AUTO: 443 K/UL — HIGH (ref 150–400)
PO2 BLDV: 40 MMHG — SIGNIFICANT CHANGE UP
POTASSIUM BLDV-SCNC: 3.8 MMOL/L — SIGNIFICANT CHANGE UP (ref 3.5–5.1)
POTASSIUM SERPL-MCNC: 3.6 MMOL/L — SIGNIFICANT CHANGE UP (ref 3.5–5.3)
POTASSIUM SERPL-MCNC: 3.8 MMOL/L — SIGNIFICANT CHANGE UP (ref 3.5–5.3)
POTASSIUM SERPL-SCNC: 3.6 MMOL/L — SIGNIFICANT CHANGE UP (ref 3.5–5.3)
POTASSIUM SERPL-SCNC: 3.8 MMOL/L — SIGNIFICANT CHANGE UP (ref 3.5–5.3)
PROT SERPL-MCNC: 7 G/DL — SIGNIFICANT CHANGE UP (ref 6–8.3)
PROTHROM AB SERPL-ACNC: 11.5 SEC — SIGNIFICANT CHANGE UP (ref 10.6–13.6)
RBC # BLD: 3.89 M/UL — SIGNIFICANT CHANGE UP (ref 3.8–5.2)
RBC # FLD: 12.9 % — SIGNIFICANT CHANGE UP (ref 10.3–14.5)
SAO2 % BLDV: 69.5 % — SIGNIFICANT CHANGE UP
SARS-COV-2 RNA SPEC QL NAA+PROBE: SIGNIFICANT CHANGE UP
SODIUM SERPL-SCNC: 136 MMOL/L — SIGNIFICANT CHANGE UP (ref 135–145)
SODIUM SERPL-SCNC: 136 MMOL/L — SIGNIFICANT CHANGE UP (ref 135–145)
WBC # BLD: 6.31 K/UL — SIGNIFICANT CHANGE UP (ref 3.8–10.5)
WBC # FLD AUTO: 6.31 K/UL — SIGNIFICANT CHANGE UP (ref 3.8–10.5)

## 2021-11-26 PROCEDURE — 73630 X-RAY EXAM OF FOOT: CPT | Mod: 26,RT

## 2021-11-26 PROCEDURE — 99223 1ST HOSP IP/OBS HIGH 75: CPT

## 2021-11-26 RX ORDER — FLUVOXAMINE MALEATE 25 MG/1
1 TABLET ORAL
Qty: 0 | Refills: 0 | DISCHARGE

## 2021-11-26 RX ORDER — OLANZAPINE 15 MG/1
5 TABLET, FILM COATED ORAL AT BEDTIME
Refills: 0 | Status: DISCONTINUED | OUTPATIENT
Start: 2021-11-26 | End: 2021-12-01

## 2021-11-26 RX ORDER — METOPROLOL TARTRATE 50 MG
50 TABLET ORAL DAILY
Refills: 0 | Status: DISCONTINUED | OUTPATIENT
Start: 2021-11-26 | End: 2021-12-01

## 2021-11-26 RX ORDER — NICOTINE POLACRILEX 2 MG
1 GUM BUCCAL DAILY
Refills: 0 | Status: DISCONTINUED | OUTPATIENT
Start: 2021-11-26 | End: 2021-11-28

## 2021-11-26 RX ORDER — MORPHINE SULFATE 50 MG/1
2 CAPSULE, EXTENDED RELEASE ORAL ONCE
Refills: 0 | Status: DISCONTINUED | OUTPATIENT
Start: 2021-11-26 | End: 2021-11-26

## 2021-11-26 RX ORDER — MORPHINE SULFATE 50 MG/1
4 CAPSULE, EXTENDED RELEASE ORAL ONCE
Refills: 0 | Status: DISCONTINUED | OUTPATIENT
Start: 2021-11-26 | End: 2021-11-26

## 2021-11-26 RX ORDER — OXYCODONE HYDROCHLORIDE 5 MG/1
5 TABLET ORAL EVERY 6 HOURS
Refills: 0 | Status: DISCONTINUED | OUTPATIENT
Start: 2021-11-26 | End: 2021-11-27

## 2021-11-26 RX ORDER — ENOXAPARIN SODIUM 100 MG/ML
40 INJECTION SUBCUTANEOUS DAILY
Refills: 0 | Status: DISCONTINUED | OUTPATIENT
Start: 2021-11-26 | End: 2021-12-01

## 2021-11-26 RX ORDER — CEFAZOLIN SODIUM 1 G
1000 VIAL (EA) INJECTION ONCE
Refills: 0 | Status: COMPLETED | OUTPATIENT
Start: 2021-11-26 | End: 2021-11-26

## 2021-11-26 RX ORDER — DIAZEPAM 5 MG
10 TABLET ORAL
Refills: 0 | Status: DISCONTINUED | OUTPATIENT
Start: 2021-11-26 | End: 2021-12-01

## 2021-11-26 RX ORDER — LANOLIN ALCOHOL/MO/W.PET/CERES
3 CREAM (GRAM) TOPICAL AT BEDTIME
Refills: 0 | Status: DISCONTINUED | OUTPATIENT
Start: 2021-11-26 | End: 2021-12-01

## 2021-11-26 RX ORDER — AMLODIPINE BESYLATE 2.5 MG/1
5 TABLET ORAL DAILY
Refills: 0 | Status: DISCONTINUED | OUTPATIENT
Start: 2021-11-26 | End: 2021-12-01

## 2021-11-26 RX ORDER — SENNA PLUS 8.6 MG/1
2 TABLET ORAL AT BEDTIME
Refills: 0 | Status: DISCONTINUED | OUTPATIENT
Start: 2021-11-26 | End: 2021-12-01

## 2021-11-26 RX ORDER — ACETAMINOPHEN 500 MG
650 TABLET ORAL EVERY 6 HOURS
Refills: 0 | Status: DISCONTINUED | OUTPATIENT
Start: 2021-11-26 | End: 2021-11-27

## 2021-11-26 RX ORDER — POLYETHYLENE GLYCOL 3350 17 G/17G
17 POWDER, FOR SOLUTION ORAL DAILY
Refills: 0 | Status: DISCONTINUED | OUTPATIENT
Start: 2021-11-26 | End: 2021-12-01

## 2021-11-26 RX ORDER — MORPHINE SULFATE 50 MG/1
2 CAPSULE, EXTENDED RELEASE ORAL EVERY 6 HOURS
Refills: 0 | Status: DISCONTINUED | OUTPATIENT
Start: 2021-11-26 | End: 2021-12-01

## 2021-11-26 RX ADMIN — Medication 10 MILLIGRAM(S): at 17:30

## 2021-11-26 RX ADMIN — OXYCODONE HYDROCHLORIDE 5 MILLIGRAM(S): 5 TABLET ORAL at 18:17

## 2021-11-26 RX ADMIN — MORPHINE SULFATE 2 MILLIGRAM(S): 50 CAPSULE, EXTENDED RELEASE ORAL at 15:52

## 2021-11-26 RX ADMIN — MORPHINE SULFATE 4 MILLIGRAM(S): 50 CAPSULE, EXTENDED RELEASE ORAL at 00:04

## 2021-11-26 RX ADMIN — Medication 1 PATCH: at 18:16

## 2021-11-26 RX ADMIN — Medication 1 PATCH: at 17:30

## 2021-11-26 RX ADMIN — Medication 50 MILLIGRAM(S): at 06:37

## 2021-11-26 RX ADMIN — MORPHINE SULFATE 2 MILLIGRAM(S): 50 CAPSULE, EXTENDED RELEASE ORAL at 16:20

## 2021-11-26 RX ADMIN — MORPHINE SULFATE 2 MILLIGRAM(S): 50 CAPSULE, EXTENDED RELEASE ORAL at 19:56

## 2021-11-26 RX ADMIN — SENNA PLUS 2 TABLET(S): 8.6 TABLET ORAL at 22:56

## 2021-11-26 RX ADMIN — MORPHINE SULFATE 2 MILLIGRAM(S): 50 CAPSULE, EXTENDED RELEASE ORAL at 22:17

## 2021-11-26 RX ADMIN — ENOXAPARIN SODIUM 40 MILLIGRAM(S): 100 INJECTION SUBCUTANEOUS at 11:15

## 2021-11-26 RX ADMIN — OXYCODONE HYDROCHLORIDE 5 MILLIGRAM(S): 5 TABLET ORAL at 17:29

## 2021-11-26 RX ADMIN — Medication 3 MILLIGRAM(S): at 22:47

## 2021-11-26 RX ADMIN — OLANZAPINE 5 MILLIGRAM(S): 15 TABLET, FILM COATED ORAL at 22:46

## 2021-11-26 RX ADMIN — Medication 10 MILLIGRAM(S): at 06:38

## 2021-11-26 RX ADMIN — MORPHINE SULFATE 4 MILLIGRAM(S): 50 CAPSULE, EXTENDED RELEASE ORAL at 04:34

## 2021-11-26 RX ADMIN — OXYCODONE HYDROCHLORIDE 5 MILLIGRAM(S): 5 TABLET ORAL at 12:00

## 2021-11-26 RX ADMIN — Medication 100 MILLIGRAM(S): at 01:44

## 2021-11-26 RX ADMIN — AMLODIPINE BESYLATE 5 MILLIGRAM(S): 2.5 TABLET ORAL at 06:37

## 2021-11-26 RX ADMIN — MORPHINE SULFATE 2 MILLIGRAM(S): 50 CAPSULE, EXTENDED RELEASE ORAL at 07:02

## 2021-11-26 RX ADMIN — OXYCODONE HYDROCHLORIDE 5 MILLIGRAM(S): 5 TABLET ORAL at 11:15

## 2021-11-26 NOTE — ED ADULT NURSE REASSESSMENT NOTE - NS ED NURSE REASSESS COMMENT FT1
received report form night RN, pt is A+Ox4, VSS denies chest pain and SOB, RR even and unlabored. pt denies pain at this time, B/L foot dressings noted. transport arrived to bring pt to inpatient room.

## 2021-11-26 NOTE — CONSULT NOTE ADULT - ASSESSMENT
45 yo f with right calcaneal comminuted fracture  - Pt seen and evaluated  - No leukocytosis, afebrile  - 11/23: s/p R foot calcaneal ORIF: sutures intact, no signs of dehiscence no hematoma, no clinical signs of infection to the R foot, ROM and sensation intact to digits, compartments soft to palpation  - 11/23: s/p L foot partial nail avulsion of the medial and lateral borders of hallux, no discharge, no erythema, no signs of infection  - X ray: Redemonstrated intra-articular calcaneal fracture now status post internal fixation. Surgical hardware appears intact.  - Pt states pain is now improved while on morphine   - Recommend admit to medicine for continued pain control   - Recommend dose of Ancef   - Podiatry to follow   - Discussed with attending

## 2021-11-26 NOTE — H&P ADULT - NSHPREVIEWOFSYSTEMS_GEN_ALL_CORE
REVIEW OF SYSTEMS:    CONSTITUTIONAL: No weakness, fevers or chills  EYES/ENT: No visual changes;  No dysphagia  NECK: No pain or stiffness  RESPIRATORY: No cough, wheezing, hemoptysis; No shortness of breath  CARDIOVASCULAR: No chest pain or palpitations; No lower extremity edema  GASTROINTESTINAL: No abdominal or epigastric pain. No nausea, vomiting, or hematemesis; No diarrhea or constipation. No melena or hematochezia.  GENITOURINARY: No dysuria, frequency or hematuria  NEUROLOGICAL: +R foot mild tingling, No weakness  PSYCH: no current depression  SKIN: No itching, burning, rashes, or lesions

## 2021-11-26 NOTE — H&P ADULT - NSHPLABSRESULTS_GEN_ALL_CORE
11-26    136  |  103  |  11  ----------------------------<  116<H>  3.8   |  21<L>  |  0.70    Ca    9.4      26 Nov 2021 00:43  Phos  2.6     11-24  Mg     1.90     11-24    TPro  7.0  /  Alb  4.1  /  TBili  <0.2  /  DBili  x   /  AST  25  /  ALT  18  /  AlkPhos  73  11-26                            11.6   6.31  )-----------( 443      ( 26 Nov 2021 00:43 )             36.5             LIVER FUNCTIONS - ( 26 Nov 2021 00:43 )  Alb: 4.1 g/dL / Pro: 7.0 g/dL / ALK PHOS: 73 U/L / ALT: 18 U/L / AST: 25 U/L / GGT: x             Xray R foot: FINDINGS:  Redemonstrated open toe cast overlies the right lower extremity.  Redemonstrated intra-articular calcaneal fracture now status post internal fixation. Surgical hardware appears intact.    IMPRESSION:  Redemonstrated intra-articular calcaneal fracture now status post internal fixation. Surgical hardware appears intact.

## 2021-11-26 NOTE — H&P ADULT - ASSESSMENT
47 y/o female with pmhx of depression, anxiety, PTSD, Cardiac X syndrome, HTN, and recent ORIF of R calcaneal fracture who presents to the ER with worsening RLE foot pain since discharge 2 days ago. Admitted for pain control.

## 2021-11-26 NOTE — H&P ADULT - NSHPPHYSICALEXAM_GEN_ALL_CORE
PHYSICAL EXAM:  GENERAL: NAD, well-developed, well-nourished  HEAD:  Atraumatic, Normocephalic  EYES: EOMI, PERRLA, conjunctiva and sclera clear  NECK: Supple, No JVD  CHEST/LUNG: Clear to auscultation bilaterally; No wheezes, rales or rhonchi  HEART: Regular rate and rhythm; No murmurs, rubs, or gallops, (+)S1, S2  ABDOMEN: Soft, Nontender, Nondistended; Normal Bowel sounds   EXTREMITIES:  +RLE in ACE bandage, sensation intact to distal toes. +L foot nail avulsion wound w/ no discharge nor erythema  PSYCH: normal mood and affect  NEUROLOGY: AAOx3, strength intact b/l LE 5/5. sensation intact to light touch in RLE distal toes  SKIN: as above in extremity exam

## 2021-11-26 NOTE — CONSULT NOTE ADULT - SUBJECTIVE AND OBJECTIVE BOX
Podiatry pager #: [phone]    Patient is a 46y old  Female who presents with a chief complaint of post op R foot calc ORIF pain    HPI:  46 year old female s/p foot surgery presenting with right foot pain. Pain began after the patient returned home from the hospital yesterday. Pain is throbbing, constant, radiating up the leg, 10/10. Associated tingling in the toes. Denies fever, CP, SOB, nausea, vomiting      PAST MEDICAL & SURGICAL HISTORY:  Benign Hypertension    Anxiety States     Deliv    Tubal Ligation    Carcinoid Tumor of Ovary, Benign    Ovarian Cyst    Hypertension  Dx     Ovarian cyst  bilateral    Depression with anxiety    Anemia  bitamin b12 deficiency    Syndrome X (cardiac)    PTSD (post-traumatic stress disorder)    Carcinoid Tumor of Ovary, Benign    Previous  Delivery, Delivered    Tubal Ligation    Status Post Ovarian Cystectomy    S/P ovarian cystectomy  bilateral    Dermoid cyst  ovarian, bilateral    S/P       S/P tubal ligation        MEDICATIONS  (STANDING):    MEDICATIONS  (PRN):      Allergies    Celexa (Rash)  Cipro (Short breath)  fluoroquinolone antibiotics (Rash)    Intolerances        VITALS:    Vital Signs Last 24 Hrs  T(C): 37.1 (2021 00:11), Max: 37.1 (2021 22:46)  T(F): 98.8 (2021 00:11), Max: 98.8 (2021 22:46)  HR: 85 (2021 00:11) (85 - 98)  BP: 117/71 (2021 00:11) (117/71 - 123/88)  BP(mean): --  RR: 18 (2021 00:11) (18 - 18)  SpO2: 100% (2021 00:11) (99% - 100%)    LABS:                          11.6   6.31  )-----------( 443      ( 2021 00:43 )             36.5       -24    135  |  104  |  8   ----------------------------<  103<H>  4.1   |  18<L>  |  0.51    Ca    9.4      2021 06:37  Phos  2.6     11-24  Mg     1.90     11-24        CAPILLARY BLOOD GLUCOSE              LOWER EXTREMITY PHYSICAL EXAM:  Vascular: DP/PT 2/4 B/L, CFT <3 seconds B/L, Temperature gradient warm to cool B/L, right foot edema to dorsal aspect  Neuro: Epicritic sensation intact to the level of ankle B/L  Musculoskeletal/Ortho: tenderness to palpation to right heel, pt able to move R ankle and digit ROM with mild-moderate pain  Skin:     : s/p R foot calcaneal ORIF: sutures intact, no signs of dehiscence no hematoma, no clinical signs of infection to the R foot  : s/p L foot partial nail avulsion of the medial and lateral borders of hallux, no discharge, no erythema, no signs of infection      RADIOLOGY & ADDITIONAL STUDIES:      ******PRELIMINARY REPORT******    ******PRELIMINARY REPORT******            EXAM:  XR FOOT COMP MIN 3 VIEWS RT        PROCEDURE DATE:  2021     ******PRELIMINARY REPORT******    ******PRELIMINARY REPORT******            INTERPRETATION:  CLINICAL INFORMATION: Postoperative exam.    TECHNIQUE: 3 radiographic views of the right foot.    COMPARISON: Right foot radiographs 2021.    FINDINGS:  Redemonstrated open toe cast overlies the right lower extremity.  Redemonstrated intra-articular calcaneal fracture now status post internal fixation. Surgical hardware appears intact.    IMPRESSION:  Redemonstrated intra-articular calcaneal fracture now status post internal fixation. Surgical hardware appears intact.          ******PRELIMINARYREPORT******    ******PRELIMINARY REPORT******          CHRISTIANO GOMEZ MD; Resident Radiology

## 2021-11-26 NOTE — H&P ADULT - HISTORY OF PRESENT ILLNESS
Pt is a 47 y/o female with pmhx of depression, anxiety, PTSD, Cardiac X syndrome, HTN, and recent ORIF of R calcaneal fracture who presents to the ER with worsening RLE foot pain since discharge 2 days ago. She reports that she had some pain on discharge in the RLE but that the pain progressively worsened despite taking her oxycodone that was prescribed. No trauma to the foot reported. She denies any weakness but does report tingling sensation in the foot, no loss of sensation. No fever, chills, cough, new SOB, chest pain, abd pain or diarrhea. In the ER pt was given morphine with improvement.

## 2021-11-26 NOTE — H&P ADULT - PROBLEM SELECTOR PLAN 1
S/p ORIF to R foot a few days ago, likely post op pain. Uncontrolled at home w/ percocet  -Pt reports some tingling in the Foot w/ pain radiating up leg. Able to move toes and has sensation  -seen by podiatry, reports soft compartments. xray with intact hardware  -will continue morphine prn severe pain, check vbg/lactate this AM  -podiatry recs apprecaited. No signs or symptoms of infection. afebrile with no leukocytosis

## 2021-11-27 LAB
ALBUMIN SERPL ELPH-MCNC: 4.1 G/DL — SIGNIFICANT CHANGE UP (ref 3.3–5)
ALP SERPL-CCNC: 75 U/L — SIGNIFICANT CHANGE UP (ref 40–120)
ALT FLD-CCNC: 18 U/L — SIGNIFICANT CHANGE UP (ref 4–33)
ANION GAP SERPL CALC-SCNC: 15 MMOL/L — HIGH (ref 7–14)
AST SERPL-CCNC: 20 U/L — SIGNIFICANT CHANGE UP (ref 4–32)
BASOPHILS # BLD AUTO: 0.02 K/UL — SIGNIFICANT CHANGE UP (ref 0–0.2)
BASOPHILS NFR BLD AUTO: 0.4 % — SIGNIFICANT CHANGE UP (ref 0–2)
BILIRUB SERPL-MCNC: 0.3 MG/DL — SIGNIFICANT CHANGE UP (ref 0.2–1.2)
BUN SERPL-MCNC: 7 MG/DL — SIGNIFICANT CHANGE UP (ref 7–23)
CALCIUM SERPL-MCNC: 9.7 MG/DL — SIGNIFICANT CHANGE UP (ref 8.4–10.5)
CHLORIDE SERPL-SCNC: 106 MMOL/L — SIGNIFICANT CHANGE UP (ref 98–107)
CO2 SERPL-SCNC: 21 MMOL/L — LOW (ref 22–31)
COVID-19 NUCLEOCAPSID GAM AB INTERP: POSITIVE
COVID-19 NUCLEOCAPSID TOTAL GAM ANTIBODY RESULT: 6.65 INDEX — HIGH
COVID-19 SPIKE DOMAIN AB INTERP: POSITIVE
COVID-19 SPIKE DOMAIN ANTIBODY RESULT: >250 U/ML — HIGH
CREAT SERPL-MCNC: 0.56 MG/DL — SIGNIFICANT CHANGE UP (ref 0.5–1.3)
EOSINOPHIL # BLD AUTO: 0.09 K/UL — SIGNIFICANT CHANGE UP (ref 0–0.5)
EOSINOPHIL NFR BLD AUTO: 1.7 % — SIGNIFICANT CHANGE UP (ref 0–6)
GLUCOSE SERPL-MCNC: 96 MG/DL — SIGNIFICANT CHANGE UP (ref 70–99)
HCT VFR BLD CALC: 35.4 % — SIGNIFICANT CHANGE UP (ref 34.5–45)
HGB BLD-MCNC: 12 G/DL — SIGNIFICANT CHANGE UP (ref 11.5–15.5)
IANC: 3.07 K/UL — SIGNIFICANT CHANGE UP (ref 1.5–8.5)
IMM GRANULOCYTES NFR BLD AUTO: 0.2 % — SIGNIFICANT CHANGE UP (ref 0–1.5)
LYMPHOCYTES # BLD AUTO: 1.63 K/UL — SIGNIFICANT CHANGE UP (ref 1–3.3)
LYMPHOCYTES # BLD AUTO: 31.4 % — SIGNIFICANT CHANGE UP (ref 13–44)
MAGNESIUM SERPL-MCNC: 1.9 MG/DL — SIGNIFICANT CHANGE UP (ref 1.6–2.6)
MCHC RBC-ENTMCNC: 30.5 PG — SIGNIFICANT CHANGE UP (ref 27–34)
MCHC RBC-ENTMCNC: 33.9 GM/DL — SIGNIFICANT CHANGE UP (ref 32–36)
MCV RBC AUTO: 90.1 FL — SIGNIFICANT CHANGE UP (ref 80–100)
MONOCYTES # BLD AUTO: 0.37 K/UL — SIGNIFICANT CHANGE UP (ref 0–0.9)
MONOCYTES NFR BLD AUTO: 7.1 % — SIGNIFICANT CHANGE UP (ref 2–14)
NEUTROPHILS # BLD AUTO: 3.07 K/UL — SIGNIFICANT CHANGE UP (ref 1.8–7.4)
NEUTROPHILS NFR BLD AUTO: 59.2 % — SIGNIFICANT CHANGE UP (ref 43–77)
NRBC # BLD: 0 /100 WBCS — SIGNIFICANT CHANGE UP
NRBC # FLD: 0 K/UL — SIGNIFICANT CHANGE UP
PHOSPHATE SERPL-MCNC: 3.8 MG/DL — SIGNIFICANT CHANGE UP (ref 2.5–4.5)
PLATELET # BLD AUTO: 467 K/UL — HIGH (ref 150–400)
POTASSIUM SERPL-MCNC: 3.5 MMOL/L — SIGNIFICANT CHANGE UP (ref 3.5–5.3)
POTASSIUM SERPL-SCNC: 3.5 MMOL/L — SIGNIFICANT CHANGE UP (ref 3.5–5.3)
PROT SERPL-MCNC: 7 G/DL — SIGNIFICANT CHANGE UP (ref 6–8.3)
RBC # BLD: 3.93 M/UL — SIGNIFICANT CHANGE UP (ref 3.8–5.2)
RBC # FLD: 12.6 % — SIGNIFICANT CHANGE UP (ref 10.3–14.5)
SARS-COV-2 IGG+IGM SERPL QL IA: 6.65 INDEX — HIGH
SARS-COV-2 IGG+IGM SERPL QL IA: >250 U/ML — HIGH
SARS-COV-2 IGG+IGM SERPL QL IA: POSITIVE
SARS-COV-2 IGG+IGM SERPL QL IA: POSITIVE
SODIUM SERPL-SCNC: 142 MMOL/L — SIGNIFICANT CHANGE UP (ref 135–145)
WBC # BLD: 5.19 K/UL — SIGNIFICANT CHANGE UP (ref 3.8–10.5)
WBC # FLD AUTO: 5.19 K/UL — SIGNIFICANT CHANGE UP (ref 3.8–10.5)

## 2021-11-27 RX ORDER — ACETAMINOPHEN 500 MG
650 TABLET ORAL EVERY 6 HOURS
Refills: 0 | Status: COMPLETED | OUTPATIENT
Start: 2021-11-27 | End: 2021-11-29

## 2021-11-27 RX ORDER — MORPHINE SULFATE 50 MG/1
2 CAPSULE, EXTENDED RELEASE ORAL
Refills: 0 | Status: DISCONTINUED | OUTPATIENT
Start: 2021-11-27 | End: 2021-12-01

## 2021-11-27 RX ORDER — MORPHINE SULFATE 50 MG/1
15 CAPSULE, EXTENDED RELEASE ORAL
Refills: 0 | Status: DISCONTINUED | OUTPATIENT
Start: 2021-11-27 | End: 2021-11-27

## 2021-11-27 RX ORDER — MORPHINE SULFATE 50 MG/1
15 CAPSULE, EXTENDED RELEASE ORAL
Refills: 0 | Status: DISCONTINUED | OUTPATIENT
Start: 2021-11-27 | End: 2021-12-01

## 2021-11-27 RX ORDER — MORPHINE SULFATE 50 MG/1
7.5 CAPSULE, EXTENDED RELEASE ORAL
Refills: 0 | Status: DISCONTINUED | OUTPATIENT
Start: 2021-11-27 | End: 2021-12-01

## 2021-11-27 RX ADMIN — OXYCODONE HYDROCHLORIDE 5 MILLIGRAM(S): 5 TABLET ORAL at 17:23

## 2021-11-27 RX ADMIN — SENNA PLUS 2 TABLET(S): 8.6 TABLET ORAL at 21:43

## 2021-11-27 RX ADMIN — OXYCODONE HYDROCHLORIDE 5 MILLIGRAM(S): 5 TABLET ORAL at 00:38

## 2021-11-27 RX ADMIN — Medication 10 MILLIGRAM(S): at 05:16

## 2021-11-27 RX ADMIN — Medication 10 MILLIGRAM(S): at 17:27

## 2021-11-27 RX ADMIN — OLANZAPINE 5 MILLIGRAM(S): 15 TABLET, FILM COATED ORAL at 22:28

## 2021-11-27 RX ADMIN — Medication 1 PATCH: at 11:43

## 2021-11-27 RX ADMIN — OXYCODONE HYDROCHLORIDE 5 MILLIGRAM(S): 5 TABLET ORAL at 09:34

## 2021-11-27 RX ADMIN — Medication 50 MILLIGRAM(S): at 05:30

## 2021-11-27 RX ADMIN — MORPHINE SULFATE 2 MILLIGRAM(S): 50 CAPSULE, EXTENDED RELEASE ORAL at 21:43

## 2021-11-27 RX ADMIN — MORPHINE SULFATE 2 MILLIGRAM(S): 50 CAPSULE, EXTENDED RELEASE ORAL at 13:29

## 2021-11-27 RX ADMIN — Medication 3 MILLIGRAM(S): at 21:43

## 2021-11-27 RX ADMIN — Medication 1 PATCH: at 06:23

## 2021-11-27 RX ADMIN — MORPHINE SULFATE 2 MILLIGRAM(S): 50 CAPSULE, EXTENDED RELEASE ORAL at 13:45

## 2021-11-27 RX ADMIN — OXYCODONE HYDROCHLORIDE 5 MILLIGRAM(S): 5 TABLET ORAL at 10:10

## 2021-11-27 RX ADMIN — Medication 650 MILLIGRAM(S): at 17:30

## 2021-11-27 RX ADMIN — Medication 650 MILLIGRAM(S): at 17:29

## 2021-11-27 RX ADMIN — ENOXAPARIN SODIUM 40 MILLIGRAM(S): 100 INJECTION SUBCUTANEOUS at 11:43

## 2021-11-27 RX ADMIN — MORPHINE SULFATE 2 MILLIGRAM(S): 50 CAPSULE, EXTENDED RELEASE ORAL at 05:10

## 2021-11-27 RX ADMIN — AMLODIPINE BESYLATE 5 MILLIGRAM(S): 2.5 TABLET ORAL at 05:30

## 2021-11-27 RX ADMIN — OXYCODONE HYDROCHLORIDE 5 MILLIGRAM(S): 5 TABLET ORAL at 16:45

## 2021-11-27 RX ADMIN — Medication 1 PATCH: at 17:23

## 2021-11-27 RX ADMIN — Medication 1 PATCH: at 18:07

## 2021-11-27 NOTE — CONSULT NOTE ADULT - SUBJECTIVE AND OBJECTIVE BOX
Patient is a 46y old  Female who presents with a chief complaint of RLE Pain (2021 11:24)      HPI:  Pt is a 47 y/o female with pmhx of depression, anxiety, PTSD, Cardiac X syndrome, HTN, and recent ORIF of R calcaneal fracture who presents to the ER with worsening RLE foot pain since discharge 2 days ago. She reports that she had some pain on discharge in the RLE but that the pain progressively worsened despite taking her oxycodone that was prescribed. No trauma to the foot reported. She denies any weakness but does report tingling sensation in the foot, no loss of sensation. No fever, chills, cough, new SOB, chest pain, abd pain or diarrhea. In the ER pt was given morphine with improvement.  (2021 05:01)      PAST MEDICAL & SURGICAL HISTORY:  Benign Hypertension    Anxiety States     Deliv    Tubal Ligation    Carcinoid Tumor of Ovary, Benign    Ovarian Cyst    Hypertension  Dx     Ovarian cyst  bilateral    Depression with anxiety    Anemia  bitamin b12 deficiency    Syndrome X (cardiac)    PTSD (post-traumatic stress disorder)    Carcinoid Tumor of Ovary, Benign    Previous  Delivery, Delivered    Tubal Ligation    Status Post Ovarian Cystectomy    S/P ovarian cystectomy  bilateral    Dermoid cyst  ovarian, bilateral    S/P       S/P tubal ligation    S/P ORIF (open reduction internal fixation) fracture        MEDICATIONS  (STANDING):  amLODIPine   Tablet 5 milliGRAM(s) Oral daily  diazepam    Tablet 10 milliGRAM(s) Oral two times a day  enoxaparin Injectable 40 milliGRAM(s) SubCutaneous daily  metoprolol succinate ER 50 milliGRAM(s) Oral daily  morphine ER Tablet 15 milliGRAM(s) Oral two times a day  nicotine -   7 mG/24Hr(s) Patch 1 patch Transdermal daily  OLANZapine 5 milliGRAM(s) Oral at bedtime  senna 2 Tablet(s) Oral at bedtime    MEDICATIONS  (PRN):  melatonin 3 milliGRAM(s) Oral at bedtime PRN Insomnia  morphine  - Injectable 2 milliGRAM(s) IV Push every 6 hours PRN Severe Pain (7 - 10)  oxyCODONE    IR 5 milliGRAM(s) Oral every 6 hours PRN Moderate Pain (4 - 6)  polyethylene glycol 3350 17 Gram(s) Oral daily PRN Constipation      ICU Vital Signs Last 24 Hrs  T(C): 36.7 (2021 13:06), Max: 37.1 (2021 15:50)  T(F): 98 (2021 13:06), Max: 98.7 (2021 15:50)  HR: 72 (2021 13:06) (66 - 81)  BP: 128/78 (2021 13:06) (128/78 - 137/82)  BP(mean): --  ABP: --  ABP(mean): --  RR: 18 (2021 13:06) (17 - 18)  SpO2: 98% (2021 13:06) (97% - 100%)      Vital Signs Last 24 Hrs  T(C): 36.7 (2021 13:06), Max: 37.1 (2021 15:50)  T(F): 98 (2021 13:06), Max: 98.7 (2021 15:50)  HR: 72 (2021 13:06) (66 - 81)  BP: 128/78 (2021 13:06) (128/78 - 137/82)  BP(mean): --  RR: 18 (2021 13:06) (17 - 18)  SpO2: 98% (2021 13:06) (97% - 100%)                          12.0   5.19  )-----------( 467      ( 2021 08:32 )             35.4       LIVER FUNCTIONS - ( 2021 08:32 )  Alb: 4.1 g/dL / Pro: 7.0 g/dL / ALK PHOS: 75 U/L / ALT: 18 U/L / AST: 20 U/L / GGT: x             PT/INR - ( 2021 06:57 )   PT: 11.5 sec;   INR: 1.01 ratio       SUMMARY:              Patient seen at bedside. Patient states she fell down the stairs on Tuesday this week. Patient states she was sleep walking and she has never done it but believes that a new antidepressant that she started taking might have been the reason why she sleep walked. Patient states she had surgery this week and was discharged home 2 days ago on Percocet. Patient reports taking 2 Percocet Q4 -6 hours for her pain at home with no relief in pain. Patient states she currently has 8/10 pain on her right leg. Patients states the pain is sharp and constant and it radiates all over her leg. Patient also reports numbness and tingling on her right toes. Patient states that she had some tightness before and does not have ot now. Patient states that in the hospital the only medication that has been helping her is the IV Morphine and the IV Morphine last for 31/2 to 4 hours. Patient states the PO Oxycodone does not help her and only last her for 2 hours sometimes. Patient states she has anxiety and depression and takes Zyprexa 5mg at bedtime and Valium 10mg BID. Patient states she was recently started on a new antidepressant does not recall the name but she stopped taking it because of the sleep walking that happened after taking the medication. Patient states she has severe anxiety and that she would like to speak to Psychiatry about other possible antidepressants she could take. Patient states she has taken PO Gabapentin in the past for anxiety and it did not help her with her anxiety. Patient states she drinks 2 to 3 glasses of wine twice week. Last drink on the day of . Patient states she uses E cigarettes for the last year and a half. Patient reports using CBD vape for anxiety and states it helps.         Patient alert and orientedx4. Patient answers questions appropriately. Patient maintains eye contact. Not in any apparent distress. Patient tolerating regular diet.  RECOMMENDATIONS:  1) Consider  PO Acetaminophen 650mg Q6H standing x2 days, then PRN for pain. Not to be given within 6 hours of last dose of Acetaminophen.  2) Consider PO Gabapentin 300mg Q8H. Hold for over sedation.   3) Consider PO Motrin 400mg Q6H standing x2 days. Not to be given within 6 hours of last dose of Motrin. Please alternate with Acetaminophen.  4) Consider discontinuing current orders for IV Morphine and PO Oxycodone instead order:  >> PO Morphine 7.5mg Q3H PRN for moderate pain. Hold for over sedation. Not to be given within one hour of any other immediate acting opioid.  >> PO Morphine 15mg Q3H PRN for severe pain. Hold for over sedation. Not to be given within one hour of any other immediate acting opioid.  >> IV Morphine 2mg Q3H PRN for severe breakthrough pain . Hold for over sedation. Not to be given within one hour of any other immediate acting opioid. ONLY TO BE GIVEN FOR PAIN NOT RELIEVED WITH PO MORPHINE.   5) Recommend Psychiatry consult for anxiety and depression.  6) Recommend follow up with chronic pain management upon discharge.                     Patient is a 46y old  Female who presents with a chief complaint of RLE Pain (2021 11:24)      HPI:  Pt is a 45 y/o female with pmhx of depression, anxiety, PTSD, Cardiac X syndrome, HTN, and recent ORIF of R calcaneal fracture who presents to the ER with worsening RLE foot pain since discharge 2 days ago. She reports that she had some pain on discharge in the RLE but that the pain progressively worsened despite taking her oxycodone that was prescribed. No trauma to the foot reported. She denies any weakness but does report tingling sensation in the foot, no loss of sensation. No fever, chills, cough, new SOB, chest pain, abd pain or diarrhea. In the ER pt was given morphine with improvement.  (2021 05:01)      PAST MEDICAL & SURGICAL HISTORY:  Benign Hypertension    Anxiety States     Deliv    Tubal Ligation    Carcinoid Tumor of Ovary, Benign    Ovarian Cyst    Hypertension  Dx     Ovarian cyst  bilateral    Depression with anxiety    Anemia  bitamin b12 deficiency    Syndrome X (cardiac)    PTSD (post-traumatic stress disorder)    Carcinoid Tumor of Ovary, Benign    Previous  Delivery, Delivered    Tubal Ligation    Status Post Ovarian Cystectomy    S/P ovarian cystectomy  bilateral    Dermoid cyst  ovarian, bilateral    S/P       S/P tubal ligation    S/P ORIF (open reduction internal fixation) fracture        MEDICATIONS  (STANDING):  amLODIPine   Tablet 5 milliGRAM(s) Oral daily  diazepam    Tablet 10 milliGRAM(s) Oral two times a day  enoxaparin Injectable 40 milliGRAM(s) SubCutaneous daily  metoprolol succinate ER 50 milliGRAM(s) Oral daily  morphine ER Tablet 15 milliGRAM(s) Oral two times a day  nicotine -   7 mG/24Hr(s) Patch 1 patch Transdermal daily  OLANZapine 5 milliGRAM(s) Oral at bedtime  senna 2 Tablet(s) Oral at bedtime    MEDICATIONS  (PRN):  melatonin 3 milliGRAM(s) Oral at bedtime PRN Insomnia  morphine  - Injectable 2 milliGRAM(s) IV Push every 6 hours PRN Severe Pain (7 - 10)  oxyCODONE    IR 5 milliGRAM(s) Oral every 6 hours PRN Moderate Pain (4 - 6)  polyethylene glycol 3350 17 Gram(s) Oral daily PRN Constipation      ICU Vital Signs Last 24 Hrs  T(C): 36.7 (2021 13:06), Max: 37.1 (2021 15:50)  T(F): 98 (2021 13:06), Max: 98.7 (2021 15:50)  HR: 72 (2021 13:06) (66 - 81)  BP: 128/78 (2021 13:06) (128/78 - 137/82)  BP(mean): --  ABP: --  ABP(mean): --  RR: 18 (2021 13:06) (17 - 18)  SpO2: 98% (2021 13:06) (97% - 100%)      Vital Signs Last 24 Hrs  T(C): 36.7 (2021 13:06), Max: 37.1 (2021 15:50)  T(F): 98 (2021 13:06), Max: 98.7 (2021 15:50)  HR: 72 (2021 13:06) (66 - 81)  BP: 128/78 (2021 13:06) (128/78 - 137/82)  BP(mean): --  RR: 18 (2021 13:06) (17 - 18)  SpO2: 98% (2021 13:06) (97% - 100%)                          12.0   5.19  )-----------( 467      ( 2021 08:32 )             35.4       LIVER FUNCTIONS - ( 2021 08:32 )  Alb: 4.1 g/dL / Pro: 7.0 g/dL / ALK PHOS: 75 U/L / ALT: 18 U/L / AST: 20 U/L / GGT: x             PT/INR - ( 2021 06:57 )   PT: 11.5 sec;   INR: 1.01 ratio       SUMMARY:              Patient seen at bedside. Patient states she fell down the stairs on Tuesday this week. Patient states she was sleep walking and she has never done it but believes that a new antidepressant that she started taking might have been the reason why she sleep walked. Patient states she had surgery this week and was discharged home 2 days ago on Percocet. Patient reports taking 2 Percocet Q4 -6 hours for her pain at home with no relief in pain. Patient states she currently has 8/10 pain on her right leg. Patients states the pain is sharp and constant and it radiates all over her leg. Patient also reports numbness and tingling on her right toes. Patient states that she had some tightness before and does not have ot now. Patient states that in the hospital the only medication that has been helping her is the IV Morphine and the IV Morphine last for 31/2 to 4 hours. Patient states the PO Oxycodone does not help her and only last her for 2 hours sometimes. Patient states she has anxiety and depression and takes Zyprexa 5mg at bedtime and Valium 10mg BID. Patient states she was recently started on a new antidepressant does not recall the name but she stopped taking it because of the sleep walking that happened after taking the medication. Patient states she has severe anxiety and that she would like to speak to Psychiatry about other possible antidepressants she could take. Patient states she has taken PO Gabapentin in the past for anxiety and it did not help her with her anxiety. Patient states she drinks 2 to 3 glasses of wine twice week. Last drink on the day of . Patient states she uses E cigarettes for the last year and a half. Patient reports using CBD vape for anxiety and states it helps.         Patient alert and orientedx4. Patient answers questions appropriately. Patient maintains eye contact. Not in any apparent distress. Patient tolerating regular diet.  RECOMMENDATIONS:  1) Consider  PO Acetaminophen 650mg Q6H standing x2 days, then PRN for pain. Not to be given within 6 hours of last dose of Acetaminophen.  2) Consider PO Gabapentin 300mg Q8H. Hold for over sedation.   3) Consider PO Motrin 400mg Q6H standing x2 days. Not to be given within 6 hours of last dose of Motrin. Please alternate with Acetaminophen.  4) Consider discontinuing current orders for IV Morphine and PO Oxycodone instead order:  >> PO Morphine 7.5mg Q3H PRN for moderate pain. Hold for over sedation. Not to be given within one hour of any other immediate acting opioid.  >> PO Morphine 15mg Q3H PRN for severe pain. Hold for over sedation. Not to be given within one hour of any other immediate acting opioid.  >> IV Morphine 2mg Q3H PRN for severe breakthrough pain . Hold for over sedation. Not to be given within one hour of any other immediate acting opioid. ONLY TO BE GIVEN FOR PAIN NOT RELIEVED WITH PO MORPHINE.   5) Recommend Psychiatry consult for anxiety and depression.  6) Recommend follow up with chronic pain management upon discharge.  ISTOP REVIEWED AND FOUND:  Oxycodone-acetaminophen 5-325mg tablets, quantity of 40 pills for 5 days. Last dispensed on 21.  Oxycodone-acetaminophen 5-325mg tablets, quantity of 15 pills for 5 days. Last dispensed on 21.  Hydromorphone 2mg tablets, quantity of 12 pills for 3 days. Last dispensed on 21.  Curaleaf 90% indica (20:1) 2.5mgthc & 0.125mgcbd/dose vape quantity of 1 for 6 days. Last dispensed on 21.    Pain service to sign off. Please call pain service if further assistance is needed with pain management.

## 2021-11-28 LAB
ANION GAP SERPL CALC-SCNC: 11 MMOL/L — SIGNIFICANT CHANGE UP (ref 7–14)
BUN SERPL-MCNC: 7 MG/DL — SIGNIFICANT CHANGE UP (ref 7–23)
CALCIUM SERPL-MCNC: 9.9 MG/DL — SIGNIFICANT CHANGE UP (ref 8.4–10.5)
CHLORIDE SERPL-SCNC: 105 MMOL/L — SIGNIFICANT CHANGE UP (ref 98–107)
CO2 SERPL-SCNC: 23 MMOL/L — SIGNIFICANT CHANGE UP (ref 22–31)
CREAT SERPL-MCNC: 0.55 MG/DL — SIGNIFICANT CHANGE UP (ref 0.5–1.3)
GLUCOSE SERPL-MCNC: 97 MG/DL — SIGNIFICANT CHANGE UP (ref 70–99)
HCT VFR BLD CALC: 39 % — SIGNIFICANT CHANGE UP (ref 34.5–45)
HGB BLD-MCNC: 13.4 G/DL — SIGNIFICANT CHANGE UP (ref 11.5–15.5)
MAGNESIUM SERPL-MCNC: 2.1 MG/DL — SIGNIFICANT CHANGE UP (ref 1.6–2.6)
MCHC RBC-ENTMCNC: 30.5 PG — SIGNIFICANT CHANGE UP (ref 27–34)
MCHC RBC-ENTMCNC: 34.4 GM/DL — SIGNIFICANT CHANGE UP (ref 32–36)
MCV RBC AUTO: 88.8 FL — SIGNIFICANT CHANGE UP (ref 80–100)
NRBC # BLD: 0 /100 WBCS — SIGNIFICANT CHANGE UP
NRBC # FLD: 0 K/UL — SIGNIFICANT CHANGE UP
PHOSPHATE SERPL-MCNC: 4.1 MG/DL — SIGNIFICANT CHANGE UP (ref 2.5–4.5)
PLATELET # BLD AUTO: 518 K/UL — HIGH (ref 150–400)
POTASSIUM SERPL-MCNC: 3.6 MMOL/L — SIGNIFICANT CHANGE UP (ref 3.5–5.3)
POTASSIUM SERPL-SCNC: 3.6 MMOL/L — SIGNIFICANT CHANGE UP (ref 3.5–5.3)
RBC # BLD: 4.39 M/UL — SIGNIFICANT CHANGE UP (ref 3.8–5.2)
RBC # FLD: 12.6 % — SIGNIFICANT CHANGE UP (ref 10.3–14.5)
SODIUM SERPL-SCNC: 139 MMOL/L — SIGNIFICANT CHANGE UP (ref 135–145)
WBC # BLD: 5.02 K/UL — SIGNIFICANT CHANGE UP (ref 3.8–10.5)
WBC # FLD AUTO: 5.02 K/UL — SIGNIFICANT CHANGE UP (ref 3.8–10.5)

## 2021-11-28 PROCEDURE — 93010 ELECTROCARDIOGRAM REPORT: CPT

## 2021-11-28 RX ORDER — NICOTINE POLACRILEX 2 MG
1 GUM BUCCAL DAILY
Refills: 0 | Status: DISCONTINUED | OUTPATIENT
Start: 2021-11-28 | End: 2021-12-01

## 2021-11-28 RX ORDER — ONDANSETRON 8 MG/1
4 TABLET, FILM COATED ORAL EVERY 6 HOURS
Refills: 0 | Status: DISCONTINUED | OUTPATIENT
Start: 2021-11-28 | End: 2021-12-01

## 2021-11-28 RX ORDER — ONDANSETRON 8 MG/1
4 TABLET, FILM COATED ORAL EVERY 6 HOURS
Refills: 0 | Status: DISCONTINUED | OUTPATIENT
Start: 2021-11-28 | End: 2021-11-28

## 2021-11-28 RX ORDER — GABAPENTIN 400 MG/1
300 CAPSULE ORAL THREE TIMES A DAY
Refills: 0 | Status: DISCONTINUED | OUTPATIENT
Start: 2021-11-28 | End: 2021-12-01

## 2021-11-28 RX ORDER — ONDANSETRON 8 MG/1
8 TABLET, FILM COATED ORAL ONCE
Refills: 0 | Status: COMPLETED | OUTPATIENT
Start: 2021-11-28 | End: 2021-11-28

## 2021-11-28 RX ORDER — SODIUM CHLORIDE 9 MG/ML
1000 INJECTION INTRAMUSCULAR; INTRAVENOUS; SUBCUTANEOUS
Refills: 0 | Status: DISCONTINUED | OUTPATIENT
Start: 2021-11-28 | End: 2021-12-01

## 2021-11-28 RX ADMIN — MORPHINE SULFATE 15 MILLIGRAM(S): 50 CAPSULE, EXTENDED RELEASE ORAL at 10:25

## 2021-11-28 RX ADMIN — ENOXAPARIN SODIUM 40 MILLIGRAM(S): 100 INJECTION SUBCUTANEOUS at 11:38

## 2021-11-28 RX ADMIN — Medication 1 PATCH: at 11:28

## 2021-11-28 RX ADMIN — MORPHINE SULFATE 15 MILLIGRAM(S): 50 CAPSULE, EXTENDED RELEASE ORAL at 19:38

## 2021-11-28 RX ADMIN — Medication 50 MILLIGRAM(S): at 06:44

## 2021-11-28 RX ADMIN — Medication 650 MILLIGRAM(S): at 06:44

## 2021-11-28 RX ADMIN — Medication 650 MILLIGRAM(S): at 19:39

## 2021-11-28 RX ADMIN — Medication 650 MILLIGRAM(S): at 18:19

## 2021-11-28 RX ADMIN — OLANZAPINE 5 MILLIGRAM(S): 15 TABLET, FILM COATED ORAL at 21:34

## 2021-11-28 RX ADMIN — MORPHINE SULFATE 2 MILLIGRAM(S): 50 CAPSULE, EXTENDED RELEASE ORAL at 16:50

## 2021-11-28 RX ADMIN — ONDANSETRON 8 MILLIGRAM(S): 8 TABLET, FILM COATED ORAL at 12:28

## 2021-11-28 RX ADMIN — MORPHINE SULFATE 2 MILLIGRAM(S): 50 CAPSULE, EXTENDED RELEASE ORAL at 22:05

## 2021-11-28 RX ADMIN — GABAPENTIN 300 MILLIGRAM(S): 400 CAPSULE ORAL at 21:35

## 2021-11-28 RX ADMIN — MORPHINE SULFATE 15 MILLIGRAM(S): 50 CAPSULE, EXTENDED RELEASE ORAL at 12:37

## 2021-11-28 RX ADMIN — MORPHINE SULFATE 15 MILLIGRAM(S): 50 CAPSULE, EXTENDED RELEASE ORAL at 09:29

## 2021-11-28 RX ADMIN — MORPHINE SULFATE 15 MILLIGRAM(S): 50 CAPSULE, EXTENDED RELEASE ORAL at 13:00

## 2021-11-28 RX ADMIN — Medication 3 MILLIGRAM(S): at 21:34

## 2021-11-28 RX ADMIN — ONDANSETRON 4 MILLIGRAM(S): 8 TABLET, FILM COATED ORAL at 18:26

## 2021-11-28 RX ADMIN — Medication 10 MILLIGRAM(S): at 06:44

## 2021-11-28 RX ADMIN — Medication 10 MILLIGRAM(S): at 18:19

## 2021-11-28 RX ADMIN — Medication 1 PATCH: at 15:48

## 2021-11-28 RX ADMIN — SENNA PLUS 2 TABLET(S): 8.6 TABLET ORAL at 21:34

## 2021-11-28 RX ADMIN — AMLODIPINE BESYLATE 5 MILLIGRAM(S): 2.5 TABLET ORAL at 06:44

## 2021-11-28 RX ADMIN — Medication 1 PATCH: at 19:38

## 2021-11-28 RX ADMIN — SODIUM CHLORIDE 100 MILLILITER(S): 9 INJECTION INTRAMUSCULAR; INTRAVENOUS; SUBCUTANEOUS at 15:55

## 2021-11-28 RX ADMIN — MORPHINE SULFATE 2 MILLIGRAM(S): 50 CAPSULE, EXTENDED RELEASE ORAL at 21:36

## 2021-11-28 RX ADMIN — Medication 650 MILLIGRAM(S): at 11:38

## 2021-11-28 RX ADMIN — MORPHINE SULFATE 2 MILLIGRAM(S): 50 CAPSULE, EXTENDED RELEASE ORAL at 15:55

## 2021-11-28 RX ADMIN — MORPHINE SULFATE 15 MILLIGRAM(S): 50 CAPSULE, EXTENDED RELEASE ORAL at 18:30

## 2021-11-28 RX ADMIN — Medication 650 MILLIGRAM(S): at 12:34

## 2021-11-28 NOTE — CHART NOTE - NSCHARTNOTEFT_GEN_A_CORE
called by RN for pt reporting she cannot eat due to nausea, denies vomiting. noted on morphine. had RN repeat EKG for QTc- QTc is 446ms. dosing zofran x1 and will continue to monitor.

## 2021-11-29 LAB
ANION GAP SERPL CALC-SCNC: 11 MMOL/L — SIGNIFICANT CHANGE UP (ref 7–14)
BUN SERPL-MCNC: 7 MG/DL — SIGNIFICANT CHANGE UP (ref 7–23)
CALCIUM SERPL-MCNC: 9.4 MG/DL — SIGNIFICANT CHANGE UP (ref 8.4–10.5)
CHLORIDE SERPL-SCNC: 104 MMOL/L — SIGNIFICANT CHANGE UP (ref 98–107)
CO2 SERPL-SCNC: 22 MMOL/L — SIGNIFICANT CHANGE UP (ref 22–31)
CREAT SERPL-MCNC: 0.64 MG/DL — SIGNIFICANT CHANGE UP (ref 0.5–1.3)
GLUCOSE SERPL-MCNC: 101 MG/DL — HIGH (ref 70–99)
HCT VFR BLD CALC: 36.5 % — SIGNIFICANT CHANGE UP (ref 34.5–45)
HGB BLD-MCNC: 12 G/DL — SIGNIFICANT CHANGE UP (ref 11.5–15.5)
MAGNESIUM SERPL-MCNC: 1.8 MG/DL — SIGNIFICANT CHANGE UP (ref 1.6–2.6)
MCHC RBC-ENTMCNC: 29.6 PG — SIGNIFICANT CHANGE UP (ref 27–34)
MCHC RBC-ENTMCNC: 32.9 GM/DL — SIGNIFICANT CHANGE UP (ref 32–36)
MCV RBC AUTO: 89.9 FL — SIGNIFICANT CHANGE UP (ref 80–100)
NRBC # BLD: 0 /100 WBCS — SIGNIFICANT CHANGE UP
NRBC # FLD: 0 K/UL — SIGNIFICANT CHANGE UP
PHOSPHATE SERPL-MCNC: 4.1 MG/DL — SIGNIFICANT CHANGE UP (ref 2.5–4.5)
PLATELET # BLD AUTO: 448 K/UL — HIGH (ref 150–400)
POTASSIUM SERPL-MCNC: 3.6 MMOL/L — SIGNIFICANT CHANGE UP (ref 3.5–5.3)
POTASSIUM SERPL-SCNC: 3.6 MMOL/L — SIGNIFICANT CHANGE UP (ref 3.5–5.3)
RBC # BLD: 4.06 M/UL — SIGNIFICANT CHANGE UP (ref 3.8–5.2)
RBC # FLD: 12.3 % — SIGNIFICANT CHANGE UP (ref 10.3–14.5)
SODIUM SERPL-SCNC: 137 MMOL/L — SIGNIFICANT CHANGE UP (ref 135–145)
WBC # BLD: 4.87 K/UL — SIGNIFICANT CHANGE UP (ref 3.8–10.5)
WBC # FLD AUTO: 4.87 K/UL — SIGNIFICANT CHANGE UP (ref 3.8–10.5)

## 2021-11-29 PROCEDURE — 99223 1ST HOSP IP/OBS HIGH 75: CPT

## 2021-11-29 RX ADMIN — MORPHINE SULFATE 2 MILLIGRAM(S): 50 CAPSULE, EXTENDED RELEASE ORAL at 14:44

## 2021-11-29 RX ADMIN — MORPHINE SULFATE 15 MILLIGRAM(S): 50 CAPSULE, EXTENDED RELEASE ORAL at 00:21

## 2021-11-29 RX ADMIN — Medication 650 MILLIGRAM(S): at 00:18

## 2021-11-29 RX ADMIN — Medication 650 MILLIGRAM(S): at 07:00

## 2021-11-29 RX ADMIN — Medication 1 PATCH: at 15:59

## 2021-11-29 RX ADMIN — GABAPENTIN 300 MILLIGRAM(S): 400 CAPSULE ORAL at 22:18

## 2021-11-29 RX ADMIN — MORPHINE SULFATE 7.5 MILLIGRAM(S): 50 CAPSULE, EXTENDED RELEASE ORAL at 12:08

## 2021-11-29 RX ADMIN — OLANZAPINE 5 MILLIGRAM(S): 15 TABLET, FILM COATED ORAL at 22:18

## 2021-11-29 RX ADMIN — GABAPENTIN 300 MILLIGRAM(S): 400 CAPSULE ORAL at 14:50

## 2021-11-29 RX ADMIN — MORPHINE SULFATE 2 MILLIGRAM(S): 50 CAPSULE, EXTENDED RELEASE ORAL at 14:14

## 2021-11-29 RX ADMIN — Medication 3 MILLIGRAM(S): at 22:18

## 2021-11-29 RX ADMIN — MORPHINE SULFATE 2 MILLIGRAM(S): 50 CAPSULE, EXTENDED RELEASE ORAL at 22:18

## 2021-11-29 RX ADMIN — Medication 650 MILLIGRAM(S): at 12:09

## 2021-11-29 RX ADMIN — MORPHINE SULFATE 7.5 MILLIGRAM(S): 50 CAPSULE, EXTENDED RELEASE ORAL at 12:38

## 2021-11-29 RX ADMIN — Medication 10 MILLIGRAM(S): at 06:03

## 2021-11-29 RX ADMIN — Medication 50 MILLIGRAM(S): at 06:01

## 2021-11-29 RX ADMIN — Medication 1 PATCH: at 07:00

## 2021-11-29 RX ADMIN — MORPHINE SULFATE 2 MILLIGRAM(S): 50 CAPSULE, EXTENDED RELEASE ORAL at 22:48

## 2021-11-29 RX ADMIN — Medication 1 PATCH: at 12:09

## 2021-11-29 RX ADMIN — GABAPENTIN 300 MILLIGRAM(S): 400 CAPSULE ORAL at 06:01

## 2021-11-29 RX ADMIN — Medication 10 MILLIGRAM(S): at 17:21

## 2021-11-29 RX ADMIN — AMLODIPINE BESYLATE 5 MILLIGRAM(S): 2.5 TABLET ORAL at 06:01

## 2021-11-29 RX ADMIN — Medication 650 MILLIGRAM(S): at 06:01

## 2021-11-29 RX ADMIN — Medication 650 MILLIGRAM(S): at 01:20

## 2021-11-29 RX ADMIN — MORPHINE SULFATE 15 MILLIGRAM(S): 50 CAPSULE, EXTENDED RELEASE ORAL at 01:20

## 2021-11-29 RX ADMIN — Medication 650 MILLIGRAM(S): at 12:39

## 2021-11-29 RX ADMIN — ENOXAPARIN SODIUM 40 MILLIGRAM(S): 100 INJECTION SUBCUTANEOUS at 12:09

## 2021-11-29 RX ADMIN — SENNA PLUS 2 TABLET(S): 8.6 TABLET ORAL at 22:17

## 2021-11-29 RX ADMIN — MORPHINE SULFATE 15 MILLIGRAM(S): 50 CAPSULE, EXTENDED RELEASE ORAL at 19:02

## 2021-11-29 NOTE — BH CONSULTATION LIAISON ASSESSMENT NOTE - NSSUICPROTFACT_PSY_ALL_CORE
Responsibility to children, family, or others/Supportive social network of family or friends/Fear of death or the actual act of killing self
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
## 2021-11-29 NOTE — BH CONSULTATION LIAISON ASSESSMENT NOTE - CASE SUMMARY
Chart reviewed. Pt seen with trainee. Presents as AA o x 3, pleasant, and in NAD. Reports fall/injury was result of interaction between wine/medications and denies this ever happening before, nor any other concerns surrounding medications/polypharmacy/addiction. She is open to following with her outpatient psychiatrist whom she trusts (sees him later this week, virtually) and has a counselor as well. Supported at home. Denies safety concerns, SI, HI, panic, or psychosis/avelino currently. Given low psych acuity, will agree with above recs and sign off. No psych contraindication to discharge when medically cleared.

## 2021-11-29 NOTE — BH CONSULTATION LIAISON ASSESSMENT NOTE - PAST PSYCHOTROPIC MEDICATION
Home Valium, Zyprexa; see meds list above. These are home doses.     Past hx of SSRI use (multiple), currently not prescribed a SSRI.

## 2021-11-29 NOTE — BH CONSULTATION LIAISON ASSESSMENT NOTE - DESCRIPTION
Employment status unknown, + cannabis, vape, etoh use. - illicit drug use. Lives at home with family.

## 2021-11-29 NOTE — BH CONSULTATION LIAISON ASSESSMENT NOTE - NSBHCHARTREVIEWVS_PSY_A_CORE FT
Vital Signs Last 24 Hrs  T(C): 36.8 (29 Nov 2021 11:21), Max: 37.3 (29 Nov 2021 05:58)  T(F): 98.2 (29 Nov 2021 11:21), Max: 99.1 (29 Nov 2021 05:58)  HR: 84 (29 Nov 2021 11:21) (72 - 84)  BP: 113/80 (29 Nov 2021 11:21) (113/80 - 119/83)  BP(mean): --  RR: 18 (29 Nov 2021 11:21) (16 - 18)  SpO2: 100% (29 Nov 2021 11:21) (99% - 100%)

## 2021-11-29 NOTE — BH CONSULTATION LIAISON ASSESSMENT NOTE - RISK ASSESSMENT
Protective: , kids, family ties. Seeking treatment for anxiety and depression. Talks about goals, wanting to get better.     Risk factors: Emotional trauma, family history of substance use. Current use of etoh/smoking. Previous SI. BZD use.

## 2021-11-29 NOTE — BH CONSULTATION LIAISON ASSESSMENT NOTE - SUMMARY
46 year old female with a PMH of anxiety, depression, PTSD, Cardiac X syndrome currently admitted to medicine for management for recent R foot fracture (ORIF) complaining of anxiety and depression with  Hx of x2 psych admissions (last in 2020 at [facility] for depression and SI). Pt states the fracture occurred while sleep walking. Sleepwalking started when she started fluvoxamine (now discontinued), exacerbated by EtOH use. Pt states she drinks ~3-4 glasses of wine a week. States her alcohol use is mild. Pt states her anxiety and depression stem from multiple trauma in the past. Positive FamHx for addiction (brother and mother). Pt admits etOH use, Smoking vape, marijuana use. Denies Illicit drug use. Pt currently sees outpatient psych q month and a counselor q week. Pt endorses taking her medications as prescribed and importance on follow up.     Plan:   1) cont zyprexa 5mg PO HS  	-intial QTc 440, NSR; HOLD if QTc >500  2) cont valium 10mg BID PO  3) No further Psychiatric assessment needed at this time. No risk of harm to self or others.  4) Routine observation; no active or passive SI/HI.   5) SSRI use deferred to outpt psych doctor for management.  6) No psychiatric CI to discharge   7) Ensure pt follows up outpt psych; next appt 12/2/2021   8) Outpt psychiatrist- Dr. Nassar in [facility] ([phone])
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
## 2021-11-29 NOTE — BH CONSULTATION LIAISON ASSESSMENT NOTE - NSICDXPASTSURGICALHX_GEN_ALL_CORE_FT
PAST SURGICAL HISTORY:  Carcinoid Tumor of Ovary, Benign     Dermoid cyst ovarian, bilateral    Previous  Delivery, Delivered     S/P      S/P ORIF (open reduction internal fixation) fracture     S/P ovarian cystectomy bilateral    S/P tubal ligation     Status Post Ovarian Cystectomy     Tubal Ligation

## 2021-11-29 NOTE — BH CONSULTATION LIAISON ASSESSMENT NOTE - CURRENT MEDICATION
MEDICATIONS  (STANDING):  amLODIPine   Tablet 5 milliGRAM(s) Oral daily  diazepam    Tablet 10 milliGRAM(s) Oral two times a day  enoxaparin Injectable 40 milliGRAM(s) SubCutaneous daily  gabapentin 300 milliGRAM(s) Oral three times a day  metoprolol succinate ER 50 milliGRAM(s) Oral daily  nicotine -  14 mG/24Hr(s) Patch 1 patch Transdermal daily  OLANZapine 5 milliGRAM(s) Oral at bedtime  senna 2 Tablet(s) Oral at bedtime  sodium chloride 0.9%. 1000 milliLiter(s) (100 mL/Hr) IV Continuous <Continuous>    MEDICATIONS  (PRN):  melatonin 3 milliGRAM(s) Oral at bedtime PRN Insomnia  morphine  - Injectable 2 milliGRAM(s) IV Push every 6 hours PRN Severe Pain (7 - 10)  morphine  - Injectable 2 milliGRAM(s) IV Push every 3 hours PRN Severe Pain (7 - 10)  morphine  IR 7.5 milliGRAM(s) Oral every 3 hours PRN Moderate Pain (4 - 6)  morphine  IR 15 milliGRAM(s) Oral every 3 hours PRN Severe Pain (7 - 10)  ondansetron Injectable 4 milliGRAM(s) IV Push every 6 hours PRN Nausea and/or Vomiting  polyethylene glycol 3350 17 Gram(s) Oral daily PRN Constipation
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
## 2021-11-30 ENCOUNTER — APPOINTMENT (OUTPATIENT)
Dept: CARDIOLOGY | Facility: CLINIC | Age: 46
End: 2021-11-30

## 2021-11-30 LAB
ANION GAP SERPL CALC-SCNC: 12 MMOL/L — SIGNIFICANT CHANGE UP (ref 7–14)
BUN SERPL-MCNC: 7 MG/DL — SIGNIFICANT CHANGE UP (ref 7–23)
CALCIUM SERPL-MCNC: 9.6 MG/DL — SIGNIFICANT CHANGE UP (ref 8.4–10.5)
CHLORIDE SERPL-SCNC: 103 MMOL/L — SIGNIFICANT CHANGE UP (ref 98–107)
CO2 SERPL-SCNC: 25 MMOL/L — SIGNIFICANT CHANGE UP (ref 22–31)
CREAT SERPL-MCNC: 0.58 MG/DL — SIGNIFICANT CHANGE UP (ref 0.5–1.3)
GLUCOSE SERPL-MCNC: 154 MG/DL — HIGH (ref 70–99)
HCT VFR BLD CALC: 38.7 % — SIGNIFICANT CHANGE UP (ref 34.5–45)
HGB BLD-MCNC: 12.6 G/DL — SIGNIFICANT CHANGE UP (ref 11.5–15.5)
MAGNESIUM SERPL-MCNC: 1.8 MG/DL — SIGNIFICANT CHANGE UP (ref 1.6–2.6)
MCHC RBC-ENTMCNC: 30 PG — SIGNIFICANT CHANGE UP (ref 27–34)
MCHC RBC-ENTMCNC: 32.6 GM/DL — SIGNIFICANT CHANGE UP (ref 32–36)
MCV RBC AUTO: 92.1 FL — SIGNIFICANT CHANGE UP (ref 80–100)
NRBC # BLD: 0 /100 WBCS — SIGNIFICANT CHANGE UP
NRBC # FLD: 0 K/UL — SIGNIFICANT CHANGE UP
PHOSPHATE SERPL-MCNC: 3.6 MG/DL — SIGNIFICANT CHANGE UP (ref 2.5–4.5)
PLATELET # BLD AUTO: 545 K/UL — HIGH (ref 150–400)
POTASSIUM SERPL-MCNC: 3.8 MMOL/L — SIGNIFICANT CHANGE UP (ref 3.5–5.3)
POTASSIUM SERPL-SCNC: 3.8 MMOL/L — SIGNIFICANT CHANGE UP (ref 3.5–5.3)
RBC # BLD: 4.2 M/UL — SIGNIFICANT CHANGE UP (ref 3.8–5.2)
RBC # FLD: 12.2 % — SIGNIFICANT CHANGE UP (ref 10.3–14.5)
SODIUM SERPL-SCNC: 140 MMOL/L — SIGNIFICANT CHANGE UP (ref 135–145)
WBC # BLD: 5.68 K/UL — SIGNIFICANT CHANGE UP (ref 3.8–10.5)
WBC # FLD AUTO: 5.68 K/UL — SIGNIFICANT CHANGE UP (ref 3.8–10.5)

## 2021-11-30 RX ADMIN — Medication 50 MILLIGRAM(S): at 06:31

## 2021-11-30 RX ADMIN — Medication 1 PATCH: at 11:46

## 2021-11-30 RX ADMIN — MORPHINE SULFATE 2 MILLIGRAM(S): 50 CAPSULE, EXTENDED RELEASE ORAL at 06:31

## 2021-11-30 RX ADMIN — Medication 3 MILLIGRAM(S): at 21:36

## 2021-11-30 RX ADMIN — MORPHINE SULFATE 15 MILLIGRAM(S): 50 CAPSULE, EXTENDED RELEASE ORAL at 16:59

## 2021-11-30 RX ADMIN — MORPHINE SULFATE 2 MILLIGRAM(S): 50 CAPSULE, EXTENDED RELEASE ORAL at 14:05

## 2021-11-30 RX ADMIN — AMLODIPINE BESYLATE 5 MILLIGRAM(S): 2.5 TABLET ORAL at 06:31

## 2021-11-30 RX ADMIN — MORPHINE SULFATE 15 MILLIGRAM(S): 50 CAPSULE, EXTENDED RELEASE ORAL at 16:29

## 2021-11-30 RX ADMIN — Medication 10 MILLIGRAM(S): at 17:52

## 2021-11-30 RX ADMIN — MORPHINE SULFATE 15 MILLIGRAM(S): 50 CAPSULE, EXTENDED RELEASE ORAL at 01:29

## 2021-11-30 RX ADMIN — OLANZAPINE 5 MILLIGRAM(S): 15 TABLET, FILM COATED ORAL at 21:36

## 2021-11-30 RX ADMIN — MORPHINE SULFATE 2 MILLIGRAM(S): 50 CAPSULE, EXTENDED RELEASE ORAL at 14:00

## 2021-11-30 RX ADMIN — GABAPENTIN 300 MILLIGRAM(S): 400 CAPSULE ORAL at 21:36

## 2021-11-30 RX ADMIN — ENOXAPARIN SODIUM 40 MILLIGRAM(S): 100 INJECTION SUBCUTANEOUS at 11:47

## 2021-11-30 RX ADMIN — GABAPENTIN 300 MILLIGRAM(S): 400 CAPSULE ORAL at 06:31

## 2021-11-30 RX ADMIN — GABAPENTIN 300 MILLIGRAM(S): 400 CAPSULE ORAL at 16:01

## 2021-11-30 RX ADMIN — MORPHINE SULFATE 15 MILLIGRAM(S): 50 CAPSULE, EXTENDED RELEASE ORAL at 22:06

## 2021-11-30 RX ADMIN — Medication 1 PATCH: at 12:09

## 2021-11-30 RX ADMIN — MORPHINE SULFATE 15 MILLIGRAM(S): 50 CAPSULE, EXTENDED RELEASE ORAL at 21:37

## 2021-11-30 RX ADMIN — Medication 10 MILLIGRAM(S): at 06:38

## 2021-11-30 RX ADMIN — MORPHINE SULFATE 2 MILLIGRAM(S): 50 CAPSULE, EXTENDED RELEASE ORAL at 13:30

## 2021-11-30 RX ADMIN — SENNA PLUS 2 TABLET(S): 8.6 TABLET ORAL at 21:36

## 2021-11-30 NOTE — PROGRESS NOTE ADULT - PROBLEM SELECTOR PLAN 1
S/p ORIF to R foot a few days ago, likely post op pain. Uncontrolled at home w/ percocet  -Pt reports some tingling in the Foot w/ pain radiating up leg. Able to move toes and has sensation  -seen by podiatry, reports soft compartments. xray with intact hardware  - started IV Morphine with improvement in pain.  - podiatry recs appreciated. No signs or symptoms of infection. afebrile with no leukocytosis  - pain management consulted. PO morphine regimen started. will wean off IV once pain stable  - Added gabapentin TID 11/28/21  - continue to monitor for improvement.  - nauseous, gentle IVF. PRN Zofran.
S/p ORIF to R foot a few days ago, likely post op pain. Uncontrolled at home w/ percocet  -Pt reports some tingling in the Foot w/ pain radiating up leg. Able to move toes and has sensation  -seen by podiatry, reports soft compartments. xray with intact hardware  - started IV Morphine with improvement in pain.  - podiatry recs appreciated. No signs or symptoms of infection. afebrile with no leukocytosis  - pain management consulted. PO morphine regimen started. will wean off IV once pain stable  - Added gabapentin TID today.   - continue to monitor for improvement.
s/p ORIF to R foot a few days prior to admission. Likely post op pain. Uncontrolled at home w/ percocet at home.   -Pt reports some tingling in the Foot w/ pain radiating up leg. Able to move toes and has sensation  - seen by podiatry, reports soft compartments. xray with intact hardware  - started IV Morphine with improvement in pain.  - podiatry recs appreciated. No signs or symptoms of infection. afebrile with no leukocytosis  - pain management consulted. PO morphine regimen started. will wean off IV once pain stable  - Added gabapentin TID 11/28/21  - continue to monitor for improvement.  - nauseous, gentle IVF. PRN Zofran.  - dc planning home if pain reasonably controlled  (pain management follow up if pain not well controlled.)
S/p ORIF to R foot a few days ago, likely post op pain. Uncontrolled at home w/ percocet  -Pt reports some tingling in the Foot w/ pain radiating up leg. Able to move toes and has sensation  -seen by podiatry, reports soft compartments. xray with intact hardware  - started IV Morphine with improvement in pain.  -podiatry recs apprecaited. No signs or symptoms of infection. afebrile with no leukocytosis  - pain management consulted. PO morphine regimen started. will wean off IV once pain stable

## 2021-11-30 NOTE — PROGRESS NOTE ADULT - PROBLEM SELECTOR PLAN 3
resume home zyprexa and valium. Pt reports no longer taking fluvoxamine
resume home Zyprexa and Valium. Pt reports no longer taking fluvoxamine (caused sleep walking and fell sustained fracture)   Psyc consult appreciated.
resume home zyprexa and valium. Pt reports no longer taking fluvoxamine (caused sleep walking and fell sustained fracture)   Psyc consult appreciated.
resume home zyprexa and valium. Pt reports no longer taking fluvoxamine

## 2021-11-30 NOTE — PROGRESS NOTE ADULT - PROBLEM SELECTOR PLAN 2
resume home toprol and norvasc

## 2021-11-30 NOTE — PROGRESS NOTE ADULT - SUBJECTIVE AND OBJECTIVE BOX
Patient is a 46y old  Female who presents with a chief complaint of RLE Pain (26 Nov 2021 05:01)       INTERVAL HPI/OVERNIGHT EVENTS:  Patient seen and evaluated at bedside.  Pt is resting comfortable in NAD. Denies N/V/F/C.  Pain rated at X/10    Allergies    Celexa (Rash)  Cipro (Short breath)  fluoroquinolone antibiotics (Rash)    Intolerances        Vital Signs Last 24 Hrs  T(C): 37.1 (26 Nov 2021 15:50), Max: 37.1 (25 Nov 2021 22:46)  T(F): 98.7 (26 Nov 2021 15:50), Max: 98.8 (25 Nov 2021 22:46)  HR: 66 (26 Nov 2021 15:50) (66 - 98)  BP: 136/75 (26 Nov 2021 15:50) (114/82 - 136/75)  BP(mean): --  RR: 17 (26 Nov 2021 15:50) (17 - 18)  SpO2: 100% (26 Nov 2021 15:50) (99% - 100%)    LABS:                        11.6   6.31  )-----------( 443      ( 26 Nov 2021 00:43 )             36.5     11-26    136  |  103  |  9   ----------------------------<  106<H>  3.6   |  22  |  0.57    Ca    9.2      26 Nov 2021 06:57  Phos  3.4     11-26  Mg     2.10     11-26    TPro  7.0  /  Alb  4.1  /  TBili  <0.2  /  DBili  x   /  AST  25  /  ALT  18  /  AlkPhos  73  11-26    PT/INR - ( 26 Nov 2021 06:57 )   PT: 11.5 sec;   INR: 1.01 ratio             CAPILLARY BLOOD GLUCOSE          Lower Extremity Physical Exam:  Vascular: DP/PT 2/4 B/L, CFT <3 seconds B/L, Temperature gradient warm to cool B/L, right foot edema to dorsal aspect  Neuro: Epicritic sensation intact to the level of ankle B/L  Musculoskeletal/Ortho: tenderness to palpation to right heel, pt able to move R ankle and digit ROM with mild-moderate pain  Skin:     11/23: s/p R foot calcaneal ORIF: sutures intact, no signs of dehiscence no hematoma, no clinical signs of infection to the R foot  11/23: s/p L foot partial nail avulsion of the medial and lateral borders of hallux, no discharge, no erythema, no signs of infection    RADIOLOGY & ADDITIONAL TESTS:  
Patient is a 46y old  Female who presents with a chief complaint of RLE Pain (29 Nov 2021 15:16)       INTERVAL HPI/OVERNIGHT EVENTS:  Patient seen and evaluated at bedside.  Pt is resting comfortable in NAD. Denies N/V/F/C.  Pain rated at X/10    Allergies    Celexa (Rash)  Cipro (Short breath)  fluoroquinolone antibiotics (Rash)    Intolerances        Vital Signs Last 24 Hrs  T(C): 36.9 (30 Nov 2021 05:42), Max: 37 (29 Nov 2021 19:35)  T(F): 98.4 (30 Nov 2021 05:42), Max: 98.6 (29 Nov 2021 19:35)  HR: 81 (30 Nov 2021 05:42) (81 - 86)  BP: 122/77 (30 Nov 2021 05:42) (113/80 - 125/82)  BP(mean): --  RR: 18 (30 Nov 2021 05:42) (18 - 18)  SpO2: 99% (30 Nov 2021 05:42) (98% - 100%)    LABS:                        12.0   4.87  )-----------( 448      ( 29 Nov 2021 07:10 )             36.5     11-29    137  |  104  |  7   ----------------------------<  101<H>  3.6   |  22  |  0.64    Ca    9.4      29 Nov 2021 07:10  Phos  4.1     11-29  Mg     1.80     11-29          CAPILLARY BLOOD GLUCOSE          Lower Extremity Physical Exam:  Dressings left CDI today, CFT intact to digits [id number]: DP/PT _/4 B/L, CFT <_sec x 10, Temp gradient_ B/L      RADIOLOGY & ADDITIONAL TESTS:  
Patient is a 46y old  Female who presents with a chief complaint of RLE Pain (26 Nov 2021 16:12)       INTERVAL HPI/OVERNIGHT EVENTS:  Patient seen and evaluated at bedside.  Pt is resting comfortable in NAD. Denies N/V/F/C.  Pain rated at 7/10    Allergies    Celexa (Rash)  Cipro (Short breath)  fluoroquinolone antibiotics (Rash)    Intolerances        Vital Signs Last 24 Hrs  T(C): 37 (27 Nov 2021 05:00), Max: 37.1 (26 Nov 2021 15:50)  T(F): 98.6 (27 Nov 2021 05:00), Max: 98.7 (26 Nov 2021 15:50)  HR: 76 (27 Nov 2021 05:00) (66 - 81)  BP: 134/85 (27 Nov 2021 05:00) (133/77 - 137/82)  BP(mean): --  RR: 18 (27 Nov 2021 05:00) (17 - 18)  SpO2: 97% (27 Nov 2021 05:00) (97% - 100%)    LABS:                        12.0   5.19  )-----------( 467      ( 27 Nov 2021 08:32 )             35.4     11-27    142  |  106  |  7   ----------------------------<  96  3.5   |  21<L>  |  0.56    Ca    9.7      27 Nov 2021 08:32  Phos  3.8     11-27  Mg     1.90     11-27    TPro  7.0  /  Alb  4.1  /  TBili  0.3  /  DBili  x   /  AST  20  /  ALT  18  /  AlkPhos  75  11-27    PT/INR - ( 26 Nov 2021 06:57 )   PT: 11.5 sec;   INR: 1.01 ratio             CAPILLARY BLOOD GLUCOSE          Lower Extremity Physical Exam:  Dressings left CDI today, CFT intact to digits x10    RADIOLOGY & ADDITIONAL TESTS:  
SUBJECTIVE / OVERNIGHT EVENTS:  Pain is improving.  neuropathic pain better on gabapentin.   Still requiring IV meds  states she needs a little more time  no cp, no sob, no n/v/d. no abdominal pain.  no headache, no dizziness.       --------------------------------------------------------------------------------------------  LABS:                        12.6   5.68  )-----------( 545      ( 30 Nov 2021 10:38 )             38.7     11-30    140  |  103  |  7   ----------------------------<  154<H>  3.8   |  25  |  0.58    Ca    9.6      30 Nov 2021 10:38  Phos  3.6     11-30  Mg     1.80     11-30        CAPILLARY BLOOD GLUCOSE                RADIOLOGY & ADDITIONAL TESTS:    Imaging Personally Reviewed:  [x] YES  [ ] NO    Consultant(s) Notes Reviewed:  [x] YES  [ ] NO    MEDICATIONS  (STANDING):  amLODIPine   Tablet 5 milliGRAM(s) Oral daily  diazepam    Tablet 10 milliGRAM(s) Oral two times a day  enoxaparin Injectable 40 milliGRAM(s) SubCutaneous daily  gabapentin 300 milliGRAM(s) Oral three times a day  metoprolol succinate ER 50 milliGRAM(s) Oral daily  nicotine -  14 mG/24Hr(s) Patch 1 patch Transdermal daily  OLANZapine 5 milliGRAM(s) Oral at bedtime  senna 2 Tablet(s) Oral at bedtime  sodium chloride 0.9%. 1000 milliLiter(s) (100 mL/Hr) IV Continuous <Continuous>    MEDICATIONS  (PRN):  melatonin 3 milliGRAM(s) Oral at bedtime PRN Insomnia  morphine  - Injectable 2 milliGRAM(s) IV Push every 6 hours PRN Severe Pain (7 - 10)  morphine  - Injectable 2 milliGRAM(s) IV Push every 3 hours PRN Severe Pain (7 - 10)  morphine  IR 7.5 milliGRAM(s) Oral every 3 hours PRN Moderate Pain (4 - 6)  morphine  IR 15 milliGRAM(s) Oral every 3 hours PRN Severe Pain (7 - 10)  ondansetron Injectable 4 milliGRAM(s) IV Push every 6 hours PRN Nausea and/or Vomiting  polyethylene glycol 3350 17 Gram(s) Oral daily PRN Constipation      Care Discussed with Consultants/Other Providers [x] YES  [ ] NO    Vital Signs Last 24 Hrs  T(C): 36.7 (30 Nov 2021 21:37), Max: 37.1 (30 Nov 2021 11:56)  T(F): 98.1 (30 Nov 2021 21:37), Max: 98.8 (30 Nov 2021 11:56)  HR: 74 (30 Nov 2021 21:37) (58 - 81)  BP: 126/82 (30 Nov 2021 21:37) (107/72 - 130/82)  BP(mean): --  RR: 18 (30 Nov 2021 21:37) (18 - 18)  SpO2: 98% (30 Nov 2021 21:37) (98% - 100%)  I&O's Summary    29 Nov 2021 07:01  -  30 Nov 2021 07:00  --------------------------------------------------------  IN: 0 mL / OUT: 1 mL / NET: -1 mL      PHYSICAL EXAM:  GENERAL: NAD, well-developed, comfortable  HEAD:  Atraumatic, Normocephalic  EYES: EOMI, PERRLA, conjunctiva and sclera clear  NECK: Supple, No JVD  CHEST/LUNG: Clear to auscultation bilaterally; No wheeze  HEART: Regular rate and rhythm; No murmurs, rubs, or gallops  ABDOMEN: Soft, Nontender, Nondistended; Bowel sounds present  Neuro: AAOx3, no focal weakness, 5/5 b/l extremity strength  EXTREMITIES:  2+ Peripheral Pulses, No clubbing, cyanosis, or edema. Foot dressing d/c/i  SKIN: No rashes or lesions   
SUBJECTIVE / OVERNIGHT EVENTS:  Feeling better today.  No overnight event.  No complaints.  Chest pain free. no SOB, no N/V/D.  Denied HA/dizziness, abdominal pain.   seen by pain management.      --------------------------------------------------------------------------------------------  LABS:                        12.0   5.19  )-----------( 467      ( 27 Nov 2021 08:32 )             35.4     11-27    142  |  106  |  7   ----------------------------<  96  3.5   |  21<L>  |  0.56    Ca    9.7      27 Nov 2021 08:32  Phos  3.8     11-27  Mg     1.90     11-27    TPro  7.0  /  Alb  4.1  /  TBili  0.3  /  DBili  x   /  AST  20  /  ALT  18  /  AlkPhos  75  11-27    PT/INR - ( 26 Nov 2021 06:57 )   PT: 11.5 sec;   INR: 1.01 ratio           CAPILLARY BLOOD GLUCOSE                RADIOLOGY & ADDITIONAL TESTS:    Imaging Personally Reviewed:  [x] YES  [ ] NO    Consultant(s) Notes Reviewed:  [x] YES  [ ] NO    MEDICATIONS  (STANDING):  acetaminophen     Tablet .. 650 milliGRAM(s) Oral every 6 hours  amLODIPine   Tablet 5 milliGRAM(s) Oral daily  diazepam    Tablet 10 milliGRAM(s) Oral two times a day  enoxaparin Injectable 40 milliGRAM(s) SubCutaneous daily  metoprolol succinate ER 50 milliGRAM(s) Oral daily  nicotine -   7 mG/24Hr(s) Patch 1 patch Transdermal daily  OLANZapine 5 milliGRAM(s) Oral at bedtime  senna 2 Tablet(s) Oral at bedtime    MEDICATIONS  (PRN):  melatonin 3 milliGRAM(s) Oral at bedtime PRN Insomnia  morphine  - Injectable 2 milliGRAM(s) IV Push every 6 hours PRN Severe Pain (7 - 10)  morphine  - Injectable 2 milliGRAM(s) IV Push every 3 hours PRN Severe Pain (7 - 10)  morphine  IR 7.5 milliGRAM(s) Oral every 3 hours PRN Moderate Pain (4 - 6)  morphine  IR 15 milliGRAM(s) Oral every 3 hours PRN Severe Pain (7 - 10)  polyethylene glycol 3350 17 Gram(s) Oral daily PRN Constipation      Care Discussed with Consultants/Other Providers [x] YES  [ ] NO    Vital Signs Last 24 Hrs  T(C): 36.7 (27 Nov 2021 13:06), Max: 37 (27 Nov 2021 05:00)  T(F): 98 (27 Nov 2021 13:06), Max: 98.6 (27 Nov 2021 05:00)  HR: 72 (27 Nov 2021 13:06) (72 - 81)  BP: 128/78 (27 Nov 2021 13:06) (128/78 - 137/82)  BP(mean): --  RR: 18 (27 Nov 2021 13:06) (18 - 18)  SpO2: 98% (27 Nov 2021 13:06) (97% - 100%)  I&O's Summary      PHYSICAL EXAM:  GENERAL: NAD, well-developed, comfortable  HEAD:  Atraumatic, Normocephalic  EYES: EOMI, PERRLA, conjunctiva and sclera clear  NECK: Supple, No JVD  CHEST/LUNG: Clear to auscultation bilaterally; No wheeze  HEART: Regular rate and rhythm; No murmurs, rubs, or gallops  ABDOMEN: Soft, Nontender, Nondistended; Bowel sounds present  Neuro: AAOx3, no focal weakness, 5/5 b/l extremity strength  EXTREMITIES:  2+ Peripheral Pulses, No clubbing, cyanosis, or edema. Foot dressing d/c/i  SKIN: No rashes or lesions   
SUBJECTIVE / OVERNIGHT EVENTS:  pain still bothering  wants to try gabapentin  (states she was on it before for anxiety)  also not eating much and request IVF. ordered.  Zofran switched to IV (PO doesn't work well).  BM yesterday.   no cp, no sob, no n/v/d. no abdominal pain.  no headache, no dizziness.         --------------------------------------------------------------------------------------------  LABS:                        13.4   5.02  )-----------( 518      ( 28 Nov 2021 07:41 )             39.0     11-28    139  |  105  |  7   ----------------------------<  97  3.6   |  23  |  0.55    Ca    9.9      28 Nov 2021 07:43  Phos  4.1     11-28  Mg     2.10     11-28    TPro  7.0  /  Alb  4.1  /  TBili  0.3  /  DBili  x   /  AST  20  /  ALT  18  /  AlkPhos  75  11-27      CAPILLARY BLOOD GLUCOSE                RADIOLOGY & ADDITIONAL TESTS:    Imaging Personally Reviewed:  [x] YES  [ ] NO    Consultant(s) Notes Reviewed:  [x] YES  [ ] NO    MEDICATIONS  (STANDING):  acetaminophen     Tablet .. 650 milliGRAM(s) Oral every 6 hours  amLODIPine   Tablet 5 milliGRAM(s) Oral daily  diazepam    Tablet 10 milliGRAM(s) Oral two times a day  enoxaparin Injectable 40 milliGRAM(s) SubCutaneous daily  gabapentin 300 milliGRAM(s) Oral three times a day  metoprolol succinate ER 50 milliGRAM(s) Oral daily  nicotine -  14 mG/24Hr(s) Patch 1 patch Transdermal daily  OLANZapine 5 milliGRAM(s) Oral at bedtime  senna 2 Tablet(s) Oral at bedtime  sodium chloride 0.9%. 1000 milliLiter(s) (100 mL/Hr) IV Continuous <Continuous>    MEDICATIONS  (PRN):  melatonin 3 milliGRAM(s) Oral at bedtime PRN Insomnia  morphine  - Injectable 2 milliGRAM(s) IV Push every 6 hours PRN Severe Pain (7 - 10)  morphine  - Injectable 2 milliGRAM(s) IV Push every 3 hours PRN Severe Pain (7 - 10)  morphine  IR 7.5 milliGRAM(s) Oral every 3 hours PRN Moderate Pain (4 - 6)  morphine  IR 15 milliGRAM(s) Oral every 3 hours PRN Severe Pain (7 - 10)  ondansetron  IVPB 4 milliGRAM(s) IV Intermittent every 6 hours PRN Nausea and/or Vomiting  polyethylene glycol 3350 17 Gram(s) Oral daily PRN Constipation      Care Discussed with Consultants/Other Providers [x] YES  [ ] NO    Vital Signs Last 24 Hrs  T(C): 37 (28 Nov 2021 05:45), Max: 37.1 (27 Nov 2021 21:00)  T(F): 98.6 (28 Nov 2021 05:45), Max: 98.8 (27 Nov 2021 21:00)  HR: 87 (28 Nov 2021 05:45) (72 - 87)  BP: 127/94 (28 Nov 2021 05:45) (127/94 - 131/86)  BP(mean): --  RR: 18 (28 Nov 2021 05:45) (17 - 18)  SpO2: 99% (28 Nov 2021 05:45) (94% - 99%)  I&O's Summary        PHYSICAL EXAM:  GENERAL: NAD, well-developed, comfortable  HEAD:  Atraumatic, Normocephalic  EYES: EOMI, PERRLA, conjunctiva and sclera clear  NECK: Supple, No JVD  CHEST/LUNG: Clear to auscultation bilaterally; No wheeze  HEART: Regular rate and rhythm; No murmurs, rubs, or gallops  ABDOMEN: Soft, Nontender, Nondistended; Bowel sounds present  Neuro: AAOx3, no focal weakness, 5/5 b/l extremity strength  EXTREMITIES:  2+ Peripheral Pulses, No clubbing, cyanosis, or edema. Foot dressing d/c/i  SKIN: No rashes or lesions   
SUBJECTIVE / OVERNIGHT EVENTS:  No events.  no cp, no sob, no n/v/d.  no abd pain.   pain better but not optimally controlled   on gabapentin       --------------------------------------------------------------------------------------------  LABS:                        12.0   4.87  )-----------( 448      ( 29 Nov 2021 07:10 )             36.5     11-29    137  |  104  |  7   ----------------------------<  101<H>  3.6   |  22  |  0.64    Ca    9.4      29 Nov 2021 07:10  Phos  4.1     11-29  Mg     1.80     11-29        CAPILLARY BLOOD GLUCOSE                RADIOLOGY & ADDITIONAL TESTS:    Imaging Personally Reviewed:  [x] YES  [ ] NO    Consultant(s) Notes Reviewed:  [x] YES  [ ] NO    MEDICATIONS  (STANDING):  amLODIPine   Tablet 5 milliGRAM(s) Oral daily  diazepam    Tablet 10 milliGRAM(s) Oral two times a day  enoxaparin Injectable 40 milliGRAM(s) SubCutaneous daily  gabapentin 300 milliGRAM(s) Oral three times a day  metoprolol succinate ER 50 milliGRAM(s) Oral daily  nicotine -  14 mG/24Hr(s) Patch 1 patch Transdermal daily  OLANZapine 5 milliGRAM(s) Oral at bedtime  senna 2 Tablet(s) Oral at bedtime  sodium chloride 0.9%. 1000 milliLiter(s) (100 mL/Hr) IV Continuous <Continuous>    MEDICATIONS  (PRN):  melatonin 3 milliGRAM(s) Oral at bedtime PRN Insomnia  morphine  - Injectable 2 milliGRAM(s) IV Push every 6 hours PRN Severe Pain (7 - 10)  morphine  - Injectable 2 milliGRAM(s) IV Push every 3 hours PRN Severe Pain (7 - 10)  morphine  IR 7.5 milliGRAM(s) Oral every 3 hours PRN Moderate Pain (4 - 6)  morphine  IR 15 milliGRAM(s) Oral every 3 hours PRN Severe Pain (7 - 10)  ondansetron Injectable 4 milliGRAM(s) IV Push every 6 hours PRN Nausea and/or Vomiting  polyethylene glycol 3350 17 Gram(s) Oral daily PRN Constipation      Care Discussed with Consultants/Other Providers [x] YES  [ ] NO    Vital Signs Last 24 Hrs  T(C): 37 (29 Nov 2021 19:35), Max: 37.3 (29 Nov 2021 05:58)  T(F): 98.6 (29 Nov 2021 19:35), Max: 99.1 (29 Nov 2021 05:58)  HR: 86 (29 Nov 2021 19:35) (72 - 86)  BP: 125/82 (29 Nov 2021 19:35) (113/80 - 125/82)  BP(mean): --  RR: 18 (29 Nov 2021 19:35) (16 - 18)  SpO2: 98% (29 Nov 2021 19:35) (98% - 100%)  I&O's Summary      PHYSICAL EXAM:  GENERAL: NAD, well-developed, comfortable  HEAD:  Atraumatic, Normocephalic  EYES: EOMI, PERRLA, conjunctiva and sclera clear  NECK: Supple, No JVD  CHEST/LUNG: Clear to auscultation bilaterally; No wheeze  HEART: Regular rate and rhythm; No murmurs, rubs, or gallops  ABDOMEN: Soft, Nontender, Nondistended; Bowel sounds present  Neuro: AAOx3, no focal weakness, 5/5 b/l extremity strength  EXTREMITIES:  2+ Peripheral Pulses, No clubbing, cyanosis, or edema. Foot dressing d/c/i  SKIN: No rashes or lesions

## 2021-11-30 NOTE — PROGRESS NOTE ADULT - PROBLEM SELECTOR PLAN 4
no chest pain reported, continue toprol

## 2021-11-30 NOTE — PROGRESS NOTE ADULT - NSPROGADDITIONALINFOA_GEN_ALL_CORE
d/w pt and NP/PA.     Mahendra Gray will be covering for me starting 12/1/21. He can be reached at  if needed.     - Dr. FADI Aragon ([facility])  - [phone]
d/w pt and NP/PA.   pain management follow up if pain not well controlled.     - Dr. FADI Aragon ([facility])  - [phone]
d/w pt and NP/PA.     - [facility]  - [phone]
d/w pt and NP.    - Dr. FADI Aragon ([facility])  - [phone]

## 2021-11-30 NOTE — PROGRESS NOTE ADULT - PROBLEM SELECTOR PLAN 5
lovenox subcu for dvt ppx, regular diet

## 2021-11-30 NOTE — PROGRESS NOTE ADULT - ASSESSMENT
45 yo f with right calcaneal comminuted fracture  - Pt seen and evaluated  - No leukocytosis, afebrile  - Dressings left clean dry and intact today, CFT and sensation intact to digits x10  - X ray: Redemonstrated intra-articular calcaneal fracture now status post internal fixation. Surgical hardware appears intact.  - Pt states pain is improved while on morphine, however when she tries PO pain medication she rates her pain a 7/10 on VAS.   - Pt is to continue remaining non weightbearing ot the RLE  - Recommend continued pain control, recommend pain management evaluation for discharge plan  - Podiatry to follow 
47 yo f with right calcaneal comminuted fracture  - Pt seen and evaluated  - No leukocytosis, afebrile  - Dressings left clean dry and intact today, CFT and sensation intact to digits x10  - X ray: Redemonstrated intra-articular calcaneal fracture now status post internal fixation. Surgical hardware appears intact.  - Pt states pain is improved while on morphine, however when she tries PO pain medication she rates her pain a 7/10 on VAS.   - Pt is to continue remaining non weightbearing ot the RLE  - Recommend continued pain control, recommend pain management evaluation for discharge plan  - Podiatry to follow   
45 yo f with right calcaneal comminuted fracture  - Pt seen and evaluated  - No leukocytosis, afebrile  - 11/23: s/p R foot calcaneal ORIF: sutures intact, no signs of dehiscence no hematoma, no clinical signs of infection to the R foot, ROM and sensation intact to digits, compartments soft to palpation  - 11/23: s/p L foot partial nail avulsion of the medial and lateral borders of hallux, no discharge, no erythema, no signs of infection  - X ray: Redemonstrated intra-articular calcaneal fracture now status post internal fixation. Surgical hardware appears intact.  - Pt states pain is now improved while on morphine   - Recommend admit to medicine for continued pain control, recommend pain management evaluation for discharge plan  - Recommend dose of Ancef   - Podiatry to follow   
47 y/o female with PMHx of depression, anxiety, PTSD, Cardiac X syndrome, HTN, and recent ORIF of R calcaneal fracture who presents to the ER with worsening RLE foot pain since discharge 2 days ago. Admitted for pain control. 
45 y/o female with PMHx of depression, anxiety, PTSD, Cardiac X syndrome, HTN, and recent ORIF of R calcaneal fracture who presents to the ER with worsening RLE foot pain since discharge 2 days ago. Admitted for pain control. 
47 y/o female with PMHx of depression, anxiety, PTSD, Cardiac X syndrome, HTN, and recent ORIF of R calcaneal fracture who presents to the ER with worsening RLE foot pain since discharge 2 days ago. Admitted for pain control. 
45 y/o female with PMHx of depression, anxiety, PTSD, Cardiac X syndrome, HTN, and recent ORIF of R calcaneal fracture who presents to the ER with worsening RLE foot pain since discharge 2 days ago. Admitted for pain control.

## 2021-12-01 ENCOUNTER — TRANSCRIPTION ENCOUNTER (OUTPATIENT)
Age: 46
End: 2021-12-01

## 2021-12-01 VITALS
TEMPERATURE: 98 F | SYSTOLIC BLOOD PRESSURE: 110 MMHG | HEART RATE: 75 BPM | RESPIRATION RATE: 16 BRPM | OXYGEN SATURATION: 95 % | DIASTOLIC BLOOD PRESSURE: 70 MMHG

## 2021-12-01 LAB
ANION GAP SERPL CALC-SCNC: 10 MMOL/L — SIGNIFICANT CHANGE UP (ref 7–14)
BUN SERPL-MCNC: 10 MG/DL — SIGNIFICANT CHANGE UP (ref 7–23)
CALCIUM SERPL-MCNC: 9.8 MG/DL — SIGNIFICANT CHANGE UP (ref 8.4–10.5)
CHLORIDE SERPL-SCNC: 104 MMOL/L — SIGNIFICANT CHANGE UP (ref 98–107)
CO2 SERPL-SCNC: 22 MMOL/L — SIGNIFICANT CHANGE UP (ref 22–31)
CREAT SERPL-MCNC: 0.59 MG/DL — SIGNIFICANT CHANGE UP (ref 0.5–1.3)
GLUCOSE SERPL-MCNC: 99 MG/DL — SIGNIFICANT CHANGE UP (ref 70–99)
HCT VFR BLD CALC: 38.7 % — SIGNIFICANT CHANGE UP (ref 34.5–45)
HGB BLD-MCNC: 12.8 G/DL — SIGNIFICANT CHANGE UP (ref 11.5–15.5)
MAGNESIUM SERPL-MCNC: 1.8 MG/DL — SIGNIFICANT CHANGE UP (ref 1.6–2.6)
MCHC RBC-ENTMCNC: 30.5 PG — SIGNIFICANT CHANGE UP (ref 27–34)
MCHC RBC-ENTMCNC: 33.1 GM/DL — SIGNIFICANT CHANGE UP (ref 32–36)
MCV RBC AUTO: 92.4 FL — SIGNIFICANT CHANGE UP (ref 80–100)
NRBC # BLD: 0 /100 WBCS — SIGNIFICANT CHANGE UP
NRBC # FLD: 0 K/UL — SIGNIFICANT CHANGE UP
PHOSPHATE SERPL-MCNC: 4.3 MG/DL — SIGNIFICANT CHANGE UP (ref 2.5–4.5)
PLATELET # BLD AUTO: 522 K/UL — HIGH (ref 150–400)
POTASSIUM SERPL-MCNC: 4 MMOL/L — SIGNIFICANT CHANGE UP (ref 3.5–5.3)
POTASSIUM SERPL-SCNC: 4 MMOL/L — SIGNIFICANT CHANGE UP (ref 3.5–5.3)
RBC # BLD: 4.19 M/UL — SIGNIFICANT CHANGE UP (ref 3.8–5.2)
RBC # FLD: 12.2 % — SIGNIFICANT CHANGE UP (ref 10.3–14.5)
SODIUM SERPL-SCNC: 136 MMOL/L — SIGNIFICANT CHANGE UP (ref 135–145)
WBC # BLD: 5.46 K/UL — SIGNIFICANT CHANGE UP (ref 3.8–10.5)
WBC # FLD AUTO: 5.46 K/UL — SIGNIFICANT CHANGE UP (ref 3.8–10.5)

## 2021-12-01 RX ORDER — SENNA PLUS 8.6 MG/1
2 TABLET ORAL
Qty: 60 | Refills: 0
Start: 2021-12-01 | End: 2021-12-30

## 2021-12-01 RX ORDER — POLYETHYLENE GLYCOL 3350 17 G/17G
17 POWDER, FOR SOLUTION ORAL
Qty: 510 | Refills: 0
Start: 2021-12-01 | End: 2021-12-30

## 2021-12-01 RX ORDER — MORPHINE SULFATE 50 MG/1
1 CAPSULE, EXTENDED RELEASE ORAL
Qty: 20 | Refills: 0
Start: 2021-12-01 | End: 2021-12-05

## 2021-12-01 RX ORDER — GABAPENTIN 400 MG/1
1 CAPSULE ORAL
Qty: 90 | Refills: 0
Start: 2021-12-01 | End: 2021-12-30

## 2021-12-01 RX ORDER — NICOTINE POLACRILEX 2 MG
1 GUM BUCCAL
Qty: 30 | Refills: 0
Start: 2021-12-01 | End: 2021-12-30

## 2021-12-01 RX ADMIN — Medication 10 MILLIGRAM(S): at 06:25

## 2021-12-01 RX ADMIN — MORPHINE SULFATE 15 MILLIGRAM(S): 50 CAPSULE, EXTENDED RELEASE ORAL at 03:59

## 2021-12-01 RX ADMIN — GABAPENTIN 300 MILLIGRAM(S): 400 CAPSULE ORAL at 14:08

## 2021-12-01 RX ADMIN — MORPHINE SULFATE 15 MILLIGRAM(S): 50 CAPSULE, EXTENDED RELEASE ORAL at 11:00

## 2021-12-01 RX ADMIN — ENOXAPARIN SODIUM 40 MILLIGRAM(S): 100 INJECTION SUBCUTANEOUS at 12:06

## 2021-12-01 RX ADMIN — Medication 1 PATCH: at 06:45

## 2021-12-01 RX ADMIN — AMLODIPINE BESYLATE 5 MILLIGRAM(S): 2.5 TABLET ORAL at 06:34

## 2021-12-01 RX ADMIN — Medication 1 PATCH: at 12:07

## 2021-12-01 RX ADMIN — MORPHINE SULFATE 15 MILLIGRAM(S): 50 CAPSULE, EXTENDED RELEASE ORAL at 12:00

## 2021-12-01 RX ADMIN — MORPHINE SULFATE 15 MILLIGRAM(S): 50 CAPSULE, EXTENDED RELEASE ORAL at 14:07

## 2021-12-01 RX ADMIN — GABAPENTIN 300 MILLIGRAM(S): 400 CAPSULE ORAL at 06:24

## 2021-12-01 RX ADMIN — MORPHINE SULFATE 2 MILLIGRAM(S): 50 CAPSULE, EXTENDED RELEASE ORAL at 00:16

## 2021-12-01 RX ADMIN — Medication 1 PATCH: at 12:00

## 2021-12-01 RX ADMIN — MORPHINE SULFATE 15 MILLIGRAM(S): 50 CAPSULE, EXTENDED RELEASE ORAL at 03:04

## 2021-12-01 RX ADMIN — POLYETHYLENE GLYCOL 3350 17 GRAM(S): 17 POWDER, FOR SOLUTION ORAL at 14:12

## 2021-12-01 RX ADMIN — MORPHINE SULFATE 2 MILLIGRAM(S): 50 CAPSULE, EXTENDED RELEASE ORAL at 01:05

## 2021-12-01 RX ADMIN — MORPHINE SULFATE 15 MILLIGRAM(S): 50 CAPSULE, EXTENDED RELEASE ORAL at 15:01

## 2021-12-01 NOTE — DISCHARGE NOTE PROVIDER - NSDCFUADDAPPT_GEN_ALL_CORE_FT
Please follow up with pain management to better monitor and control your pain  Please follow up with your PCP in 1-2 weeks

## 2021-12-01 NOTE — DISCHARGE NOTE PROVIDER - HOSPITAL COURSE
47 y/o female with PMHx of depression, anxiety, PTSD, Cardiac X syndrome, HTN, and recent ORIF of R calcaneal fracture who presents to the ER with worsening RLE foot pain since discharge 2 days prior to current presentation to the ED . Admitted for pain control.     1. Right foot pain.   - s/p ORIF to R foot a few days prior to admission. Likely post op pain. Uncontrolled at home w/ percocet at home.   - Pt reports some tingling in the Foot w/ pain radiating up leg. Able to move toes and has sensation  - seen by podiatry, reports soft compartments. xray with intact hardware  - started IV Morphine with improvement in pain.  - podiatry recs appreciated. No signs or symptoms of infection. afebrile with no leukocytosis  - pain management consulted. PO morphine regimen started, weaned off IV pain medications.   - Added gabapentin TID 11/28/21  - nauseous, given gentle IVF, and PRN Zofran.  - pain management f/u outpatient  - Patient to c/w PO morphine IR 15 mg q 6 hrs prn  - Patient has outpatient follow up with their orthopedist on 12/03/2021 for further management  - Reviewed patient's Istop: Reference #: [id number]     2. Depression with anxiety.   - resume home Zyprexa and Valium. Pt reports no longer taking fluvoxamine (caused sleep walking and fell sustained fracture)   - Psychiatry following patient throughout stay for management    3. Hypertension  - Patient to continue with all home medications, however will hold home metoprolol on discharge as patient's heart rate has been slightly bradycardic at times  - Patient to follow up with PCP and/or cardiologist in 1-2 weeks for further management    Dispo: Home. Patient has PT script for outpatient PT. Patient to remain non-weight bearing of the RLE as per podiatry    Patient seen and evaluated, no acute somatic complaints. Reviewed discharge medications with patient; All new medications requiring new prescriptions were sent to the pharmacy of patient's choice. Reviewed need for prescription for previous home medications and new prescriptions sent if requested. Medically cleared/stable for discharge as per Dr. Lauren on 12/01/2021 with close outpatient follow up within 1-2 weeks of discharge. Patient understands and agrees with plan of care.

## 2021-12-01 NOTE — DISCHARGE NOTE NURSING/CASE MANAGEMENT/SOCIAL WORK - PATIENT PORTAL LINK FT
You can access the FollowMyHealth Patient Portal offered by [facility] by registering at the following website: [email or web address]. By joining [facility]’s FollowMyHealth portal, you will also be able to view your health information using other applications (apps) compatible with our system.

## 2021-12-01 NOTE — DISCHARGE NOTE PROVIDER - NSDCCPCAREPLAN_GEN_ALL_CORE_FT
PRINCIPAL DISCHARGE DIAGNOSIS  Diagnosis: Right foot pain  Assessment and Plan of Treatment: You came into the hospital with worsening right foot pain and we admitted you for further pain control. You were followed by podiatry throughout your stay whom advised you follow up with pain management outpatient for further pain control and to remain non-weight bearing on your right leg. Please continue your medications as prescribed and follow up with your Orthopedist as planned on 12/03/2021. Please follow up with your PCP in 1-2 weeks as well. Please also follow up with your physical therapist.      SECONDARY DISCHARGE DIAGNOSES  Diagnosis: Hypertension  Assessment and Plan of Treatment: We held your blood pressure medication, metoprolol, since your heart rate ran low at times. Please follow up with your PCP and/or cardiologist for further blood pressure medication management.

## 2021-12-01 NOTE — DISCHARGE NOTE PROVIDER - NSDCMRMEDTOKEN_GEN_ALL_CORE_FT
amLODIPine 5 mg oral tablet: 1 tab(s) orally once a day  gabapentin 300 mg oral capsule: 1 cap(s) orally 3 times a day MDD:3 tabs  morphine 15 mg oral tablet: 1 tab(s) orally every 6 hours, As Needed -for severe pain MDD:4 tabs  nicotine 14 mg/24 hr transdermal film, extended release: 1 application transdermal once a day   Outpatient Physical Therapy: 3-5x weekly x 1 month    Non-Weight bearing RLE.    Weight Bearing as tolerated LLE  pantoprazole 40 mg oral delayed release tablet: 1 tab(s) orally once a day   polyethylene glycol 3350 oral powder for reconstitution: 17 gram(s) orally once a day, As Needed  senna oral tablet: 2 tab(s) orally once a day (at bedtime), As Needed  Valium 10 mg oral tablet: 1 tab(s) orally 2 times a day (Filled 11/13/21 x #60 tabs: 1 tab BID)  ZyPREXA 5 mg oral tablet: 1 tab(s) orally once a day (at bedtime)

## 2021-12-01 NOTE — DISCHARGE NOTE PROVIDER - CARE PROVIDER_API CALL
Bonnie Elena  Internal Medicine  [address] [place] [address], [address]  [place] [address]  Phone: [phone]  Fax: [phone]  Follow Up Time: 1 week    Orthopedics,   Phone: (   )    -  Fax: (   )    -  Scheduled Appointment: 12/03/2021

## 2021-12-01 NOTE — DISCHARGE NOTE PROVIDER - PROVIDER TOKENS
PROVIDER:[TOKEN:[[id number]:MIIS:[id number]],FOLLOWUP:[1 week]],FREE:[LAST:[Orthopedics],PHONE:[(   )    -],FAX:[(   )    -],SCHEDULEDAPPT:[12/03/2021]]

## 2021-12-01 NOTE — DISCHARGE NOTE NURSING/CASE MANAGEMENT/SOCIAL WORK - NSDCPEFALRISK_GEN_ALL_CORE
For information on Fall & Injury Prevention, visit: [email or web address] OR  [email or web address] OR  https://www.cdc.gov/steadi/patient.html

## 2021-12-18 ENCOUNTER — EMERGENCY (EMERGENCY)
Facility: HOSPITAL | Age: 46
LOS: 1 days | Discharge: HOME | End: 2021-12-18
Attending: STUDENT IN AN ORGANIZED HEALTH CARE EDUCATION/TRAINING PROGRAM | Admitting: STUDENT IN AN ORGANIZED HEALTH CARE EDUCATION/TRAINING PROGRAM
Payer: COMMERCIAL

## 2021-12-18 VITALS
HEART RATE: 92 BPM | RESPIRATION RATE: 12 BRPM | TEMPERATURE: 99 F | HEIGHT: 61 IN | OXYGEN SATURATION: 99 % | SYSTOLIC BLOOD PRESSURE: 124 MMHG | DIASTOLIC BLOOD PRESSURE: 78 MMHG

## 2021-12-18 DIAGNOSIS — Z98.89 OTHER SPECIFIED POSTPROCEDURAL STATES: Chronic | ICD-10-CM

## 2021-12-18 DIAGNOSIS — Z98.890 OTHER SPECIFIED POSTPROCEDURAL STATES: Chronic | ICD-10-CM

## 2021-12-18 DIAGNOSIS — Z98.51 TUBAL LIGATION STATUS: Chronic | ICD-10-CM

## 2021-12-18 DIAGNOSIS — D36.9 BENIGN NEOPLASM, UNSPECIFIED SITE: Chronic | ICD-10-CM

## 2021-12-18 PROCEDURE — 73610 X-RAY EXAM OF ANKLE: CPT | Mod: 26,50

## 2021-12-18 PROCEDURE — 73030 X-RAY EXAM OF SHOULDER: CPT | Mod: 26,LT

## 2021-12-18 PROCEDURE — 73562 X-RAY EXAM OF KNEE 3: CPT | Mod: 26,LT

## 2021-12-18 PROCEDURE — 99284 EMERGENCY DEPT VISIT MOD MDM: CPT

## 2021-12-18 RX ORDER — KETOROLAC TROMETHAMINE 30 MG/ML
30 SYRINGE (ML) INJECTION ONCE
Refills: 0 | Status: DISCONTINUED | OUTPATIENT
Start: 2021-12-18 | End: 2021-12-18

## 2021-12-18 RX ORDER — OXYCODONE HYDROCHLORIDE 5 MG/1
1 TABLET ORAL
Qty: 1 | Refills: 0
Start: 2021-12-18 | End: 2021-12-18

## 2021-12-18 RX ORDER — ACETAMINOPHEN 500 MG
650 TABLET ORAL ONCE
Refills: 0 | Status: COMPLETED | OUTPATIENT
Start: 2021-12-18 | End: 2021-12-18

## 2021-12-18 RX ADMIN — Medication 30 MILLIGRAM(S): at 22:20

## 2021-12-18 RX ADMIN — Medication 650 MILLIGRAM(S): at 22:20

## 2021-12-18 NOTE — ED PROVIDER NOTE - PHYSICAL EXAMINATION
CONSTITUTIONAL: NAD  SKIN: Warm dry, normal skin turgor  HEAD: NCAT  NECK: Supple; non tender. Full ROM.  CARD: RRR, no murmurs.  RESP: clear to ausculation b/l. No crackles or wheezing.  ABD: soft, non-tender, non-distended, no rebound or guarding.  MSK: R heel in splint, distal sensation and motor in tact, L knee and ankle mildly tender to palpation full ROM, L shoulder tender to palpation with full ROM  PSYCH: Cooperative, appropriate.

## 2021-12-18 NOTE — ED PROVIDER NOTE - CLINICAL SUMMARY MEDICAL DECISION MAKING FREE TEXT BOX
46F pmh depression, anxiety, PTSD, Cardiac X syndrome, HTN, and recent ORIF of R calcaneal fracture who presents to the ER after falling down 3 stairs as she was getting ready to come to the ER. Pt has some L Knee, L ankle, L shoulder pain. Pt denies LOC, denies weakness, denies dizziness, denies hitting head, denies any other symptoms aside from pain, xr affected joints, pain control, likely DC home

## 2021-12-18 NOTE — ED PROVIDER NOTE - OBJECTIVE STATEMENT
46F pmh depression, anxiety, PTSD, Cardiac X syndrome, HTN, and recent ORIF of R calcaneal fracture who presents to the ER after falling down 3 stairs as she was getting ready to come to the ER. Pt has some L Knee, L ankle, L shoulder pain. Pt denies LOC, denies weakness, denies dizziness, denies hitting head, denies any other symptoms aside from pain.

## 2021-12-18 NOTE — ED PROVIDER NOTE - ATTENDING CONTRIBUTION TO CARE
Quintin Kumar DO:  patient seen and evaluated with the resident.  I was present for key portions of the History & Physical, and I agree with the Impression & Plan. 47 yo f pmh depression, anxiety, PTSD, Cardiac X syndrome, HTN, and recent ORIF of R calcaneal fracture, pw r ankle pain and recent fall. pt was returning to ed for pain control today.  having continued pain of r heel. ran out of po morphine, oxycodone. today received tylenol w/ codeine, however insufficient. while coming to ed, fell on left side, mechanical. able to ambulate after, no loc. reports mild left sided body pain, mostly left knee and shoulder. denies n/v, cp, sob, cough, congestion, skin disruption. arrives hds, well appearing. bearing weight left side. no gross deformities. able to move r toes. do not suspect compartment syndrome of r foot. will check for new fx, intact hardware, reassess.

## 2021-12-18 NOTE — ED PROVIDER NOTE - PATIENT PORTAL LINK FT
You can access the FollowMyHealth Patient Portal offered by [facility] by registering at the following website: [email or web address]. By joining [facility]’s FollowMyHealth portal, you will also be able to view your health information using other applications (apps) compatible with our system.

## 2021-12-18 NOTE — ED ADULT TRIAGE NOTE - CHIEF COMPLAINT QUOTE
pt in wheel chair from home recovering from right leg surgery, cast in place, 2 weeks ago, pt states she feel from 2 steps hitting her left ankle , leg and shoulder c/o 9/10 pain, noted medical patch on  left arm. PMH:  HTN, cardiac syndrome X

## 2021-12-18 NOTE — ED PROVIDER NOTE - PROGRESS NOTE DETAILS
yes Garcia Varner, DO PGY-2: Pt states pain has improved, has tylenol w/ codine @ home, can follow up outpatient w/ surgeon for pain control, had lengthy conversation about pain control and agrees w/ plan

## 2021-12-18 NOTE — ED ADULT NURSE NOTE - OBJECTIVE STATEMENT
Patient came in with the complaints of pain in left ankle, leg and shoulder.As per patient,she fell 2 stairs.No other complaints.Cast noted on Right leg.Medications given as ordered. Patient in no distress.Continue to monitor

## 2021-12-18 NOTE — ED PROVIDER NOTE - WR ORDER ID 1
Spoke to patient - should have been in on the 5th according to my prior note. Will treat this Friday - continue same treatment - markers and scan were stable.   Otoniel Marina MD [id number]

## 2021-12-18 NOTE — ED PROVIDER NOTE - NSFOLLOWUPINSTRUCTIONS_ED_ALL_ED_FT
You were seen in the Emergency Department for pain after a fall.    You may take 975 mg Tylenol (acetaminophen) every eight hours as needed for pain.    You may take 600mg Ibuprofen (Advil) once every 8 hours as needed for pain. See medication label for warnings and use instructions.     Follow up with your primary care provider within 3-5 days.     If you have fever, chills, nausea, vomiting, new or worsening pain, or if you have any new symptoms return to the Emergency Department.

## 2021-12-18 NOTE — ED PROVIDER NOTE - TOBACCO USE
[f rep st]



                                                                OPERATIVE REPORT





DATE OF OPERATION:  06/08/2018



SURGEON:  Osmar Nixon MD



ASSISTANT:  Krishan Andersen P.A-.C.



ANESTHESIA:  GETA.



PREOPERATIVE DIAGNOSIS:  Prior L2 to the pelvis fusion with flat back deformity and kyphoscoliosis wi
th intractable and extreme back pain.



POSTOPERATIVE DIAGNOSIS:  Prior L2 to the pelvis fusion with flat back deformity and kyphoscoliosis w
ith intractable and extreme back pain.



PROCEDURE PERFORMED:  Flat back/kyphoscoliosis correction with an L2 to the pelvis, spinal exploratio
n, removal of hardware, extension of a posterior spinal fusion with correction of spinal deformity to
 thoracic #10, placement of segmental instrumentation, L2-4 laminectomies, an L3 pedicle subtraction 
osteotomy on L1-2, Gómez Oneill osteotomy and L1-2 transforaminal lumbar interbody fusion, use of a
utograft use of allograft, use of BMP, use of stealth navigation and use of neuromonitoring.



FINDINGS:  Improved spinal alignment at the end of the procedure.





ESTIMATED BLOOD LOSS:  1.5 L.



DESCRIPTION OF PROCEDURE:  The patient was brought to the operating room and a sign-in was performed.
  She was given antibiotics to prevent postoperative infection.  She was smoothly induced under gener
al anesthesia and intubated without difficulty.  Appropriate IV access was obtained.  An arterial regina
e was placed.  A Chavira catheter was placed.  Neuromonitoring electrodes were placed.  SCDs were place
d to prevent postoperative DVT.  She was turned onto an open Dion table in a prone position.  Her 
back was washed with chlorhexidine shampoo and rubbing alcohol.  Her old incision was traced out.  Ch
loraPrep was used to sterilize the skin.  All pressure points were padded well.  Sterile field was cr
eated with blue towels, and Ioban and a sterile surgical drape.  Prior to the procedure, a time-out w
as performed in which all members of surgery, nursing, anesthesia went over the necessary checklist i
tems and agreed to proceed.  20 cc of a 0.25% Marcaine with 1:200,000 parts of epinephrine was inject
ed along the planned incision line.  A #10 scalpel used to incise the old skin incision into the subc
utaneous layers.  Self-retaining retractors were placed and Bovie electrocautery was used to complete
 our midline dissection.  We dissected out all the patient's old hardware from L2-S1.  She had previo
usly had her rods removed.  All of her screws were in place.  We also completed our dissection up to 
the pedicle screw entry site of T10, being cautious not to violate the T9-10 joint space and predispo
se the patient to proximal junctional kyphosis in the future.  Once our dissection was complete, we p
laced a spinous process clamp, draped the patient sterilely and brought the O-arm into the field to p
erform a navigational spin.  Accuracy was verified.  We then proceeded with placement of pedicle scre
ws from T10 to L1 bilaterally.  This was performed without any difficulty whatsoever with the aid of 
neuronavigation.  Once our pedicle screws were in place, we proceeded with our osteotomies.  Dr. Toma guy began with an L2-4 laminectomy and began with the L3 PSO while I began the L1-2 Smith Oneill ost
eotomy.  Once I completed this osteotomy, I performed a right-sided L1-2 TLIF with an expandable cage
.  All the bone and the bone dust harvested during our procedures was prepped and morselized for use 
of autograft.  When I got into the L1-2 disk space I used sequential kelli and curette to prep the 
endplates and remove all of the disk and cartilaginous material.  I irrigated out the disk space with
 copious antibiotic solution.  We packed the expandable cage with some BMP and the patient's own bone
.  I placed a substantial amount of her own bone and bone dust down to the disk space and then placed
 the cage in the disk space and expanded it to its maximal height.  We did place the TLIF cage with t
adi navigation.  I then proceeded downward toward the L2-4 levels and assisted Dr. Angel with laminec
tomies and with PSO.  We cut an approximately 20 degree wedge osteotomy at L3 by removing bilateral f
acets, transverse processes and drilling down the pedicles with a high-speed drill.  We drilled throu
gh the vertebral body all the way to the anterior longitudinal ligament.  We also used navigation to 
assist us with the osteotomy.  Prior to cutting our osteotomy, however, we did place a jesus alberto to keep th
e level stabilized and prevent it from closing prematurely.  Once we completed our bony work, we plac
ed a crescent cage as anteriorly as possible and then slowly and carefully closed the osteotomy while
 checking neuromonitoring signals along the way.  The distance between L2 and L4 pedicle screws on th
e left before the PSO was 7 cm and at the end it was 3 and on the right it was 5-3/4 cm and at the en
d of the closure of the osteotomy it was 3.5 cm.  After osteotomies were completed, we used a pulse l
avage system with bacitracin irrigation to irrigate out the wound well.  We measured and cut and then
 bent to specifications 2 long titanium rods.  We ran the rods from T10 to the S2/pelvis screws bilat
erally with the use of coronal and sagittal Benders.  We did do our best to improve the patient's lum
bar lordosis and gave her a more natural curvature.  We set these rods bilaterally with set screws.  
We draped the patient sterilely.  We brought the O-arm back into the field and performed a check spin
, which showed good spinal alignment and safe position of all of her implants.  We removed the O-arm 
from the field.  We irrigated out once more.  We final tightened our screws.  We used a high-speed dr
ill with matchstick bur.  We performed our posterolateral arthrodesis with a high-speed drill.  We la
y our BMP as the base of our fusion substrate posterolaterally on both sides.  We then placed our aut
ograft and harvested bone dust and then we packed everything in tightly with the Progenix Plus putty.
  We placed two #7 flat JESS in the operative bed and 1 g of vancomycin powder to prevent infection.  W
e closed the fascia with 0 Vicryl suture.  We irrigated the suprafascial compartment.  We injected pl
ain Marcaine in the paraspinal musculature for postoperative pain control.  We closed the dermis with
 inverted 2 0 pop-off Vicryl suture.  The skin edges approximated well.  We cleaned the skin with a w
et and dry sponges.  We secured the drainage tubes with stitches and hooked them up to full bulb comp
ression.  Dermabond was used to close the skin.  Once this dried, sterile dressings were placed.  The
 patient was returned from a prone to supine position on an operating gurney, reversed from anesthesi
a and extubated without difficulty.  I was there for the entirety of procedure.  All counts were wilber
ect.  There were no immediate surgical anesthetic complications.  Neuromonitoring signals remained st
able throughout the entire procedure.  The patient was transferred directly to the ICU where she was 
found to be in stable medical neurologic condition.  Orders were given directly to the nurses at the 
bedside.  I updated the patient's , family, and they were very appreciative of the care she bernard
d received.



SECOND ASSISTANT:  Von Angel MD.



COMPLICATIONS:  None.



DRAINS:  JESS #7 x 2.



IMPLANTS:  Medtronic Solara 4.75 system was used.  A 6.5 x 50 mm pedicle screw was placed on the left
 at T10.  A 6.5 x 45 mm pedicle screw was placed on the right at T10, 6.5 x 50 mm pedicle screw was p
laced on the left at T11, 6.5 x 45 mm pedicle screw was placed on the right at T11.  A crosslink was 
placed between T11 and T12, 6.5 x 45 mm pedicle screws were placed bilaterally at T12, 6.5 x 50 mm pe
dicle screws were placed bilaterally at L1, a 7 x 23 mm elevate expandable interbody cage was placed 
in the L1-2 interspace.  A 7 mm x 25 mm crescent cage was placed at the site of our PSO at L3, bilate
ral side-to-side connectors were placed between T12 and L1 and between L4 and L5.  Rods were run from
 T10 to the sacrum bilaterally and we also ran dual quad rods across our PSO and SPO through the conn
ectors at T12-L1 and L4-L5.  Set screws were placed and final tightened.  A medium infuse BMP was imp
lanted and 10 cc of Progenix Plus corticocancellous allograft/DBM putty was implanted along with the 
patient's own autograft.



INDICATIONS FOR PROCEDURE:  The patient is a 69-year-old female with a history of an L2 to the sacrum
 fusion by my partner, Dr. Constantine Vidal some years ago.  Over time, she has developed progressive and d
ebilitating back pain, which is most certainly due to a flat back syndrome and kyphoscoliosis.  She h
as undergone extensive preoperative clearance including improved bone health quality for over a year 
now.  We did attempt to operate on her at [facility] in [place] approximately 4 months ago.  S
hortly after initiating the procedure, she had a cardiac arrest on the operating table.  She was resu
scitated and is back at her baseline, but we were never able to determine the cause of her arrest.  S
he has been cardiac cleared and Pulmonary cleared and we have concluded this was somewhat of a freak 
occurrence.  The patient requested we attempt her operation once more, as she is extremely debilitate
d from her chronic back pain and can barely get out of the house.  Risks, benefits, alternatives were
 discussed and she signed informed consent prior to the procedure.





Job #:  [id number]/[id number] Never smoker

## 2021-12-19 VITALS
TEMPERATURE: 99 F | HEART RATE: 88 BPM | RESPIRATION RATE: 14 BRPM | SYSTOLIC BLOOD PRESSURE: 120 MMHG | OXYGEN SATURATION: 100 % | DIASTOLIC BLOOD PRESSURE: 76 MMHG

## 2022-02-20 NOTE — ED ADULT TRIAGE NOTE - PAIN RATING/NUMBER SCALE (0-10): ACTIVITY
"Subjective:       Patient ID: Dylan Amin is a 14 y.o. male.    Chief Complaint: Dyspnea    HPI   Respiratory symptoms per ROS.  With cross country running, coughs a lot and dyspnea after he finishes running.  No history of inhalers.  Trouble getting air in during the race.  Wheezes once in a while.  Track now.  Happened in basketball too, started Fall 2021.  No problems sleeping related to breathing.      Review of Systems   Twelve point review of systems positive for wheezing, chest tightness, cough, and shortness of breath.      Objective:       Spirometry was performed today.  There is scooping noted in the expiratory limb of the flow volume loop to suggest small airway obstruction.  The FVC is 100 % predicted.  The FEV1 is 89 % predicted.  The FEV1 to FVC ratio is 76 %.  FEF 2575 is 67 % predicted. Four puffs of albuterol was administered.  There was a 170 mL and 8% increase in the FEV1 to 96% predicted.  The FEV1 to FVC ratio increased to 79%.  The FEF 2575 increased by 14%.  Study suggests mild small airway obstruction with some a increase post bronchodilator not meeting significance criteria.        FeNO in low range at 15 ppb.      Physical Exam  Constitutional:       General: He is active.      Appearance: He is not toxic-appearing.      Comments: Pulse 68, resp. rate 20, height 4' 11.02" (1.499 m), weight 39.4 kg (86 lb 12 oz), SpO2 99 %.   Pulmonary:      Effort: No respiratory distress.      Breath sounds: No stridor. No wheezing.      Comments: Not coughing.    Musculoskeletal:      Comments: No clubbing   Neurological:      Mental Status: He is alert.         Assessment:       1. Dyspnea on exertion - likely EIB given symptoms and spirometry, ? component of VCD   2. Pre-procedure lab exam          Plan:       Take albuterol inhaler 4 puffs prior to significant activity.  Stop the activity and re-dose 4 puffs of the inhaler for activity induced persistent coughing, wheezing, labored breathing, " and/or chest tightness that occurs despite this premedication.    Can take it every 4 hours as needed for symptoms of persistent cough, wheezing, and/or labored breathing.    Take inhaler with a chamber with mouthpiece.    ATS handout on vocal cord dysfunction provided.    For sensation of trouble getting air in, try pant breathing maneuver.    Update me on status in several weeks.           5

## 2022-05-04 ENCOUNTER — EMERGENCY (EMERGENCY)
Facility: HOSPITAL | Age: 47
LOS: 1 days | Discharge: HOME | End: 2022-05-04
Attending: STUDENT IN AN ORGANIZED HEALTH CARE EDUCATION/TRAINING PROGRAM
Payer: COMMERCIAL

## 2022-05-04 VITALS
OXYGEN SATURATION: 99 % | HEART RATE: 73 BPM | DIASTOLIC BLOOD PRESSURE: 82 MMHG | SYSTOLIC BLOOD PRESSURE: 137 MMHG | WEIGHT: 138.89 LBS | RESPIRATION RATE: 19 BRPM | HEIGHT: 65.75 IN | TEMPERATURE: 99 F

## 2022-05-04 DIAGNOSIS — Z98.51 TUBAL LIGATION STATUS: Chronic | ICD-10-CM

## 2022-05-04 DIAGNOSIS — D36.9 BENIGN NEOPLASM, UNSPECIFIED SITE: Chronic | ICD-10-CM

## 2022-05-04 DIAGNOSIS — Z98.890 OTHER SPECIFIED POSTPROCEDURAL STATES: Chronic | ICD-10-CM

## 2022-05-04 DIAGNOSIS — Z98.89 OTHER SPECIFIED POSTPROCEDURAL STATES: Chronic | ICD-10-CM

## 2022-05-04 LAB
ALBUMIN SERPL ELPH-MCNC: 3.7 G/DL — SIGNIFICANT CHANGE UP (ref 3.5–5)
ALP SERPL-CCNC: 93 U/L — SIGNIFICANT CHANGE UP (ref 40–120)
ALT FLD-CCNC: 27 U/L DA — SIGNIFICANT CHANGE UP (ref 10–60)
ANION GAP SERPL CALC-SCNC: 5 MMOL/L — SIGNIFICANT CHANGE UP (ref 5–17)
AST SERPL-CCNC: 15 U/L — SIGNIFICANT CHANGE UP (ref 10–40)
BASOPHILS # BLD AUTO: 0.02 K/UL — SIGNIFICANT CHANGE UP (ref 0–0.2)
BASOPHILS NFR BLD AUTO: 0.3 % — SIGNIFICANT CHANGE UP (ref 0–2)
BILIRUB SERPL-MCNC: 0.2 MG/DL — SIGNIFICANT CHANGE UP (ref 0.2–1.2)
BUN SERPL-MCNC: 11 MG/DL — SIGNIFICANT CHANGE UP (ref 7–18)
CALCIUM SERPL-MCNC: 9.8 MG/DL — SIGNIFICANT CHANGE UP (ref 8.4–10.5)
CHLORIDE SERPL-SCNC: 107 MMOL/L — SIGNIFICANT CHANGE UP (ref 96–108)
CO2 SERPL-SCNC: 28 MMOL/L — SIGNIFICANT CHANGE UP (ref 22–31)
CREAT SERPL-MCNC: 0.66 MG/DL — SIGNIFICANT CHANGE UP (ref 0.5–1.3)
EGFR: 110 ML/MIN/1.73M2 — SIGNIFICANT CHANGE UP
EOSINOPHIL # BLD AUTO: 0.14 K/UL — SIGNIFICANT CHANGE UP (ref 0–0.5)
EOSINOPHIL NFR BLD AUTO: 1.9 % — SIGNIFICANT CHANGE UP (ref 0–6)
GLUCOSE SERPL-MCNC: 99 MG/DL — SIGNIFICANT CHANGE UP (ref 70–99)
HCG SERPL-ACNC: <1 MIU/ML — SIGNIFICANT CHANGE UP
HCT VFR BLD CALC: 38.7 % — SIGNIFICANT CHANGE UP (ref 34.5–45)
HGB BLD-MCNC: 13.3 G/DL — SIGNIFICANT CHANGE UP (ref 11.5–15.5)
IMM GRANULOCYTES NFR BLD AUTO: 0.3 % — SIGNIFICANT CHANGE UP (ref 0–1.5)
INR BLD: 0.97 RATIO — SIGNIFICANT CHANGE UP (ref 0.88–1.16)
LYMPHOCYTES # BLD AUTO: 2.26 K/UL — SIGNIFICANT CHANGE UP (ref 1–3.3)
LYMPHOCYTES # BLD AUTO: 30.1 % — SIGNIFICANT CHANGE UP (ref 13–44)
MCHC RBC-ENTMCNC: 29.8 PG — SIGNIFICANT CHANGE UP (ref 27–34)
MCHC RBC-ENTMCNC: 34.4 GM/DL — SIGNIFICANT CHANGE UP (ref 32–36)
MCV RBC AUTO: 86.6 FL — SIGNIFICANT CHANGE UP (ref 80–100)
MONOCYTES # BLD AUTO: 0.43 K/UL — SIGNIFICANT CHANGE UP (ref 0–0.9)
MONOCYTES NFR BLD AUTO: 5.7 % — SIGNIFICANT CHANGE UP (ref 2–14)
NEUTROPHILS # BLD AUTO: 4.63 K/UL — SIGNIFICANT CHANGE UP (ref 1.8–7.4)
NEUTROPHILS NFR BLD AUTO: 61.7 % — SIGNIFICANT CHANGE UP (ref 43–77)
NRBC # BLD: 0 /100 WBCS — SIGNIFICANT CHANGE UP (ref 0–0)
NT-PROBNP SERPL-SCNC: 41 PG/ML — SIGNIFICANT CHANGE UP (ref 0–125)
PLATELET # BLD AUTO: 320 K/UL — SIGNIFICANT CHANGE UP (ref 150–400)
POTASSIUM SERPL-MCNC: 4.1 MMOL/L — SIGNIFICANT CHANGE UP (ref 3.5–5.3)
POTASSIUM SERPL-SCNC: 4.1 MMOL/L — SIGNIFICANT CHANGE UP (ref 3.5–5.3)
PROT SERPL-MCNC: 7.5 G/DL — SIGNIFICANT CHANGE UP (ref 6–8.3)
PROTHROM AB SERPL-ACNC: 11.5 SEC — SIGNIFICANT CHANGE UP (ref 10.5–13.4)
RBC # BLD: 4.47 M/UL — SIGNIFICANT CHANGE UP (ref 3.8–5.2)
RBC # FLD: 11.7 % — SIGNIFICANT CHANGE UP (ref 10.3–14.5)
SODIUM SERPL-SCNC: 140 MMOL/L — SIGNIFICANT CHANGE UP (ref 135–145)
TROPONIN I, HIGH SENSITIVITY RESULT: 5.4 NG/L — SIGNIFICANT CHANGE UP
WBC # BLD: 7.5 K/UL — SIGNIFICANT CHANGE UP (ref 3.8–10.5)
WBC # FLD AUTO: 7.5 K/UL — SIGNIFICANT CHANGE UP (ref 3.8–10.5)

## 2022-05-04 PROCEDURE — 71045 X-RAY EXAM CHEST 1 VIEW: CPT | Mod: 26

## 2022-05-04 PROCEDURE — 99285 EMERGENCY DEPT VISIT HI MDM: CPT

## 2022-05-04 RX ORDER — ASPIRIN/CALCIUM CARB/MAGNESIUM 324 MG
324 TABLET ORAL ONCE
Refills: 0 | Status: COMPLETED | OUTPATIENT
Start: 2022-05-04 | End: 2022-05-04

## 2022-05-04 RX ORDER — SODIUM CHLORIDE 9 MG/ML
1000 INJECTION INTRAMUSCULAR; INTRAVENOUS; SUBCUTANEOUS ONCE
Refills: 0 | Status: COMPLETED | OUTPATIENT
Start: 2022-05-04 | End: 2022-05-04

## 2022-05-04 RX ORDER — ALBUTEROL 90 UG/1
2 AEROSOL, METERED ORAL EVERY 6 HOURS
Refills: 0 | Status: DISCONTINUED | OUTPATIENT
Start: 2022-05-04 | End: 2022-05-08

## 2022-05-04 RX ORDER — SODIUM CHLORIDE 9 MG/ML
3 INJECTION INTRAMUSCULAR; INTRAVENOUS; SUBCUTANEOUS EVERY 8 HOURS
Refills: 0 | Status: DISCONTINUED | OUTPATIENT
Start: 2022-05-04 | End: 2022-05-08

## 2022-05-04 RX ADMIN — SODIUM CHLORIDE 1000 MILLILITER(S): 9 INJECTION INTRAMUSCULAR; INTRAVENOUS; SUBCUTANEOUS at 22:45

## 2022-05-04 RX ADMIN — SODIUM CHLORIDE 3 MILLILITER(S): 9 INJECTION INTRAMUSCULAR; INTRAVENOUS; SUBCUTANEOUS at 23:28

## 2022-05-04 RX ADMIN — Medication 324 MILLIGRAM(S): at 22:43

## 2022-05-04 RX ADMIN — SODIUM CHLORIDE 1000 MILLILITER(S): 9 INJECTION INTRAMUSCULAR; INTRAVENOUS; SUBCUTANEOUS at 23:46

## 2022-05-04 NOTE — ED PROVIDER NOTE - CLINICAL SUMMARY MEDICAL DECISION MAKING FREE TEXT BOX
Patient presenting with 2 days of intermittent shortness of breath, chest discomfort with mild wheezing on exam. EKG is normal. Labs, chest x-ray pending. Patient stable, will reassess. Patient presenting with 2 days of intermittent shortness of breath, chest discomfort with mild wheezing on exam. EKG is normal. Labs, chest x-ray pending. Patient stable, will reassess.    Labs and CXR wnl. On reassessment pt continues to have reassuring exam with nml HR and SpO2. PERC neg. Pt denies any new or worsening symptoms. Rec PMD f/u within 3 days. Most likely non emergent etiology of symptoms- the details of the case, history, and exam make more emergent diagnoses much less likely. Discussed with pt my clinical impression and results, patient given strict return precautions if persistent or worsening of symptoms occurs, and need for close follow up. Pt expressed understanding and agrees with plan. Pt is well appearing with a reassuring exam. Discharge home with PMD or Specialist f/u within 3 days.

## 2022-05-04 NOTE — ED PROVIDER NOTE - CHPI ED SYMPTOMS NEG
no nausea, vomiting, diarrhea, syncope, leg swelling, recent immobility, or urinary symptoms/no fever

## 2022-05-04 NOTE — ED ADULT NURSE NOTE - NSICDXPASTMEDICALHX_GEN_ALL_CORE_FT
PAST MEDICAL HISTORY:  Anemia bitamin b12 deficiency    Anxiety States     Benign Hypertension     Carcinoid Tumor of Ovary, Benign      Deliv     Depression with anxiety     Hypertension Dx     Ovarian cyst bilateral    Ovarian Cyst     PTSD (post-traumatic stress disorder)     Syndrome X (cardiac)     Tubal Ligation       Cardiac syndrome X     GERD (gastroesophageal reflux disease)

## 2022-05-04 NOTE — ED PROVIDER NOTE - PHYSICAL EXAMINATION
Vital Signs Reviewed  GEN: Comfortable, NAD, AAOx3  HEENT: NCAT, EOMI, MMM, Neck Supple  RESP: Slight end expiratory wheezing bilaterally  CV: RRR, S1S2, No murmurs  ABD: No TTP, ND, No masses, No CVA Tenderness  Extrem/Skin: Equal pulses bilat, No cyanosis/edema/rashes  Neuro: No focal deficits Vital Signs Reviewed  GEN: Comfortable, NAD, AAOx3  HEENT: NCAT, EOMI, MMM, Neck Supple  RESP: Slight end expiratory wheezing bilaterally, Nml WOB and RR, No rhonchi/rales  CV: RRR, S1S2, No murmurs  ABD: No TTP, ND, No masses, No CVA Tenderness  Extrem/Skin: Equal pulses bilat, No cyanosis/edema/rashes  Neuro: No focal deficits

## 2022-05-04 NOTE — ED PROVIDER NOTE - NSFOLLOWUPINSTRUCTIONS_ED_ALL_ED_FT
You were seen in the emergency room today for shortness of breath. Please call your primary doctor to inform them of this ER visit and obtain the next available appointment within the next 3 days. As we discussed, return to the ER if you have any worsening symptoms.    We no longer feel that you need further emergency care or admission to the hospital at this time.    While we have determined that you are currently stable for discharge, we know that things can change. Please seek immediate medical attention or return to the ER if you experience any of the following:  Any worsening or persistent symptoms  Severe Pain  Chest Pain  Difficulty Breathing  Bleeding  Passing Out  Severe Rash  Inability to Eat or Drink  Persistent Fever    Please see a primary care doctor or specialist within 3 days to ensure that you are improving.    Please call the [facility] phone numbers on this document if you have any problems obtaining a follow up appointment.    I wish you well! -Dr Moser

## 2022-05-04 NOTE — ED ADULT NURSE NOTE - OBJECTIVE STATEMENT
Pt presents to the ED with c/o chest discomfort and SOB x 2 days. Pt endorsed that she's a smoker. Denies fever, nausea, or vomiting.

## 2022-05-04 NOTE — ED PROVIDER NOTE - PATIENT PORTAL LINK FT
You can access the FollowMyHealth Patient Portal offered by [facility] by registering at the following website: [email or web address]. By joining [employer]’s FollowMyHealth portal, you will also be able to view your health information using other applications (apps) compatible with our system.

## 2022-05-04 NOTE — ED PROVIDER NOTE - OBJECTIVE STATEMENT
47 year old female with PMHx of anxiety, depression, GERD, and Cardiac X Syndrome ( syndrome of recurrent typical chest pain with normal cardiac workups and no clear diagnosis) and no significant PSHx presents to the ED with complaints of intermittent shortness of breath and central chest discomfort for the past 2 days. Patient is a smoker with chronic cough which remains unchanged. Patient denies any other acute associated symptoms, blood clots or any heart problems. Patient denies any recent fevers, nausea, vomiting, diarrhea, syncope, leg swelling, recent immobility, urinary symptoms, or any recent illness or hospitalization.  Allergies: Cipro (short breath), Celexa (rash), fluoroquinolone antibiotics (rash)

## 2022-05-05 VITALS
OXYGEN SATURATION: 97 % | TEMPERATURE: 98 F | HEART RATE: 69 BPM | SYSTOLIC BLOOD PRESSURE: 116 MMHG | RESPIRATION RATE: 18 BRPM | DIASTOLIC BLOOD PRESSURE: 75 MMHG

## 2022-05-05 LAB
RAPID RVP RESULT: SIGNIFICANT CHANGE UP
SARS-COV-2 RNA SPEC QL NAA+PROBE: SIGNIFICANT CHANGE UP

## 2022-05-05 PROCEDURE — 85025 COMPLETE CBC W/AUTO DIFF WBC: CPT

## 2022-05-05 PROCEDURE — 99285 EMERGENCY DEPT VISIT HI MDM: CPT | Mod: 25

## 2022-05-05 PROCEDURE — 96360 HYDRATION IV INFUSION INIT: CPT

## 2022-05-05 PROCEDURE — 83880 ASSAY OF NATRIURETIC PEPTIDE: CPT

## 2022-05-05 PROCEDURE — 80053 COMPREHEN METABOLIC PANEL: CPT

## 2022-05-05 PROCEDURE — 84702 CHORIONIC GONADOTROPIN TEST: CPT

## 2022-05-05 PROCEDURE — 0225U NFCT DS DNA&RNA 21 SARSCOV2: CPT

## 2022-05-05 PROCEDURE — 85610 PROTHROMBIN TIME: CPT

## 2022-05-05 PROCEDURE — 36415 COLL VENOUS BLD VENIPUNCTURE: CPT

## 2022-05-05 PROCEDURE — 71045 X-RAY EXAM CHEST 1 VIEW: CPT

## 2022-05-05 PROCEDURE — 93005 ELECTROCARDIOGRAM TRACING: CPT

## 2022-05-05 PROCEDURE — 84484 ASSAY OF TROPONIN QUANT: CPT

## 2022-05-05 PROCEDURE — 94640 AIRWAY INHALATION TREATMENT: CPT

## 2022-05-05 RX ADMIN — ALBUTEROL 2 PUFF(S): 90 AEROSOL, METERED ORAL at 00:07

## 2022-09-30 NOTE — ED ADULT TRIAGE NOTE - TEMPERATURE IN CELSIUS (DEGREES C)
CLINICAL PHARMACY NOTE: MEDS TO BEDS    Total # of Prescriptions Filled: 2   The following medications were delivered to the patient:  ZYPREXA 10 MG   DEPAKOTE  MG    Additional Documentation: 37.1

## 2023-01-07 NOTE — ED ADULT NURSE NOTE - NSIMPLEMENTINTERV_GEN_ALL_ED
Implemented All Universal Safety Interventions:  [place] to call system. Call bell, personal items and telephone within reach. Instruct patient to call for assistance. Room bathroom lighting operational. Non-slip footwear when patient is off stretcher. Physically safe environment: no spills, clutter or unnecessary equipment. Stretcher in lowest position, wheels locked, appropriate side rails in place. DC instructions

## 2023-01-09 ENCOUNTER — NON-APPOINTMENT (OUTPATIENT)
Age: 48
End: 2023-01-09

## 2023-02-16 NOTE — ED PROVIDER NOTE - CARE PLAN
Additional Comments: NDC: 5247-1690-59 Additional Comments: NDC: 6600-2093-10 Principal Discharge DX:	Abdominal pain of unknown etiology

## 2023-02-25 NOTE — ED CDU PROVIDER SUBSEQUENT DAY NOTE - GASTROINTESTINAL, MLM
Abdomen soft, non-tender, no rebound, no guarding.
Abdomen soft, non-tender, no rebound, no guarding.
71

## 2023-03-21 PROBLEM — K21.9 GASTRO-ESOPHAGEAL REFLUX DISEASE WITHOUT ESOPHAGITIS: Chronic | Status: ACTIVE | Noted: 2022-05-05

## 2023-03-21 PROBLEM — I20.8 OTHER FORMS OF ANGINA PECTORIS: Chronic | Status: ACTIVE | Noted: 2022-05-05

## 2023-04-26 ENCOUNTER — APPOINTMENT (OUTPATIENT)
Dept: GASTROENTEROLOGY | Facility: CLINIC | Age: 48
End: 2023-04-26
Payer: COMMERCIAL

## 2023-04-26 ENCOUNTER — NON-APPOINTMENT (OUTPATIENT)
Age: 48
End: 2023-04-26

## 2023-04-26 VITALS
WEIGHT: 167 LBS | TEMPERATURE: 97.3 F | OXYGEN SATURATION: 98 % | DIASTOLIC BLOOD PRESSURE: 60 MMHG | SYSTOLIC BLOOD PRESSURE: 117 MMHG | BODY MASS INDEX: 31.53 KG/M2 | HEIGHT: 61 IN | HEART RATE: 78 BPM

## 2023-04-26 DIAGNOSIS — K59.04 CHRONIC IDIOPATHIC CONSTIPATION: ICD-10-CM

## 2023-04-26 DIAGNOSIS — F12.90 CANNABIS USE, UNSPECIFIED, UNCOMPLICATED: ICD-10-CM

## 2023-04-26 DIAGNOSIS — K63.5 POLYP OF COLON: ICD-10-CM

## 2023-04-26 DIAGNOSIS — R13.12 DYSPHAGIA, OROPHARYNGEAL PHASE: ICD-10-CM

## 2023-04-26 DIAGNOSIS — F31.9 BIPOLAR DISORDER, UNSPECIFIED: ICD-10-CM

## 2023-04-26 DIAGNOSIS — F43.10 POST-TRAUMATIC STRESS DISORDER, UNSPECIFIED: ICD-10-CM

## 2023-04-26 PROCEDURE — 99204 OFFICE O/P NEW MOD 45 MIN: CPT

## 2023-04-26 RX ORDER — LAMOTRIGINE 150 MG/1
150 TABLET ORAL
Refills: 0 | Status: ACTIVE | COMMUNITY

## 2023-04-26 RX ORDER — LUBIPROSTONE 24 UG/1
24 CAPSULE ORAL TWICE DAILY
Qty: 60 | Refills: 5 | Status: ACTIVE | COMMUNITY
Start: 2023-04-26 | End: 1900-01-01

## 2023-04-26 RX ORDER — CLONAZEPAM 1 MG/1
1 TABLET ORAL
Refills: 0 | Status: COMPLETED | COMMUNITY
End: 2023-04-26

## 2023-04-26 RX ORDER — POLYETHYLENE GLYCOL 3350 AND ELECTROLYTES WITH LEMON FLAVOR 236; 22.74; 6.74; 5.86; 2.97 G/4L; G/4L; G/4L; G/4L; G/4L
236 POWDER, FOR SOLUTION ORAL
Qty: 1 | Refills: 0 | Status: ACTIVE | COMMUNITY
Start: 2023-04-26 | End: 1900-01-01

## 2023-04-26 RX ORDER — OXYCODONE HYDROCHLORIDE AND ACETAMINOPHEN 10; 325 MG/1; MG/1
10-325 TABLET ORAL
Refills: 0 | Status: ACTIVE | COMMUNITY
Start: 2023-04-26

## 2023-04-26 RX ORDER — QUETIAPINE FUMARATE 300 MG/1
300 TABLET ORAL
Refills: 0 | Status: ACTIVE | COMMUNITY

## 2023-04-26 RX ORDER — PREGABALIN 300 MG/1
300 CAPSULE ORAL
Refills: 0 | Status: ACTIVE | COMMUNITY
Start: 2023-04-26

## 2023-04-26 NOTE — PHYSICAL EXAM
[Alert] : alert [Normal Voice/Communication] : normal voice/communication [Healthy Appearing] : healthy appearing [No Acute Distress] : no acute distress [Sclera] : the sclera and conjunctiva were normal [Hearing Threshold Finger Rub Not [place]] : hearing was normal [Normal Appearance] : the appearance of the neck was normal [No Respiratory Distress] : no respiratory distress [No Acc Muscle Use] : no accessory muscle use [Respiration, Rhythm And Depth] : normal respiratory rhythm and effort [Auscultation Breath Sounds / Voice Sounds] : lungs were clear to auscultation bilaterally [Heart Rate And Rhythm] : heart rate was normal and rhythm regular [Normal S1, S2] : normal S1 and S2 [Bowel Sounds] : normal bowel sounds [Abdomen Tenderness] : non-tender [No Masses] : no abdominal mass palpated [Abdomen Soft] : soft [Normal Color / Pigmentation] : normal skin color and pigmentation [Oriented To Time, Place, And Person] : oriented to person, place, and time [No Focal Deficits] : no focal deficits [de-identified] : Ankle pain and limping as she broke her foot recently

## 2023-04-26 NOTE — ASSESSMENT
[FreeTextEntry1] : Impression:\par 1. Dysphagia - more oropharyngeal in symptomatology; ddx includes bars, diverticula, vs less likely esophageal structural obstructive lesions or primary esophageal dysmotility\par 2. Chronic constipation - most likely related to medications including narcotics; ddx also includes pelvic floor dysfunction\par 3. History reportedly of colon polyps\par \par Plan;\par -Will first refer for swallow evaluation with [facility]\par -Plan for EGD/colonoscopy to r/o esophageal lesions and for surveillance of colon polyps\par -Risks and benefits of upper endoscopy and colonoscopy including but not limited to bleeding, infection, missed lesions, perforation, injury to internal organs, anesthesia/drug side effects were discussed with patient. All questions answered. Patient is agreeable to the procedures.\par -Explained to patient 2 days bowel prep given chronic constipation\par -Will start Amitiza for her constipation; if not approved, advised BID MiraLAX for now\par

## 2023-04-26 NOTE — HISTORY OF PRESENT ILLNESS
[FreeTextEntry1] : This is a 48 year old female with HTN, PTSD, regional pain syndrome, bipolar disorder, who presents for evaluation of dysphagia, constipation.\par \par She reports having had a colonoscopy years ago and was told she had polyps and had to come back q5 years. She also reports having had EGD before and she was told she had GERD. Currently, she feels that something is stuck in her throat. It will last a few hours. It has been ongoing for 6-8 weeks. It occurs with pills and food, not with liquids. No chest dysphagia. She reports regurgitation sometimes when she burps. She takes pantoprazole and when she's on it, she still has reflux daily.\par \par She is on Percocet and she reports chronic constipation. She takes a stool softener as well as laxatives (milk of magnesia) and also reports no improvement. She normally has a BM only once very 6-7 days. She reports [place] stool scale 1. She reports only blood when she wipes sometimes. She has to strain. She reports having had to manually disimpacted. She takes ibuprofen PRN for her regional pain syndrome.\par \par No abnormal weight loss. Her mother had anal cancer at the age of ~50's. \par \par 3/21/23: WBC 8, hgb 14, Hct 42.1, PLT 38\par Na 138, k 4.1, Cl 102, bicarb 23, BUN 12, Cr 0.75, glucose 86, Ca 10, T protein 7.3, albumin 4.7, T bili <0.2, ALP 68, ASt 14, ALT 15\par \par TTE 8/25/21:\par CONCLUSIONS:\par 1. Normal left ventricular internal dimensions and wall\par thicknesses.\par 2. Normal left ventricular systolic function. No segmental\par wall motion abnormalities.\par 3. Normal left ventricular diastolic function.\par 4. Normal right ventricular size and function.\par \par \par Nuclear stress 8/26/21:\par IMPRESSIONS:Normal Study\par * Myocardial Perfusion SPECT results are normal.\par * Review of raw data shows: The study is of good technical\par quality.\par * The left ventricle was normal in size. Normal myocardial\par perfusion scan,with no evidence of infarction or inducible\par ischemia.\par * Post-stress gated wall motion analysis was performed\par (LVEF = 58 %;LVEDV = 68 ml.), revealing normal LV\par function. No segmental wall motion abnormalities.  RV\par appeared normal\par \par  [de-identified] : \par CT A/P 2/10/20:\par FINDINGS:\par \par LOWER CHEST: Right basilar subsegmental atelectasis.\par \par LIVER: Within normal limits.\par BILE DUCTS: Normal caliber.\par GALLBLADDER: Within normal limits.\par SPLEEN: Within normal limits.\par PANCREAS: Within normal limits.\par ADRENALS: Within normal limits.\par KIDNEYS/URETERS: Within normal limits.\par \par BLADDER: Within normal limits.\par REPRODUCTIVE ORGANS: Hysterectomy. 2.9 x 2.9 cm left ovarian cyst.\par \par BOWEL: No bowel obstruction. Appendix is normal.\par PERITONEUM: No ascites.\par VESSELS: Within normal limits.\par RETROPERITONEUM/LYMPH NODES: No lymphadenopathy.  \par ABDOMINAL WALL: Within normal limits.\par BONES: Within normal limits.\par \par IMPRESSION:\par \par 2.9 x 2.9 cm left ovarian cyst.\par No acute appendicitis.\par

## 2023-04-27 ENCOUNTER — NON-APPOINTMENT (OUTPATIENT)
Age: 48
End: 2023-04-27

## 2023-05-01 ENCOUNTER — OUTPATIENT (OUTPATIENT)
Dept: OUTPATIENT SERVICES | Facility: HOSPITAL | Age: 48
LOS: 1 days | End: 2023-05-01
Payer: COMMERCIAL

## 2023-05-01 ENCOUNTER — TRANSCRIPTION ENCOUNTER (OUTPATIENT)
Age: 48
End: 2023-05-01

## 2023-05-01 ENCOUNTER — APPOINTMENT (OUTPATIENT)
Dept: GASTROENTEROLOGY | Facility: HOSPITAL | Age: 48
End: 2023-05-01

## 2023-05-01 ENCOUNTER — RESULT REVIEW (OUTPATIENT)
Age: 48
End: 2023-05-01

## 2023-05-01 VITALS
HEIGHT: 61 IN | SYSTOLIC BLOOD PRESSURE: 153 MMHG | WEIGHT: 162.92 LBS | TEMPERATURE: 98 F | RESPIRATION RATE: 20 BRPM | HEART RATE: 97 BPM | DIASTOLIC BLOOD PRESSURE: 82 MMHG | OXYGEN SATURATION: 98 %

## 2023-05-01 VITALS
SYSTOLIC BLOOD PRESSURE: 120 MMHG | RESPIRATION RATE: 18 BRPM | DIASTOLIC BLOOD PRESSURE: 76 MMHG | OXYGEN SATURATION: 98 % | HEART RATE: 77 BPM

## 2023-05-01 DIAGNOSIS — R13.12 DYSPHAGIA, OROPHARYNGEAL PHASE: ICD-10-CM

## 2023-05-01 DIAGNOSIS — K63.5 POLYP OF COLON: ICD-10-CM

## 2023-05-01 DIAGNOSIS — Z98.890 OTHER SPECIFIED POSTPROCEDURAL STATES: Chronic | ICD-10-CM

## 2023-05-01 DIAGNOSIS — Z98.51 TUBAL LIGATION STATUS: Chronic | ICD-10-CM

## 2023-05-01 DIAGNOSIS — D36.9 BENIGN NEOPLASM, UNSPECIFIED SITE: Chronic | ICD-10-CM

## 2023-05-01 DIAGNOSIS — Z98.89 OTHER SPECIFIED POSTPROCEDURAL STATES: Chronic | ICD-10-CM

## 2023-05-01 PROCEDURE — 45380 COLONOSCOPY AND BIOPSY: CPT | Mod: PT

## 2023-05-01 PROCEDURE — 88305 TISSUE EXAM BY PATHOLOGIST: CPT

## 2023-05-01 PROCEDURE — 43239 EGD BIOPSY SINGLE/MULTIPLE: CPT | Mod: 59

## 2023-05-01 PROCEDURE — 43270 EGD LESION ABLATION: CPT

## 2023-05-01 PROCEDURE — 43239 EGD BIOPSY SINGLE/MULTIPLE: CPT

## 2023-05-01 PROCEDURE — 45380 COLONOSCOPY AND BIOPSY: CPT

## 2023-05-01 PROCEDURE — 88305 TISSUE EXAM BY PATHOLOGIST: CPT | Mod: 26

## 2023-05-01 DEVICE — NET RETRV ROT ROTH 2.5MMX230CM: Type: IMPLANTABLE DEVICE | Status: FUNCTIONAL

## 2023-05-01 RX ORDER — SODIUM CHLORIDE 9 MG/ML
500 INJECTION INTRAMUSCULAR; INTRAVENOUS; SUBCUTANEOUS
Refills: 0 | Status: COMPLETED | OUTPATIENT
Start: 2023-05-01 | End: 2023-05-01

## 2023-05-01 RX ADMIN — SODIUM CHLORIDE 30 MILLILITER(S): 9 INJECTION INTRAMUSCULAR; INTRAVENOUS; SUBCUTANEOUS at 12:55

## 2023-05-01 NOTE — ASU DISCHARGE PLAN (ADULT/PEDIATRIC) - NS MD DC FALL RISK RISK
For information on Fall & Injury Prevention, visit: [email or web address] OR  [email or web address] OR  https://www.cdc.gov/steadi/patient.html

## 2023-05-01 NOTE — PRE PROCEDURE NOTE - PROCEDURE SERVICE
PROGRESS NOTE    Subjective:       Patient ID: Gail Mckinnon is a 56 y.o. female.  MRN: [id number]  : 1966    Chief Complaint: Colon Cancer (2 week follow up )      History of Present Illness:   Gail Mckinnon is a 56 y.o. female who presents with colon cancer, initially stage III and now with LN recurrence.       She completed adjuvant FOLFOX in 2020. She tolerated only 4 cycles of FOLFOX before she developed an infusion reaction to oxaliplatin in cycle 5 was well as cycle 6. She then completed 6 cycles of infusional 5 FU.     In May 2021, restaging scans were consistent with RP toni metastasis. She was offered second line therapy with FOLFIRI and Avastin.      She presented to the ED on  with left flank pain. CT renal stone study showed Moderate hydronephrosis on the left secondary to 3 mm calculus at the UPJ.    She had a PET scan mid April that showed possible progression of her disease with      She has been to [facility] for another opinion. No change was recommended in systemic therapy but she was offered surgical removal of the aorta caval nodes.  Recent sans at [facility]  show stable disease.      Surgery done 22. Received 2 more cycle of FOLFIRI without Avastin.     She had repeat imaging at [facility] on 22 that unfortunately showed disease progression since her surgery with multiple RP nodes and one mediastinal node.       PET 22  Impression:     1. Progression of disease with interval increase of abdominal and pelvic lymphadenopathy.  2. New area of moderate FDG uptake at the right half of the T9 vertebral body (image 124).  Favor degenerative disc changes.  No underlying osteolytic or osteoblastic lesion.  Recommend continued attention on follow-up.  3. No other evidence of FDG avid disease.     22  Impression After scan comparison:     1. Metastatic portacaval and left periaortic retroperitoneal lymph nodes  which remain stable between the CT of 09/24/2022 and the subsequent PET-CT of 12/08/2022.  There is no evidence of progression of metastatic disease.  2. Nonobstructing bilateral renal calculi and cholelithiasis.    2/10/22:  CT chest abd pelvis  Impression:     Stable periportal, retroperitoneal and mesenteric lymphadenopathy compared to PET-CT 12/08/2022.     Stable right middle lobe 3 mm pulmonary nodule.  No new or enlarging pulmonary nodule.     Hepatic steatosis.  Hepatosplenomegaly.     Cholelithiasis.     Bilateral nonobstructing nephrolithiasis.     Small hiatal hernia with retained contrast in the distal esophagus.  Correlate for reflux.    She had virtual visit at [facility] on 2/17/23.     She has continued FOLFIRI and Avastin.     Interim history:  Has been switched to xeloda due to 5 FU shortage since her last visit. Did have more nausea and got tenderness on her feet, improving now. Has started her next cycle today. Nausea improves with compazine.      Weight and appetite is stable.     She is following up with Dr. Palm. Anxiety is worse, recently moved as well. Will be seeing the psychiatrist to adjust medications.         Oncology History:  Oncology History   Malignant neoplasm of sigmoid colon   3/16/2020 Initial Diagnosis    Malignant neoplasm of sigmoid colon     3/31/2020 Cancer Staged    Staging form: Colon and Rectum, AJCC 8th Edition  - Clinical stage from 3/31/2020: Stage IIIC (cT4b, cN2a, cM0)       5/6/2020 - 11/17/2020 Chemotherapy    Treatment Summary   Plan Name: OP FOLFOX 6 Q2W  Treatment Goal: Curative  Status: Inactive  Start Date: 5/6/2020  End Date: 11/6/2020  Provider: Dylan Leyva MD  Chemotherapy: fluorouraciL injection 945 mg, 400 mg/m2 = 945 mg, Intravenous, Clinic/HOD 1 time, 14 of 14 cycles  Administration: 945 mg (5/6/2020), 945 mg (5/20/2020), 945 mg (6/3/2020), 945 mg (6/17/2020), 945 mg (7/29/2020), 945 mg (8/12/2020), 945 mg (8/26/2020), 945 mg (9/9/2020), 945 mg  (9/23/2020), 945 mg (10/6/2020), 945 mg (10/21/2020), 945 mg (11/4/2020)  fluorouraciL 2,400 mg/m2 = 5,665 mg in sodium chloride 0.9% 240 mL chemo infusion, 2,400 mg/m2 = 5,665 mg, Intravenous, Over 46 hours, 14 of 14 cycles  Administration: 5,665 mg (5/6/2020), 5,665 mg (5/20/2020), 5,665 mg (6/3/2020), 5,665 mg (6/17/2020), 5,665 mg (7/29/2020), 5,665 mg (8/12/2020), 5,665 mg (8/26/2020), 5,665 mg (9/9/2020), 5,665 mg (9/23/2020), 5,665 mg (10/6/2020), 5,665 mg (10/21/2020), 5,665 mg (11/4/2020)  leucovorin calcium 900 mg in dextrose 5 % 250 mL infusion, 945 mg, Intravenous, Clinic/HOD 1 time, 14 of 14 cycles  Administration: 900 mg (5/6/2020), 900 mg (5/20/2020), 900 mg (6/3/2020), 900 mg (6/17/2020), 945 mg (6/30/2020), 945 mg (7/20/2020), 945 mg (7/29/2020), 945 mg (8/12/2020), 945 mg (8/26/2020), 945 mg (9/9/2020), 945 mg (9/23/2020), 945 mg (10/6/2020), 945 mg (10/21/2020), 945 mg (11/4/2020)  oxaliplatin (ELOXATIN) 200 mg in dextrose 5 % 500 mL chemo infusion, 201 mg, Intravenous, Clinic/HOD 1 time, 6 of 6 cycles  Dose modification: 65 mg/m2 (original dose 85 mg/m2, Cycle 6)  Administration: 200 mg (5/6/2020), 200 mg (5/20/2020), 200 mg (6/3/2020), 200 mg (6/17/2020), 201 mg (6/30/2020), 150 mg (7/20/2020)       6/16/2021 -  Chemotherapy    Treatment Summary   Plan Name: OP COLORECTAL FOLFIRI + BEVACIZUMAB Q2W  Treatment Goal: Control  Status: Active  Start Date: 6/16/2021  End Date: 5/11/2023 (Planned)  Provider: Marah Santo MD  Chemotherapy: fluorouraciL injection 990 mg, 400 mg/m2 = 990 mg, Intravenous, Clinic/HOD 1 time, 15 of 15 cycles  Administration: 990 mg (6/16/2021), 990 mg (6/30/2021), 990 mg (7/14/2021), 980 mg (7/28/2021), 990 mg (8/11/2021), 990 mg (8/25/2021), 990 mg (9/8/2021), 990 mg (9/22/2021), 990 mg (10/6/2021), 990 mg (10/20/2021), 990 mg (11/3/2021), 990 mg (12/1/2021), 990 mg (12/15/2021), 990 mg (11/17/2021), 990 mg (12/28/2021)  fluorouraciL 2,400 mg/m2 = 5,950 mg in sodium  Endoscopy chloride 0.9% 240 mL chemo infusion, 2,400 mg/m2 = 5,950 mg, Intravenous, Over 46 hours, 36 of 39 cycles  Administration: 5,950 mg (6/16/2021), 5,950 mg (6/30/2021), 5,950 mg (7/14/2021), 5,880 mg (7/28/2021), 5,930 mg (8/11/2021), 5,930 mg (8/25/2021), 5,930 mg (9/8/2021), 5,930 mg (9/22/2021), 5,930 mg (10/6/2021), 5,930 mg (10/20/2021), 5,930 mg (11/3/2021), 5,930 mg (12/1/2021), 5,930 mg (12/15/2021), 5,930 mg (11/17/2021), 5,930 mg (12/28/2021), 6,050 mg (5/11/2022), 6,025 mg (3/9/2022), 6,025 mg (2/23/2022), 5,930 mg (2/2/2022), 5,930 mg (1/19/2022), 6,050 mg (4/20/2022), 6,025 mg (4/6/2022), 6,025 mg (3/23/2022), 6,050 mg (6/1/2022), 6,050 mg (6/15/2022), 6,050 mg (10/19/2022), 6,050 mg (10/5/2022), 6,050 mg (11/2/2022), 6,050 mg (11/16/2022), 6,050 mg (11/30/2022), 6,050 mg (1/4/2023), 6,050 mg (12/19/2022), 6,050 mg (1/18/2023), 6,000 mg (2/28/2023), 6,050 mg (2/14/2023), 6,000 mg (2/1/2023)  bevacizumab (AVASTIN) 600 mg in sodium chloride 0.9% 100 mL chemo infusion, 660 mg, Intravenous, Clinic/hospitals 1 time, 35 of 36 cycles  Dose modification: 5 mg/kg (original dose 5 mg/kg, Cycle 26, Reason: MD Discretion, Comment: 5 mg/kg original dose)  Administration: 600 mg (6/30/2021), 600 mg (7/14/2021), 600 mg (7/28/2021), 600 mg (6/16/2021), 600 mg (8/11/2021), 655 mg (8/25/2021), 600 mg (9/8/2021), 600 mg (9/22/2021), 600 mg (10/6/2021), 600 mg (10/20/2021), 600 mg (11/3/2021), 655 mg (12/1/2021), 600 mg (12/15/2021), 600 mg (11/17/2021), 600 mg (12/28/2021), 600 mg (5/11/2022), 600 mg (3/9/2022), 600 mg (2/23/2022), 600 mg (2/2/2022), 600 mg (1/19/2022), 600 mg (4/20/2022), 680 mg (4/6/2022), 600 mg (3/23/2022), 680 mg (10/5/2022), 680 mg (10/19/2022), 680 mg (11/2/2022), 680 mg (11/16/2022), 680 mg (11/30/2022), 680 mg (1/4/2023), 680 mg (12/19/2022), 680 mg (1/18/2023), 680 mg (3/14/2023), 680 mg (2/28/2023), 680 mg (2/14/2023), 680 mg (2/1/2023)  irinotecan (CAMPTOSAR) 440 mg in sodium chloride 0.9% 500 mL chemo  infusion, 446 mg, Intravenous, Clinic/HOD 1 time, 37 of 41 cycles  Dose modification: 180 mg/m2 (original dose 180 mg/m2, Cycle 24)  Administration: 440 mg (6/16/2021), 440 mg (6/30/2021), 440 mg (7/14/2021), 440 mg (7/28/2021), 440 mg (8/11/2021), 444 mg (8/25/2021), 440 mg (9/8/2021), 440 mg (9/22/2021), 440 mg (10/6/2021), 440 mg (10/20/2021), 440 mg (11/3/2021), 440 mg (12/1/2021), 440 mg (12/15/2021), 440 mg (11/17/2021), 440 mg (12/28/2021), 440 mg (5/11/2022), 440 mg (3/9/2022), 440 mg (2/23/2022), 440 mg (2/2/2022), 440 mg (1/19/2022), 440 mg (4/20/2022), 440 mg (4/6/2022), 440 mg (3/24/2022), 440 mg (6/1/2022), 440 mg (6/15/2022), 440 mg (10/19/2022), 440 mg (10/5/2022), 440 mg (11/2/2022), 440 mg (11/16/2022), 440 mg (11/30/2022), 440 mg (1/4/2023), 440 mg (12/19/2022), 440 mg (1/18/2023), 440 mg (3/14/2023), 440 mg (2/28/2023), 440 mg (2/14/2023), 440 mg (2/1/2023)  bevacizumab-awwb (MVASI) 5 mg/kg = 680 mg in sodium chloride 0.9% 100 mL infusion, 5 mg/kg = 680 mg (original dose ), Intravenous, Clinic/HOD 1 time, 0 of 3 cycles  Dose modification: 5 mg/kg (Cycle 39)       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   4/3/2020 Initial Diagnosis    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     5/6/2020 - 11/17/2020 Chemotherapy    Treatment Summary   Plan Name: OP FOLFOX 6 Q2W  Treatment Goal: Curative  Status: Inactive  Start Date: 5/6/2020  End Date: 11/6/2020  Provider: Dylan Leyva MD  Chemotherapy: fluorouraciL injection 945 mg, 400 mg/m2 = 945 mg, Intravenous, Clinic/HOD 1 time, 14 of 14 cycles  Administration: 945 mg (5/6/2020), 945 mg (5/20/2020), 945 mg (6/3/2020), 945 mg (6/17/2020), 945 mg (7/29/2020), 945 mg (8/12/2020), 945 mg (8/26/2020), 945 mg (9/9/2020), 945 mg (9/23/2020), 945 mg (10/6/2020), 945 mg (10/21/2020), 945 mg (11/4/2020)  fluorouraciL 2,400 mg/m2 = 5,665 mg in sodium chloride 0.9% 240 mL chemo infusion, 2,400 mg/m2 = 5,665 mg, Intravenous, Over 46 hours, 14 of 14 cycles  Administration:  5,665 mg (5/6/2020), 5,665 mg (5/20/2020), 5,665 mg (6/3/2020), 5,665 mg (6/17/2020), 5,665 mg (7/29/2020), 5,665 mg (8/12/2020), 5,665 mg (8/26/2020), 5,665 mg (9/9/2020), 5,665 mg (9/23/2020), 5,665 mg (10/6/2020), 5,665 mg (10/21/2020), 5,665 mg (11/4/2020)  leucovorin calcium 900 mg in dextrose 5 % 250 mL infusion, 945 mg, Intravenous, Clinic/HOD 1 time, 14 of 14 cycles  Administration: 900 mg (5/6/2020), 900 mg (5/20/2020), 900 mg (6/3/2020), 900 mg (6/17/2020), 945 mg (6/30/2020), 945 mg (7/20/2020), 945 mg (7/29/2020), 945 mg (8/12/2020), 945 mg (8/26/2020), 945 mg (9/9/2020), 945 mg (9/23/2020), 945 mg (10/6/2020), 945 mg (10/21/2020), 945 mg (11/4/2020)  oxaliplatin (ELOXATIN) 200 mg in dextrose 5 % 500 mL chemo infusion, 201 mg, Intravenous, Clinic/HOD 1 time, 6 of 6 cycles  Dose modification: 65 mg/m2 (original dose 85 mg/m2, Cycle 6)  Administration: 200 mg (5/6/2020), 200 mg (5/20/2020), 200 mg (6/3/2020), 200 mg (6/17/2020), 201 mg (6/30/2020), 150 mg (7/20/2020)       6/16/2021 -  Chemotherapy    Treatment Summary   Plan Name: OP COLORECTAL FOLFIRI + BEVACIZUMAB Q2W  Treatment Goal: Control  Status: Active  Start Date: 6/16/2021  End Date: 5/11/2023 (Planned)  Provider: Marah Santo MD  Chemotherapy: fluorouraciL injection 990 mg, 400 mg/m2 = 990 mg, Intravenous, [facility] 1 time, 15 of 15 cycles  Administration: 990 mg (6/16/2021), 990 mg (6/30/2021), 990 mg (7/14/2021), 980 mg (7/28/2021), 990 mg (8/11/2021), 990 mg (8/25/2021), 990 mg (9/8/2021), 990 mg (9/22/2021), 990 mg (10/6/2021), 990 mg (10/20/2021), 990 mg (11/3/2021), 990 mg (12/1/2021), 990 mg (12/15/2021), 990 mg (11/17/2021), 990 mg (12/28/2021)  fluorouraciL 2,400 mg/m2 = 5,950 mg in sodium chloride 0.9% 240 mL chemo infusion, 2,400 mg/m2 = 5,950 mg, Intravenous, Over 46 hours, 36 of 39 cycles  Administration: 5,950 mg (6/16/2021), 5,950 mg (6/30/2021), 5,950 mg (7/14/2021), 5,880 mg (7/28/2021), 5,930 mg (8/11/2021), 5,930 mg  (8/25/2021), 5,930 mg (9/8/2021), 5,930 mg (9/22/2021), 5,930 mg (10/6/2021), 5,930 mg (10/20/2021), 5,930 mg (11/3/2021), 5,930 mg (12/1/2021), 5,930 mg (12/15/2021), 5,930 mg (11/17/2021), 5,930 mg (12/28/2021), 6,050 mg (5/11/2022), 6,025 mg (3/9/2022), 6,025 mg (2/23/2022), 5,930 mg (2/2/2022), 5,930 mg (1/19/2022), 6,050 mg (4/20/2022), 6,025 mg (4/6/2022), 6,025 mg (3/23/2022), 6,050 mg (6/1/2022), 6,050 mg (6/15/2022), 6,050 mg (10/19/2022), 6,050 mg (10/5/2022), 6,050 mg (11/2/2022), 6,050 mg (11/16/2022), 6,050 mg (11/30/2022), 6,050 mg (1/4/2023), 6,050 mg (12/19/2022), 6,050 mg (1/18/2023), 6,000 mg (2/28/2023), 6,050 mg (2/14/2023), 6,000 mg (2/1/2023)  bevacizumab (AVASTIN) 600 mg in sodium chloride 0.9% 100 mL chemo infusion, 660 mg, Intravenous, [facility] 1 time, 35 of 36 cycles  Dose modification: 5 mg/kg (original dose 5 mg/kg, Cycle 26, Reason: MD Discretion, Comment: 5 mg/kg original dose)  Administration: 600 mg (6/30/2021), 600 mg (7/14/2021), 600 mg (7/28/2021), 600 mg (6/16/2021), 600 mg (8/11/2021), 655 mg (8/25/2021), 600 mg (9/8/2021), 600 mg (9/22/2021), 600 mg (10/6/2021), 600 mg (10/20/2021), 600 mg (11/3/2021), 655 mg (12/1/2021), 600 mg (12/15/2021), 600 mg (11/17/2021), 600 mg (12/28/2021), 600 mg (5/11/2022), 600 mg (3/9/2022), 600 mg (2/23/2022), 600 mg (2/2/2022), 600 mg (1/19/2022), 600 mg (4/20/2022), 680 mg (4/6/2022), 600 mg (3/23/2022), 680 mg (10/5/2022), 680 mg (10/19/2022), 680 mg (11/2/2022), 680 mg (11/16/2022), 680 mg (11/30/2022), 680 mg (1/4/2023), 680 mg (12/19/2022), 680 mg (1/18/2023), 680 mg (3/14/2023), 680 mg (2/28/2023), 680 mg (2/14/2023), 680 mg (2/1/2023)  irinotecan (CAMPTOSAR) 440 mg in sodium chloride 0.9% 500 mL chemo infusion, 446 mg, Intravenous, [facility] 1 time, 37 of 41 cycles  Dose modification: 180 mg/m2 (original dose 180 mg/m2, Cycle 24)  Administration: 440 mg (6/16/2021), 440 mg (6/30/2021), 440 mg (7/14/2021), 440 mg (7/28/2021), 440 mg  (2021), 444 mg (2021), 440 mg (2021), 440 mg (2021), 440 mg (10/6/2021), 440 mg (10/20/2021), 440 mg (11/3/2021), 440 mg (2021), 440 mg (12/15/2021), 440 mg (2021), 440 mg (2021), 440 mg (2022), 440 mg (3/9/2022), 440 mg (2022), 440 mg (2022), 440 mg (2022), 440 mg (2022), 440 mg (2022), 440 mg (3/24/2022), 440 mg (2022), 440 mg (6/15/2022), 440 mg (10/19/2022), 440 mg (10/5/2022), 440 mg (2022), 440 mg (2022), 440 mg (2022), 440 mg (2023), 440 mg (2022), 440 mg (2023), 440 mg (3/14/2023), 440 mg (2023), 440 mg (2023), 440 mg (2023)  bevacizumab-awwb (MVASI) 5 mg/kg = 680 mg in sodium chloride 0.9% 100 mL infusion, 5 mg/kg = 680 mg (original dose ), Intravenous, Clinic/HOD 1 time, 0 of 3 cycles  Dose modification: 5 mg/kg (Cycle 39)           History:  Past Medical History:   Diagnosis Date    Anxiety     Depression     FH: ovarian cancer 3/16/2020    Hx of psychiatric care     Effexor, Paxil, Lexapro, Zoloft, Wellbutrin, Trazodone Buspar    Hyperthyroidism     Hypothyroid     Kidney calculi     Malignant neoplasm of sigmoid colon 3/16/2020    Menorrhagia     Multinodular goiter 2012    Palpitation     Psychiatric problem     Venous insufficiency       Past Surgical History:   Procedure Laterality Date     SECTION, CLASSIC      x3    COLONOSCOPY N/A 2020    Procedure: COLONOSCOPY;  Surgeon: Shane Parker MD;  Location: [facility] ENDO;  Service: Endoscopy;  Laterality: N/A;    COLONOSCOPY N/A 2022    Procedure: COLONOSCOPY;  Surgeon: Shane Parker MD;  Location: [facility];  Service: Endoscopy;  Laterality: N/A;    CYSTOSCOPY W/ URETERAL STENT PLACEMENT Bilateral 3/25/2020    Procedure: CYSTOSCOPY, WITH URETERAL STENT INSERTION;  Surgeon: Claudio Tyson MD;  Location: [facility] OR [address] [place] [address];  Service: Urology;  Laterality: Bilateral;    INSERTION OF TUNNELED CENTRAL VENOUS CATHETER  (CVC) WITH SUBCUTANEOUS PORT N/A 5/1/2020    Procedure: FHOHSYWOS-VBRJ-Y-CATH;  Surgeon: Doscaprice Diagnostic Provider;  Location: [facility] OR 2ND FLR;  Service: Radiology;  Laterality: N/A;  Room 189/Cindy    LAPAROSCOPIC SIGMOIDECTOMY N/A 3/25/2020    Procedure: COLECTOMY, SIGMOID, LAPAROSCOPIC, flex sig, ERAS high;  Surgeon: Silvio Man MD;  Location: [facility] OR 2ND FLR;  Service: Colon and Rectal;  Laterality: N/A;    MOBILIZATION OF SPLENIC FLEXURE  3/25/2020    Procedure: MOBILIZATION, SPLENIC FLEXURE;  Surgeon: Silvio Man MD;  Location: [facility] OR 2ND FLR;  Service: Colon and Rectal;;    tonsillectomy      TOTAL ABDOMINAL HYSTERECTOMY W/ BILATERAL SALPINGOOPHORECTOMY N/A 3/25/2020    Procedure: HYSTERECTOMY, TOTAL, ABDOMINAL, WITH BILATERAL SALPINGO-OOPHORECTOMY;  Surgeon: Keron Brady MD;  Location: [facility] OR 2ND FLR;  Service: Oncology;  Laterality: N/A;     Family History   Problem Relation Age of Onset    Heart disease Father     Diabetes Mother 65    Drug abuse Brother     Drug abuse Brother     Cancer Maternal Aunt         lung cancer    Cancer Maternal Grandmother         stomach cancer- started in ovaries    Ovarian cancer Maternal Grandmother         stomach cancer- started in ovaries    Colon cancer Paternal Uncle     Cancer Paternal Uncle     Ovarian cancer Maternal Aunt     Ovarian cancer Maternal Aunt     Ovarian cancer Cousin         mother had ovarian cancer    Drug abuse Son     Drug abuse Son         clean/sober since 2012    Colon cancer Son     No Known Problems Daughter     Uterine cancer Neg Hx     Breast cancer Neg Hx      Social History     Tobacco Use    Smoking status: Never    Smokeless tobacco: Never   Substance and Sexual Activity    Alcohol use: Yes     Comment: occasionally- twice monthly    Drug use: Never    Sexual activity: Yes     Partners: Male     Birth control/protection: See Surgical Hx        ROS:   Review of Systems   Constitutional:  Positive for  "malaise/fatigue (first few days). Negative for fever and weight loss.   HENT:  Negative for congestion, hearing loss, nosebleeds and sore throat.    Eyes:  Negative for double vision and photophobia.   Respiratory:  Negative for cough, hemoptysis, sputum production, shortness of breath and wheezing.    Cardiovascular:  Negative for chest pain, palpitations, orthopnea and leg swelling.   Gastrointestinal:  Positive for nausea (improving). Negative for abdominal pain, blood in stool, constipation, diarrhea, heartburn and vomiting.   Genitourinary:  Negative for dysuria, frequency, hematuria and urgency.   Musculoskeletal:  Negative for back pain, joint pain and myalgias.   Skin:  Positive for rash (feet). Negative for itching.   Neurological:  Negative for dizziness, tingling, seizures, weakness and headaches.   Endo/Heme/Allergies:  Negative for polydipsia. Does not bruise/bleed easily.   Psychiatric/Behavioral:  Negative for depression and memory loss. The patient is nervous/anxious. The patient does not have insomnia.       Objective:     Vitals:    03/27/23 1103   BP: 98/60   Pulse: 76   Temp: 97.3 °F (36.3 °C)   TempSrc: Temporal   SpO2: 96%   Weight: 126 kg (277 lb 12.5 oz)   Height: 5' 6" (1.676 m)   PainSc: 0-No pain           Wt Readings from Last 10 Encounters:   03/27/23 126 kg (277 lb 12.5 oz)   03/14/23 134.5 kg (296 lb 8.3 oz)   03/13/23 134.5 kg (296 lb 8.3 oz)   02/28/23 133 kg (293 lb 3.4 oz)   02/27/23 133 kg (293 lb 3.4 oz)   02/16/23 (!) 137.2 kg (302 lb 7.5 oz)   02/14/23 (!) 137.2 kg (302 lb 7.5 oz)   02/13/23 (P) 135.1 kg (297 lb 13.5 oz)   02/03/23 (!) 138.2 kg (304 lb 10.8 oz)   02/01/23 (!) 138.2 kg (304 lb 10.8 oz)       Physical Examination:   Physical Exam  Vitals and nursing note reviewed.   Constitutional:       General: She is not in acute distress.     Appearance: She is not diaphoretic.   HENT:      Head: Normocephalic.      Mouth/Throat:      Pharynx: No oropharyngeal exudate. "   Eyes:      General: No scleral icterus.     Conjunctiva/sclera: Conjunctivae normal.   Neck:      Thyroid: No thyromegaly.   Cardiovascular:      Rate and Rhythm: Normal rate and regular rhythm.      Heart sounds: Normal heart sounds. No murmur heard.  Pulmonary:      Effort: Pulmonary effort is normal. No respiratory distress.      Breath sounds: No stridor. No wheezing or rales.   Chest:      Chest wall: No tenderness.   Abdominal:      General: Bowel sounds are normal. There is no distension.      Palpations: Abdomen is soft. There is no mass.      Tenderness: There is no abdominal tenderness. There is no rebound.   Musculoskeletal:         General: No tenderness or deformity. Normal range of motion.      Cervical back: Neck supple.   Lymphadenopathy:      Cervical: No cervical adenopathy.   Skin:     General: Skin is warm and dry.      Findings: No erythema or rash.   Neurological:      Mental Status: She is alert and oriented to person, place, and time.      Cranial Nerves: No cranial nerve deficit.      Coordination: Coordination normal.      Gait: Gait is intact.   Psychiatric:         Mood and Affect: Affect normal.         Cognition and Memory: Memory normal.         Judgment: Judgment normal.        Diagnostic Tests:  Significant Imaging: I have reviewed and interpreted all pertinent imaging results/findings.  Laboratory Data:  All pertinent labs have been reviewed.  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 Oxaliplatin was stopped due to severe adverse reaction.     Follow-up CTs and PET in May 2021 showed enlarging retroperitoneal node, concerning for metastatic disease.      She started FOLFIRI + Avastin and has continued till July 2022.   Given isolated RP toni disease, she has undergone open Left periaortic and aortocaval lymph node dissection on 7/12/2022.     Follow up scans showed disease progression. I have discussed the case with Dr. Montelongo at [facility]. We discussed resuming FOLFIRI-debo vs transitioning to BRAF directed therapy (BEACON).   There may be an option ton enroll in SWOG 2107 (encorafenib/cetuximab +/- nivo) at [facility] if no prior BRAF inhibitor exposure.[facility] is working with the patient's insurance to see if she can be enrolled there.      Patient is interested in the trial and therefore we resumed FOLFIRI- Debo. 2 follow up scans show stable disease and we will continue. [facility] follow up note reviewed. No other recommendations at this time.       If she has progressive disease and unable to got to [facility] and trial not available locally, will treat with next line treatment with encorafenib and cetuximab.     We discussed a nationwide shortage of 5 FU and that we will switch her to capecitabine while 5 FU becomes available. As per our hospital practice guidelines, she is on capecitabine 1000 mg/m2 bid 7 days on and 7 days off .She will receive avastin and Irinotecan tomorrow, monitory closely for toxicity. Continue moisturizers for uriostegui planter dysesthesias.     Nausea   Improving on compazine.     Mucositis   Continue [facility] soln.     Transaminitis  Fluctuates.  Continue to monitor. No obvious liver mets.     Hypercalcemia   Mild, secondary to hyperparathyroidism.  Avoid calcium supplements.     Hypothyroidism  Multinodular goiter   Continue  Levothyroxine. Endocrine consult is appreciated.   Had a non diagnostic biopsy in 2012, usg findings have remained stable. However, given uptake on PET this year, an FNA vs repeat usg in 6 months is recommended. Will follow closely.     Essential HTN   Continue antihypertensives.      Anxiety   Continue to  follow up with Dr. Palm.     MDM includes  :    - Acute or chronic illness or injury that poses a threat to life or bodily function  - Independent review and explanation of 3+ results from unique tests  - Discussion of management and ordering 3+ unique tests  - Extensive discussion of treatment and management  - Prescription drug management  - Drug therapy requiring intensive monitoring for toxicity          ECOG SCORE               Discussion:   No follow-ups on file.    Plan was discussed with the patient at length, and she verbalized understanding. Gail was given an opportunity to ask questions that were answered to her satisfaction, and she was advised to call in the interval if any problems or questions arise.    Electronically signed by Marah Santo MD        Route Chart for Scheduling    Med Onc Chart Routing      Follow up with physician . 4/24,5/10   Follow up with TAVO . 4/10   Infusion scheduling note    Injection scheduling note    Labs CBC and CMP   Scheduling: Labs same day as infusion  Preferred lab:  Lab interval:     Imaging    Pharmacy appointment    Other referrals           Treatment Plan Information   OP COLORECTAL FOLFIRI + BEVACIZUMAB Q2W   Marah Santo MD   Upcoming Treatment Dates - OP COLORECTAL FOLFIRI + BEVACIZUMAB Q2W    3/28/2023       Pre-Medications       LORazepam injection 1 mg       promethazine (PHENERGAN) 6.25 mg in dextrose 5 % (D5W) 100 mL IVPB       palonosetron 0.25mg/dexAMETHasone 20mg in NS IVPB       Chemotherapy       bevacizumab (AVASTIN) 5 mg/kg = 680 mg in sodium chloride 0.9% 100 mL chemo infusion       irinotecan (CAMPTOSAR) 440 mg in sodium chloride 0.9%  522 mL chemo infusion       Supportive Care       atropine injection 0.4 mg  4/11/2023       Pre-Medications       LORazepam injection 1 mg       promethazine (PHENERGAN) 6.25 mg in dextrose 5 % 100 mL IVPB       palonosetron 0.25mg/dexAMETHasone 20mg in NS IVPB       Chemotherapy       irinotecan (CAMPTOSAR) 440 mg in sodium chloride 0.9% 522 mL chemo infusion       leucovorin calcium 400 mg/m2 = 1,010 mg in dextrose 5 % 250 mL infusion       fluorouracil (ADRUCIL) 2,400 mg/m2 = 6,050 mg in sodium chloride 0.9% 240 mL chemo infusion       bevacizumab-awwb (MVASI) 5 mg/kg = 680 mg in sodium chloride 0.9% 100 mL infusion       Supportive Care       atropine injection 0.4 mg  4/25/2023       Pre-Medications       LORazepam injection 1 mg       promethazine (PHENERGAN) 6.25 mg in dextrose 5 % 100 mL IVPB       palonosetron 0.25mg/dexAMETHasone 20mg in NS IVPB       Chemotherapy       irinotecan (CAMPTOSAR) 440 mg in sodium chloride 0.9% 522 mL chemo infusion       leucovorin calcium 400 mg/m2 = 1,010 mg in dextrose 5 % 250 mL infusion       fluorouracil (ADRUCIL) 2,400 mg/m2 = 6,050 mg in sodium chloride 0.9% 240 mL chemo infusion       bevacizumab-awwb (MVASI) 5 mg/kg = 680 mg in sodium chloride 0.9% 100 mL infusion       Supportive Care       atropine injection 0.4 mg  5/9/2023       Pre-Medications       LORazepam injection 1 mg       promethazine (PHENERGAN) 6.25 mg in dextrose 5 % 100 mL IVPB       palonosetron 0.25mg/dexAMETHasone 20mg in NS IVPB       Chemotherapy       irinotecan (CAMPTOSAR) 440 mg in sodium chloride 0.9% 522 mL chemo infusion       leucovorin calcium 400 mg/m2 = 1,010 mg in dextrose 5 % 250 mL infusion       fluorouracil (ADRUCIL) 2,400 mg/m2 = 6,050 mg in sodium chloride 0.9% 240 mL chemo infusion       bevacizumab-awwb (MVASI) 5 mg/kg = 680 mg in sodium chloride 0.9% 100 mL infusion       Supportive Care       atropine injection 0.4 mg    Supportive Plan Information  IV FLUIDS AND  ELECTROLYTES   Brayden Solo MD   Upcoming Treatment Dates - IV FLUIDS AND ELECTROLYTES    No upcoming days in selected categories.    Therapy Plan Information  PORT FLUSH  Flushes  heparin, porcine (PF) 100 unit/mL injection flush 500 Units  500 Units, Intravenous, Every visit  sodium chloride 0.9% flush 10 mL  10 mL, Intravenous, Every visit      Answers submitted by the patient for this visit:  Review of Systems Questionnaire (Submitted on 3/24/2023)  appetite change : No  unexpected weight change: No  mouth sores: Yes  visual disturbance: No  adenopathy: No

## 2023-05-01 NOTE — PRE PROCEDURE NOTE - PRE PROCEDURE EVALUATION
Attending Physician: Phuc Cho MD    Procedure: EGD/colonooscopy    Indication for Procedure: Dysphagia, hx polyps with 1st degree relative with colon cancer  ________________________________________________________  PAST MEDICAL & SURGICAL HISTORY:  Benign Hypertension      Anxiety States       Deliv      Tubal Ligation      Carcinoid Tumor of Ovary, Benign      Ovarian Cyst      Hypertension  Dx       Ovarian cyst  bilateral      Depression with anxiety      Anemia  bitamin b12 deficiency      Syndrome X (cardiac)      PTSD (post-traumatic stress disorder)      GERD (gastroesophageal reflux disease)      Cardiac syndrome X      Carcinoid Tumor of Ovary, Benign      Previous  Delivery, Delivered      Tubal Ligation      Status Post Ovarian Cystectomy      S/P ovarian cystectomy  bilateral      Dermoid cyst  ovarian, bilateral      S/P         S/P tubal ligation      S/P ORIF (open reduction internal fixation) fracture        ALLERGIES:  fluoroquinolone antibiotics (Rash)  Celexa (Rash)  Cipro (Short breath)    HOME MEDICATIONS:  amLODIPine 5 mg oral tablet: 1 tab(s) orally once a day  Valium 10 mg oral tablet: 1 tab(s) orally 2 times a day (Filled 21 x #60 tabs: 1 tab BID)  ZyPREXA 5 mg oral tablet: 1 tab(s) orally once a day (at bedtime)    AICD/PPM: [ ] yes   [x ] no    PERTINENT LAB DATA:                      PHYSICAL EXAMINATION:    T(C): --  HR: --  BP: --  RR: --  SpO2: --    Constitutional: NAD  HEENT: PERRLA, EOMI,    Neck:  No JVD  Respiratory: CTAB/L  Cardiovascular: S1 and S2  Gastrointestinal: BS+, soft, NT/ND  Extremities: No peripheral edema  Neurological: A/O x 3, no focal deficits  Psychiatric: Normal mood, normal affect  Skin: No rashes    ASA Class: I [ ]  II [ ]  III [ ]  IV [ ]    COMMENTS:    The patient is a suitable candidate for the planned procedure unless box checked [ ]  No, explain:

## 2023-05-01 NOTE — ASU PATIENT PROFILE, ADULT - NSICDXPASTMEDICALHX_GEN_ALL_CORE_FT
PAST MEDICAL HISTORY:  Anemia bitamin b12 deficiency    Anxiety States     Benign Hypertension     Carcinoid Tumor of Ovary, Benign     Cardiac syndrome X      Deliv     Depression with anxiety     GERD (gastroesophageal reflux disease)     Hypertension Dx     Ovarian Cyst     Ovarian cyst bilateral    PTSD (post-traumatic stress disorder)     Syndrome X (cardiac)     Tubal Ligation

## 2023-05-01 NOTE — ASU PATIENT PROFILE, ADULT - FALL HARM RISK - ATTEMPT OOB
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

## 2023-05-04 LAB — SURGICAL PATHOLOGY STUDY: SIGNIFICANT CHANGE UP

## 2023-05-08 ENCOUNTER — NON-APPOINTMENT (OUTPATIENT)
Age: 48
End: 2023-05-08

## 2023-05-09 ENCOUNTER — NON-APPOINTMENT (OUTPATIENT)
Age: 48
End: 2023-05-09

## 2023-05-10 ENCOUNTER — EMERGENCY (EMERGENCY)
Facility: HOSPITAL | Age: 48
LOS: 1 days | Discharge: HOME | End: 2023-05-10
Attending: EMERGENCY MEDICINE
Payer: COMMERCIAL

## 2023-05-10 ENCOUNTER — TRANSCRIPTION ENCOUNTER (OUTPATIENT)
Age: 48
End: 2023-05-10

## 2023-05-10 VITALS
OXYGEN SATURATION: 98 % | WEIGHT: 162.92 LBS | SYSTOLIC BLOOD PRESSURE: 127 MMHG | HEART RATE: 83 BPM | RESPIRATION RATE: 18 BRPM | HEIGHT: 61 IN | DIASTOLIC BLOOD PRESSURE: 88 MMHG | TEMPERATURE: 98 F

## 2023-05-10 DIAGNOSIS — Z98.89 OTHER SPECIFIED POSTPROCEDURAL STATES: Chronic | ICD-10-CM

## 2023-05-10 DIAGNOSIS — D36.9 BENIGN NEOPLASM, UNSPECIFIED SITE: Chronic | ICD-10-CM

## 2023-05-10 DIAGNOSIS — Z98.890 OTHER SPECIFIED POSTPROCEDURAL STATES: Chronic | ICD-10-CM

## 2023-05-10 DIAGNOSIS — Z98.51 TUBAL LIGATION STATUS: Chronic | ICD-10-CM

## 2023-05-10 PROCEDURE — 29130 APPL FINGER SPLINT STATIC: CPT | Mod: F7

## 2023-05-10 PROCEDURE — 99284 EMERGENCY DEPT VISIT MOD MDM: CPT

## 2023-05-10 PROCEDURE — 99283 EMERGENCY DEPT VISIT LOW MDM: CPT | Mod: 25

## 2023-05-10 RX ORDER — LIDOCAINE 4 G/100G
1 CREAM TOPICAL ONCE
Refills: 0 | Status: COMPLETED | OUTPATIENT
Start: 2023-05-10 | End: 2023-05-10

## 2023-05-10 RX ORDER — CYCLOBENZAPRINE HYDROCHLORIDE 10 MG/1
5 TABLET, FILM COATED ORAL ONCE
Refills: 0 | Status: COMPLETED | OUTPATIENT
Start: 2023-05-10 | End: 2023-05-10

## 2023-05-10 RX ORDER — IBUPROFEN 200 MG
600 TABLET ORAL ONCE
Refills: 0 | Status: COMPLETED | OUTPATIENT
Start: 2023-05-10 | End: 2023-05-10

## 2023-05-10 RX ORDER — LIDOCAINE 4 G/100G
1 CREAM TOPICAL
Qty: 1 | Refills: 0
Start: 2023-05-10 | End: 2023-05-14

## 2023-05-10 RX ADMIN — Medication 600 MILLIGRAM(S): at 12:26

## 2023-05-10 RX ADMIN — CYCLOBENZAPRINE HYDROCHLORIDE 5 MILLIGRAM(S): 10 TABLET, FILM COATED ORAL at 12:26

## 2023-05-10 RX ADMIN — LIDOCAINE 1 PATCH: 4 CREAM TOPICAL at 12:26

## 2023-05-10 NOTE — ED PROVIDER NOTE - PROGRESS NOTE DETAILS
Patient reports she is feeling better, will f/u with hand and neurology.  Finger splint placed on finger.  Patient nontoxic and medically stable for discharge. Results as applicable discussed with patient. Return precautions provided and patient understands to return to the ED for concerning or worsening signs and symptoms. Patient's questions answered.

## 2023-05-10 NOTE — ED PROVIDER NOTE - ATTENDING APP SHARED VISIT CONTRIBUTION OF CARE
48 year old female with PMHx of regional pain syndrome, IBS, and HTN and PShx of hysterectomy presents to ED with complaint of neck pain which radiates to the right arm and hand. The patient has had these symptoms before however pain worsened which is why patient came in for eval.  Patient has neuro appointment scheduled for 2 weeks. Likely radiculopathy.  Will dc with neuro follow up. Pt also complaint of finger injury.  +splint placed for finger fracture.

## 2023-05-10 NOTE — ED ADULT TRIAGE NOTE - CHIEF COMPLAINT QUOTE
R sided neck pain radiating to the R arm and fingers x 1 week, + fracture in the 3rd finger of the R hand - from urgent care, denies any injury/ trauma

## 2023-05-10 NOTE — ED ADULT NURSE NOTE - NSFALLUNIVINTERV_ED_ALL_ED
Call bell, personal items and telephone in reach/Instruct patient to call for assistance before getting out of bed/chair/stretcher/Non-slip footwear applied when patient is off stretcher/[place] to call system/Physically safe environment - no spills, clutter or unnecessary equipment/Purposeful proactive rounding/Room/bathroom lighting operational, light cord in reach

## 2023-05-10 NOTE — ED ADULT NURSE NOTE - OBJECTIVE STATEMENT
Patient presented to ED c/o right 3rd finger and neck pain x1 week. Pt denies any dizziness or blurry vision.

## 2023-05-10 NOTE — ED PROVIDER NOTE - PHYSICAL EXAMINATION
no focal tenderness in hand.   5/5 strength in hand and arm    Mild tenderness to cervical spine and right paraspinal region.   Capillary refill <2 seconds.  Normal temperature gradient.

## 2023-05-10 NOTE — ED PROVIDER NOTE - PATIENT PORTAL LINK FT
You can access the FollowMyHealth Patient Portal offered by [facility] by registering at the following website: [email or web address]. By joining [employer]’s FollowMyHealth portal, you will also be able to view your health information using other applications (apps) compatible with our system.

## 2023-05-10 NOTE — ED PROVIDER NOTE - OBJECTIVE STATEMENT
48 year old female with PMHx of hysterectomy, complex regional pain syndrome, IBS, and HTN is presenting for 3 days of neck pain that radiates to the right arm and hand. She had this in the past for questionable herniated disc in the c-spine. She has out patient neurology follow up in 2 weeks but due to worsening pain today and coldness of her right 3rd finger, patient came for evaluation. She was in an urgent care prior and was told she has a finger fracture. After removing her ring, her finger temperature became normal with improvement to her pain and tingling. No recent trauma so she isn't sure how she got the fracture. Pt takes percocet 2-4 times a day as per her prescription and last dose was 6am. No headache, fevers, chills. Allergy: Cipro - SOB.  Celexa - Rash.  Fluoroquinolone antibiotics - rash.

## 2023-05-10 NOTE — ED PROVIDER NOTE - CARE PROVIDERS DIRECT ADDRESSES
[email or web address]

## 2023-05-10 NOTE — ED PROVIDER NOTE - NSFOLLOWUPINSTRUCTIONS_ED_ALL_ED_FT
Fracture    A fracture is a break in one of your bones. This can occur from a variety of injuries, especially traumatic ones. Symptoms include pain, bruising, or swelling. Do not use the injured limb. If a fracture is in one of the bones below your waist, do not put weight on that limb unless instructed to do so by your healthcare provider. Crutches or a cane may have been provided. A splint or cast may have been applied by your health care provider. Make sure to keep it dry and follow up with an orthopedist as instructed.    SEEK IMMEDIATE MEDICAL CARE IF YOU HAVE ANY OF THE FOLLOWING SYMPTOMS: numbness, tingling, increasing pain, or weakness in any part of the injured limb.     Please call your primary doctor to inform them of this ER visit and obtain the next available appointment within the next 5 days. As we discussed, return to the ER if you have any worsening symptoms. - Bring copies of your results.    We no longer feel that you need further emergency care or admission to the hospital at this time.    While we have determined that you are currently stable for discharge, we know that things can change. Please seek immediate medical attention or return to the ER if you experience any of the following:  Any worsening or persistent symptoms  Severe Pain  Chest Pain  Difficulty Breathing  Bleeding  Passing Out  Severe Rash  Inability to Eat or Drink  Persistent Fever    Please see a primary care doctor or specialist within 5 days to ensure that you are improving.    Please call the [employer] phone numbers on this document if you have any problems obtaining a follow up appointment.     1) Follow up with your doctor as well as your neurologist and hand doctor  2) Return to the ED immediately for new or worsening symptoms   3) Please continue to take any home medications as prescribed  4) Your test results from your ED visit were discussed with you prior to discharge

## 2023-05-10 NOTE — ED PROVIDER NOTE - CLINICAL SUMMARY MEDICAL DECISION MAKING FREE TEXT BOX
48 year old female presenting for the above. Will get X-ray of the hand, pain control. C-spine imaging discussed at this time but since symptoms are constant will hold off on imaging. Pt with likely cervical radiculopathy causing neck pain and arm symptoms (possiblely due to finger fracture).

## 2023-05-10 NOTE — ED PROVIDER NOTE - CARE PROVIDER_API CALL
Quintin Jackson)  Orthopaedic Surgery  [address]  [place] [address]  Phone: [phone]  Fax: [phone]  Follow Up Time:     Satnam Duval)  Orthopaedic Surgery  [address] [place] [address]  Phone: [phone]  Fax: [phone]  Follow Up Time:

## 2023-05-24 ENCOUNTER — APPOINTMENT (OUTPATIENT)
Dept: PULMONOLOGY | Facility: CLINIC | Age: 48
End: 2023-05-24
Payer: COMMERCIAL

## 2023-05-24 VITALS
DIASTOLIC BLOOD PRESSURE: 72 MMHG | SYSTOLIC BLOOD PRESSURE: 106 MMHG | HEIGHT: 62 IN | HEART RATE: 95 BPM | BODY MASS INDEX: 30.55 KG/M2 | WEIGHT: 166 LBS | OXYGEN SATURATION: 98 %

## 2023-05-24 DIAGNOSIS — G90.50 COMPLEX REGIONAL PAIN SYNDROME I, UNSPECIFIED: ICD-10-CM

## 2023-05-24 DIAGNOSIS — R06.02 SHORTNESS OF BREATH: ICD-10-CM

## 2023-05-24 PROCEDURE — ZZZZZ: CPT

## 2023-05-24 PROCEDURE — 94726 PLETHYSMOGRAPHY LUNG VOLUMES: CPT

## 2023-05-24 PROCEDURE — 94729 DIFFUSING CAPACITY: CPT

## 2023-05-24 PROCEDURE — 94060 EVALUATION OF WHEEZING: CPT

## 2023-05-24 PROCEDURE — 99203 OFFICE O/P NEW LOW 30 MIN: CPT | Mod: 25

## 2023-05-24 NOTE — REVIEW OF SYSTEMS
04 13 2023  LAST ov       Received request via: Pharmacy    Was the patient seen in the last year in this department? Yes    Does the patient have an active prescription (recently filled or refills available) for medication(s) requested? No    Does the patient have retirement Plus and need 100 day supply (blood pressure, diabetes and cholesterol meds only)? Patient does not have SCP       [Negative] : Endocrine bones(Osteoporosis,prev fx,steroid use,metastatic bone ca)/other

## 2023-05-24 NOTE — HISTORY OF PRESENT ILLNESS
[Former] : former [Current] : current [TextBox_4] : 48 year old woman with history of hypertension, depression and anxiety here for evaluation of  shortness of breath, slight cough, wheezing. has only albuterol, using it twice per day.  no mucous or infectious symptoms.  Cannot walk develops shortness of breath.  \par \par Seen by Dr. Moran in 2019 while pregnant, was smoking at the time.  PFT showed mild obstruction. She was started on Breo Ellipta and albuterol prn. \par \par Echoin 2021 showed normal LV and RV. STress thall was also WNL.\par \par Smokes e cigarettes- flavored, one pod lasts e.5 days.  \par \par Has CRPS on Lyrica\par \par In addition take oxy 10/325, up to 4x per day, seroquel 300 daily, valium 10 BID,  [TextBox_11] : 1 [TextBox_13] : 30 [YearQuit] : 2020

## 2023-05-24 NOTE — ASSESSMENT
[FreeTextEntry1] : 48 year old woman, former heavy smoker > 30 pack years, who quit 3 years ago now using e cigarettes.  She reports exertional dyspnea, cough and occiasional wheezing.  Using albuterol twice daily.\par \par ON PE today VSS Lungs are clear, extremities without edema\par \par PFTs show mild obstruction with positive bronchodilator response in terms of FEV1.  Lung volumes and DLCO are WNL.\par \par IMpression:  MIld COPD not in exacerbation\par \par Plan:\par Breo Ellipta 100-25 daily\par albuterol as needed\par F/U in 4-6 weeks.  Ideally would like to give her LAMA-LABA.  Will ensure there is no interaction between the anticholinergic and seroquel, lamictal or valium.

## 2023-05-24 NOTE — PHYSICAL EXAM
[No Acute Distress] : no acute distress [Normal Appearance] : normal appearance [No Neck Mass] : no neck mass [Normal Rate/Rhythm] : normal rate/rhythm [Normal S1, S2] : normal s1, s2 [No Murmurs] : no murmurs [No Resp Distress] : no resp distress [Clear to Auscultation Bilaterally] : clear to auscultation bilaterally [No Abnormalities] : no abnormalities [No Clubbing] : no clubbing [No Cyanosis] : no cyanosis [No Edema] : no edema [Oriented x3] : oriented x3 [TextBox_99] : walks with limp

## 2023-05-26 ENCOUNTER — APPOINTMENT (OUTPATIENT)
Dept: RADIOLOGY | Facility: IMAGING CENTER | Age: 48
End: 2023-05-26

## 2023-05-29 ENCOUNTER — INPATIENT (INPATIENT)
Facility: HOSPITAL | Age: 48
LOS: 2 days | Discharge: HOME | End: 2023-06-01
Attending: INTERNAL MEDICINE | Admitting: INTERNAL MEDICINE
Payer: COMMERCIAL

## 2023-05-29 ENCOUNTER — TRANSCRIPTION ENCOUNTER (OUTPATIENT)
Age: 48
End: 2023-05-29

## 2023-05-29 VITALS
SYSTOLIC BLOOD PRESSURE: 128 MMHG | HEIGHT: 61 IN | DIASTOLIC BLOOD PRESSURE: 94 MMHG | HEART RATE: 86 BPM | TEMPERATURE: 98 F | RESPIRATION RATE: 18 BRPM | OXYGEN SATURATION: 98 %

## 2023-05-29 DIAGNOSIS — Z98.51 TUBAL LIGATION STATUS: Chronic | ICD-10-CM

## 2023-05-29 DIAGNOSIS — J44.9 CHRONIC OBSTRUCTIVE PULMONARY DISEASE, UNSPECIFIED: ICD-10-CM

## 2023-05-29 DIAGNOSIS — G90.50 COMPLEX REGIONAL PAIN SYNDROME I, UNSPECIFIED: ICD-10-CM

## 2023-05-29 DIAGNOSIS — I10 ESSENTIAL (PRIMARY) HYPERTENSION: ICD-10-CM

## 2023-05-29 DIAGNOSIS — Z98.89 OTHER SPECIFIED POSTPROCEDURAL STATES: Chronic | ICD-10-CM

## 2023-05-29 DIAGNOSIS — F41.8 OTHER SPECIFIED ANXIETY DISORDERS: ICD-10-CM

## 2023-05-29 DIAGNOSIS — M79.671 PAIN IN RIGHT FOOT: ICD-10-CM

## 2023-05-29 DIAGNOSIS — Z97.8 PRESENCE OF OTHER SPECIFIED DEVICES: ICD-10-CM

## 2023-05-29 DIAGNOSIS — I20.8 OTHER FORMS OF ANGINA PECTORIS: ICD-10-CM

## 2023-05-29 DIAGNOSIS — K21.9 GASTRO-ESOPHAGEAL REFLUX DISEASE WITHOUT ESOPHAGITIS: ICD-10-CM

## 2023-05-29 DIAGNOSIS — Z98.890 OTHER SPECIFIED POSTPROCEDURAL STATES: Chronic | ICD-10-CM

## 2023-05-29 DIAGNOSIS — D36.9 BENIGN NEOPLASM, UNSPECIFIED SITE: Chronic | ICD-10-CM

## 2023-05-29 DIAGNOSIS — F43.10 POST-TRAUMATIC STRESS DISORDER, UNSPECIFIED: ICD-10-CM

## 2023-05-29 LAB
ALBUMIN SERPL ELPH-MCNC: 4.5 G/DL — SIGNIFICANT CHANGE UP (ref 3.3–5)
ALP SERPL-CCNC: 55 U/L — SIGNIFICANT CHANGE UP (ref 40–120)
ALT FLD-CCNC: 23 U/L — SIGNIFICANT CHANGE UP (ref 4–33)
ANION GAP SERPL CALC-SCNC: 15 MMOL/L — HIGH (ref 7–14)
APTT BLD: 28.3 SEC — SIGNIFICANT CHANGE UP (ref 27–36.3)
AST SERPL-CCNC: 23 U/L — SIGNIFICANT CHANGE UP (ref 4–32)
BASOPHILS # BLD AUTO: 0.02 K/UL — SIGNIFICANT CHANGE UP (ref 0–0.2)
BASOPHILS NFR BLD AUTO: 0.3 % — SIGNIFICANT CHANGE UP (ref 0–2)
BILIRUB SERPL-MCNC: <0.2 MG/DL — SIGNIFICANT CHANGE UP (ref 0.2–1.2)
BLD GP AB SCN SERPL QL: NEGATIVE — SIGNIFICANT CHANGE UP
BUN SERPL-MCNC: 13 MG/DL — SIGNIFICANT CHANGE UP (ref 7–23)
CALCIUM SERPL-MCNC: 8.4 MG/DL — SIGNIFICANT CHANGE UP (ref 8.4–10.5)
CHLORIDE SERPL-SCNC: 106 MMOL/L — SIGNIFICANT CHANGE UP (ref 98–107)
CO2 SERPL-SCNC: 19 MMOL/L — LOW (ref 22–31)
CREAT SERPL-MCNC: 0.91 MG/DL — SIGNIFICANT CHANGE UP (ref 0.5–1.3)
EGFR: 79 ML/MIN/1.73M2 — SIGNIFICANT CHANGE UP
EOSINOPHIL # BLD AUTO: 0.05 K/UL — SIGNIFICANT CHANGE UP (ref 0–0.5)
EOSINOPHIL NFR BLD AUTO: 0.9 % — SIGNIFICANT CHANGE UP (ref 0–6)
GLUCOSE SERPL-MCNC: 94 MG/DL — SIGNIFICANT CHANGE UP (ref 70–99)
HCG SERPL-ACNC: <1 MIU/ML — SIGNIFICANT CHANGE UP
HCT VFR BLD CALC: 35.7 % — SIGNIFICANT CHANGE UP (ref 34.5–45)
HGB BLD-MCNC: 12 G/DL — SIGNIFICANT CHANGE UP (ref 11.5–15.5)
IANC: 3.34 K/UL — SIGNIFICANT CHANGE UP (ref 1.8–7.4)
IMM GRANULOCYTES NFR BLD AUTO: 0.2 % — SIGNIFICANT CHANGE UP (ref 0–0.9)
INR BLD: 0.91 RATIO — SIGNIFICANT CHANGE UP (ref 0.88–1.16)
LYMPHOCYTES # BLD AUTO: 2.01 K/UL — SIGNIFICANT CHANGE UP (ref 1–3.3)
LYMPHOCYTES # BLD AUTO: 35 % — SIGNIFICANT CHANGE UP (ref 13–44)
MCHC RBC-ENTMCNC: 30.2 PG — SIGNIFICANT CHANGE UP (ref 27–34)
MCHC RBC-ENTMCNC: 33.6 GM/DL — SIGNIFICANT CHANGE UP (ref 32–36)
MCV RBC AUTO: 89.9 FL — SIGNIFICANT CHANGE UP (ref 80–100)
MONOCYTES # BLD AUTO: 0.32 K/UL — SIGNIFICANT CHANGE UP (ref 0–0.9)
MONOCYTES NFR BLD AUTO: 5.6 % — SIGNIFICANT CHANGE UP (ref 2–14)
NEUTROPHILS # BLD AUTO: 3.34 K/UL — SIGNIFICANT CHANGE UP (ref 1.8–7.4)
NEUTROPHILS NFR BLD AUTO: 58 % — SIGNIFICANT CHANGE UP (ref 43–77)
NRBC # BLD: 0 /100 WBCS — SIGNIFICANT CHANGE UP (ref 0–0)
NRBC # FLD: 0 K/UL — SIGNIFICANT CHANGE UP (ref 0–0)
PLATELET # BLD AUTO: 265 K/UL — SIGNIFICANT CHANGE UP (ref 150–400)
POTASSIUM SERPL-MCNC: 4.4 MMOL/L — SIGNIFICANT CHANGE UP (ref 3.5–5.3)
POTASSIUM SERPL-SCNC: 4.4 MMOL/L — SIGNIFICANT CHANGE UP (ref 3.5–5.3)
PROT SERPL-MCNC: 6.4 G/DL — SIGNIFICANT CHANGE UP (ref 6–8.3)
PROTHROM AB SERPL-ACNC: 10.5 SEC — SIGNIFICANT CHANGE UP (ref 10.5–13.4)
RBC # BLD: 3.97 M/UL — SIGNIFICANT CHANGE UP (ref 3.8–5.2)
RBC # FLD: 11.9 % — SIGNIFICANT CHANGE UP (ref 10.3–14.5)
RH IG SCN BLD-IMP: POSITIVE — SIGNIFICANT CHANGE UP
SODIUM SERPL-SCNC: 140 MMOL/L — SIGNIFICANT CHANGE UP (ref 135–145)
WBC # BLD: 5.75 K/UL — SIGNIFICANT CHANGE UP (ref 3.8–10.5)
WBC # FLD AUTO: 5.75 K/UL — SIGNIFICANT CHANGE UP (ref 3.8–10.5)

## 2023-05-29 PROCEDURE — 99222 1ST HOSP IP/OBS MODERATE 55: CPT

## 2023-05-29 PROCEDURE — 99285 EMERGENCY DEPT VISIT HI MDM: CPT

## 2023-05-29 PROCEDURE — 71045 X-RAY EXAM CHEST 1 VIEW: CPT | Mod: 26

## 2023-05-29 RX ORDER — ALBUTEROL 90 UG/1
2 AEROSOL, METERED ORAL EVERY 6 HOURS
Refills: 0 | Status: DISCONTINUED | OUTPATIENT
Start: 2023-05-29 | End: 2023-06-01

## 2023-05-29 RX ORDER — ACETAMINOPHEN 500 MG
650 TABLET ORAL EVERY 6 HOURS
Refills: 0 | Status: DISCONTINUED | OUTPATIENT
Start: 2023-05-29 | End: 2023-05-30

## 2023-05-29 RX ORDER — QUETIAPINE FUMARATE 200 MG/1
1 TABLET, FILM COATED ORAL
Refills: 0 | DISCHARGE

## 2023-05-29 RX ORDER — ALBUTEROL 90 UG/1
2 AEROSOL, METERED ORAL
Refills: 0 | DISCHARGE

## 2023-05-29 RX ORDER — OLANZAPINE 15 MG/1
1 TABLET, FILM COATED ORAL
Qty: 0 | Refills: 0 | DISCHARGE

## 2023-05-29 RX ORDER — LAMOTRIGINE 25 MG/1
200 TABLET, ORALLY DISINTEGRATING ORAL AT BEDTIME
Refills: 0 | Status: DISCONTINUED | OUTPATIENT
Start: 2023-05-29 | End: 2023-06-01

## 2023-05-29 RX ORDER — DIAZEPAM 5 MG
10 TABLET ORAL
Refills: 0 | Status: DISCONTINUED | OUTPATIENT
Start: 2023-05-29 | End: 2023-06-01

## 2023-05-29 RX ORDER — LANOLIN ALCOHOL/MO/W.PET/CERES
3 CREAM (GRAM) TOPICAL AT BEDTIME
Refills: 0 | Status: DISCONTINUED | OUTPATIENT
Start: 2023-05-29 | End: 2023-06-01

## 2023-05-29 RX ORDER — BUDESONIDE AND FORMOTEROL FUMARATE DIHYDRATE 160; 4.5 UG/1; UG/1
2 AEROSOL RESPIRATORY (INHALATION)
Refills: 0 | Status: DISCONTINUED | OUTPATIENT
Start: 2023-05-29 | End: 2023-06-01

## 2023-05-29 RX ORDER — AMLODIPINE BESYLATE 2.5 MG/1
5 TABLET ORAL DAILY
Refills: 0 | Status: DISCONTINUED | OUTPATIENT
Start: 2023-05-29 | End: 2023-06-01

## 2023-05-29 RX ORDER — NICOTINE POLACRILEX 2 MG
1 GUM BUCCAL DAILY
Refills: 0 | Status: DISCONTINUED | OUTPATIENT
Start: 2023-05-29 | End: 2023-06-01

## 2023-05-29 RX ORDER — QUETIAPINE FUMARATE 200 MG/1
300 TABLET, FILM COATED ORAL AT BEDTIME
Refills: 0 | Status: DISCONTINUED | OUTPATIENT
Start: 2023-05-29 | End: 2023-06-01

## 2023-05-29 RX ORDER — DIAZEPAM 5 MG
1 TABLET ORAL
Qty: 0 | Refills: 0 | DISCHARGE

## 2023-05-29 RX ORDER — ACETAMINOPHEN 500 MG
1000 TABLET ORAL ONCE
Refills: 0 | Status: COMPLETED | OUTPATIENT
Start: 2023-05-29 | End: 2023-05-29

## 2023-05-29 RX ORDER — IBUPROFEN 200 MG
1 TABLET ORAL
Refills: 0 | DISCHARGE

## 2023-05-29 RX ORDER — MORPHINE SULFATE 50 MG/1
2 CAPSULE, EXTENDED RELEASE ORAL EVERY 6 HOURS
Refills: 0 | Status: DISCONTINUED | OUTPATIENT
Start: 2023-05-29 | End: 2023-06-01

## 2023-05-29 RX ORDER — AMLODIPINE BESYLATE 2.5 MG/1
1 TABLET ORAL
Qty: 0 | Refills: 0 | DISCHARGE

## 2023-05-29 RX ORDER — METOPROLOL TARTRATE 50 MG
25 TABLET ORAL DAILY
Refills: 0 | Status: DISCONTINUED | OUTPATIENT
Start: 2023-05-29 | End: 2023-06-01

## 2023-05-29 RX ORDER — METOPROLOL TARTRATE 50 MG
1 TABLET ORAL
Refills: 0 | DISCHARGE

## 2023-05-29 RX ORDER — OXYCODONE HYDROCHLORIDE 5 MG/1
10 TABLET ORAL EVERY 6 HOURS
Refills: 0 | Status: DISCONTINUED | OUTPATIENT
Start: 2023-05-29 | End: 2023-06-01

## 2023-05-29 RX ORDER — LAMOTRIGINE 25 MG/1
1 TABLET, ORALLY DISINTEGRATING ORAL
Refills: 0 | DISCHARGE

## 2023-05-29 RX ORDER — PANTOPRAZOLE SODIUM 20 MG/1
40 TABLET, DELAYED RELEASE ORAL
Refills: 0 | Status: DISCONTINUED | OUTPATIENT
Start: 2023-05-29 | End: 2023-06-01

## 2023-05-29 RX ORDER — FLUTICASONE FUROATE AND VILANTEROL TRIFENATATE 100; 25 UG/1; UG/1
1 POWDER RESPIRATORY (INHALATION)
Refills: 0 | DISCHARGE

## 2023-05-29 RX ORDER — ONDANSETRON 8 MG/1
4 TABLET, FILM COATED ORAL EVERY 8 HOURS
Refills: 0 | Status: DISCONTINUED | OUTPATIENT
Start: 2023-05-29 | End: 2023-06-01

## 2023-05-29 RX ORDER — POLYETHYLENE GLYCOL 3350 17 G/17G
17 POWDER, FOR SOLUTION ORAL DAILY
Refills: 0 | Status: DISCONTINUED | OUTPATIENT
Start: 2023-05-29 | End: 2023-06-01

## 2023-05-29 RX ADMIN — QUETIAPINE FUMARATE 300 MILLIGRAM(S): 200 TABLET, FILM COATED ORAL at 21:53

## 2023-05-29 RX ADMIN — Medication 300 MILLIGRAM(S): at 21:52

## 2023-05-29 RX ADMIN — Medication 10 MILLIGRAM(S): at 21:52

## 2023-05-29 RX ADMIN — Medication 1 PATCH: at 21:52

## 2023-05-29 RX ADMIN — LAMOTRIGINE 200 MILLIGRAM(S): 25 TABLET, ORALLY DISINTEGRATING ORAL at 21:52

## 2023-05-29 RX ADMIN — BUDESONIDE AND FORMOTEROL FUMARATE DIHYDRATE 2 PUFF(S): 160; 4.5 AEROSOL RESPIRATORY (INHALATION) at 21:53

## 2023-05-29 RX ADMIN — Medication 400 MILLIGRAM(S): at 19:49

## 2023-05-29 NOTE — CONSULT NOTE ADULT - SUBJECTIVE AND OBJECTIVE BOX
Patient is a 48y old  Female who presents with a chief complaint of Preop clearance for hardware removal in right ankle by podiatry (29 May 2023 20:21)      HPI:  48 yr old female with a pmh of HTN, cardiac syndrome X, GERD, PTSD, COPD, complex regional pain syndrome following a right ankle fracture in  s/p repair with hardware. Pt reports one of the screws has malfunctioned/displaced and she is to have it removed by podiatry 23 therefore she presents for preop clearance.   Denies  headache, dizziness, chest pain, palpitations, SOB, abdominal pain,  diarrhea/constipation, urinary symptoms.   Vitals: T 98.3, HR 86, /94, RR 18 satting 98% RA (29 May 2023 20:21)      PAST MEDICAL & SURGICAL HISTORY:  Benign Hypertension      Carcinoid Tumor of Ovary, Benign      Ovarian Cyst      Depression with anxiety      Anemia  bitamin b12 deficiency      PTSD (post-traumatic stress disorder)      GERD (gastroesophageal reflux disease)      Cardiac syndrome X      COPD (chronic obstructive pulmonary disease)      Complex regional pain syndrome I, unspecified      Carcinoid Tumor of Ovary, Benign      Previous  Delivery, Delivered      Tubal Ligation      Status Post Ovarian Cystectomy      S/P ovarian cystectomy  bilateral      Dermoid cyst  ovarian, bilateral      S/P         S/P ORIF (open reduction internal fixation) fracture      S/P tubal ligation          MEDICATIONS  (STANDING):  amLODIPine   Tablet 5 milliGRAM(s) Oral daily  budesonide  80 MICROgram(s)/formoterol 4.5 MICROgram(s) Inhaler 2 Puff(s) Inhalation two times a day  diazepam    Tablet 10 milliGRAM(s) Oral two times a day  lamoTRIgine 200 milliGRAM(s) Oral at bedtime  metoprolol succinate ER 25 milliGRAM(s) Oral daily  nicotine - 21 mG/24Hr(s) Patch 1 Patch Transdermal daily  pantoprazole    Tablet 40 milliGRAM(s) Oral before breakfast  pregabalin 300 milliGRAM(s) Oral two times a day  QUEtiapine 300 milliGRAM(s) Oral at bedtime    MEDICATIONS  (PRN):  acetaminophen     Tablet .. 650 milliGRAM(s) Oral every 6 hours PRN Temp greater or equal to 38C (100.4F), Mild Pain (1 - 3)  albuterol    90 MICROgram(s) HFA Inhaler 2 Puff(s) Inhalation every 6 hours PRN Shortness of Breath and/or Wheezing  aluminum hydroxide/magnesium hydroxide/simethicone Suspension 30 milliLiter(s) Oral every 4 hours PRN Dyspepsia  melatonin 3 milliGRAM(s) Oral at bedtime PRN Insomnia  morphine  - Injectable 2 milliGRAM(s) IV Push every 6 hours PRN breakthrough pain  ondansetron Injectable 4 milliGRAM(s) IV Push every 8 hours PRN Nausea and/or Vomiting  oxyCODONE    IR 10 milliGRAM(s) Oral every 6 hours PRN Severe Pain (7 - 10)  polyethylene glycol 3350 17 Gram(s) Oral daily PRN Constipation      Allergies    Celexa (Rash)  fluoroquinolone antibiotics (Rash)  Cipro (Short breath)    Intolerances        VITALS:    Vital Signs Last 24 Hrs  T(C): 36.7 (29 May 2023 17:56), Max: 36.8 (29 May 2023 14:59)  T(F): 98 (29 May 2023 17:56), Max: 98.3 (29 May 2023 14:59)  HR: 73 (29 May 2023 17:56) (73 - 86)  BP: 120/75 (29 May 2023 17:56) (120/75 - 128/94)  BP(mean): --  RR: 18 (29 May 2023 17:56) (18 - 18)  SpO2: 100% (29 May 2023 17:56) (98% - 100%)    Parameters below as of 29 May 2023 17:56  Patient On (Oxygen Delivery Method): room air        LABS:                          12.0   5.75  )-----------( 265      ( 29 May 2023 17:02 )             35.7       05-    140  |  106  |  13  ----------------------------<  94  4.4   |  19<L>  |  0.91    Ca    8.4      29 May 2023 17:02    TPro  6.4  /  Alb  4.5  /  TBili  <0.2  /  DBili  x   /  AST  23  /  ALT  23  /  AlkPhos  55  05-      CAPILLARY BLOOD GLUCOSE          PT/INR - ( 29 May 2023 17:02 )   PT: 10.5 sec;   INR: 0.91 ratio         PTT - ( 29 May 2023 17:02 )  PTT:28.3 sec    LOWER EXTREMITY PHYSICAL EXAM:    Vascular: DP/PT 2/4, B/L, CFT <3 seconds B/L, Temperature gradient warm to cool, B/L.   Neuro: Epicritic sensation intact to the level of toes, B/L.  Musculoskeletal/Ortho: pain on palpation to right foot dorsal midfoot   Skin: right foot with normal skin tension lines, no ope wounds, no open lesions, no erythema no edema. Left foot with no acute signs of infection    RADIOLOGY & ADDITIONAL STUDIES:

## 2023-05-29 NOTE — H&P ADULT - NSHPADDITIONALINFOADULT_GEN_ALL_CORE
Istop Reference #: [id number]    05/08/2023	05/22/2023	pregabalin 300 mg capsule	60	30	Ramo Aj	[id number]	Insurance	[facility] #[id number]  05/08/2023	05/12/2023	oxycodone-acetaminophen  mg tab	120	30	Ramo Aj	[id number]	Insurance	[facility] #[id number]  04/11/2023	04/30/2023	diazepam 10 mg tablet	60	30	Faiza Adan	[id number]	[facility] #[id number]

## 2023-05-29 NOTE — H&P ADULT - PROBLEM SELECTOR PLAN 1
Active-  presence for preop clearance for hardware removal   Per ACC/AHA 2014 guidelines Guideline on Perioperative Cardiovascular Evaluation and Management of Patients Undergoing Noncardiac Surgery, Margi Stewart's estimated rate of MI, pulmonary edema, v. Fib, cardiac arrest or complete heart block is 3.9% ( revised cardiac risk index) and 0.1% (ROQUE) which is considered low  risk of adverse outcomes with non-cardiac surgery. Patient may proceed with scheduled intervention without further workup. Active-  presence for preop clearance for hardware removal   Podiatry consult in AM    Per ACC/AHA 2014 guidelines Guideline on Perioperative Cardiovascular Evaluation and Management of Patients Undergoing Noncardiac Surgery, Margi Stewart's estimated rate of MI, pulmonary edema, v. Fib, cardiac arrest or complete heart block is 3.9% ( revised cardiac risk index) and 0.1% (ROQUE) which is considered low  risk of adverse outcomes with non-cardiac surgery. Patient may proceed with scheduled intervention without further workup.

## 2023-05-29 NOTE — H&P ADULT - NSICDXPASTMEDICALHX_GEN_ALL_CORE_FT
PAST MEDICAL HISTORY:  Anemia bitamin b12 deficiency    Benign Hypertension     Carcinoid Tumor of Ovary, Benign     Cardiac syndrome X     COPD (chronic obstructive pulmonary disease)     Depression with anxiety     GERD (gastroesophageal reflux disease)     Ovarian Cyst     PTSD (post-traumatic stress disorder)

## 2023-05-29 NOTE — H&P ADULT - NSHPLABSRESULTS_GEN_ALL_CORE
labs personally reviewed and pertinent [facility] documents/labs/diagnostics reviewed                         12.0   5.75  )-----------( 265      ( 29 May 2023 17:02 )             35.7       05-29    140  |  106  |  13  ----------------------------<  94  4.4   |  19<L>  |  0.91    Ca    8.4      29 May 2023 17:02    TPro  6.4  /  Alb  4.5  /  TBili  <0.2  /  DBili  x   /  AST  23  /  ALT  23  /  AlkPhos  55  05-29      PT/INR - ( 29 May 2023 17:02 )   PT: 10.5 sec;   INR: 0.91 ratio         PTT - ( 29 May 2023 17:02 )  PTT:28.3 sec        EKG interpreted by myself: nsr  CXR interpreted by myself: clear lungs

## 2023-05-29 NOTE — H&P ADULT - PROBLEM SELECTOR PLAN 2
Chronic stable  Continue diazepam BID, lyrica 300mg BID  Morphine 2mg IV Q6 PRN for breakthrough pain

## 2023-05-29 NOTE — H&P ADULT - NSHPPHYSICALEXAM_GEN_ALL_CORE
PHYSICAL EXAM:  GENERAL: NAD, comfortable at bedside   HEAD:  Atraumatic, Normocephalic  EYES: EOMI, PERRL, conjunctiva and sclera clear  NECK: Supple, No JVD  CHEST/LUNG: Clear to auscultation bilaterally; No wheezes, rales or rhonchi  HEART: Regular rate and rhythm; No murmurs, rubs, or gallops, (+)S1, S2  ABDOMEN: Soft, Nontender, Nondistended; Normal Bowel sounds   EXTREMITIES:  2+ Peripheral Pulses, No clubbing, cyanosis, or edema, + tenderness to palpation of the right dorsal and lateral foot aspects   PSYCH: normal mood and affect  NEUROLOGY: AAOx3, non-focal  SKIN: No rashes or lesions

## 2023-05-29 NOTE — ED PROVIDER NOTE - CLINICAL SUMMARY MEDICAL DECISION MAKING FREE TEXT BOX
48-year-old female with scheduled right foot surgery here for preop clearance.  Labs ordered, EKG and chest x-ray as well.  As per podiatry patient to be admitted to Dr. Rodriguez.  So far message has been left.  Will admit patient once contact has been made.

## 2023-05-29 NOTE — ED ADULT NURSE NOTE - OBJECTIVE STATEMENT
pt sent in  for admission, pt having screws removed from foot.  no swelling noted to foot/ iv placed on lt ac 20  labs sent off.

## 2023-05-29 NOTE — CONSULT NOTE ADULT - ASSESSMENT
48 F w right foot malfunctioned hardware  - Patient seen and evaluated  - Patient is afebrile, VSS, neurovascular status intact  -  right foot with normal skin tension lines, no ope wounds, no open lesions, no erythema no edema. Left foot with no acute signs of infection  - Recommended admission to medicine under Dr Rodriguez  - Ordered right foot/ankle xray  - Patient booked for right foot hardware removal with right foot subtalr joint arthroscopy tomorrow Tuesday 5/30 @ 1:00 PM with Dr Waterhouse  -Medical clearance appreciated  -NPO after midnight  -Pre-op labs in AM  -Discussed with attending

## 2023-05-29 NOTE — ED PROVIDER NOTE - OBJECTIVE STATEMENT
48-year-old female with past medical history of hypertension and cardiac syndrome X.  Patient also has a history of GERD, PTSD and COPD.  Patient had history of right ankle fracture in 2021 that required screws.  Patient has been having complex regional pain syndrome since and was found to have displacement of screw.  Patient is scheduled to have surgery tomorrow with podiatry to have screw removed.  Patient is here for preop clearance.  Patient has no new complaints.

## 2023-05-29 NOTE — ED PROVIDER NOTE - PHYSICAL EXAMINATION
Tenderness at lateral heel on right foot.  No significant edema, erythema, wound noted.  Pulses are equal in both lower extremities and sensation and range of motion is normal.

## 2023-05-29 NOTE — ED ADULT TRIAGE NOTE - CHIEF COMPLAINT QUOTE
pt sent to ED for scheduled surgery tomorrow.  pt is scheduled to get screws removed from right foot and was told that the ED needs to call podiatry upon arrival.  pt offers no complaints

## 2023-05-29 NOTE — H&P ADULT - HISTORY OF PRESENT ILLNESS
48 yr old female with a pmh of HTN, cardiac syndrome X, GERD, PTSD, COPD, complex regional pain syndrome following a right ankle fracture in 2021 s/p repair with hardware. Pt reports one of the screws has malfunctioned/displaced and she is to have it removed by podiatry 5/30/23 therefore she presents for preop clearance.   Denies  headache, dizziness, chest pain, palpitations, SOB, abdominal pain,  diarrhea/constipation, urinary symptoms.   Vitals: T 98.3, HR 86, /94, RR 18 satting 98% RA

## 2023-05-29 NOTE — ED ADULT TRIAGE NOTE - NS ED TRIAGE AVPU SCALE
conservatvei gi magnemnt  no sign of gib Alert-The patient is alert, awake and responds to voice. The patient is oriented to time, place, and person. The triage nurse is able to obtain subjective information.

## 2023-05-29 NOTE — H&P ADULT - NSHPREVIEWOFSYSTEMS_GEN_ALL_CORE
REVIEW OF SYSTEMS:    CONSTITUTIONAL: No weakness, fevers or chills  EYES/ENT: No visual changes;  No dysphagia; No sore throat; No rhinorrhea; No sinus pain/pressure  NECK: No pain or stiffness  RESPIRATORY: No cough, wheezing, hemoptysis; No shortness of breath  CARDIOVASCULAR: No chest pain or palpitations; No lower extremity edema  GASTROINTESTINAL: No abdominal or epigastric pain. No nausea, vomiting, or hematemesis; No diarrhea or constipation. No melena or hematochezia.  GENITOURINARY: No dysuria, frequency or hematuria  NEUROLOGICAL: No numbness or weakness  MSK: ambulates without assistance- right foot pain- dorsal and lateral aspect   SKIN: No itching, burning, rashes, or lesions   All other review of systems is negative unless indicated above.

## 2023-05-30 ENCOUNTER — TRANSCRIPTION ENCOUNTER (OUTPATIENT)
Age: 48
End: 2023-05-30

## 2023-05-30 LAB
ANION GAP SERPL CALC-SCNC: 10 MMOL/L — SIGNIFICANT CHANGE UP (ref 7–14)
APTT BLD: 28.6 SEC — SIGNIFICANT CHANGE UP (ref 27–36.3)
BASOPHILS # BLD AUTO: 0.02 K/UL — SIGNIFICANT CHANGE UP (ref 0–0.2)
BASOPHILS NFR BLD AUTO: 0.5 % — SIGNIFICANT CHANGE UP (ref 0–2)
BUN SERPL-MCNC: 10 MG/DL — SIGNIFICANT CHANGE UP (ref 7–23)
CALCIUM SERPL-MCNC: 8.8 MG/DL — SIGNIFICANT CHANGE UP (ref 8.4–10.5)
CHLORIDE SERPL-SCNC: 106 MMOL/L — SIGNIFICANT CHANGE UP (ref 98–107)
CHOLEST SERPL-MCNC: 218 MG/DL — HIGH
CO2 SERPL-SCNC: 22 MMOL/L — SIGNIFICANT CHANGE UP (ref 22–31)
CREAT SERPL-MCNC: 0.69 MG/DL — SIGNIFICANT CHANGE UP (ref 0.5–1.3)
EGFR: 108 ML/MIN/1.73M2 — SIGNIFICANT CHANGE UP
EOSINOPHIL # BLD AUTO: 0.08 K/UL — SIGNIFICANT CHANGE UP (ref 0–0.5)
EOSINOPHIL NFR BLD AUTO: 1.8 % — SIGNIFICANT CHANGE UP (ref 0–6)
GLUCOSE SERPL-MCNC: 86 MG/DL — SIGNIFICANT CHANGE UP (ref 70–99)
HCT VFR BLD CALC: 36.1 % — SIGNIFICANT CHANGE UP (ref 34.5–45)
HDLC SERPL-MCNC: 50 MG/DL — LOW
HGB BLD-MCNC: 11.9 G/DL — SIGNIFICANT CHANGE UP (ref 11.5–15.5)
IANC: 1.78 K/UL — LOW (ref 1.8–7.4)
IMM GRANULOCYTES NFR BLD AUTO: 0.2 % — SIGNIFICANT CHANGE UP (ref 0–0.9)
INR BLD: <0.9 RATIO — SIGNIFICANT CHANGE UP (ref 0.88–1.16)
LIPID PNL WITH DIRECT LDL SERPL: 100 MG/DL — HIGH
LYMPHOCYTES # BLD AUTO: 2.24 K/UL — SIGNIFICANT CHANGE UP (ref 1–3.3)
LYMPHOCYTES # BLD AUTO: 50.8 % — HIGH (ref 13–44)
MCHC RBC-ENTMCNC: 29.2 PG — SIGNIFICANT CHANGE UP (ref 27–34)
MCHC RBC-ENTMCNC: 33 GM/DL — SIGNIFICANT CHANGE UP (ref 32–36)
MCV RBC AUTO: 88.7 FL — SIGNIFICANT CHANGE UP (ref 80–100)
MONOCYTES # BLD AUTO: 0.28 K/UL — SIGNIFICANT CHANGE UP (ref 0–0.9)
MONOCYTES NFR BLD AUTO: 6.3 % — SIGNIFICANT CHANGE UP (ref 2–14)
NEUTROPHILS # BLD AUTO: 1.78 K/UL — LOW (ref 1.8–7.4)
NEUTROPHILS NFR BLD AUTO: 40.4 % — LOW (ref 43–77)
NON HDL CHOLESTEROL: 168 MG/DL — HIGH
NRBC # BLD: 0 /100 WBCS — SIGNIFICANT CHANGE UP (ref 0–0)
NRBC # FLD: 0 K/UL — SIGNIFICANT CHANGE UP (ref 0–0)
PLATELET # BLD AUTO: 232 K/UL — SIGNIFICANT CHANGE UP (ref 150–400)
POTASSIUM SERPL-MCNC: 4 MMOL/L — SIGNIFICANT CHANGE UP (ref 3.5–5.3)
POTASSIUM SERPL-SCNC: 4 MMOL/L — SIGNIFICANT CHANGE UP (ref 3.5–5.3)
PROTHROM AB SERPL-ACNC: 10.3 SEC — LOW (ref 10.5–13.4)
RBC # BLD: 4.07 M/UL — SIGNIFICANT CHANGE UP (ref 3.8–5.2)
RBC # FLD: 11.9 % — SIGNIFICANT CHANGE UP (ref 10.3–14.5)
SODIUM SERPL-SCNC: 138 MMOL/L — SIGNIFICANT CHANGE UP (ref 135–145)
TRIGL SERPL-MCNC: 341 MG/DL — HIGH
WBC # BLD: 4.41 K/UL — SIGNIFICANT CHANGE UP (ref 3.8–10.5)
WBC # FLD AUTO: 4.41 K/UL — SIGNIFICANT CHANGE UP (ref 3.8–10.5)

## 2023-05-30 PROCEDURE — 73630 X-RAY EXAM OF FOOT: CPT | Mod: 26,RT,77

## 2023-05-30 PROCEDURE — 73610 X-RAY EXAM OF ANKLE: CPT | Mod: 26,RT

## 2023-05-30 PROCEDURE — 73630 X-RAY EXAM OF FOOT: CPT | Mod: 26,RT

## 2023-05-30 RX ORDER — MORPHINE SULFATE 50 MG/1
2 CAPSULE, EXTENDED RELEASE ORAL EVERY 4 HOURS
Refills: 0 | Status: DISCONTINUED | OUTPATIENT
Start: 2023-05-30 | End: 2023-06-01

## 2023-05-30 RX ORDER — ONDANSETRON 8 MG/1
4 TABLET, FILM COATED ORAL ONCE
Refills: 0 | Status: DISCONTINUED | OUTPATIENT
Start: 2023-05-30 | End: 2023-05-30

## 2023-05-30 RX ORDER — OXYCODONE HYDROCHLORIDE 5 MG/1
10 TABLET ORAL ONCE
Refills: 0 | Status: DISCONTINUED | OUTPATIENT
Start: 2023-05-30 | End: 2023-05-30

## 2023-05-30 RX ORDER — ACETAMINOPHEN 500 MG
650 TABLET ORAL EVERY 6 HOURS
Refills: 0 | Status: DISCONTINUED | OUTPATIENT
Start: 2023-05-30 | End: 2023-06-01

## 2023-05-30 RX ORDER — HYDROMORPHONE HYDROCHLORIDE 2 MG/ML
1 INJECTION INTRAMUSCULAR; INTRAVENOUS; SUBCUTANEOUS
Refills: 0 | Status: DISCONTINUED | OUTPATIENT
Start: 2023-05-30 | End: 2023-05-30

## 2023-05-30 RX ORDER — FENTANYL CITRATE 50 UG/ML
50 INJECTION INTRAVENOUS
Refills: 0 | Status: DISCONTINUED | OUTPATIENT
Start: 2023-05-30 | End: 2023-05-30

## 2023-05-30 RX ADMIN — Medication 25 MILLIGRAM(S): at 05:39

## 2023-05-30 RX ADMIN — QUETIAPINE FUMARATE 300 MILLIGRAM(S): 200 TABLET, FILM COATED ORAL at 22:09

## 2023-05-30 RX ADMIN — OXYCODONE HYDROCHLORIDE 10 MILLIGRAM(S): 5 TABLET ORAL at 05:40

## 2023-05-30 RX ADMIN — Medication 10 MILLIGRAM(S): at 17:41

## 2023-05-30 RX ADMIN — PANTOPRAZOLE SODIUM 40 MILLIGRAM(S): 20 TABLET, DELAYED RELEASE ORAL at 05:40

## 2023-05-30 RX ADMIN — OXYCODONE HYDROCHLORIDE 10 MILLIGRAM(S): 5 TABLET ORAL at 06:40

## 2023-05-30 RX ADMIN — Medication 1 PATCH: at 21:48

## 2023-05-30 RX ADMIN — Medication 300 MILLIGRAM(S): at 05:40

## 2023-05-30 RX ADMIN — MORPHINE SULFATE 2 MILLIGRAM(S): 50 CAPSULE, EXTENDED RELEASE ORAL at 09:49

## 2023-05-30 RX ADMIN — LAMOTRIGINE 200 MILLIGRAM(S): 25 TABLET, ORALLY DISINTEGRATING ORAL at 22:09

## 2023-05-30 RX ADMIN — Medication 300 MILLIGRAM(S): at 17:41

## 2023-05-30 RX ADMIN — AMLODIPINE BESYLATE 5 MILLIGRAM(S): 2.5 TABLET ORAL at 05:40

## 2023-05-30 RX ADMIN — Medication 10 MILLIGRAM(S): at 05:39

## 2023-05-30 RX ADMIN — Medication 1 PATCH: at 18:49

## 2023-05-30 RX ADMIN — Medication 1 PATCH: at 11:59

## 2023-05-30 RX ADMIN — BUDESONIDE AND FORMOTEROL FUMARATE DIHYDRATE 2 PUFF(S): 160; 4.5 AEROSOL RESPIRATORY (INHALATION) at 22:05

## 2023-05-30 RX ADMIN — BUDESONIDE AND FORMOTEROL FUMARATE DIHYDRATE 2 PUFF(S): 160; 4.5 AEROSOL RESPIRATORY (INHALATION) at 08:33

## 2023-05-30 NOTE — PROGRESS NOTE ADULT - ASSESSMENT
85 year old lady well know to me with COPD, home O2 and nodules was sent to ER for persistent SOB despite nebulizer treatments and additional Prednisone use. She had mild chest pain. Found to have NSTEMI with acute on chronic HF s/p cardiac cath that showed 2 vessel disease. Plan to repeat cardiac cath tomorrow for possible Rotational Atherectomy. Nephro eval for risk stratification for contrast.    Assessment and plan  CKD 3b/ NSTEMI/ acute HF  Creatinine 1.5 at baseline  patient at risk for JUDIT  no IVF pre and post procedure given volume overload and pulmonary edema requiring oxygen  hold lasix on day of cardiac cath  continue to monitor BMP and strict i/os  electrolytes reviewed  will follow    48 yr old female presenting for Preop clearance for hardware removal in right ankle by podiatry

## 2023-05-30 NOTE — PROGRESS NOTE ADULT - ASSESSMENT
Plan:   Pt is scheduled for right foot hardware removal and right subtalar joint athroscopy with Dr. Waterhouse at 1:00PM. Patient is aware of procedure and is NPO since midnight.  CXR on sunrise.  EKG in patient's chart  Medical/Cardiac clearance since 5/29 and documented in chart.  Consent signed and in chart.  Procedure was explained to patient in detail. All alternatives, risks and complications were discussed. All questions answered.

## 2023-05-30 NOTE — PATIENT PROFILE ADULT - FALL HARM RISK - HARM RISK INTERVENTIONS

## 2023-05-30 NOTE — BRIEF OPERATIVE NOTE - COMMENTS
s/p removal of sustentaculum art screw  - WBAT as tolerated to right foot, PT eval  - Dressing to remain clean dry and intact until follow up appointment  - Pain meds PRN  - Pt is stable from podiatry for discharge

## 2023-05-31 ENCOUNTER — TRANSCRIPTION ENCOUNTER (OUTPATIENT)
Age: 48
End: 2023-05-31

## 2023-05-31 LAB
ANION GAP SERPL CALC-SCNC: 12 MMOL/L — SIGNIFICANT CHANGE UP (ref 7–14)
BUN SERPL-MCNC: 13 MG/DL — SIGNIFICANT CHANGE UP (ref 7–23)
CALCIUM SERPL-MCNC: 9.3 MG/DL — SIGNIFICANT CHANGE UP (ref 8.4–10.5)
CHLORIDE SERPL-SCNC: 104 MMOL/L — SIGNIFICANT CHANGE UP (ref 98–107)
CO2 SERPL-SCNC: 19 MMOL/L — LOW (ref 22–31)
CREAT SERPL-MCNC: 0.62 MG/DL — SIGNIFICANT CHANGE UP (ref 0.5–1.3)
EGFR: 111 ML/MIN/1.73M2 — SIGNIFICANT CHANGE UP
GLUCOSE SERPL-MCNC: 106 MG/DL — HIGH (ref 70–99)
HCT VFR BLD CALC: 37.7 % — SIGNIFICANT CHANGE UP (ref 34.5–45)
HGB BLD-MCNC: 12.5 G/DL — SIGNIFICANT CHANGE UP (ref 11.5–15.5)
MAGNESIUM SERPL-MCNC: 2.1 MG/DL — SIGNIFICANT CHANGE UP (ref 1.6–2.6)
MCHC RBC-ENTMCNC: 29.1 PG — SIGNIFICANT CHANGE UP (ref 27–34)
MCHC RBC-ENTMCNC: 33.2 GM/DL — SIGNIFICANT CHANGE UP (ref 32–36)
MCV RBC AUTO: 87.7 FL — SIGNIFICANT CHANGE UP (ref 80–100)
NRBC # BLD: 0 /100 WBCS — SIGNIFICANT CHANGE UP (ref 0–0)
NRBC # FLD: 0 K/UL — SIGNIFICANT CHANGE UP (ref 0–0)
PHOSPHATE SERPL-MCNC: 2.6 MG/DL — SIGNIFICANT CHANGE UP (ref 2.5–4.5)
PLATELET # BLD AUTO: 274 K/UL — SIGNIFICANT CHANGE UP (ref 150–400)
POTASSIUM SERPL-MCNC: 4.2 MMOL/L — SIGNIFICANT CHANGE UP (ref 3.5–5.3)
POTASSIUM SERPL-SCNC: 4.2 MMOL/L — SIGNIFICANT CHANGE UP (ref 3.5–5.3)
RBC # BLD: 4.3 M/UL — SIGNIFICANT CHANGE UP (ref 3.8–5.2)
RBC # FLD: 11.7 % — SIGNIFICANT CHANGE UP (ref 10.3–14.5)
SODIUM SERPL-SCNC: 135 MMOL/L — SIGNIFICANT CHANGE UP (ref 135–145)
WBC # BLD: 7.75 K/UL — SIGNIFICANT CHANGE UP (ref 3.8–10.5)
WBC # FLD AUTO: 7.75 K/UL — SIGNIFICANT CHANGE UP (ref 3.8–10.5)

## 2023-05-31 RX ORDER — ACETAMINOPHEN 500 MG
2 TABLET ORAL
Qty: 0 | Refills: 0 | DISCHARGE
Start: 2023-05-31

## 2023-05-31 RX ORDER — OXYCODONE AND ACETAMINOPHEN 5; 325 MG/1; MG/1
1 TABLET ORAL
Qty: 0 | Refills: 0 | DISCHARGE
Start: 2023-05-31

## 2023-05-31 RX ORDER — DOCUSATE SODIUM 100 MG
0 CAPSULE ORAL
Refills: 0 | DISCHARGE

## 2023-05-31 RX ORDER — OXYCODONE AND ACETAMINOPHEN 5; 325 MG/1; MG/1
1 TABLET ORAL
Refills: 0 | DISCHARGE

## 2023-05-31 RX ORDER — SENNA PLUS 8.6 MG/1
2 TABLET ORAL
Qty: 60 | Refills: 0
Start: 2023-05-31 | End: 2023-06-29

## 2023-05-31 RX ORDER — POLYETHYLENE GLYCOL 3350 17 G/17G
17 POWDER, FOR SOLUTION ORAL
Qty: 1 | Refills: 0
Start: 2023-05-31 | End: 2023-06-29

## 2023-05-31 RX ORDER — SENNA PLUS 8.6 MG/1
2 TABLET ORAL DAILY
Refills: 0 | Status: DISCONTINUED | OUTPATIENT
Start: 2023-05-31 | End: 2023-06-01

## 2023-05-31 RX ADMIN — Medication 300 MILLIGRAM(S): at 08:46

## 2023-05-31 RX ADMIN — PANTOPRAZOLE SODIUM 40 MILLIGRAM(S): 20 TABLET, DELAYED RELEASE ORAL at 06:07

## 2023-05-31 RX ADMIN — Medication 1 PATCH: at 07:42

## 2023-05-31 RX ADMIN — Medication 650 MILLIGRAM(S): at 10:13

## 2023-05-31 RX ADMIN — BUDESONIDE AND FORMOTEROL FUMARATE DIHYDRATE 2 PUFF(S): 160; 4.5 AEROSOL RESPIRATORY (INHALATION) at 08:45

## 2023-05-31 RX ADMIN — OXYCODONE HYDROCHLORIDE 10 MILLIGRAM(S): 5 TABLET ORAL at 02:39

## 2023-05-31 RX ADMIN — Medication 300 MILLIGRAM(S): at 17:56

## 2023-05-31 RX ADMIN — SENNA PLUS 2 TABLET(S): 8.6 TABLET ORAL at 11:34

## 2023-05-31 RX ADMIN — Medication 1 PATCH: at 19:26

## 2023-05-31 RX ADMIN — MORPHINE SULFATE 2 MILLIGRAM(S): 50 CAPSULE, EXTENDED RELEASE ORAL at 22:04

## 2023-05-31 RX ADMIN — Medication 25 MILLIGRAM(S): at 07:37

## 2023-05-31 RX ADMIN — Medication 650 MILLIGRAM(S): at 10:49

## 2023-05-31 RX ADMIN — AMLODIPINE BESYLATE 5 MILLIGRAM(S): 2.5 TABLET ORAL at 07:37

## 2023-05-31 RX ADMIN — OXYCODONE HYDROCHLORIDE 10 MILLIGRAM(S): 5 TABLET ORAL at 01:39

## 2023-05-31 RX ADMIN — Medication 10 MILLIGRAM(S): at 06:06

## 2023-05-31 RX ADMIN — MORPHINE SULFATE 2 MILLIGRAM(S): 50 CAPSULE, EXTENDED RELEASE ORAL at 22:34

## 2023-05-31 RX ADMIN — Medication 1 PATCH: at 11:34

## 2023-05-31 RX ADMIN — LAMOTRIGINE 200 MILLIGRAM(S): 25 TABLET, ORALLY DISINTEGRATING ORAL at 22:03

## 2023-05-31 RX ADMIN — QUETIAPINE FUMARATE 300 MILLIGRAM(S): 200 TABLET, FILM COATED ORAL at 22:02

## 2023-05-31 RX ADMIN — BUDESONIDE AND FORMOTEROL FUMARATE DIHYDRATE 2 PUFF(S): 160; 4.5 AEROSOL RESPIRATORY (INHALATION) at 22:02

## 2023-05-31 RX ADMIN — Medication 1 PATCH: at 11:56

## 2023-05-31 RX ADMIN — Medication 10 MILLIGRAM(S): at 17:56

## 2023-05-31 NOTE — PROGRESS NOTE ADULT - ASSESSMENT
48 F w right foot malfunctioned hardware  - Patient seen and evaluated  - Patient is afebrile, VSS, neurovascular status intact  - R foot s/p Removal of hardware, dressings left intact today   - Pain well controlled  - Pt eval today   - Stable for d/c tomorrow pending pain   - Discussed with attending    48 F s/p R foot removal of painful hardware   - Patient seen and evaluated  - Patient is afebrile, VSS, neurovascular status intact  - R foot s/p Removal of hardware, dressings left intact today   - Pain well controlled  - Pt eval today   - Stable for d/c, follow up information in discharge note provider   - Discussed with attending    48 F s/p R foot removal of painful hardware   - Patient seen and evaluated  - Patient is afebrile, VSS, neurovascular status intact  - R foot s/p Removal of hardware, dressings left intact today   - Pain well controlled  - Pt eval today   - RX CAM walker boot to the RLE to be worn daily   - Stable for d/c, follow up information in discharge note provider   - Discussed with attending

## 2023-05-31 NOTE — DISCHARGE NOTE PROVIDER - NSDCCPCAREPLAN_GEN_ALL_CORE_FT
PRINCIPAL DISCHARGE DIAGNOSIS  Diagnosis: Orthopedic hardware present  Assessment and Plan of Treatment: You had removal of calcaneus screw from your right foot with podiatry.    Please contine your CAM walker boot to the right lower extremity (to be worn daily) which was delivered at bedside. Your dressing should remain clean, dry and intact until your podiatry follow up appointment. Please continue to be weight bearing as tolerated to your right lower extremity and continue with physical therapy. Continue with your pain regimen as directed as well as a bowel regimen.     PRINCIPAL DISCHARGE DIAGNOSIS  Diagnosis: Orthopedic hardware present  Assessment and Plan of Treatment: You had removal of calcaneus screw from your right foot with podiatry.    Please contine your CAM walker boot to the right lower extremity (to be worn daily) which was delivered at bedside. Your dressing should remain clean, dry and intact until your podiatry follow up appointment. Please continue to be weight bearing as tolerated to your right lower extremity and continue with physical therapy. Continue with your pain regimen as directed as well as a bowel regimen.      SECONDARY DISCHARGE DIAGNOSES  Diagnosis: HLD (hyperlipidemia)  Assessment and Plan of Treatment: Continue a DASH (Low fat/salt) diet. Follow up with your primary care provider upon discharge for further management and monitoring of cholesterol levels.     PRINCIPAL DISCHARGE DIAGNOSIS  Diagnosis: Orthopedic hardware present  Assessment and Plan of Treatment: You had removal of calcaneus screw from your right foot with podiatry.    Please contine your CAM walker boot to the right lower extremity (to be worn daily) which was delivered at bedside. Your dressing should remain clean, dry and intact until your podiatry follow up appointment. Please continue to be weight bearing as tolerated to your right lower extremity and continue with physical therapy. Continue with your pain regimen as directed as well as a bowel regimen.  Follow up with your pain management specialist and PCP upon discharge.      SECONDARY DISCHARGE DIAGNOSES  Diagnosis: HLD (hyperlipidemia)  Assessment and Plan of Treatment: Continue a DASH (Low fat/salt) diet. Follow up with your primary care provider upon discharge for further management and monitoring of cholesterol levels.

## 2023-05-31 NOTE — DISCHARGE NOTE PROVIDER - NSDCMRMEDTOKEN_GEN_ALL_CORE_FT
amLODIPine 5 mg oral tablet: 1 tab(s) orally once a day  Breo Ellipta 100 mcg-25 mcg/inh inhalation powder: 1 puff(s) inhaled once a day  colace:    mg oral tablet: 1 tab(s) orally every 8 hours as needed for  moderate pain  lamoTRIgine 200 mg oral tablet: 1 tab(s) orally once a day (at bedtime)  Lyrica 300 mg oral capsule: 1 orally 2 times a day  Metoprolol Succinate ER 25 mg oral tablet, extended release: 1 tab(s) orally once a day  pantoprazole 40 mg oral delayed release tablet: 1 tab(s) orally once a day   Percocet 10 mg-325 mg oral tablet: 1 tab(s) orally every 6 hours as needed for  severe pain  polyethylene glycol 3350 oral powder for reconstitution: 17 gram(s) orally once a day, As Needed  QUEtiapine 300 mg oral tablet: 1 tab(s) orally once a day (at bedtime)  R foot CAM walker boot: Please dispense 1 CAM walker boot for the RLE to be worn as indicated    Indication: to be worn at all times  Dx: s/p R foot removal of painful hardware  Valium 10 mg oral tablet: 1 tab(s) orally 2 times a day (Filled 11/13/21 x #60 tabs: 1 tab BID)  Ventolin HFA 90 mcg/inh inhalation aerosol: 2 puff(s) inhaled every 6 hours as needed for  bronchospasm   acetaminophen 325 mg oral tablet: 2 tab(s) orally every 6 hours As needed Mild Pain (1 - 3)  aluminum hydroxide-magnesium hydroxide 200 mg-200 mg/5 mL oral suspension: 30 milliliter(s) orally every 4 hours As needed Dyspepsia  amLODIPine 5 mg oral tablet: 1 tab(s) orally once a day  Breo Ellipta 100 mcg-25 mcg/inh inhalation powder: 1 puff(s) inhaled once a day   mg oral tablet: 1 tab(s) orally every 8 hours as needed for  moderate pain  lamoTRIgine 200 mg oral tablet: 1 tab(s) orally once a day (at bedtime)  Lyrica 300 mg oral capsule: 1 orally 2 times a day  Metoprolol Succinate ER 25 mg oral tablet, extended release: 1 tab(s) orally once a day  Outpatient Physical Therapy: 2-3x/week  oxycodone-acetaminophen 5 mg-325 mg oral tablet: 1 tab(s) orally every 4 hours as needed for Severe Pain (7 - 10)  pantoprazole 40 mg oral delayed release tablet: 1 tab(s) orally once a day   polyethylene glycol 3350 oral powder for reconstitution: 17 gram(s) orally once a day as needed for Constipation  QUEtiapine 300 mg oral tablet: 1 tab(s) orally once a day (at bedtime)  R foot CAM walker boot: Please dispense 1 CAM walker boot for the RLE to be worn as indicated    Indication: to be worn at all times  Dx: s/p R foot removal of painful hardware  senna leaf extract oral tablet: 2 tab(s) orally once a day  Valium 10 mg oral tablet: 1 tab(s) orally 2 times a day (Filled 11/13/21 x #60 tabs: 1 tab BID)  Ventolin HFA 90 mcg/inh inhalation aerosol: 2 puff(s) inhaled every 6 hours as needed for  bronchospasm   acetaminophen 325 mg oral tablet: 2 tab(s) orally every 6 hours As needed Mild Pain (1 - 3)  aluminum hydroxide-magnesium hydroxide 200 mg-200 mg/5 mL oral suspension: 30 milliliter(s) orally every 4 hours As needed Dyspepsia  amLODIPine 5 mg oral tablet: 1 tab(s) orally once a day  Breo Ellipta 100 mcg-25 mcg/inh inhalation powder: 1 puff(s) inhaled once a day  Dilaudid 1 mg/mL oral liquid: 1 milliliter(s) orally every 8 hours as needed for  severe pain not relieved by percocet or other pain medications. Not to be given within 1 hour of another opioid-pain medication. Hold for sedation. MDD: 3ml   mg oral tablet: 1 tab(s) orally every 8 hours as needed for  moderate pain  lamoTRIgine 200 mg oral tablet: 1 tab(s) orally once a day (at bedtime)  Lyrica 300 mg oral capsule: 1 orally 2 times a day  Metoprolol Succinate ER 25 mg oral tablet, extended release: 1 tab(s) orally once a day  naloxone 4 mg/0.1 mL nasal spray: 4 milligram(s) intranasally once If needed for opioid withdrawal.  Outpatient Physical Therapy: 2-3x/week  oxycodone-acetaminophen 10 mg-325 mg oral tablet: 1 tab(s) orally every 6 hours as needed for  severe pain MDD: 4  pantoprazole 40 mg oral delayed release tablet: 1 tab(s) orally once a day   polyethylene glycol 3350 oral powder for reconstitution: 17 gram(s) orally once a day as needed for Constipation  QUEtiapine 300 mg oral tablet: 1 tab(s) orally once a day (at bedtime)  R foot CAM walker boot: Please dispense 1 CAM walker boot for the RLE to be worn as indicated    Indication: to be worn at all times  Dx: s/p R foot removal of painful hardware  senna leaf extract oral tablet: 2 tab(s) orally once a day  Valium 10 mg oral tablet: 1 tab(s) orally 2 times a day (Filled 11/13/21 x #60 tabs: 1 tab BID)  Ventolin HFA 90 mcg/inh inhalation aerosol: 2 puff(s) inhaled every 6 hours as needed for  bronchospasm

## 2023-05-31 NOTE — DISCHARGE NOTE PROVIDER - NSDCFUSCHEDAPPT_GEN_ALL_CORE_FT
Krystina Molina  [facility]  [address]  Scheduled Appointment: 06/20/2023

## 2023-05-31 NOTE — DISCHARGE NOTE PROVIDER - PROVIDER TOKENS
PROVIDER:[TOKEN:[[id number]]],PROVIDER:[TOKEN:[[id number]]],PROVIDER:[TOKEN:[[id number]]]

## 2023-05-31 NOTE — DISCHARGE NOTE PROVIDER - HOSPITAL COURSE
48 yr old female presenting for preop clearance for hardware removal in right ankle by podiatry      Orthopedic hardware present.   presence for preop clearance for hardware removal    s/p R foot removal of painful hardware;   CAM walker boot to the RLE to be worn daily, delivered at bedside; dressing to remain clean, dry and intact until podiatry follow up appointment   WBAT as tolerated to R foot, PT evaluated     Complex regional pain syndrome    Continued diazepam BID, lyrica 300mg BID. Morphine 2mg IV Q6 PRN for breakthrough pain.    Cardiac syndrome X.   Denied Chest pain  EKG NSR      Depression with anxiety/PTSD   c/w diazepam 10 BID, quetiapine 300mg nightly, lamotrigine 200mg nightly.     HTN   c/w amlodipine and metoprolol.    GERD    c/w pantoprazole 40mg daily.    COPD    CXR: clear lungs  Continue home inhalers  Smokes e-cigs-> nicotine patch ordered.    Patient is medically optimized for discharge. Case/labs/medications discussed with Dr. Rodriguez on 5/31/23. 48 yr old female presenting for preop clearance for hardware removal in right ankle by podiatry      Orthopedic hardware present.   presence for preop clearance for hardware removal    s/p R foot removal of painful hardware;   CAM walker boot to the RLE to be worn daily, delivered at bedside; dressing to remain clean, dry and intact until podiatry follow up appointment   WBAT as tolerated to R foot, PT evaluated     Complex regional pain syndrome    Continued diazepam BID, lyrica 300mg BID. Morphine 2mg IV Q6 PRN for breakthrough pain.    Cardiac syndrome X.   Denied Chest pain  EKG NSR      Depression with anxiety/PTSD   c/w diazepam 10 BID, quetiapine 300mg nightly, lamotrigine 200mg nightly.     HTN   c/w amlodipine and metoprolol.    GERD    c/w pantoprazole 40mg daily.    COPD    CXR: clear lungs  Continue home inhalers  Smokes e-cigs-> nicotine patch ordered.     48 yr old female presenting for preop clearance for hardware removal in right ankle by podiatry      Orthopedic hardware present.   presence for preop clearance for hardware removal    s/p R foot removal of painful hardware;   CAM walker boot to the RLE to be worn daily, delivered at bedside; dressing to remain clean, dry and intact until podiatry follow up appointment   WBAT as tolerated to R foot, PT evaluated     Complex regional pain syndrome    Continued diazepam BID, lyrica 300mg BID. Morphine 2mg IV Q6 PRN for breakthrough pain.    Cardiac syndrome X.   Denied Chest pain  EKG NSR      Depression with anxiety/PTSD   c/w diazepam 10 BID, quetiapine 300mg nightly, lamotrigine 200mg nightly.     HTN   c/w amlodipine and metoprolol.    GERD    c/w pantoprazole 40mg daily.    COPD    CXR: clear lungs  Continue home inhalers  Smokes e-cigs-> nicotine patch ordered.    HLD  outpatient follow up with PMD     Patient is medically optimized for discharge home. Case/labs/medications discussed with Dr Rodriguez on 6/1/23.      48 yr old female presenting for preop clearance for hardware removal in right ankle by podiatry      Orthopedic hardware present.   presence for preop clearance for hardware removal    s/p R foot removal of painful hardware;   CAM walker boot to the RLE to be worn daily, delivered at bedside; dressing to remain clean, dry and intact until podiatry follow up appointment   WBAT as tolerated to R foot, PT evaluated   Rolling walker to be delievered    Complex regional pain syndrome    Continued diazepam BID, lyrica 300mg BID. Morphine 2mg IV Q6 PRN for breakthrough pain.    Cardiac syndrome X.   Denied Chest pain  EKG NSR      Depression with anxiety/PTSD   c/w diazepam 10 BID, quetiapine 300mg nightly, lamotrigine 200mg nightly.     HTN   c/w amlodipine and metoprolol.    GERD    c/w pantoprazole 40mg daily.    COPD    CXR: clear lungs  Continue home inhalers  Smokes e-cigs-> nicotine patch ordered.    HLD  outpatient follow up with PMD     Patient is medically optimized for discharge home. Case/labs/medications discussed with Dr Rodriguez on 6/1/23.      48 yr old female presenting for preop clearance for hardware removal in right ankle by podiatry      Orthopedic hardware present.   presence for preop clearance for hardware removal    s/p R foot removal of painful hardware;   CAM walker boot to the RLE to be worn daily, delivered at bedside; dressing to remain clean, dry and intact until podiatry follow up appointment   WBAT as tolerated to R foot, PT evaluated   Rolling walker to be delivered    Complex regional pain syndrome    C/w patient's home medications     Cardiac syndrome X.   Denied Chest pain  EKG NSR      Depression with anxiety/PTSD   c/w patient's home medications      HTN   c/w amlodipine and metoprolol.    GERD    c/w pantoprazole 40mg daily.    COPD    CXR: clear lungs  Continue home inhalers  Smokes e-cigs-> nicotine patch ordered.    HLD  outpatient follow up with PMD     Patient is medically optimized for discharge home. Case/labs/medications discussed with Dr Rodriguez on 6/1/23.

## 2023-05-31 NOTE — CHART NOTE - NSCHARTNOTEFT_GEN_A_CORE
This report was requested by: Sol Evangelista | Reference #: [id number]    Practitioner Count: 3  Pharmacy Count: 1  Current Opioid Prescriptions: 1  Current Benzodiazepine Prescriptions: 0  Current Stimulant Prescriptions: 0      Patient Demographic Information (PDI)       PDI	First Name	Last Name	Birth Date	Gender	Street Address	[facility]  JASON Stewart	1975	Female	[address] [address]    Prescription Information      PDI Filter:    PDI	My Rx	Current Rx	Drug Type	Rx Written	Rx Dispensed	Drug	Quantity	Days Supply	Prescriber Name	Prescriber NACHO #	Payment Method	Dispenser  A	N	Y		05/08/2023	05/22/2023	pregabalin 300 mg capsule	60	30	Ramo Aj	[id number]	Insurance	[id number]  A	N	Y	O	05/08/2023	05/12/2023	oxycodone-acetaminophen  mg tab	120	30	Ramo Aj	[id number]	Insurance	[facility] #[id number]  A	N	N	B	04/11/2023	04/30/2023	diazepam 10 mg tablet	60	30	Faiza Adan	[id number]	Insurance	[facility] #[id number]  A	N	N		04/13/2023	04/16/2023	pregabalin 300 mg capsule	60	30	Ramo Aj	[id number]	Insurance	[facility] #[id number]  A	N	N	O	03/15/2023	04/12/2023	oxycodone-acetaminophen  mg tab	120	30	Ramo Aj	[id number]	Insurance	[facility] #[id number]  A	N	N	B	03/14/2023	03/27/2023	diazepam 10 mg tablet	60	30	Bee Brown	[id number]	Insurance	[facility] #[id number]  A	N	N		03/15/2023	03/15/2023	pregabalin 300 mg capsule	60	30	Ramo Aj	[id number]	Insurance	[facility] #[id number]  A	N	N	O	02/20/2023	03/09/2023	oxycodone-acetaminophen  mg tab	120	30	Ramo Aj	[id number]	Insurance	[facility] #[id number]  A	N	N	B	02/17/2023	02/20/2023	diazepam 10 mg tablet	60	30	Kyrie Nassar MD	[id number]	Insurance	[facility] #[id number]  A	N	N		01/23/2023	02/17/2023	pregabalin 300 mg capsule	60	30	AjHernandez kuhnnn	[id number]	Insurance	[facility] #[id number]  A	N	N	B	01/12/2023	02/06/2023	diazepam 10 mg tablet	60	30	Kyrie Nassar MD	[id number]	[facility] #[id number]  A	N	N	O	01/23/2023	02/06/2023	oxycodone-acetaminophen  mg tab	120	30	Aj, Ramo	[id number]	Insurance	[facility] #[id number]  A	N	N		12/15/2022	01/23/2023	pregabalin 300 mg capsule	60	30	Aj, Ramo	[id number]	Insurance	[facility] #[id number]  A	N	N	O	01/04/2023	01/05/2023	oxycodone-acetaminophen  mg tab	120	30	Aj, Ramo	[id number]	Insurance	[facility] #[id number]  A	N	N	B	12/12/2022	01/05/2023	diazepam 10 mg tablet	60	30	Kyrie Nassar MD	[id number]	Insurance	[facility] #[id number]  A	N	N		11/14/2022	12/05/2022	pregabalin 300 mg capsule	60	30	Aj, Ramo	[id number]	Insurance	[facility] #[id number]  A	N	N	B	11/14/2022	12/05/2022	diazepam 10 mg tablet	60	30	Kyrie Nassar MD	[id number]	[facility] #[id number]  A	N	N	O	11/14/2022	12/05/2022	oxycodone-acetaminophen  mg tab	90	30	Aj, Ramo	[id number]	Insurance	[facility] #[id number]  A	N	N		09/26/2022	11/04/2022	pregabalin 300 mg capsule	60	30	Aj, Ramo	[id number]	Insurance	[facility] #[id number]  A	N	N	B	10/13/2022	11/04/2022	diazepam 10 mg tablet	60	30	Kyrie Nassar MD	[id number]	[facility] #[id number]  A	N	N	O	09/26/2022	10/22/2022	oxycodone-acetaminophen  mg tab	90	30	AjHernandez kuhnnn	[id number]	Insurance	[facility] #[id number]  A	N	N	B	09/13/2022	10/01/2022	diazepam 10 mg tablet	60	30	YoungKyrie MD	[id number]	Insurance	[facility] #[id number]  A	N	N		09/26/2022	10/01/2022	pregabalin 300 mg capsule	60	30	Aj, Ramo	[id number]	[facility] #[id number]  A	N	N	B	08/16/2022	08/31/2022	diazepam 10 mg tablet	60	30	YoungKyrie MD	[id number]	Insurance	[facility] #[id number]  A	N	N		08/22/2022	08/28/2022	pregabalin 300 mg capsule	60	30	Aj, Ramo	[id number]	Insurance	[facility] #[id number]  A	N	N	O	08/22/2022	08/28/2022	oxycodone-acetaminophen  mg tab	90	30	Aj, Ramo	[id number]	Insurance	[facility] #[id number]  A	N	N	B	07/25/2022	07/25/2022	diazepam 5 mg tablet	90	30	YoungKyrie MD	[id number]	Insurance	[facility] #[id number]  A	N	N	O	07/25/2022	07/25/2022	oxycodone-acetaminophen  mg tab	90	30	Aj, Ramo	[id number]	Insurance	[facility] #[id number]  A	N	N		07/25/2022	07/25/2022	pregabalin 300 mg capsule	60	30	Aj, Ramo	[id number]	Insurance	[facility] #[id number]  A	N	N		06/30/2022	07/01/2022	pregabalin 300 mg capsule	60	30	Aj, Ramo	[id number]	Insurance	[facility] #[id number]  A	N	N	B	06/09/2022	06/30/2022	diazepam 5 mg tablet	90	30	YoungKyrie MD	[id number]	Insurance	[facility] #[id number]  A	N	N		05/19/2022	06/01/2022	pregabalin 300 mg capsule	60	30	Aj, Ramo	[id number]	[facility] #[id number]    * - Details of Drug Type : O = Opioid, B = Benzodiazepine, S = Stimulant

## 2023-05-31 NOTE — PHYSICAL THERAPY INITIAL EVALUATION ADULT - GENERAL OBSERVATIONS, REHAB EVAL
Patient received supine in bed this AM with complaints of numbness and diminished motor function below her knee from the block; MD saw patient prior to session and aware; HR 68 RR 12

## 2023-06-01 ENCOUNTER — TRANSCRIPTION ENCOUNTER (OUTPATIENT)
Age: 48
End: 2023-06-01

## 2023-06-01 VITALS
HEART RATE: 92 BPM | OXYGEN SATURATION: 97 % | RESPIRATION RATE: 17 BRPM | TEMPERATURE: 98 F | SYSTOLIC BLOOD PRESSURE: 106 MMHG | DIASTOLIC BLOOD PRESSURE: 69 MMHG

## 2023-06-01 RX ORDER — NALOXONE HYDROCHLORIDE 4 MG/.1ML
4 SPRAY NASAL
Qty: 1 | Refills: 0
Start: 2023-06-01 | End: 2023-06-01

## 2023-06-01 RX ORDER — OXYCODONE AND ACETAMINOPHEN 5; 325 MG/1; MG/1
1 TABLET ORAL
Qty: 1 | Refills: 0
Start: 2023-06-01

## 2023-06-01 RX ORDER — HYDROMORPHONE HYDROCHLORIDE 2 MG/ML
1 INJECTION INTRAMUSCULAR; INTRAVENOUS; SUBCUTANEOUS
Qty: 9 | Refills: 0
Start: 2023-06-01 | End: 2023-06-03

## 2023-06-01 RX ORDER — OXYCODONE HYDROCHLORIDE 5 MG/1
5 TABLET ORAL ONCE
Refills: 0 | Status: DISCONTINUED | OUTPATIENT
Start: 2023-06-01 | End: 2023-06-01

## 2023-06-01 RX ADMIN — OXYCODONE HYDROCHLORIDE 10 MILLIGRAM(S): 5 TABLET ORAL at 11:24

## 2023-06-01 RX ADMIN — Medication 650 MILLIGRAM(S): at 12:49

## 2023-06-01 RX ADMIN — OXYCODONE HYDROCHLORIDE 10 MILLIGRAM(S): 5 TABLET ORAL at 17:32

## 2023-06-01 RX ADMIN — OXYCODONE HYDROCHLORIDE 5 MILLIGRAM(S): 5 TABLET ORAL at 15:30

## 2023-06-01 RX ADMIN — Medication 25 MILLIGRAM(S): at 06:57

## 2023-06-01 RX ADMIN — AMLODIPINE BESYLATE 5 MILLIGRAM(S): 2.5 TABLET ORAL at 06:57

## 2023-06-01 RX ADMIN — Medication 1 PATCH: at 11:52

## 2023-06-01 RX ADMIN — Medication 300 MILLIGRAM(S): at 06:56

## 2023-06-01 RX ADMIN — OXYCODONE HYDROCHLORIDE 5 MILLIGRAM(S): 5 TABLET ORAL at 14:37

## 2023-06-01 RX ADMIN — Medication 10 MILLIGRAM(S): at 06:56

## 2023-06-01 RX ADMIN — Medication 300 MILLIGRAM(S): at 17:32

## 2023-06-01 RX ADMIN — Medication 1 PATCH: at 11:00

## 2023-06-01 RX ADMIN — Medication 650 MILLIGRAM(S): at 03:23

## 2023-06-01 RX ADMIN — Medication 1 PATCH: at 07:18

## 2023-06-01 RX ADMIN — PANTOPRAZOLE SODIUM 40 MILLIGRAM(S): 20 TABLET, DELAYED RELEASE ORAL at 06:57

## 2023-06-01 RX ADMIN — OXYCODONE HYDROCHLORIDE 10 MILLIGRAM(S): 5 TABLET ORAL at 10:33

## 2023-06-01 RX ADMIN — MORPHINE SULFATE 2 MILLIGRAM(S): 50 CAPSULE, EXTENDED RELEASE ORAL at 04:24

## 2023-06-01 RX ADMIN — Medication 650 MILLIGRAM(S): at 04:23

## 2023-06-01 RX ADMIN — OXYCODONE HYDROCHLORIDE 10 MILLIGRAM(S): 5 TABLET ORAL at 18:21

## 2023-06-01 RX ADMIN — Medication 650 MILLIGRAM(S): at 14:00

## 2023-06-01 RX ADMIN — BUDESONIDE AND FORMOTEROL FUMARATE DIHYDRATE 2 PUFF(S): 160; 4.5 AEROSOL RESPIRATORY (INHALATION) at 09:20

## 2023-06-01 RX ADMIN — Medication 10 MILLIGRAM(S): at 17:32

## 2023-06-01 RX ADMIN — Medication 1 PATCH: at 18:22

## 2023-06-01 RX ADMIN — MORPHINE SULFATE 2 MILLIGRAM(S): 50 CAPSULE, EXTENDED RELEASE ORAL at 04:53

## 2023-06-01 RX ADMIN — SENNA PLUS 2 TABLET(S): 8.6 TABLET ORAL at 11:52

## 2023-06-01 NOTE — PROGRESS NOTE ADULT - PROBLEM SELECTOR PLAN 5
Chronic stable  Continue diazepam 10 BID, quetiapine 300mg nightly, lamotrigine 200mg nightly

## 2023-06-01 NOTE — PROGRESS NOTE ADULT - PROBLEM SELECTOR PLAN 6
Chronic stable  BP 12/94  Continue amlodipine and metoprolol

## 2023-06-01 NOTE — PROGRESS NOTE ADULT - SUBJECTIVE AND OBJECTIVE BOX
Patient is a 48y old  Female who presents with a chief complaint of Preop clearance for hardware removal in right ankle by podiatry (29 May 2023 21:30)      INTERVAL HPI/OVERNIGHT EVENTS:   Pt is scheduled for right foot hardware removal and right subtalar joint athroscopy with Dr. Waterhouse at 1:00PM. Patient is aware of procedure and is NPO since midnight.    MEDICATIONS  (STANDING):  amLODIPine   Tablet 5 milliGRAM(s) Oral daily  budesonide  80 MICROgram(s)/formoterol 4.5 MICROgram(s) Inhaler 2 Puff(s) Inhalation two times a day  diazepam    Tablet 10 milliGRAM(s) Oral two times a day  lamoTRIgine 200 milliGRAM(s) Oral at bedtime  metoprolol succinate ER 25 milliGRAM(s) Oral daily  nicotine - 21 mG/24Hr(s) Patch 1 Patch Transdermal daily  pantoprazole    Tablet 40 milliGRAM(s) Oral before breakfast  pregabalin 300 milliGRAM(s) Oral two times a day  QUEtiapine 300 milliGRAM(s) Oral at bedtime    MEDICATIONS  (PRN):  acetaminophen     Tablet .. 650 milliGRAM(s) Oral every 6 hours PRN Temp greater or equal to 38C (100.4F), Mild Pain (1 - 3)  albuterol    90 MICROgram(s) HFA Inhaler 2 Puff(s) Inhalation every 6 hours PRN Shortness of Breath and/or Wheezing  aluminum hydroxide/magnesium hydroxide/simethicone Suspension 30 milliLiter(s) Oral every 4 hours PRN Dyspepsia  melatonin 3 milliGRAM(s) Oral at bedtime PRN Insomnia  morphine  - Injectable 2 milliGRAM(s) IV Push every 6 hours PRN breakthrough pain  ondansetron Injectable 4 milliGRAM(s) IV Push every 8 hours PRN Nausea and/or Vomiting  oxyCODONE    IR 10 milliGRAM(s) Oral every 6 hours PRN Severe Pain (7 - 10)  polyethylene glycol 3350 17 Gram(s) Oral daily PRN Constipation      Allergies    Celexa (Rash)  fluoroquinolone antibiotics (Rash)  Cipro (Short breath)    Intolerances        Vital Signs Last 24 Hrs  T(C): 36.7 (30 May 2023 05:41), Max: 36.8 (29 May 2023 14:59)  T(F): 98 (30 May 2023 05:41), Max: 98.3 (29 May 2023 14:59)  HR: 85 (30 May 2023 05:41) (73 - 86)  BP: 110/60 (30 May 2023 05:41) (110/60 - 128/94)  BP(mean): --  RR: 18 (30 May 2023 05:41) (18 - 18)  SpO2: 100% (30 May 2023 05:41) (98% - 100%)    Parameters below as of 30 May 2023 05:41  Patient On (Oxygen Delivery Method): room air        LABS:                        11.9   4.41  )-----------( 232      ( 30 May 2023 05:39 )             36.1     05-29    140  |  106  |  13  ----------------------------<  94  4.4   |  19<L>  |  0.91    Ca    8.4      29 May 2023 17:02    TPro  6.4  /  Alb  4.5  /  TBili  <0.2  /  DBili  x   /  AST  23  /  ALT  23  /  AlkPhos  55  05-29    PT/INR - ( 30 May 2023 05:39 )   PT: 10.3 sec;   INR: <0.90 ratio         PTT - ( 30 May 2023 05:39 )  PTT:28.6 sec    CAPILLARY BLOOD GLUCOSE          RADIOLOGY & ADDITIONAL TESTS:    
POST ANESTHESIA EVALUATION    48y Female POSTOP DAY 1      MENTAL STATUS: Patient participation [ x ] Awake     [  ] Arousable     [  ] Sedated    AIRWAY PATENCY: [ x ] Satisfactory  [  ] Other:     Vital Signs Last 24 Hrs  T(C): 36.8 (31 May 2023 07:30), Max: 37.1 (30 May 2023 13:25)  T(F): 98.2 (31 May 2023 07:30), Max: 98.8 (30 May 2023 13:25)  HR: 85 (31 May 2023 07:30) (73 - 90)  BP: 107/59 (31 May 2023 07:30) (106/66 - 135/92)  BP(mean): 90 (30 May 2023 16:45) (87 - 103)  RR: 18 (31 May 2023 07:30) (12 - 20)  SpO2: 98% (31 May 2023 07:30) (94% - 98%)    Parameters below as of 31 May 2023 07:30  Patient On (Oxygen Delivery Method): room air      I&O's Summary        NAUSEA/ VOMITTING:  [ x ] NONE  [  ] CONTROLLED [  ] OTHER     PAIN: [ x ] CONTROLLED WITH CURRENT REGIMEN  [  ] OTHER    [ x ] NO APPARENT ANESTHESIA COMPLICATIONS      Comments: No anesthesia complications 
Patient is a 48y old  Female who presents with a chief complaint of Preop clearance for hardware removal in right ankle by podiatry (31 May 2023 10:51)       INTERVAL HPI/OVERNIGHT EVENTS:  Patient seen and evaluated at bedside.  Pt is resting comfortable in NAD. Denies N/V/F/C.  Pain rated at X/10    Allergies    Celexa (Rash)  fluoroquinolone antibiotics (Rash)  Cipro (Short breath)    Intolerances        Vital Signs Last 24 Hrs  T(C): 36.8 (31 May 2023 07:30), Max: 37.1 (30 May 2023 13:25)  T(F): 98.2 (31 May 2023 07:30), Max: 98.8 (30 May 2023 13:25)  HR: 85 (31 May 2023 07:30) (73 - 90)  BP: 107/59 (31 May 2023 07:30) (106/66 - 135/92)  BP(mean): 90 (30 May 2023 16:45) (87 - 103)  RR: 18 (31 May 2023 07:30) (12 - 20)  SpO2: 98% (31 May 2023 07:30) (94% - 98%)    Parameters below as of 31 May 2023 07:30  Patient On (Oxygen Delivery Method): room air        LABS:                        12.5   7.75  )-----------( 274      ( 31 May 2023 07:15 )             37.7     05-31    135  |  104  |  13  ----------------------------<  106<H>  4.2   |  19<L>  |  0.62    Ca    9.3      31 May 2023 07:15  Phos  2.6     05-31  Mg     2.10     05-31    TPro  6.4  /  Alb  4.5  /  TBili  <0.2  /  DBili  x   /  AST  23  /  ALT  23  /  AlkPhos  55  05-29    PT/INR - ( 30 May 2023 05:39 )   PT: 10.3 sec;   INR: <0.90 ratio         PTT - ( 30 May 2023 05:39 )  PTT:28.6 sec    CAPILLARY BLOOD GLUCOSE          Lower Extremity Physical Exam:      Vascular: DP/PT 2/4, B/L, CFT <3 seconds B/L, Temperature gradient warm to cool, B/L.   Neuro: Epicritic sensation intact to the level of toes, B/L.  Musculoskeletal/Ortho: s/p removal of hardware to R foot   Skin: R foot s/p Removal of hardware, dressings left intact today   RADIOLOGY & ADDITIONAL TESTS:    ACC: [id number] EXAM:  XR FOOT COMP MIN 3 VIEWS RT   ORDERED BY: PAMELA MARCANO     PROCEDURE DATE:  05/30/2023          INTERPRETATION:  INDICATION: s/p R foot removal of hardware    COMPARISON: Foot radiographs dated 5/30/2023    FINDING: Three views of the right foot demonstrates a lateral plate and   screw fixation of the calcaneus. No fracture of the hardware noted. There   has been interval removal of a longer mid to posterior calcaneal screw.   The tibiotalar joint is mildly narrowed. Mild narrowing the first MTP.   There is decreased bone mineralization.    IMPRESSION:  Interval removal of a calcaneal screw    --- End of Report ---            LESA NANCE MD; Attending Radiologist  This document has been electronically signed. May 30 2023  3:59PM  
Name of Patient : EWA MARTIN  MRN: [id number]  Date of visit: 05-30-23       Subjective: Patient seen and examined. No new events except as noted.       REVIEW OF SYSTEMS:    CONSTITUTIONAL: No weakness, fevers or chills  EYES/ENT: No visual changes;  No vertigo or throat pain   NECK: No pain or stiffness  RESPIRATORY: No cough, wheezing, hemoptysis; No shortness of breath  CARDIOVASCULAR: No chest pain or palpitations  GASTROINTESTINAL: No abdominal or epigastric pain. No nausea, vomiting, or hematemesis; No diarrhea or constipation. No melena or hematochezia.  GENITOURINARY: No dysuria, frequency or hematuria  NEUROLOGICAL: No numbness or weakness  SKIN: No itching, burning, rashes, or lesions   All other review of systems is negative unless indicated above.    MEDICATIONS:  MEDICATIONS  (STANDING):  amLODIPine   Tablet 5 milliGRAM(s) Oral daily  budesonide  80 MICROgram(s)/formoterol 4.5 MICROgram(s) Inhaler 2 Puff(s) Inhalation two times a day  diazepam    Tablet 10 milliGRAM(s) Oral two times a day  lamoTRIgine 200 milliGRAM(s) Oral at bedtime  metoprolol succinate ER 25 milliGRAM(s) Oral daily  nicotine - 21 mG/24Hr(s) Patch 1 Patch Transdermal daily  pantoprazole    Tablet 40 milliGRAM(s) Oral before breakfast  pregabalin 300 milliGRAM(s) Oral two times a day  QUEtiapine 300 milliGRAM(s) Oral at bedtime      PHYSICAL EXAM:  T(C): 37 (05-30-23 @ 22:00), Max: 37.1 (05-30-23 @ 13:25)  HR: 90 (05-30-23 @ 22:00) (73 - 90)  BP: 118/76 (05-30-23 @ 22:00) (110/60 - 135/92)  RR: 18 (05-30-23 @ 22:00) (12 - 20)  SpO2: 98% (05-30-23 @ 22:00) (94% - 100%)  Wt(kg): --  I&O's Summary      Weight (kg): 75.75 (05-30 @ 13:30)    Appearance: Normal	  HEENT:  PERRLA   Lymphatic: No lymphadenopathy   Cardiovascular: Normal S1 S2, no JVD  Respiratory: normal effort , clear  Gastrointestinal:  Soft, Non-tender  Skin: No rashes,  warm to touch  Psychiatry:  Mood & affect appropriate  Musculuskeletal: LE dressing     recent labs, Imaging and EKGs personally reviewed                           11.9   4.41  )-----------( 232      ( 30 May 2023 05:39 )             36.1               05-30    138  |  106  |  10  ----------------------------<  86  4.0   |  22  |  0.69    Ca    8.8      30 May 2023 05:39    TPro  6.4  /  Alb  4.5  /  TBili  <0.2  /  DBili  x   /  AST  23  /  ALT  23  /  AlkPhos  55  05-29    PT/INR - ( 30 May 2023 05:39 )   PT: 10.3 sec;   INR: <0.90 ratio         PTT - ( 30 May 2023 05:39 )  PTT:28.6 sec                                   
Name of Patient : EWA MARTIN  MRN: [id number]  Date of visit: 06-01-23       Subjective: Patient seen and examined. No new events except as noted.     REVIEW OF SYSTEMS:    CONSTITUTIONAL: No weakness, fevers or chills  EYES/ENT: No visual changes;  No vertigo or throat pain   NECK: No pain or stiffness  RESPIRATORY: No cough, wheezing, hemoptysis; No shortness of breath  CARDIOVASCULAR: No chest pain or palpitations  GASTROINTESTINAL: No abdominal or epigastric pain. No nausea, vomiting, or hematemesis; No diarrhea or constipation. No melena or hematochezia.  GENITOURINARY: No dysuria, frequency or hematuria  NEUROLOGICAL: No numbness or weakness  SKIN: No itching, burning, rashes, or lesions   All other review of systems is negative unless indicated above.    MEDICATIONS:  MEDICATIONS  (STANDING):  amLODIPine   Tablet 5 milliGRAM(s) Oral daily  budesonide  80 MICROgram(s)/formoterol 4.5 MICROgram(s) Inhaler 2 Puff(s) Inhalation two times a day  diazepam    Tablet 10 milliGRAM(s) Oral two times a day  lamoTRIgine 200 milliGRAM(s) Oral at bedtime  metoprolol succinate ER 25 milliGRAM(s) Oral daily  nicotine - 21 mG/24Hr(s) Patch 1 Patch Transdermal daily  pantoprazole    Tablet 40 milliGRAM(s) Oral before breakfast  pregabalin 300 milliGRAM(s) Oral two times a day  QUEtiapine 300 milliGRAM(s) Oral at bedtime  senna 2 Tablet(s) Oral daily      PHYSICAL EXAM:  T(C): 36.6 (06-01-23 @ 11:25), Max: 36.7 (05-31-23 @ 22:00)  HR: 92 (06-01-23 @ 11:25) (79 - 92)  BP: 106/69 (06-01-23 @ 11:25) (106/69 - 130/80)  RR: 17 (06-01-23 @ 11:25) (17 - 18)  SpO2: 97% (06-01-23 @ 11:25) (97% - 99%)  Wt(kg): --  I&O's Summary    31 May 2023 07:01  -  01 Jun 2023 07:00  --------------------------------------------------------  IN: 3220 mL / OUT: 5900 mL / NET: -2680 mL          Appearance: Normal	  HEENT:  PERRLA   Lymphatic: No lymphadenopathy   Cardiovascular: Normal S1 S2, no JVD  Respiratory: normal effort , clear  Gastrointestinal:  Soft, Non-tender  Skin: No rashes,  warm to touch  Psychiatry:  Mood & affect appropriate  Musculuskeletal: LE cast     recent labs, Imaging and EKGs personally reviewed     05-31-23 @ 07:01  -  06-01-23 @ 07:00  --------------------------------------------------------  IN: 3220 mL / OUT: 5900 mL / NET: -2680 mL                            12.5   7.75  )-----------( 274      ( 31 May 2023 07:15 )             37.7               05-31    135  |  104  |  13  ----------------------------<  106<H>  4.2   |  19<L>  |  0.62    Ca    9.3      31 May 2023 07:15  Phos  2.6     05-31  Mg     2.10     05-31                                 
Name of Patient : EWA MARTIN  MRN: [id number]  Date of visit: 05-31-23 @ 23:01      Subjective: Patient seen and examined. No new events except as noted.     REVIEW OF SYSTEMS:    CONSTITUTIONAL: No weakness, fevers or chills  EYES/ENT: No visual changes;  No vertigo or throat pain   NECK: No pain or stiffness  RESPIRATORY: No cough, wheezing, hemoptysis; No shortness of breath  CARDIOVASCULAR: No chest pain or palpitations  GASTROINTESTINAL: No abdominal or epigastric pain. No nausea, vomiting, or hematemesis; No diarrhea or constipation. No melena or hematochezia.  GENITOURINARY: No dysuria, frequency or hematuria  NEUROLOGICAL: No numbness or weakness  SKIN: No itching, burning, rashes, or lesions   All other review of systems is negative unless indicated above.    MEDICATIONS:  MEDICATIONS  (STANDING):  amLODIPine   Tablet 5 milliGRAM(s) Oral daily  budesonide  80 MICROgram(s)/formoterol 4.5 MICROgram(s) Inhaler 2 Puff(s) Inhalation two times a day  diazepam    Tablet 10 milliGRAM(s) Oral two times a day  lamoTRIgine 200 milliGRAM(s) Oral at bedtime  metoprolol succinate ER 25 milliGRAM(s) Oral daily  nicotine - 21 mG/24Hr(s) Patch 1 Patch Transdermal daily  pantoprazole    Tablet 40 milliGRAM(s) Oral before breakfast  pregabalin 300 milliGRAM(s) Oral two times a day  QUEtiapine 300 milliGRAM(s) Oral at bedtime  senna 2 Tablet(s) Oral daily      PHYSICAL EXAM:  T(C): 36.3 (05-31-23 @ 15:01), Max: 36.8 (05-31-23 @ 07:30)  HR: 97 (05-31-23 @ 15:01) (85 - 97)  BP: 134/88 (05-31-23 @ 15:01) (106/66 - 134/88)  RR: 18 (05-31-23 @ 15:01) (18 - 18)  SpO2: 97% (05-31-23 @ 15:01) (95% - 98%)  Wt(kg): --  I&O's Summary    31 May 2023 07:01  -  31 May 2023 23:01  --------------------------------------------------------  IN: 2000 mL / OUT: 3600 mL / NET: -1600 mL          Appearance: Normal	  HEENT:  PERRLA   Lymphatic: No lymphadenopathy   Cardiovascular: Normal S1 S2, no JVD  Respiratory: normal effort , clear  Gastrointestinal:  Soft, Non-tender  Skin: No rashes,  warm to touch  Psychiatry:  Mood & affect appropriate  Musculuskeletal: No edema    recent labs, Imaging and EKGs personally reviewed     05-31-23 @ 07:01  -  05-31-23 @ 23:01  --------------------------------------------------------  IN: 2000 mL / OUT: 3600 mL / NET: -1600 mL                            12.5   7.75  )-----------( 274      ( 31 May 2023 07:15 )             37.7               05-31    135  |  104  |  13  ----------------------------<  106<H>  4.2   |  19<L>  |  0.62    Ca    9.3      31 May 2023 07:15  Phos  2.6     05-31  Mg     2.10     05-31      PT/INR - ( 30 May 2023 05:39 )   PT: 10.3 sec;   INR: <0.90 ratio         PTT - ( 30 May 2023 05:39 )  PTT:28.6 sec

## 2023-06-01 NOTE — PROGRESS NOTE ADULT - PROBLEM SELECTOR PROBLEM 2
Complex regional pain syndrome I, unspecified

## 2023-06-01 NOTE — PROGRESS NOTE ADULT - PROBLEM SELECTOR PLAN 8
Chronic stable  CXR: clear lungs  Continue home inhalers  Smokes e-cigs-> nicotine patch ordered
Chronic stable  CXR: clear lungs  Continue home inhalers  Smokes e-cigs-> nicotine patch ordered        D/C planing
Chronic stable  CXR: clear lungs  Continue home inhalers  Smokes e-cigs-> nicotine patch ordered        D/C planing

## 2023-06-01 NOTE — DISCHARGE NOTE NURSING/CASE MANAGEMENT/SOCIAL WORK - PATIENT PORTAL LINK FT
You can access the FollowMyHealth Patient Portal offered by [facility] by registering at the following website: [email or web address]. By joining [facility]’s FollowMyHealth portal, you will also be able to view your health information using other applications (apps) compatible with our system.

## 2023-06-01 NOTE — PROGRESS NOTE ADULT - REASON FOR ADMISSION
Preop clearance for hardware removal in right ankle by podiatry
Modified Advancement Flap Text: The defect edges were debeveled with a #15 scalpel blade.  Given the location of the defect, shape of the defect and the proximity to free margins a modified advancement flap was deemed most appropriate.  Using a sterile surgical marker, an appropriate advancement flap was drawn incorporating the defect and placing the expected incisions within the relaxed skin tension lines where possible.    The area thus outlined was incised deep to adipose tissue with a #15 scalpel blade.  The skin margins were undermined to an appropriate distance in all directions utilizing iris scissors.

## 2023-06-01 NOTE — PROGRESS NOTE ADULT - PROBLEM SELECTOR PLAN 7
Chronic stable  Continue pantoprazole 40mg daily

## 2023-06-01 NOTE — PROGRESS NOTE ADULT - PROBLEM SELECTOR PLAN 3
Chronic stable  Denies Chest pain  EKG NSR   Continue to monitor

## 2023-06-09 RX ORDER — FLUTICASONE FUROATE AND VILANTEROL 100; 25 UG/1; UG/1
100-25 POWDER RESPIRATORY (INHALATION)
Qty: 1 | Refills: 3 | Status: ACTIVE | COMMUNITY
Start: 2023-05-24 | End: 1900-01-01

## 2023-06-14 RX ORDER — BUDESONIDE AND FORMOTEROL FUMARATE DIHYDRATE 80; 4.5 UG/1; UG/1
80-4.5 AEROSOL RESPIRATORY (INHALATION) TWICE DAILY
Qty: 1 | Refills: 0 | Status: ACTIVE | COMMUNITY
Start: 2023-06-14 | End: 1900-01-01

## 2023-06-16 RX ORDER — FLUTICASONE FUROATE AND VILANTEROL TRIFENATATE 200; 25 UG/1; UG/1
200-25 POWDER RESPIRATORY (INHALATION)
Qty: 1 | Refills: 2 | Status: ACTIVE | COMMUNITY
Start: 2019-01-14 | End: 1900-01-01

## 2023-06-19 NOTE — ED ADULT TRIAGE NOTE - MODE OF ARRIVAL
Call placed to patient. Name and date of birth verified. Patient informed of the following:    Please notify T is low.  It should be repeated between 7-10 am with a prolactin and LH.   -Dr. Wylie    Patient offered assistance with scheduling lab and declined. Patient stated he will call back to schedule labs as he has returned to work. Patient provided office contact. Patient verbalized understanding.     
Call placed to patient. No answer. Message left to return call. Call back number provided. Will continue to follow-up.   Portal message sent.   
Call placed to patient. No answer. Message left to return call. Call back number provided. Will continue to follow-up.   Portal message sent.   
Please notify T is low.  It should be repeated between 7-10 am with a prolactin and LH.   
Walk in

## 2023-06-20 ENCOUNTER — APPOINTMENT (OUTPATIENT)
Dept: PULMONOLOGY | Facility: CLINIC | Age: 48
End: 2023-06-20

## 2023-06-29 RX ORDER — PANTOPRAZOLE 40 MG/1
40 TABLET, DELAYED RELEASE ORAL DAILY
Qty: 90 | Refills: 2 | Status: ACTIVE | COMMUNITY
Start: 2023-04-26 | End: 1900-01-01

## 2023-08-02 NOTE — PROGRESS NOTE ADULT - PROBLEM SELECTOR PLAN 1
Active-  S/P harware removal   Podiatry consult appreciated, S/P OR   PT   D/C Planing
Active-  S/P harware removal   Podiatry consult appreciated, S/P OR   PT   D/C Planing
Ms. 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 Pt with pancytopenia due to disease/or treatment       
Active-  presence for preop clearance for hardware removal   Podiatry consult appreciated, OR today     Per ACC/AHA 2014 guidelines Guideline on Perioperative Cardiovascular Evaluation and Management of Patients Undergoing Noncardiac Surgery, Margi Stewart's estimated rate of MI, pulmonary edema, v. Fib, cardiac arrest or complete heart block is 3.9% ( revised cardiac risk index) and 0.1% (ROQUE) which is considered low  risk of adverse outcomes with non-cardiac surgery. Patient may proceed with scheduled intervention without further workup.
Ms. 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 Hospital course c/b neutropenic fevers treated with prophylactic antibiotics. Pt with pancytopenia due to disease and chemotherapy.

## 2023-08-16 ENCOUNTER — EMERGENCY (EMERGENCY)
Facility: HOSPITAL | Age: 48
LOS: 1 days | Discharge: HOME | End: 2023-08-16
Attending: EMERGENCY MEDICINE
Payer: COMMERCIAL

## 2023-08-16 VITALS
SYSTOLIC BLOOD PRESSURE: 135 MMHG | TEMPERATURE: 98 F | RESPIRATION RATE: 19 BRPM | OXYGEN SATURATION: 98 % | HEART RATE: 76 BPM | DIASTOLIC BLOOD PRESSURE: 86 MMHG

## 2023-08-16 VITALS
OXYGEN SATURATION: 98 % | HEART RATE: 82 BPM | SYSTOLIC BLOOD PRESSURE: 124 MMHG | TEMPERATURE: 98 F | WEIGHT: 169.98 LBS | RESPIRATION RATE: 17 BRPM | DIASTOLIC BLOOD PRESSURE: 83 MMHG

## 2023-08-16 DIAGNOSIS — Z98.89 OTHER SPECIFIED POSTPROCEDURAL STATES: Chronic | ICD-10-CM

## 2023-08-16 DIAGNOSIS — Z98.51 TUBAL LIGATION STATUS: Chronic | ICD-10-CM

## 2023-08-16 DIAGNOSIS — Z98.890 OTHER SPECIFIED POSTPROCEDURAL STATES: Chronic | ICD-10-CM

## 2023-08-16 DIAGNOSIS — D36.9 BENIGN NEOPLASM, UNSPECIFIED SITE: Chronic | ICD-10-CM

## 2023-08-16 PROBLEM — G90.50 COMPLEX REGIONAL PAIN SYNDROME I, UNSPECIFIED: Chronic | Status: ACTIVE | Noted: 2023-05-29

## 2023-08-16 PROBLEM — J44.9 CHRONIC OBSTRUCTIVE PULMONARY DISEASE, UNSPECIFIED: Chronic | Status: ACTIVE | Noted: 2023-05-29

## 2023-08-16 LAB
ALBUMIN SERPL ELPH-MCNC: 3.7 G/DL — SIGNIFICANT CHANGE UP (ref 3.5–5)
ALP SERPL-CCNC: 89 U/L — SIGNIFICANT CHANGE UP (ref 40–120)
ALT FLD-CCNC: 81 U/L DA — HIGH (ref 10–60)
ANION GAP SERPL CALC-SCNC: 3 MMOL/L — LOW (ref 5–17)
AST SERPL-CCNC: 44 U/L — HIGH (ref 10–40)
BASOPHILS # BLD AUTO: 0.03 K/UL — SIGNIFICANT CHANGE UP (ref 0–0.2)
BASOPHILS NFR BLD AUTO: 0.5 % — SIGNIFICANT CHANGE UP (ref 0–2)
BILIRUB SERPL-MCNC: 0.1 MG/DL — LOW (ref 0.2–1.2)
BUN SERPL-MCNC: 9 MG/DL — SIGNIFICANT CHANGE UP (ref 7–18)
CALCIUM SERPL-MCNC: 9.2 MG/DL — SIGNIFICANT CHANGE UP (ref 8.4–10.5)
CHLORIDE SERPL-SCNC: 111 MMOL/L — HIGH (ref 96–108)
CO2 SERPL-SCNC: 25 MMOL/L — SIGNIFICANT CHANGE UP (ref 22–31)
CREAT SERPL-MCNC: 0.8 MG/DL — SIGNIFICANT CHANGE UP (ref 0.5–1.3)
EGFR: 92 ML/MIN/1.73M2 — SIGNIFICANT CHANGE UP
EOSINOPHIL # BLD AUTO: 0.11 K/UL — SIGNIFICANT CHANGE UP (ref 0–0.5)
EOSINOPHIL NFR BLD AUTO: 1.9 % — SIGNIFICANT CHANGE UP (ref 0–6)
GLUCOSE SERPL-MCNC: 95 MG/DL — SIGNIFICANT CHANGE UP (ref 70–99)
HCG SERPL-ACNC: <1 MIU/ML — SIGNIFICANT CHANGE UP
HCT VFR BLD CALC: 38.5 % — SIGNIFICANT CHANGE UP (ref 34.5–45)
HGB BLD-MCNC: 13.2 G/DL — SIGNIFICANT CHANGE UP (ref 11.5–15.5)
IMM GRANULOCYTES NFR BLD AUTO: 0.2 % — SIGNIFICANT CHANGE UP (ref 0–0.9)
LYMPHOCYTES # BLD AUTO: 2.58 K/UL — SIGNIFICANT CHANGE UP (ref 1–3.3)
LYMPHOCYTES # BLD AUTO: 44.8 % — HIGH (ref 13–44)
MCHC RBC-ENTMCNC: 30.1 PG — SIGNIFICANT CHANGE UP (ref 27–34)
MCHC RBC-ENTMCNC: 34.3 GM/DL — SIGNIFICANT CHANGE UP (ref 32–36)
MCV RBC AUTO: 87.7 FL — SIGNIFICANT CHANGE UP (ref 80–100)
MONOCYTES # BLD AUTO: 0.37 K/UL — SIGNIFICANT CHANGE UP (ref 0–0.9)
MONOCYTES NFR BLD AUTO: 6.4 % — SIGNIFICANT CHANGE UP (ref 2–14)
NEUTROPHILS # BLD AUTO: 2.66 K/UL — SIGNIFICANT CHANGE UP (ref 1.8–7.4)
NEUTROPHILS NFR BLD AUTO: 46.2 % — SIGNIFICANT CHANGE UP (ref 43–77)
NRBC # BLD: 0 /100 WBCS — SIGNIFICANT CHANGE UP (ref 0–0)
PLATELET # BLD AUTO: 300 K/UL — SIGNIFICANT CHANGE UP (ref 150–400)
POTASSIUM SERPL-MCNC: 4.1 MMOL/L — SIGNIFICANT CHANGE UP (ref 3.5–5.3)
POTASSIUM SERPL-SCNC: 4.1 MMOL/L — SIGNIFICANT CHANGE UP (ref 3.5–5.3)
PROT SERPL-MCNC: 7.2 G/DL — SIGNIFICANT CHANGE UP (ref 6–8.3)
RBC # BLD: 4.39 M/UL — SIGNIFICANT CHANGE UP (ref 3.8–5.2)
RBC # FLD: 11.7 % — SIGNIFICANT CHANGE UP (ref 10.3–14.5)
SODIUM SERPL-SCNC: 139 MMOL/L — SIGNIFICANT CHANGE UP (ref 135–145)
WBC # BLD: 5.76 K/UL — SIGNIFICANT CHANGE UP (ref 3.8–10.5)
WBC # FLD AUTO: 5.76 K/UL — SIGNIFICANT CHANGE UP (ref 3.8–10.5)

## 2023-08-16 PROCEDURE — 85025 COMPLETE CBC W/AUTO DIFF WBC: CPT

## 2023-08-16 PROCEDURE — 96375 TX/PRO/DX INJ NEW DRUG ADDON: CPT | Mod: XU

## 2023-08-16 PROCEDURE — 96374 THER/PROPH/DIAG INJ IV PUSH: CPT | Mod: XU

## 2023-08-16 PROCEDURE — 80053 COMPREHEN METABOLIC PANEL: CPT

## 2023-08-16 PROCEDURE — 70450 CT HEAD/BRAIN W/O DYE: CPT | Mod: MA

## 2023-08-16 PROCEDURE — 99285 EMERGENCY DEPT VISIT HI MDM: CPT

## 2023-08-16 PROCEDURE — 36415 COLL VENOUS BLD VENIPUNCTURE: CPT

## 2023-08-16 PROCEDURE — 70496 CT ANGIOGRAPHY HEAD: CPT | Mod: MA

## 2023-08-16 PROCEDURE — 82962 GLUCOSE BLOOD TEST: CPT

## 2023-08-16 PROCEDURE — 70450 CT HEAD/BRAIN W/O DYE: CPT | Mod: 26,MA,59

## 2023-08-16 PROCEDURE — 84702 CHORIONIC GONADOTROPIN TEST: CPT

## 2023-08-16 PROCEDURE — 99284 EMERGENCY DEPT VISIT MOD MDM: CPT | Mod: 25

## 2023-08-16 PROCEDURE — 70496 CT ANGIOGRAPHY HEAD: CPT | Mod: 26,MA

## 2023-08-16 RX ORDER — KETOROLAC TROMETHAMINE 30 MG/ML
15 SYRINGE (ML) INJECTION ONCE
Refills: 0 | Status: DISCONTINUED | OUTPATIENT
Start: 2023-08-16 | End: 2023-08-16

## 2023-08-16 RX ORDER — SODIUM CHLORIDE 9 MG/ML
1000 INJECTION INTRAMUSCULAR; INTRAVENOUS; SUBCUTANEOUS ONCE
Refills: 0 | Status: COMPLETED | OUTPATIENT
Start: 2023-08-16 | End: 2023-08-16

## 2023-08-16 RX ORDER — METOCLOPRAMIDE HCL 10 MG
10 TABLET ORAL ONCE
Refills: 0 | Status: COMPLETED | OUTPATIENT
Start: 2023-08-16 | End: 2023-08-16

## 2023-08-16 RX ORDER — MAGNESIUM SULFATE 500 MG/ML
1 VIAL (ML) INJECTION ONCE
Refills: 0 | Status: COMPLETED | OUTPATIENT
Start: 2023-08-16 | End: 2023-08-16

## 2023-08-16 RX ADMIN — SODIUM CHLORIDE 1000 MILLILITER(S): 9 INJECTION INTRAMUSCULAR; INTRAVENOUS; SUBCUTANEOUS at 15:25

## 2023-08-16 RX ADMIN — Medication 104 MILLIGRAM(S): at 17:44

## 2023-08-16 RX ADMIN — Medication 15 MILLIGRAM(S): at 15:25

## 2023-08-16 RX ADMIN — Medication 300 GRAM(S): at 15:25

## 2023-08-16 NOTE — ED PROVIDER NOTE - PATIENT PORTAL LINK FT
You can access the FollowMyHealth Patient Portal offered by [facility] by registering at the following website: [email or web address]. By joining [facility]’s FollowMyHealth portal, you will also be able to view your health information using other applications (apps) compatible with our system.

## 2023-08-16 NOTE — ED ADULT NURSE NOTE - NSFALLUNIVINTERV_ED_ALL_ED
Bed/Stretcher in lowest position, wheels locked, appropriate side rails in place/Call bell, personal items and telephone in reach/Instruct patient to call for assistance before getting out of bed/chair/stretcher/Non-slip footwear applied when patient is off stretcher/[place] to call system/Physically safe environment - no spills, clutter or unnecessary equipment/Purposeful proactive rounding/Room/bathroom lighting operational, light cord in reach

## 2023-08-16 NOTE — ED PROVIDER NOTE - NEUROLOGICAL, MLM
Alert and oriented, no focal deficits, no motor or sensory deficits. cn ii-xii intact, no dysmetria, limp on gait from ankle surgery, neck supple

## 2023-08-16 NOTE — ED PROVIDER NOTE - OBJECTIVE STATEMENT
48 yr old female with hx of HTN, cardiac syndrome X, GERD, PTSD, COPD, complex regional pain syndrome on percocet presents to ed c/o right sided headache with facial twitching x 2 weeks. no fever, no uri sx, no syncope, no nv, no focal weakness, no rashes. tried tylenol, perocet, motrin w/o relieve

## 2023-08-16 NOTE — ED PROVIDER NOTE - CLINICAL SUMMARY MEDICAL DECISION MAKING FREE TEXT BOX
48 yr old female with hx of HTN, cardiac syndrome X, GERD, PTSD, COPD, complex regional pain syndrome on percocet presents to ed c/o right sided headache with facial twitching x 2 weeks. no fever, no uri sx, no syncope, no nv, no focal weakness, no rashes. tried tylenol, perocet, motrin w/o relieve    migraine vs ic mass vs tension headache vs aneurysm?- no infectious sx. no cervical sx. cth, cta head, labs, migraine cocktail

## 2023-08-19 ENCOUNTER — EMERGENCY (EMERGENCY)
Facility: HOSPITAL | Age: 48
LOS: 1 days | Discharge: HOME | End: 2023-08-19
Attending: STUDENT IN AN ORGANIZED HEALTH CARE EDUCATION/TRAINING PROGRAM | Admitting: STUDENT IN AN ORGANIZED HEALTH CARE EDUCATION/TRAINING PROGRAM
Payer: COMMERCIAL

## 2023-08-19 VITALS
OXYGEN SATURATION: 99 % | RESPIRATION RATE: 20 BRPM | HEART RATE: 101 BPM | SYSTOLIC BLOOD PRESSURE: 117 MMHG | DIASTOLIC BLOOD PRESSURE: 79 MMHG | TEMPERATURE: 99 F

## 2023-08-19 VITALS
DIASTOLIC BLOOD PRESSURE: 85 MMHG | SYSTOLIC BLOOD PRESSURE: 122 MMHG | TEMPERATURE: 99 F | OXYGEN SATURATION: 100 % | HEART RATE: 98 BPM | RESPIRATION RATE: 20 BRPM

## 2023-08-19 DIAGNOSIS — Z98.89 OTHER SPECIFIED POSTPROCEDURAL STATES: Chronic | ICD-10-CM

## 2023-08-19 DIAGNOSIS — Z98.890 OTHER SPECIFIED POSTPROCEDURAL STATES: Chronic | ICD-10-CM

## 2023-08-19 DIAGNOSIS — D36.9 BENIGN NEOPLASM, UNSPECIFIED SITE: Chronic | ICD-10-CM

## 2023-08-19 DIAGNOSIS — Z98.51 TUBAL LIGATION STATUS: Chronic | ICD-10-CM

## 2023-08-19 LAB
ALBUMIN SERPL ELPH-MCNC: 4.5 G/DL — SIGNIFICANT CHANGE UP (ref 3.3–5)
ALP SERPL-CCNC: 103 U/L — SIGNIFICANT CHANGE UP (ref 40–120)
ALT FLD-CCNC: 80 U/L — HIGH (ref 4–33)
ANION GAP SERPL CALC-SCNC: 13 MMOL/L — SIGNIFICANT CHANGE UP (ref 7–14)
AST SERPL-CCNC: 66 U/L — HIGH (ref 4–32)
BASE EXCESS BLDV CALC-SCNC: -1.8 MMOL/L — SIGNIFICANT CHANGE UP (ref -2–3)
BASOPHILS # BLD AUTO: 0.02 K/UL — SIGNIFICANT CHANGE UP (ref 0–0.2)
BASOPHILS NFR BLD AUTO: 0.4 % — SIGNIFICANT CHANGE UP (ref 0–2)
BILIRUB SERPL-MCNC: 0.2 MG/DL — SIGNIFICANT CHANGE UP (ref 0.2–1.2)
BUN SERPL-MCNC: 8 MG/DL — SIGNIFICANT CHANGE UP (ref 7–23)
CA-I SERPL-SCNC: 1.21 MMOL/L — SIGNIFICANT CHANGE UP (ref 1.15–1.33)
CALCIUM SERPL-MCNC: 9.4 MG/DL — SIGNIFICANT CHANGE UP (ref 8.4–10.5)
CHLORIDE BLDV-SCNC: 103 MMOL/L — SIGNIFICANT CHANGE UP (ref 96–108)
CHLORIDE SERPL-SCNC: 103 MMOL/L — SIGNIFICANT CHANGE UP (ref 98–107)
CO2 BLDV-SCNC: 24.3 MMOL/L — SIGNIFICANT CHANGE UP (ref 22–26)
CO2 SERPL-SCNC: 23 MMOL/L — SIGNIFICANT CHANGE UP (ref 22–31)
CREAT SERPL-MCNC: 0.75 MG/DL — SIGNIFICANT CHANGE UP (ref 0.5–1.3)
EGFR: 99 ML/MIN/1.73M2 — SIGNIFICANT CHANGE UP
EOSINOPHIL # BLD AUTO: 0.09 K/UL — SIGNIFICANT CHANGE UP (ref 0–0.5)
EOSINOPHIL NFR BLD AUTO: 1.7 % — SIGNIFICANT CHANGE UP (ref 0–6)
FLUAV AG NPH QL: SIGNIFICANT CHANGE UP
FLUBV AG NPH QL: SIGNIFICANT CHANGE UP
GAS PNL BLDV: 138 MMOL/L — SIGNIFICANT CHANGE UP (ref 136–145)
GAS PNL BLDV: SIGNIFICANT CHANGE UP
GLUCOSE BLDV-MCNC: 99 MG/DL — SIGNIFICANT CHANGE UP (ref 70–99)
GLUCOSE SERPL-MCNC: 104 MG/DL — HIGH (ref 70–99)
HCO3 BLDV-SCNC: 23 MMOL/L — SIGNIFICANT CHANGE UP (ref 22–29)
HCT VFR BLD CALC: 39.6 % — SIGNIFICANT CHANGE UP (ref 34.5–45)
HCT VFR BLDA CALC: 41 % — SIGNIFICANT CHANGE UP (ref 34.5–46.5)
HGB BLD CALC-MCNC: 13.5 G/DL — SIGNIFICANT CHANGE UP (ref 11.7–16.1)
HGB BLD-MCNC: 13.7 G/DL — SIGNIFICANT CHANGE UP (ref 11.5–15.5)
IANC: 3.28 K/UL — SIGNIFICANT CHANGE UP (ref 1.8–7.4)
IMM GRANULOCYTES NFR BLD AUTO: 0.2 % — SIGNIFICANT CHANGE UP (ref 0–0.9)
LACTATE BLDV-MCNC: 0.8 MMOL/L — SIGNIFICANT CHANGE UP (ref 0.5–2)
LYMPHOCYTES # BLD AUTO: 1.2 K/UL — SIGNIFICANT CHANGE UP (ref 1–3.3)
LYMPHOCYTES # BLD AUTO: 23.3 % — SIGNIFICANT CHANGE UP (ref 13–44)
MCHC RBC-ENTMCNC: 30 PG — SIGNIFICANT CHANGE UP (ref 27–34)
MCHC RBC-ENTMCNC: 34.6 GM/DL — SIGNIFICANT CHANGE UP (ref 32–36)
MCV RBC AUTO: 86.7 FL — SIGNIFICANT CHANGE UP (ref 80–100)
MONOCYTES # BLD AUTO: 0.55 K/UL — SIGNIFICANT CHANGE UP (ref 0–0.9)
MONOCYTES NFR BLD AUTO: 10.7 % — SIGNIFICANT CHANGE UP (ref 2–14)
NEUTROPHILS # BLD AUTO: 3.28 K/UL — SIGNIFICANT CHANGE UP (ref 1.8–7.4)
NEUTROPHILS NFR BLD AUTO: 63.7 % — SIGNIFICANT CHANGE UP (ref 43–77)
NRBC # BLD: 0 /100 WBCS — SIGNIFICANT CHANGE UP (ref 0–0)
NRBC # FLD: 0 K/UL — SIGNIFICANT CHANGE UP (ref 0–0)
PCO2 BLDV: 39 MMHG — SIGNIFICANT CHANGE UP (ref 39–52)
PH BLDV: 7.38 — SIGNIFICANT CHANGE UP (ref 7.32–7.43)
PLATELET # BLD AUTO: 251 K/UL — SIGNIFICANT CHANGE UP (ref 150–400)
PO2 BLDV: 34 MMHG — SIGNIFICANT CHANGE UP (ref 25–45)
POTASSIUM BLDV-SCNC: 4.1 MMOL/L — SIGNIFICANT CHANGE UP (ref 3.5–5.1)
POTASSIUM SERPL-MCNC: 4.2 MMOL/L — SIGNIFICANT CHANGE UP (ref 3.5–5.3)
POTASSIUM SERPL-SCNC: 4.2 MMOL/L — SIGNIFICANT CHANGE UP (ref 3.5–5.3)
PROT SERPL-MCNC: 7.4 G/DL — SIGNIFICANT CHANGE UP (ref 6–8.3)
RBC # BLD: 4.57 M/UL — SIGNIFICANT CHANGE UP (ref 3.8–5.2)
RBC # FLD: 11.8 % — SIGNIFICANT CHANGE UP (ref 10.3–14.5)
RSV RNA NPH QL NAA+NON-PROBE: SIGNIFICANT CHANGE UP
SAO2 % BLDV: 54.9 % — LOW (ref 67–88)
SARS-COV-2 RNA SPEC QL NAA+PROBE: DETECTED
SODIUM SERPL-SCNC: 139 MMOL/L — SIGNIFICANT CHANGE UP (ref 135–145)
TROPONIN T, HIGH SENSITIVITY RESULT: <6 NG/L — SIGNIFICANT CHANGE UP
WBC # BLD: 5.15 K/UL — SIGNIFICANT CHANGE UP (ref 3.8–10.5)
WBC # FLD AUTO: 5.15 K/UL — SIGNIFICANT CHANGE UP (ref 3.8–10.5)

## 2023-08-19 PROCEDURE — 71045 X-RAY EXAM CHEST 1 VIEW: CPT | Mod: 26

## 2023-08-19 PROCEDURE — 99284 EMERGENCY DEPT VISIT MOD MDM: CPT

## 2023-08-19 PROCEDURE — 93010 ELECTROCARDIOGRAM REPORT: CPT

## 2023-08-19 RX ORDER — METOCLOPRAMIDE HCL 10 MG
10 TABLET ORAL ONCE
Refills: 0 | Status: COMPLETED | OUTPATIENT
Start: 2023-08-19 | End: 2023-08-19

## 2023-08-19 RX ORDER — ALBUTEROL 90 UG/1
2.5 AEROSOL, METERED ORAL ONCE
Refills: 0 | Status: COMPLETED | OUTPATIENT
Start: 2023-08-19 | End: 2023-08-19

## 2023-08-19 RX ORDER — ACETAMINOPHEN 500 MG
650 TABLET ORAL ONCE
Refills: 0 | Status: COMPLETED | OUTPATIENT
Start: 2023-08-19 | End: 2023-08-19

## 2023-08-19 RX ADMIN — Medication 650 MILLIGRAM(S): at 23:09

## 2023-08-19 RX ADMIN — Medication 10 MILLIGRAM(S): at 21:00

## 2023-08-19 RX ADMIN — ALBUTEROL 2.5 MILLIGRAM(S): 90 AEROSOL, METERED ORAL at 21:00

## 2023-08-19 NOTE — ED PROVIDER NOTE - NSFOLLOWUPINSTRUCTIONS_ED_ALL_ED_FT
– Return for any worsening or concerning symptoms (see below).  – Follow up with primary care doctor in the next 5 to 7 days.     COVID-19 (Coronavirus Disease 2019)    WHAT YOU NEED TO KNOW:    What do I need to know about coronavirus disease 2019 (COVID-19)? COVID-19 is the disease caused by the novel (new) coronavirus first discovered in 2019. Coronaviruses generally cause upper respiratory (nose, throat, and lung) infections, such as a cold. The new virus can also cause serious lower respiratory conditions, such as pneumonia or acute respiratory distress syndrome (ARDS). The new virus can also affect many other organs, including the brain and heart. Blood vessels can also be affected, leading to blood clots. Anyone can develop serious problems from the new virus, but your risk is higher if you are 65 or older. A weak immune system, diabetes, or a heart or lung condition can also increase your risk.    What are the signs and symptoms of COVID-19? You may not develop any signs or symptoms. Signs and symptoms that do develop usually start about 5 days after infection but can take 2 to 14 days. Signs and symptoms range from mild to severe. You may feel like you have the flu or a bad cold. Information on COVID-19 is still being learned. Tell your healthcare provider if you think you were infected but develop signs or symptoms not listed below:  •A cough      •Shortness of breath or trouble breathing that may become severe      •A fever of at least 100.4°F, or 38°C (may be lower in adults 65 or older)      •Chills that might include shaking      •Muscle pain, body aches, or a headache      •A sore throat      •Suddenly not being able to taste or smell anything      •Feeling mentally and physically tired (fatigue)      •Congestion (stuffy head and nose), or a runny nose      •Diarrhea, nausea, or vomiting      How is COVID-19 diagnosed? If you think you have COVID-19, call your healthcare provider. In some areas, testing is only done if a person has severe symptoms or is hospitalized. Testing is done more widely in other places. Your provider will tell you what to do based on your symptoms and the rules in your area. In general, the following may be used:   •A viral test shows if you have a current infection. Samples are taken from your nose and throat, usually with swabs. You may need to wait several days to get the test results. Your healthcare provider will tell you how to get your results. You will need to quarantine (stay physically away from others) until you get your results. If results show you have COVID-19, you will need to isolate yourself physically until you are well. Your provider or other health official may give you more directions. You will also need to prevent another infection until it is known if you can get COVID-19 again.      •An antibody test shows if you had a past infection. Blood samples are used for this test. Antibodies are made by your immune system to attack the virus that causes COVID-19. Antibodies will form 1 to 3 weeks after you are infected. It is not known if antibodies prevent a second infection, or for how long a person might be protected. If you have antibodies, you will still need to be careful around others until more is known.      •CT scans or x-rays may be used to check for signs of pneumonia. The 2019 coronavirus causes a specific kind of pneumonia, usually in both lungs. The pictures may also be used to check for health problems in other parts of your body.      How is COVID-19 treated? No medicine or specific treatment is currently approved for COVID-19. The following may be used to manage your symptoms or treat the effects of COVID-19:   •Mild symptoms may get better on their own. If you do not need to be treated in a hospital, you will be given instructions to use at home. Your condition will be closely monitored. You will need to watch for worsening symptoms and seek immediate care if needed. Talk to your healthcare provider about the following:?Relieve your symptoms. To soothe a sore throat, gargle with warm salt water, or use throat lozenges or a throat spray. Your healthcare provider may recommend a cough medicine. Drink more liquids to thin and loosen mucus and to prevent dehydration. Use decongestants or saline drops as directed for nasal congestion.      ?NSAIDs or acetaminophen can help lower a fever and relieve body aches or a headache. Follow directions. If not taken correctly, NSAIDs can cause kidney damage and acetaminophen can cause liver damage.      •Severe or life-threatening symptoms are treated in the hospital. You may need a combination of the following:?Medicines may be given to reduce inflammation or to fight the virus. You may also need blood thinners to prevent or treat blood clots. If you have a deep vein thrombosis (DVT) or pulmonary embolism (PE), you may need to keep using blood thinners for 3 months.      ?Extra oxygen may be given if you have respiratory failure. This means your lungs cannot get enough oxygen into your blood and out to your organs. Extra oxygen can help prevent organ failure.      ?A ventilator may be used to help you breathe.      ?Convalescent plasma (part of blood) from a patient who has recovered from COVID-19 may be used. The plasma contains antibodies that can help your body fight the infection. Convalescent plasma is only given to patients who have severe signs and symptoms.        How does the 2019 coronavirus spread? The virus spreads quickly and easily. You can become infected if you are in contact with a large amount of the virus, even for a short time. You can also become infected by being around a small amount of virus for a long time. The following are ways the virus is thought to spread, but more information may be coming:   •Droplets are the most common way all coronaviruses spread. The virus can travel in droplets that form when a person talks, coughs, or sneezes. Anyone who breathes in the droplets or gets them in his or her eyes can become infected with the virus. Droplets can stay in the air for a few hours and travel farther than 6 feet (2 meters). A person can have contact with the droplets, even after the infected person leaves the room. This is called airborne transmission, a less common way the virus can spread. It has mainly happened in closed rooms that do not have flowing air.      •Close personal contact with an infected person increases the risk for infection. Close personal contact means you are within 6 feet (2 meters) of the person. The virus can be passed starting 2 days before symptoms begin. The virus can be passed even if the person never develops symptoms, starting 2 days before a positive test. Infection can happen from close contact for a total of 15 minutes or more over 24 hours. An example is close contact 3 times for 5 minutes each within 24 hours. Some factors make infection more likely. These include how close you are and for how long. If you are indoors or outdoors also matters. The risk is even higher if both of you are not wearing face coverings.      •Person-to-person contact can spread the virus. For example, a person with the virus on his or her hands can spread it by shaking hands with someone. At this time, it does not appear that the virus can be passed to a baby during pregnancy or delivery. Some newborns have tested positive for the virus. The newborns may have been infected before, during, or after birth. The greatest risk is for a  to be in close contact with an infected person. The virus also does not appear to spread in breast milk. If you are pregnant or breastfeeding, talk to your healthcare provider or obstetrician about any concerns you have.      •The virus can stay on objects and surfaces. A person can get the virus on his or her hands by touching the object or surface. Infection happens if the person then touches his or her eyes or mouth with unwashed hands. It is not yet known how long the virus can stay on an object or surface. That is why it is important to clean all surfaces that are used regularly.      •An infected animal may be able to infect a person who touches it. This may happen at live markets or on a farm.      How can everyone lower the risk for COVID-19? The best way to prevent infection is to avoid anyone who is infected, but this can be hard to do. An infected person can spread the virus before signs or symptoms begin, or even if signs or symptoms never develop. The following can help lower the risk for infection:   Limit the Spread of Infectious Disease       •Wash your hands often throughout the day. Use soap and water. Rub your soapy hands together, lacing your fingers. Wash the front and back of each hand, and in between your fingers. Use the fingers of one hand to scrub under the fingernails of the other hand. Wash for at least 20 seconds. Rinse with warm, running water for several seconds. Then dry your hands with a clean towel or paper towel. Use hand  that contains alcohol if soap and water are not available. Do not touch your eyes, nose, or mouth without washing your hands first. Teach children how to wash their hands and use hand .  Handwashing           •Cover a sneeze or cough. This prevents droplets from traveling from you to others. Turn your face away and cover your mouth and nose with a tissue. Throw the tissue away. Use the bend of your arm if a tissue is not available. Then wash your hands well with soap and water or use hand . Turn and cover your face if you are around someone who is sneezing or coughing. Teach children how to cover a cough or sneeze.      •Follow worldwide, national, and local social distancing guidelines. Social distancing means people avoid close physical contact so the virus cannot spread from one person to another. Keep at least 6 feet (2 meters) between you and others. Also keep this distance from anyone who comes to your home, such as someone making a delivery.      •Make a habit of not touching your face. It is not known how long the virus can stay on objects and surfaces. If you get the virus on your hands, you can transfer it to your eyes, nose, or mouth and become infected. You can also transfer it to objects, surfaces, or people. Be aware of what you touch when you go out. Examples include handrails and elevator buttons. Try not to touch anything with bare hands unless it is necessary. Wash your hands before you leave your home and when you return.      •Clean and disinfect high-touch surfaces and objects often. Use a disinfecting solution or wipes. You can make a solution by diluting 4 teaspoons of bleach in 1 quart (4 cups) of water. Clean and disinfect even if you think no one living in or coming to your home is infected with the virus. You can wipe items with a disinfecting cloth before you bring them into your home. Wash your hands after you handle anything you bring into your home.      •Make your immune system as healthy as possible. A weakened immune system makes you more vulnerable to the new coronavirus. No COVID-19 vaccine is currently available. Vaccines such as the flu and pneumonia vaccines can help your immune system fight infection. Your doctor can tell you which vaccines to get, and when to get them. Keep your immune system as strong as possible. Do not smoke. Eat healthy foods, exercise regularly, and try to manage stress. Go to bed and wake up at the same times each day.   Healthy Foods           How do I follow social distancing guidelines to help lower the risk for COVID-19? National and local social distancing rules vary. Rules may change over time as restrictions are lifted. Restrictions may return if an outbreak happens where you live. It is important to know and follow all current social distancing rules in your area. The following are general guidelines:  •Limit trips out of your home. You may be able to have food, medicines, and other supplies delivered. If possible, have delivered items left at your door or other area. Try not to have someone hand you an item. You will be so close to the person that the virus can spread between you.      •Do not have close physical contact with anyone who does not live in your home. Do not shake hands with, hug, or kiss a person as a greeting. Stand or walk as far from others as possible. If you must use public transportation (such as a bus or subway), try to sit or stand away from others. You can stay safely connected with others through phone calls, e-mail messages, social media websites, and video chats. Check in on anyone who may be having a hard time socially distancing, or who lives alone. Ask administrators at nursing homes or long-term care facilities how you can safely communicate with someone living there.      •Wear a cloth face covering around others who do not live in your home. Face coverings help prevent the virus from spreading to others in droplets. You can use a clear face covering if someone needs to read your lips. This is a cloth covering that has plastic over the mouth area so your lips can be seen. Do not use coverings that have breathing valves or vents. The virus can travel out of the valve or vent and be spread to others. Do not take your covering off to talk, cough, or sneeze. Do not use coverings on children younger than 2 years or on anyone who has breathing problems or cannot remove it.      •Only allow medical or other necessary professionals into your home. Wear your face covering, and remind professionals to wear a face covering. Remind them to wash their hands when they arrive and before they leave. Do not let anyone who does not live in your home in, even if the person is not sick. A person can pass the virus to others before symptoms of COVID-19 begin. Some people never even develop symptoms. Children commonly have mild symptoms or no symptoms. It may be hard to tell a child not to hug or kiss you. Explain that this is how he or she can help you stay healthy.      •Do not go to someone else's home unless it is necessary. Do not go over to visit, even if the person is lonely. Only go if you need to help him or her. Make sure you both wear face coverings while you are there.      •Avoid large gatherings and crowds. Gatherings or crowds of 10 or more individuals can cause the virus to spread. Examples of gatherings include parties, sporting events, Buddhism services, and conferences. Crowds may form at beaches, [place], and tourist attractions. Protect yourself by staying away from large gatherings and crowds.      •Ask your healthcare provider for other ways to have appointments. You may be able to have appointments without having to go into the provider's office. Some providers offer phone, video, or other types of appointments. You may also be able to get prescriptions for a few months of your medicines at a time.      •Stay safe if you must go out to work. You may have a job that can only be done outside your home. Keep physical distance between you and other workers as much as possible. Follow your employer's rules so everyone stays safe.      What should I do if I have COVID-19 and am recovering at home? Healthcare providers will give you specific instructions to follow. The following are general guidelines to remind you how to keep others safe until you are well:   •Wash your hands often. Use soap and water as much as possible. You can use hand  that contains alcohol if soap and water are not available. Do not share towels with anyone. If you use paper towels, throw them away in a lined trash can kept in your room or area. Use a covered trash can, if possible.      •Do not go out of your home unless it is necessary. You may have to go to your healthcare provider's office for check-ups or to get prescription refills. Do not arrive at the provider's office without an appointment. Providers have to make their offices safe for staff and other patients.      •Do not have close physical contact with anyone unless it is necessary. Only have close physical contact with a person giving direct care, or a baby or child you must care for. Family members and friends should not visit you. If possible, stay in a separate area or room of your home if you live with others. No one should go into the area or room except to give you care. You can visit with others by phone, video chat, e-mail, or similar systems. It is important to stay connected with others in your life while you recover.      •Wear a face covering while others are near you. This can help prevent droplets from spreading the virus when you talk, sneeze, or cough. Put the covering on before anyone comes into your room or area. Remind the person to cover his or her nose and mouth before going in to provide care for you.      •Do not share items. Do not share dishes, towels, or other items with anyone. Items need to be washed after you use them.      •Protect your baby. Wash your hands with soap and water often throughout the day. Wear a clean face covering while you breastfeed, or while you express or pump breast milk. If possible, ask someone who is well to care for your baby. You can put breast milk in bottles for the person to use, if needed. Talk to your healthcare provider if you have any questions or concerns about caring for or bonding with your baby. He or she will tell you when to bring your baby in for check-ups and vaccines. He or she will also tell you what to do if you think your baby was infected with the new virus.      •Do not handle live animals. Until more is known, it is best not to touch, play with, or handle live animals. Some animals, including pets, have been infected with the new coronavirus. Do not handle or care for animals until you are well. Care includes feeding, petting, and cuddling your pet. Do not let your pet lick you or share your food. Ask someone who is not infected to take care of your pet, if possible. If you must care for a pet, wear a face covering. Wash your hands before and after you give care.      •Follow directions from your healthcare provider for being around others after you recover. It is not known for sure if or for how long a recovered person can pass the virus to others. Your provider may give you instructions, such as continuing social distancing or wearing a face covering around others. The following are general guidelines for when you can be around others:?If you never developed any symptoms, wait at least 10 days after your positive test. Your provider may want you to have 2 negative tests in a row at least 24 hours apart. This depends on how available testing is in your area.      ?If you did have symptoms, wait at least 10 days after the symptoms first appeared. Then you will need to have no fever for 24 hours without fever medicine. Most of your symptoms will also need to be gone. A loss of taste or smell may continue for several months. It is considered okay to be around others if this is your only symptom.      ?If you were hospitalized for COVID-19 and needed oxygen, your provider will tell you how long to wait. You may need to wait until 20 days after symptoms appeared. It may be less if you have 2 negative tests in a row at least 24 hours apart. This will depend on how available testing is in your area.        How should I take care of someone who has COVID-19? If the person lives in another home, arrange for a time to give care. Remember to bring a few pairs of disposable gloves and a cloth face covering. The following are general guidelines to help you safely care for anyone who has COVID-19:  •Wash your hands often. Wash before and after you go into the person's home, area, or room. Throw paper towels away in a lined trash can that has a lid, if possible.      •Do not allow others to go near the person. No one should come into the person's home unless it is necessary. If possible, the person should be in a separate area or room if he or she lives with others. Keep the room's door shut unless you need to go in or out. Have others call, video chat, or e-mail the person if he or she is feeling well enough. The person may feel lonely if he or she is kept separate for a long period of time. Safe communication can help him or her stay connected to family and friends.      •Make sure the person's room has good air flow. You may be able to open the window if the weather allows. An air conditioner can also be turned on to help air move.      •Contact the person before you go in to give care. Make sure the person is wearing a face covering. Remind him or her to wash his or her hands with soap and water. He or she can use hand  that contains alcohol if soap and water are not available. Put on a face covering before you go in to give care.      •Wear gloves while you give care and clean. Clean items the person uses often. Clean countertops, cooking surfaces, and the fronts and insides of the microwave and refrigerator. Clean the shower, toilet, the area around the toilet, the sink, the area around the sink, and faucets. Gather used laundry or bedding. Wash and dry items on the warmest settings the fabric allows. Wash dishes and silverware in hot, soapy water or in a .      •Anything you throw away needs to go into a lined trash can. When you need to empty the trash, close the open end of the lining and tie it closed. This helps prevent items the virus is on from spilling out of the trash. Remove your gloves and throw them away. Wash your hands.      Where can I find more information?   •[facility]  [address] [place] [address]  Phone: 1-980.871.2712  Web Address: http://www.cdc.gov        What should I do if I think I or someone I know may be infected? Do the following to protect others:   •If emergency care is needed, tell the  about the possible infection, or call ahead and tell the emergency department.      •Call a healthcare provider for instructions if symptoms are mild. Anyone who may be infected should not arrive without calling first. The provider will need to protect staff members and other patients.      •The person who may be infected needs to wear a face covering while getting medical care. This will help lower the risk of infecting others. Coverings are not used for anyone who is younger than 2 years, has breathing problems, or cannot remove it. The provider can give you instructions for anyone who cannot wear a covering.      Call your local emergency number (911 in the ) or an emergency department if:   •You have trouble breathing or shortness of breath at rest.      •You have chest pain or pressure that lasts longer than 5 minutes.      •You become confused or hard to wake.      •Your lips or face are blue.      •You have a fever of 104°F (40°C) or higher.      When should I call my doctor?   •You do not have symptoms of COVID-19 but had close physical contact within 14 days with someone who tested positive.      •You have questions or concerns about your condition or care.      CARE AGREEMENT:    You have the right to help plan your care. Learn about your health condition and how it may be treated. Discuss treatment options with your healthcare providers to decide what care you want to receive. You always have the right to refuse treatment.

## 2023-08-19 NOTE — ED PROVIDER NOTE - PHYSICAL EXAMINATION
Gen: NAD; well appearing  Head: NCAT  Eyes: EOMI, PERRLA, no conjunctival pallor, no scleral icterus  ENT: mucous membranes moist, no discharge  Neck: neck supple  Resp: sating 97-99, CTAB, no W/R/R  CV: RRR, +S1/S2, no M/R/G  GI: Abdomen soft non-distended, NTTP, no masses  MSK: No open wounds, no bruising, no lower extremity edema  Neuro: A&Ox4, sensation nl, motor 5/5 RUE/LUE/RLE/LLE, follows commands  Ext: no edema, no deformity, warm and well-perfused  Skin: no rash or bruising Gen: NAD; well appearing  Head: NCAT  Eyes: EOMI, PERRLA, no conjunctival pallor, no scleral icterus  ENT: mucous membranes moist, no discharge  Neck: neck supple  Resp: sating 97-99, CTAB, no W/R/R  CV: RRR, +S1/S2, no M/R/G  GI: Abdomen soft non-distended, NTTP, no masses  MSK: No open wounds, no bruising, no lower extremity edema  Neuro: CN2-12 grossly intact. EOMI. 5/5 strength in UE and LE b/l.  Sensation intact in UE/LE b/l.  No dysdiadochokinesia. Gait nl   Ext: no edema, no deformity, warm and well-perfused  Skin: no rash or bruising

## 2023-08-19 NOTE — ED PROVIDER NOTE - NSICDXPASTMEDICALHX_GEN_ALL_CORE_FT
PAST MEDICAL HISTORY:  Anemia bitamin b12 deficiency    Benign Hypertension     Carcinoid Tumor of Ovary, Benign     Cardiac syndrome X     Complex regional pain syndrome I, unspecified     COPD (chronic obstructive pulmonary disease)     Depression with anxiety     GERD (gastroesophageal reflux disease)     Ovarian Cyst     PTSD (post-traumatic stress disorder)

## 2023-08-19 NOTE — ED PROVIDER NOTE - PROGRESS NOTE DETAILS
Sai GUTIERREZ PGY-3: ambulatory sat 100. feeling well, will dc Sai GUTIERREZ PGY-3: Pt was re-evaluated at bedside, VSS, feeling well overall. Return precautions and follow up plan with PCP and/or specialist were discussed. Time was taken to answer any questions that the patient had before providing them with discharge paperwork.

## 2023-08-19 NOTE — ED ADULT TRIAGE NOTE - CHIEF COMPLAINT QUOTE
Pt presents to ED ambulatory from home with c/o migraine headache x 5 days. Pt tested positive for COVID yesterday. PT endorses shortness of breath. Pt has hx of complex regional pain, HTN, bipolar, depression, anxiety,

## 2023-08-19 NOTE — ED PROVIDER NOTE - CLINICAL SUMMARY MEDICAL DECISION MAKING FREE TEXT BOX
Sai GUTIERREZ PGY-3: 48 yr old female with hx of HTN, cardiac syndrome X, GERD, PTSD, COPD, complex regional pain syndrome, Diagnosed with COVID yesterday presenting with chief complaint shortness of breath. VSS, sating 97-99 RA. CTAB, no wheezing appreciated. Pt well appearing speaking full stencnes. Cardiac workup, pl.aced on caridac monitor. SOB most likely 2/2 known COVID. CXR to check for pna, labs, reglan for HA. Most likely compelx migraine vs cluster. Sai GUTIERREZ PGY-3: 48 yr old female with hx of HTN, cardiac syndrome X, GERD, PTSD, COPD, complex regional pain syndrome, Diagnosed with COVID yesterday presenting with chief complaint shortness of breath. VSS, sating 97-99 RA. CTAB, no wheezing appreciated. Pt well appearing speaking full stencnes. Cardiac workup, pl.aced on cardiac monitor. SOB most likely 2/2 known COVID. CXR to check for pna, labs, reglan for HA. Most likely compelx migraine vs cluster.

## 2023-08-19 NOTE — ED PROVIDER NOTE - OBJECTIVE STATEMENT
48 yr old female with hx of HTN, cardiac syndrome X, GERD, PTSD, COPD not on home O2, complex regional pain syndrome, Diagnosed with COVID yesterday presenting with chief complaint shortness of breath.  Patient is accompanied by father at bedside who is providing collateral information.  Patient states that she was recently seen at [facility] for right-sided headache, constant, denies any severe worst headache of her life, no visual disturbances, no photophobia.  Patient had CT done at that time which was within normal limits.   Denies any vomiting, neuro symptoms such as tingling or numbness in the upper or lower extremities.  Patient does endorse shortness of breath since today as well as generalized weakness, also reports a fever 101 today.  Denies any neck pain or neck stiffness, as well as any rashes.  Endorses generalized chest pain  that is nonpleuritic, nonexertional   No prior history of blood clots.   + fevers.

## 2023-08-19 NOTE — ED PROVIDER NOTE - PATIENT PORTAL LINK FT
You can access the FollowMyHealth Patient Portal offered by [facility] by registering at the following website: [email or web address]. By joining [employer]’s FollowMyHealth portal, you will also be able to view your health information using other applications (apps) compatible with our system.

## 2023-08-19 NOTE — ED PROVIDER NOTE - ATTENDING CONTRIBUTION TO CARE
Quintin Kumar DO:  patient seen and evaluated with the resident.  I was present for key portions of the History & Physical, and I agree with the Impression & Plan. 47 yo f pmh HTN, cardiac syndrome X, GERD, PTSD, COPD not on home O2, complex regional pain syndrome, pw SOB.  Patient ports 1 week of malaise, myalgias.  In the last 2 days became short of breath.  Reports mild CP at baseline.  Midsternal, none exertional, nonpleuritic, nonpositional.  Reports dry cough.  Yesterday patient tested positive at home for COVID.  Few days prior patient developed mild right-sided headache, atraumatic, no neck trauma, no neck manipulation.  Went to [facility], EMR independent reviewed by me.  Patient was eventually discharged after treatment and work-up.  Patient reports mild continued headache. Denies vision changes, weakness focally, paresthesias.  Patient is HDS, well-appearing, satting 99% on room air.  Lungs CTA BL.  No neurodeficits.  Will treat symptoms, reassess.  Anticipate DC.  Patient not in window for Paxlovid.

## 2023-08-19 NOTE — ED ADULT NURSE NOTE - OBJECTIVE STATEMENT
Patient received to room 8, A/Ox4, ambulatory, c/o headaches. Patient states she was diagnosed with COVID yesterday and has been experiencing headaches, chest pain, SOB, fevers and chills. Denies vomiting, numbness/tingling and vision changes. 20G IV placed to left AC. Placed on cardiac monitor, NSR. Safety maintained, call bell within reach.

## 2023-08-23 NOTE — ED PROVIDER NOTE - CPE EDP ENMT NORM
-- DO NOT REPLY / DO NOT REPLY ALL --  -- Message is from [facility] [employer] (ECO) --    ONLY TO BE USED WITHIN A REFILL MEDICATION ENCOUNTER    Med Refill  Is the patient currently having any symptoms?: No/Non-Emergent symptoms    Name of medication requested: See pended med    Has patient contacted the pharmacy? No, direct patient to contact pharmacy first as they will be able to process the refill request directly    Is this the first request for the medication in the last 48 hours?: Yes      Patient is requesting a medication refill - medication is on active list      Full name of the provider who ordered the medication: Outside provider did the last order    Clinic site name / Account # for provider: [email or web address]    Preferred Pharmacy: [facility] - [address]    Patient confirmed the above pharmacy as correct?  Yes     Would like 90 days      Caller Information       Type Contact Phone/Fax    08/23/2023 02:16 PM CDT Phone (Incoming) Tatyana Pappas (Emergency Contact) [phone]          Alternative phone number: [phone] patient    Can a detailed message be left?: Yes    Patient is completely out of medication: Verify if patient is currently experiencing symptoms. If patient is symptomatic, proceed with front end triage instead of medication refill. If patient is not symptomatic but is completely out of medication, hillary as High priority when routing. Inform patient: “Please call back with any questions or concerns and if your condition becomes life threatening, you should seek immediate medical assistance by calling 911 or going to the Emergency Department for evaluation.”    Inform all patients: \"If the clinical team needs to contact you regarding this refill, please be aware the return phone call may come from an unidentified or out of state phone number and your refill request will be addressed as soon as the clinical team reviews your  message.\"   normal...

## 2023-08-23 NOTE — ED ADULT NURSE NOTE - SUICIDE SCREENING QUESTION 1
No Subjective   Reason for Visit: Tabby Morrell is an 73 y.o. female here for a Medicare Wellness visit.     Past Medical, Surgical, and Family History reviewed and updated in chart.    Reviewed all medications by prescribing practitioner or clinical pharmacist (such as prescriptions, OTCs, herbal therapies and supplements) and documented in the medical record.  Medicare Wellness Billing Compliance Satisfied    *This is a visual tool to show completion of required items on the day of the visit. Green checks will only appear on the date of visit.    Review all medications by prescribing practitioner or clinical pharmacist (such as prescriptions, OTCs, herbal therapies and supplements) documented in the medical record    Past Medical, Surgical, and Family History reviewed and updated in chart    Tobacco Use Reviewed    Alcohol Use Reviewed    Illicit Drug Use Reviewed    PHQ2/9    Falls in Last Year Reviewed    Home Safety Risk Factors Reviewed    Cognitive Impairment Reviewed    Patient Self Assessment and Health Status    Current Diet Reviewed    Exercise Frequency    ADL - Hearing Impairment    ADL - Bathing    ADL - Dressing    ADL - Walks in Home    IADL - Managing Finances    IADL - Grocery Shopping    IADL - Taking Medications    IADL - Doing Housework    Vision Screening       The patient is a 73 year-old female presenting to the clinic for a medicare wellness visit.  Today she is accompanied by her caregiver.  Leg swelling reported 7/2023 with elevated blood pressure. She reports decrease in swelling as blood pressure lowers.  Patient denies excess sodium intake. Does not recall weather influences.  She reports monitoring blood pressure at home with a wrist monitor.    She reports taking all medications with out fault.  Patient denies side effects.      Patient is concerned of loose bowel movements 7/2023.  She reports symptoms lasting 1 day.  Patient denies diet changes.  Denies antibiotic  use during that time frame.      Patient reports bouts of incontinence.      She reports foot pain.  Patient reports using ice to relieve pain.      Patient's caregiver is concerned of fall risks.  She reports the patient being advised to utilize a cane when ambulatory.  Patient saw an orthopedic specialist in the past.      Patient reports most recent mammogram 2020.  She is interested in staying up to date on routine maintenance and screening.      She is an orthopedic specialist of Dr. Reno.  Patient reports seeing Neurologist.     Patient has been staying with a friend.  Caregiver reports this is negatively affecting patient.  Caregiver reports family concern.       Bp good range here today, brought in bp record and doing well    On pristiq, no changes    Saw Dr Guan for dementia,no changes in meds.  Did not get rf from him, will refill here today and then will discuss on her f/up appt in [place]    Saw  at [facility] for foot pain, has claw deformity and bunion and was to do surgery but did not.  Saw Dr Thompson several years prior and rec surgery also but did not due.  Now pain worse and affecting walking.  Walks her dog a lot and concerned if uses cane and dog leash more of a fall risk.  Good gait when not in pain    Occasional urinary incont, when has to go has to go right away or may leak urine    Patient Care Team:  Nidhi Severino DO as PCP - General  Nidhi Severino DO as PCP - [facility] Attributed Provider     Review of Systems    Objective   Vitals:  /72   Pulse 63   Temp 36.7 °C (98 °F)   Wt 93 kg (205 lb)   BMI 38.73 kg/m²       Physical Exam    Physical Exam      General: Not in distress.     HEENT: Normocephalic and anicteric sclerae. No erythema, exudates.     Neck: Soft, supple and no thyromegaly noted.      Heart: Regular rate, rhythm.     Lungs: Clear to auscultation. No wheezing or rhonchi.      Abdomen: Soft, non-tender, non-distended, bowel sounds are positive.       Extremities: No clubbing, cyanosis or edema.       Assessment/Plan   Problem List Items Addressed This Visit          Mental Health    Major depression in complete remission (CMS/HCC)    Relevant Medications    desvenlafaxine 100 mg 24 hr tablet     Other Visit Diagnoses       Routine general medical examination at health care facility    -  Primary    Asymptomatic menopausal state        Relevant Orders    XR DEXA bone density    Encounter for screening mammogram for breast cancer        Relevant Orders    BI mammo bilateral screening tomosynthesis    Moderate dementia with mood disturbance, unspecified dementia type (CMS/HCC)        Relevant Medications    memantine (Namenda) 10 mg tablet    donepezil (Aricept) 10 mg tablet    Foot deformity, acquired, left        Relevant Orders    Referral to Orthopaedic Surgery          Reviewed Labs from 10/24/2023, which revealed blood count WNL.  Reviewed imaging with patient.      Depression controlled.  Counseled the patient on depression.     Discussed bowel complications, had one day , will observe for now.  Had colonoscopy earlier this year    Discussed urinary incontinence.  No meds at this time, will follow    Discussed foot pain.  Advised patient to wear supportive shoes.  Follow up with Orthopedic specialist.  Referral placed today 8/23/2023. Use Tylenol as needed for pain.  Advised on treatment options.  Discussed possibility of rehabilitation at a facility to aid recovery post operation.  She can follow up at [facility] where she was last seen, also gave her Dr Thompson and Dr Day's names and she will decide on next step.       Advised patient to maintain a routine to help dementia and functioning.      Order placed for DEXA bone density scan 8/23/2023.  Order placed for Mammogram 8/23/2023.    Discussed medications.  Refills ordered.  Take medication as directed.     Continue and see your specialists as scheduled.  Follow up in office as scheduled in  4-6 months or prn sooner.        Scribe Attestation  By signing my name below, I, James Farrar   attest that this documentation has been prepared under the direction and in the presence of Nidhi Severino DO.

## 2023-09-28 ENCOUNTER — APPOINTMENT (OUTPATIENT)
Dept: PULMONOLOGY | Facility: CLINIC | Age: 48
End: 2023-09-28
Payer: COMMERCIAL

## 2023-09-28 DIAGNOSIS — J20.9 ACUTE BRONCHITIS, UNSPECIFIED: ICD-10-CM

## 2023-09-28 PROCEDURE — 99214 OFFICE O/P EST MOD 30 MIN: CPT | Mod: 95

## 2023-09-28 RX ORDER — AZITHROMYCIN 250 MG/1
250 TABLET, FILM COATED ORAL
Qty: 1 | Refills: 0 | Status: ACTIVE | COMMUNITY
Start: 2023-09-28 | End: 1900-01-01

## 2023-09-28 RX ORDER — PREDNISONE 20 MG/1
20 TABLET ORAL
Qty: 10 | Refills: 0 | Status: ACTIVE | COMMUNITY
Start: 2023-09-28 | End: 1900-01-01

## 2023-09-28 RX ORDER — ALBUTEROL SULFATE 90 UG/1
108 (90 BASE) INHALANT RESPIRATORY (INHALATION)
Qty: 3 | Refills: 3 | Status: ACTIVE | COMMUNITY
Start: 2023-05-24 | End: 1900-01-01

## 2023-10-01 ENCOUNTER — NON-APPOINTMENT (OUTPATIENT)
Age: 48
End: 2023-10-01

## 2023-10-11 ENCOUNTER — LABORATORY RESULT (OUTPATIENT)
Age: 48
End: 2023-10-11

## 2023-10-11 ENCOUNTER — APPOINTMENT (OUTPATIENT)
Dept: PULMONOLOGY | Facility: CLINIC | Age: 48
End: 2023-10-11
Payer: COMMERCIAL

## 2023-10-11 VITALS
WEIGHT: 170 LBS | HEART RATE: 93 BPM | BODY MASS INDEX: 31.28 KG/M2 | SYSTOLIC BLOOD PRESSURE: 113 MMHG | DIASTOLIC BLOOD PRESSURE: 77 MMHG | RESPIRATION RATE: 15 BRPM | OXYGEN SATURATION: 94 % | TEMPERATURE: 97 F | HEIGHT: 62 IN

## 2023-10-11 DIAGNOSIS — J30.2 OTHER SEASONAL ALLERGIC RHINITIS: ICD-10-CM

## 2023-10-11 DIAGNOSIS — Z72.0 TOBACCO USE: ICD-10-CM

## 2023-10-11 DIAGNOSIS — J44.9 CHRONIC OBSTRUCTIVE PULMONARY DISEASE, UNSPECIFIED: ICD-10-CM

## 2023-10-11 PROCEDURE — 99215 OFFICE O/P EST HI 40 MIN: CPT | Mod: 25

## 2023-10-11 PROCEDURE — 36415 COLL VENOUS BLD VENIPUNCTURE: CPT

## 2023-10-11 PROCEDURE — 99406 BEHAV CHNG SMOKING 3-10 MIN: CPT

## 2023-10-11 RX ORDER — FLUTICASONE PROPIONATE 50 UG/1
50 SPRAY, METERED NASAL TWICE DAILY
Qty: 1 | Refills: 5 | Status: ACTIVE | COMMUNITY
Start: 2023-10-11 | End: 1900-01-01

## 2023-10-11 RX ORDER — NICOTINE 4 MG/1
10 INHALANT RESPIRATORY (INHALATION)
Qty: 1 | Refills: 0 | Status: ACTIVE | COMMUNITY
Start: 2023-10-11 | End: 1900-01-01

## 2023-10-11 RX ORDER — ALBUTEROL SULFATE 90 UG/1
108 (90 BASE) INHALANT RESPIRATORY (INHALATION)
Qty: 1 | Refills: 5 | Status: ACTIVE | COMMUNITY
Start: 2023-10-11 | End: 1900-01-01

## 2023-10-13 LAB
A ALTERNATA IGE QN: <0.1 KUA/L
A FUMIGATUS IGE QN: <0.1 KUA/L
C ALBICANS IGE QN: <0.1 KUA/L
C HERBARUM IGE QN: <0.1 KUA/L
CAT DANDER IGE QN: <0.1 KUA/L
COMMON RAGWEED IGE QN: <0.1 KUA/L
D FARINAE IGE QN: <0.1 KUA/L
D PTERONYSS IGE QN: <0.1 KUA/L
DEPRECATED A ALTERNATA IGE RAST QL: 0
DEPRECATED A FUMIGATUS IGE RAST QL: 0
DEPRECATED C ALBICANS IGE RAST QL: 0
DEPRECATED C HERBARUM IGE RAST QL: 0
DEPRECATED CAT DANDER IGE RAST QL: 0
DEPRECATED COMMON RAGWEED IGE RAST QL: 0
DEPRECATED D FARINAE IGE RAST QL: 0
DEPRECATED D PTERONYSS IGE RAST QL: 0
DEPRECATED DOG DANDER IGE RAST QL: 0
DEPRECATED M RACEMOSUS IGE RAST QL: 0
DEPRECATED ROACH IGE RAST QL: 0
DEPRECATED TIMOTHY IGE RAST QL: 0
DEPRECATED WHITE OAK IGE RAST QL: 0
DOG DANDER IGE QN: <0.1 KUA/L
HCT VFR BLD CALC: 42.9 %
HGB BLD-MCNC: 14.2 G/DL
M RACEMOSUS IGE QN: <0.1 KUA/L
MCHC RBC-ENTMCNC: 30.7 PG
MCHC RBC-ENTMCNC: 33.1 GM/DL
MCV RBC AUTO: 92.7 FL
PLATELET # BLD AUTO: 247 K/UL
RBC # BLD: 4.63 M/UL
RBC # FLD: 12.7 %
ROACH IGE QN: <0.1 KUA/L
TIMOTHY IGE QN: <0.1 KUA/L
WBC # FLD AUTO: 5.48 K/UL
WHITE OAK IGE QN: <0.1 KUA/L

## 2023-10-25 RX ORDER — FLUTICASONE FUROATE AND VILANTEROL TRIFENATATE 100; 25 UG/1; UG/1
100-25 POWDER RESPIRATORY (INHALATION)
Qty: 3 | Refills: 4 | Status: ACTIVE | COMMUNITY
Start: 2023-09-28 | End: 1900-01-01

## 2023-11-08 ENCOUNTER — NON-APPOINTMENT (OUTPATIENT)
Age: 48
End: 2023-11-08

## 2023-11-13 DIAGNOSIS — G47.19 OTHER HYPERSOMNIA: ICD-10-CM

## 2023-11-13 DIAGNOSIS — R06.83 SNORING: ICD-10-CM

## 2023-11-13 RX ORDER — FLUTICASONE PROPIONATE AND SALMETEROL 50; 250 UG/1; UG/1
250-50 POWDER RESPIRATORY (INHALATION)
Qty: 1 | Refills: 3 | Status: DISCONTINUED | COMMUNITY
Start: 2023-11-08 | End: 2023-11-13

## 2023-11-26 ENCOUNTER — NON-APPOINTMENT (OUTPATIENT)
Age: 48
End: 2023-11-26

## 2023-12-08 NOTE — ED PROVIDER NOTE - PHYSICAL EXAMINATION
I have reviewed the triage vital signs.  Const: AAOx3, in NAD  Eyes: no conjunctival injection  HENT: NCAT, Neck supple, oral mucosa moist  CV: RRR, +S1, S2  Resp: CTAB, no respiratory distress  GI: Abdomen soft, NTND, no guarding  Extremities: DP pulses intact, brisk capillary refill   Skin: Warm, well perfused, no rash  MSK: RLE: R foot with areas of swelling, mild scattered ecchymosis, NO open wounds. R heel TTP. Compartments soft  Neuro: No focal sensory or motor deficits. Pt able to wiggle R toes, limited R ankle ROM 2/2 pain   Psych: Appropriate mood and affect Cooperative

## 2024-01-04 NOTE — ED PROVIDER NOTE - NSCAREINITIATED _GEN_ER
Forms faxed and placed in file cabinet.   
RN Team,     I received PA paperwork from [employer] in my inbox. Filled out today, please print out semaglutide (Wegovy) entries from med list to include with the paperwork, fax and file. Will leave completed paperwork in your mailbox by end of day.    Thank you,     Dr. JIM   
Pauline Cage(NP)

## 2024-01-30 ENCOUNTER — APPOINTMENT (OUTPATIENT)
Dept: PULMONOLOGY | Facility: CLINIC | Age: 49
End: 2024-01-30

## 2024-02-02 NOTE — HISTORY OF PRESENT ILLNESS
[TextBox_4] : 48 year old woman, former heavy smoker > 30 pack years, who quit 3 years ago. She has mild obstruction with positive bronchodilator response in terms of FEV1. Lung volumes and DLCO are WNL. Had COVID infection in September and when seen in October 2023 she reported persistent breathing difficulty and nasal congestion. I increased Breo to 200-25 and recommended treatment of allergic symptoms.   Asthma RAST was negative. Returns for follow up today.    PFTs in May showed mild obstruction with positive BD response for FEV1.

## 2024-02-12 RX ORDER — FLUTICASONE FUROATE AND VILANTEROL TRIFENATATE 200; 25 UG/1; UG/1
200-25 POWDER RESPIRATORY (INHALATION)
Qty: 3 | Refills: 0 | Status: ACTIVE | COMMUNITY
Start: 2023-10-11 | End: 1900-01-01

## 2024-03-02 NOTE — H&P ADULT - NSICDXPASTSURGICALHX_GEN_ALL_CORE_FT
PAST SURGICAL HISTORY:  Carcinoid Tumor of Ovary, Benign     Dermoid cyst ovarian, bilateral    Previous  Delivery, Delivered     S/P      S/P ORIF (open reduction internal fixation) fracture     S/P ovarian cystectomy bilateral    S/P tubal ligation     Status Post Ovarian Cystectomy     Tubal Ligation     
No

## 2024-03-22 NOTE — H&P ADULT - NSHPREVIEWOFSYSTEMS_GEN_ALL_CORE
REVIEW OF SYSTEMS:    CONSTITUTIONAL: No weakness, fevers or chills  EYES/ENT: No visual changes;  No vertigo or throat pain   NECK: No pain or stiffness  RESPIRATORY: No cough, wheezing, hemoptysis; No shortness of breath  CARDIOVASCULAR: No chest pain or palpitations  GASTROINTESTINAL: No abdominal or epigastric pain. No nausea, vomiting, or hematemesis; No diarrhea or constipation. No melena or hematochezia.  GENITOURINARY: No dysuria, frequency or hematuria  NEUROLOGICAL: No numbness or weakness  MUSCULOSKELETAL: +R foot pain, +swelling.   SKIN: No itching, burning, rashes, or lesions   All other review of systems is negative unless indicated above. Spine appears normal, range of motion is not limited,   Tenderness to palpation of left medial knee with range of motion intact

## 2024-03-28 ENCOUNTER — APPOINTMENT (OUTPATIENT)
Dept: PULMONOLOGY | Facility: CLINIC | Age: 49
End: 2024-03-28

## 2024-03-31 NOTE — ED ADULT NURSE NOTE - SUICIDE SCREENING QUESTION 3
SEEMA superimposed on CKD stage IIIa, present on admission, severely worsening renal function with oliguria  Patient initially admitted with acute respiratory failure felt to be secondary to COPD as well as possible CHF exacerbation component, received diuresis - with worsening renal function further diuretics now discontinued  S/p hemodialysis catheter placement 3/26/24, HD initiated 3/26, additional treatment 3/27, 3 hr treatment 3/29  Cr stable with still poor urine output. Continue to monitor for renal recovery.     No

## 2024-10-16 ENCOUNTER — APPOINTMENT (OUTPATIENT)
Dept: PULMONOLOGY | Facility: CLINIC | Age: 49
End: 2024-10-16
Payer: COMMERCIAL

## 2024-10-16 VITALS
SYSTOLIC BLOOD PRESSURE: 119 MMHG | OXYGEN SATURATION: 97 % | HEIGHT: 62 IN | HEART RATE: 83 BPM | DIASTOLIC BLOOD PRESSURE: 78 MMHG | BODY MASS INDEX: 34.23 KG/M2 | WEIGHT: 186 LBS

## 2024-10-16 DIAGNOSIS — J44.89 OTHER SPECIFIED CHRONIC OBSTRUCTIVE PULMONARY DISEASE: ICD-10-CM

## 2024-10-16 DIAGNOSIS — F17.200 NICOTINE DEPENDENCE, UNSPECIFIED, UNCOMPLICATED: ICD-10-CM

## 2024-10-16 PROCEDURE — 99214 OFFICE O/P EST MOD 30 MIN: CPT

## 2024-10-16 RX ORDER — NICOTINE 4 MG/1
10 INHALANT RESPIRATORY (INHALATION)
Qty: 1 | Refills: 2 | Status: ACTIVE | COMMUNITY
Start: 2024-10-16 | End: 1900-01-01

## 2024-10-24 NOTE — BH CONSULTATION LIAISON ASSESSMENT NOTE - NSBHREFERLPTEAMNAME_PSY_A_CORE_FT
Is This A New Presentation, Or A Follow-Up?: Skin Lesions Additional History: Pt is here for cosmetic removal today. Presley

## 2024-12-18 NOTE — ED PROVIDER NOTE - LONGEST PERIOD TOBACCO-FREE
----- Message from Angela Hawthorne NP sent at 12/18/2024  9:13 AM CST -----  Advise dad that Covid, influenza and RSV were negative.    Vape

## 2024-12-19 NOTE — ED PROVIDER NOTE - CARDIAC, MLM
[FreeTextEntry1] : #Tinea corporis, R forearm - IMPROVING with post-inflammatory erythema, s/p PO terbinafine 250mg x2 weeks and keto cream.  Prev tx for contact dermatitis with worsening of rash; lyme titers negative - Diagnosis, chronic nature, disease course, treatment options and goals of therapy discussed - AVOID betamethasone - Continue ketoconazole cream TID for 2 more weeks   #Contact dermatitis 2/2 leads, mild on abdomen - Betamethasone BID x1 week to AA. SED  RTC FBSE  Normal rate, regular rhythm.  Heart sounds S1, S2.  No murmurs, rubs or gallops. Mild pain on palpation of chest wall.

## 2025-01-06 NOTE — ED PROVIDER NOTE - NUMBER OF YRS
Pt is aware of lab results. He states he does not feel well, but is unsure why. He c/o of being off balance, loss of appetite and is feeling very afraid because of this. He is scheduled for ov on 2/17/25 but is requesting a sooner appt as he thinks it is unwise for him to wait that long. He also wants to know if he should have additional labs. He was advised to go to ER if his symptoms become worse. Please advise.   20

## 2025-01-14 ENCOUNTER — APPOINTMENT (OUTPATIENT)
Dept: PULMONOLOGY | Facility: CLINIC | Age: 50
End: 2025-01-14

## 2025-03-02 ENCOUNTER — NON-APPOINTMENT (OUTPATIENT)
Age: 50
End: 2025-03-02

## 2025-03-06 ENCOUNTER — NON-APPOINTMENT (OUTPATIENT)
Age: 50
End: 2025-03-06

## 2025-03-06 RX ORDER — LEVALBUTEROL HYDROCHLORIDE 0.63 MG/3ML
0.63 SOLUTION RESPIRATORY (INHALATION) EVERY 6 HOURS
Qty: 1080 | Refills: 6 | Status: ACTIVE | COMMUNITY
Start: 2025-03-06 | End: 1900-01-01

## 2025-03-11 ENCOUNTER — APPOINTMENT (OUTPATIENT)
Dept: PULMONOLOGY | Facility: CLINIC | Age: 50
End: 2025-03-11

## 2025-03-11 VITALS
RESPIRATION RATE: 15 BRPM | OXYGEN SATURATION: 97 % | HEART RATE: 100 BPM | BODY MASS INDEX: 25.76 KG/M2 | SYSTOLIC BLOOD PRESSURE: 131 MMHG | DIASTOLIC BLOOD PRESSURE: 84 MMHG | WEIGHT: 140 LBS | TEMPERATURE: 97 F | HEIGHT: 62 IN

## 2025-03-11 DIAGNOSIS — J44.89 OTHER SPECIFIED CHRONIC OBSTRUCTIVE PULMONARY DISEASE: ICD-10-CM

## 2025-03-11 DIAGNOSIS — J30.2 OTHER SEASONAL ALLERGIC RHINITIS: ICD-10-CM

## 2025-03-11 PROCEDURE — 99214 OFFICE O/P EST MOD 30 MIN: CPT

## 2025-03-11 RX ORDER — FLUTICASONE FUROATE, UMECLIDINIUM BROMIDE AND VILANTEROL TRIFENATATE 200; 62.5; 25 UG/1; UG/1; UG/1
200-62.5-25 POWDER RESPIRATORY (INHALATION)
Qty: 1 | Refills: 2 | Status: ACTIVE | COMMUNITY
Start: 2025-03-11 | End: 1900-01-01

## 2025-03-12 ENCOUNTER — NON-APPOINTMENT (OUTPATIENT)
Age: 50
End: 2025-03-12

## 2025-03-12 LAB
A1AT SERPL-MCNC: 129 MG/DL
ANION GAP SERPL CALC-SCNC: 12 MMOL/L
BASOPHILS # BLD AUTO: 0.03 K/UL
BASOPHILS NFR BLD AUTO: 0.3 %
BUN SERPL-MCNC: 11 MG/DL
CALCIUM SERPL-MCNC: 10.1 MG/DL
CHLORIDE SERPL-SCNC: 102 MMOL/L
CO2 SERPL-SCNC: 26 MMOL/L
CREAT SERPL-MCNC: 0.86 MG/DL
EGFRCR SERPLBLD CKD-EPI 2021: 83 ML/MIN/1.73M2
EOSINOPHIL # BLD AUTO: 0.07 K/UL
EOSINOPHIL NFR BLD AUTO: 0.7 %
GLUCOSE SERPL-MCNC: 61 MG/DL
HCT VFR BLD CALC: 45.3 %
HGB BLD-MCNC: 15.2 G/DL
IMM GRANULOCYTES NFR BLD AUTO: 0.5 %
LYMPHOCYTES # BLD AUTO: 3.7 K/UL
LYMPHOCYTES NFR BLD AUTO: 37.9 %
MAN DIFF?: NORMAL
MCHC RBC-ENTMCNC: 30.1 PG
MCHC RBC-ENTMCNC: 33.6 G/DL
MCV RBC AUTO: 89.7 FL
MONOCYTES # BLD AUTO: 0.73 K/UL
MONOCYTES NFR BLD AUTO: 7.5 %
NEUTROPHILS # BLD AUTO: 5.19 K/UL
NEUTROPHILS NFR BLD AUTO: 53.1 %
PLATELET # BLD AUTO: 372 K/UL
POTASSIUM SERPL-SCNC: 4.5 MMOL/L
RBC # BLD: 5.05 M/UL
RBC # FLD: 13.1 %
SODIUM SERPL-SCNC: 140 MMOL/L
WBC # FLD AUTO: 9.77 K/UL

## 2025-03-16 LAB
A1AT PHENOTYP SERPL-IMP: NORMAL
A1AT SERPL-MCNC: 132 MG/DL

## 2025-03-17 NOTE — ED PROVIDER NOTE - IV ALTEPLASE ADMIN OUTSIDE HIDDEN
Provider Progress Note        Patient:   Seema Champion  YOB: 1959  Age:  65 y.o.  Room:  [facility]31/031-A  MRN:  [id number]   Acct: [id number]  PCP: Vijay Taylor MD    Date of Admission:  1/31/show

## 2025-03-20 RX ORDER — NICOTINE 21 MG/24HR
21 PATCH, TRANSDERMAL 24 HOURS TRANSDERMAL DAILY
Qty: 1 | Refills: 0 | Status: ACTIVE | COMMUNITY
Start: 2025-03-11 | End: 1900-01-01

## 2025-03-28 ENCOUNTER — APPOINTMENT (OUTPATIENT)
Dept: CT IMAGING | Facility: IMAGING CENTER | Age: 50
End: 2025-03-28

## 2025-04-03 NOTE — ASU PATIENT PROFILE, ADULT - WAS YOUR LAST COVID-19 VACCINE GREATER THAN OR EQUAL TO TWO MONTHS AGO?
Encounter Date: 4/3/2025       History     Chief Complaint   Patient presents with    Abdominal Pain     Pt c/o lower abd pain and HA.     A 55-year-old female patient came to the emergency department complaining of abdominal pain.  The patient states that the pain started 3 days ago and gradually progressed.  There is associated nausea but no vomiting order area.  The pain is crampy in nature and woke her up from sleep last night.  There is no diarrhea.  There is no blood in the stool.  The pain does not radiate to the back.  The patient denies any problem with urination.  Patient states that she has her gallbladder removed in April this year by Dr. Brown.    The history is provided by the patient. No  was used.     Review of patient's allergies indicates:   Allergen Reactions    Imitrex [sumatriptan] Anaphylaxis    Tylox [oxycodone-acetaminophen] Itching    Latex     Opioids - morphine analogues Hives    Sulfa (sulfonamide antibiotics) Rash    Zithromax z-casimiro [azithromycin] Rash     Past Medical History:   Diagnosis Date    Arthritis     Class 3 severe obesity due to excess calories without serious comorbidity with body mass index (BMI) of 40.0 to 44.9 in adult 03/20/2023    Essential hypertension 03/20/2023    History of gastric ulcer 05/01/2023    Hypothyroidism     Vestibular dizziness 10/23/2023     Past Surgical History:   Procedure Laterality Date    ARTHROSCOPY OF KNEE Right 05/25/2023    Procedure: ARTHROSCOPY, KNEE;  Surgeon: Tacos Aburto MD;  Location: [facility] OR;  Service: Orthopedics;  Laterality: Right;    BLOCK, NERVE, FEMORAL Right 2/4/2025    Procedure: BLOCK, NERVE, FEMORAL;  Surgeon: Leny Lang MD;  Location: [facility];  Service: Pain Management;  Laterality: Right;    BLOCK, NERVE, OBTURATOR Right 2/4/2025    Procedure: BLOCK,NERVE,OBTURATOR;  Surgeon: Leny Lang MD;  Location: [facility];  Service: Pain Management;  Laterality:  Right;    CHOLECYSTECTOMY  05/09/2024    ENDOSCOPY  05/13/2024    HYSTERECTOMY      Partial    INJECTION OF ANESTHETIC AGENT INTO SACROILIAC JOINT Bilateral 05/07/2024    Procedure: BLOCK, SACROILIAC JOINT;  Surgeon: Leny Lang MD;  Location: [facility];  Service: Pain Management;  Laterality: Bilateral;    INJECTION OF JOINT Left 07/30/2024    Procedure: Injection, Joint, Hip, intra-articular hip injection;  Surgeon: Leny Lang MD;  Location: [facility];  Service: Pain Management;  Laterality: Left;    INJECTION OF JOINT Left 10/22/2024    Procedure: Injection, Joint, Hip, intra-articular hip injection;  Surgeon: Leny Lang MD;  Location: [facility];  Service: Pain Management;  Laterality: Left;    JOINT REPLACEMENT Right     hip replacement    KNEE ARTHROPLASTY Left     KNEE ARTHROSCOPY W/ MENISCECTOMY Right 05/25/2023    Procedure: ARTHROSCOPY, KNEE, WITH MENISCECTOMY;  Surgeon: Tacos Aburto MD;  Location: [facility]THO OR;  Service: Orthopedics;  Laterality: Right;    LUMPECTOMY, BREAST      ROBOT-ASSISTED CHOLECYSTECTOMY USING DA ANG XI N/A 05/09/2024    Procedure: XI ROBOTIC CHOLECYSTECTOMY;  Surgeon: Mao Dempsey DO;  Location: [facility] OR;  Service: General;  Laterality: N/A;     Family History   Problem Relation Name Age of Onset    Hypertension Maternal Grandmother      Stroke Maternal Grandmother      Stroke Maternal Grandfather      Hypertension Mother      Stroke Mother      Diabetes Mother       Social History[1]  Review of Systems   Constitutional:  Negative for fever.   HENT:  Negative for sore throat.    Respiratory:  Negative for shortness of breath.    Cardiovascular:  Negative for chest pain.   Gastrointestinal:  Positive for abdominal pain. Negative for nausea.   Genitourinary:  Negative for dysuria.   Musculoskeletal:  Negative for back pain.   Skin:  Negative for rash.   Neurological:  Negative for weakness.   Hematological:  Does not  bruise/bleed easily.   All other systems reviewed and are negative.      Physical Exam     Initial Vitals   BP Pulse Resp Temp SpO2   04/03/25 0529 04/03/25 0529 04/03/25 0528 04/03/25 0529 04/03/25 0529   138/74 73 18 97.9 °F (36.6 °C) 98 %      MAP       --                Physical Exam    Nursing note and vitals reviewed.  Constitutional: She appears well-developed and well-nourished.   Patient with elevated BMI   HENT:   Head: Normocephalic and atraumatic.   Eyes: EOM are normal. Pupils are equal, round, and reactive to light.   Neck: Neck supple.   Normal range of motion.  Cardiovascular:  Normal rate, regular rhythm, normal heart sounds and intact distal pulses.           No murmur heard.  Pulmonary/Chest: Breath sounds normal. She has no wheezes.   Abdominal: Abdomen is soft. Bowel sounds are normal. There is abdominal tenderness (Diffuse). There is guarding.   Musculoskeletal:         General: No tenderness or edema. Normal range of motion.      Cervical back: Normal range of motion and neck supple.     Neurological: She is alert and oriented to person, place, and time. She has normal strength and normal reflexes. GCS score is 15. GCS eye subscore is 4. GCS verbal subscore is 5. GCS motor subscore is 6.   Skin: Skin is warm and dry. Capillary refill takes less than 2 seconds. No rash noted.   Psychiatric: She has a normal mood and affect.         Medical Screening Exam   See Full Note    ED Course   Procedures  Labs Reviewed   COMPREHENSIVE METABOLIC PANEL - Abnormal       Result Value    Sodium 140      Potassium 3.7      Chloride 111 (*)     CO2 22      Anion Gap 11      Glucose 111 (*)     BUN 14      Creatinine 0.68      BUN/Creatinine Ratio 21 (*)     Calcium 8.7      Total Protein 6.2 (*)     Albumin 3.6      Globulin 2.6      A/G Ratio 1.4      Bilirubin, Total 0.3      Alk Phos 86      ALT 10      AST 19      eGFR 104     URINALYSIS, REFLEX TO URINE CULTURE - Abnormal    Color, UA Yellow      Clarity,  UA Clear      pH, UA 5.5      Leukocytes, UA Negative      Nitrites, UA Negative      Protein, UA 10 (*)     Glucose, UA Normal      Ketones, UA Negative      Urobilinogen, UA 2 (*)     Bilirubin, UA Negative      Blood, UA Small (*)     Specific Gravity, UA 1.035 (*)    CBC WITH DIFFERENTIAL - Abnormal    WBC 4.22 (*)     RBC 4.34      Hemoglobin 12.8      Hematocrit 38.8      MCV 89.4      MCH 29.5      MCHC 33.0      RDW 12.6      Platelet Count 225      MPV 10.9      Neutrophils % 40.8 (*)     Lymphocytes % 41.0      Monocytes % 14.7 (*)     Eosinophils % 2.6      Basophils % 0.7      Immature Granulocytes % 0.2      nRBC, Auto 0.0      Neutrophils, Abs 1.72 (*)     Lymphocytes, Absolute 1.73      Monocytes, Absolute 0.62      Eosinophils, Absolute 0.11      Basophils, Absolute 0.03      Immature Granulocytes, Absolute 0.01      nRBC, Absolute 0.00      Diff Type Auto     URINALYSIS, MICROSCOPIC - Abnormal    WBC, UA 1      RBC, UA 9 (*)     Bacteria, UA Occasional (*)     Squamous Epithelial Cells, UA Occasional (*)     Mucous Occasional (*)    LIPASE - Normal    Lipase 13     LACTIC ACID, PLASMA - Normal    Lactic Acid 1.1     TSH - Normal    TSH 2.278     CBC W/ AUTO DIFFERENTIAL    Narrative:     The following orders were created for panel order CBC W/ AUTO DIFFERENTIAL.  Procedure                               Abnormality         Status                     ---------                               -----------         ------                     CBC with Differential[[id number]]       Abnormal            Final result                 Please view results for these tests on the individual orders.          Imaging Results              XR ABDOMEN, ACUTE 2 OR MORE VIEWS WITH CHEST (Final result)  Result time 04/03/25 09:04:14      Final result by Noel Woods MD (04/03/25 09:04:14)                   Impression:      No convincing evidence of acute cardiopulmonary disease.    Nonspecific, nonobstructive bowel  gas pattern.      Electronically signed by: Noel Woods  Date:    04/03/2025  Time:    09:04               Narrative:    EXAMINATION:  XR ABDOMEN ACUTE 2 OR MORE VIEWS WITH CHEST    CLINICAL HISTORY:  Abdominal pain;    TECHNIQUE:  PA chest and four views of the abdomen.    COMPARISON:  Abdominal series 03/18/2024    FINDINGS:  Cardiac contours appear to be within normal limits.  Lungs essentially clear.  No definite pneumothorax or large volume pleural effusion.    Osseous and soft tissue structures appear without definite acute change.    No definite dilated gas-filled loops of small large bowel appreciated.  Moderate volume colonic stool.    Air present in distal colon and rectum.    No acute osseous or soft tissue findings are seen.  Status post right total hip arthroplasty.                                       Medications - No data to display  Medical Decision Making  Amount and/or Complexity of Data Reviewed  Labs: ordered. Decision-making details documented in ED Course.  Radiology: ordered.    Risk  Prescription drug management.               ED Course as of 04/04/25 1059   Thu Apr 03, 2025   0630 MDM:  A 55-year-old female patient came to the emergency department complaining of abdominal pain which started 3 days ago.  Differential diagnosis:  Appendicitis, colitis, pyelonephritis, constipation. [HK]   0804 WBC(!): 4.22 [HK]   0806 Neutrophils Relative(!): 40.8 [HK]   0806 Chloride(!): 111 [HK]      ED Course User Index  [HK] Edy Ariza MD                           Clinical Impression:   Final diagnoses:  [R10.9] Abdominal pain, unspecified abdominal location - Constipation (Primary)        ED Disposition Condition    Discharge Stable          ED Prescriptions       Medication Sig Dispense Start Date End Date Auth. Provider    linaCLOtide (LINZESS) 145 mcg Cap capsule Take 1 capsule (145 mcg total) by mouth before breakfast. 90 capsule 4/3/2025 -- Edy Ariza MD          Follow-up  Information    None            [1]   Social History  Tobacco Use    Smoking status: Never     Passive exposure: Never    Smokeless tobacco: Never   Substance Use Topics    Alcohol use: Never    Drug use: Never        Edy Ariza MD  04/04/25 1119     Yes

## 2025-04-21 ENCOUNTER — NON-APPOINTMENT (OUTPATIENT)
Age: 50
End: 2025-04-21

## 2025-04-22 ENCOUNTER — OUTPATIENT (OUTPATIENT)
Dept: OUTPATIENT SERVICES | Facility: HOSPITAL | Age: 50
LOS: 1 days | End: 2025-04-22
Payer: COMMERCIAL

## 2025-04-22 ENCOUNTER — APPOINTMENT (OUTPATIENT)
Dept: OBGYN | Facility: CLINIC | Age: 50
End: 2025-04-22
Payer: COMMERCIAL

## 2025-04-22 ENCOUNTER — RX RENEWAL (OUTPATIENT)
Age: 50
End: 2025-04-22

## 2025-04-22 VITALS
WEIGHT: 138 LBS | HEIGHT: 62 IN | OXYGEN SATURATION: 99 % | TEMPERATURE: 97.2 F | BODY MASS INDEX: 25.4 KG/M2 | DIASTOLIC BLOOD PRESSURE: 79 MMHG | RESPIRATION RATE: 18 BRPM | HEART RATE: 80 BPM | SYSTOLIC BLOOD PRESSURE: 119 MMHG

## 2025-04-22 DIAGNOSIS — Z12.72 ENCOUNTER FOR SCREENING FOR MALIGNANT NEOPLASM OF VAGINA: ICD-10-CM

## 2025-04-22 DIAGNOSIS — Z78.0 ASYMPTOMATIC MENOPAUSAL STATE: ICD-10-CM

## 2025-04-22 DIAGNOSIS — I10 ESSENTIAL (PRIMARY) HYPERTENSION: ICD-10-CM

## 2025-04-22 DIAGNOSIS — Z87.898 PERSONAL HISTORY OF OTHER SPECIFIED CONDITIONS: ICD-10-CM

## 2025-04-22 DIAGNOSIS — J44.9 CHRONIC OBSTRUCTIVE PULMONARY DISEASE, UNSPECIFIED: ICD-10-CM

## 2025-04-22 DIAGNOSIS — R61 GENERALIZED HYPERHIDROSIS: ICD-10-CM

## 2025-04-22 DIAGNOSIS — D36.9 BENIGN NEOPLASM, UNSPECIFIED SITE: Chronic | ICD-10-CM

## 2025-04-22 DIAGNOSIS — G90.50 COMPLEX REGIONAL PAIN SYNDROME I, UNSPECIFIED: ICD-10-CM

## 2025-04-22 DIAGNOSIS — J30.2 OTHER SEASONAL ALLERGIC RHINITIS: ICD-10-CM

## 2025-04-22 DIAGNOSIS — Z01.419 ENCOUNTER FOR GYNECOLOGICAL EXAMINATION (GENERAL) (ROUTINE) W/OUT ABNORMAL FINDINGS: ICD-10-CM

## 2025-04-22 DIAGNOSIS — Z90.710 ACQUIRED ABSENCE OF BOTH CERVIX AND UTERUS: ICD-10-CM

## 2025-04-22 DIAGNOSIS — N95.1 MENOPAUSAL AND FEMALE CLIMACTERIC STATES: ICD-10-CM

## 2025-04-22 DIAGNOSIS — R06.02 SHORTNESS OF BREATH: ICD-10-CM

## 2025-04-22 DIAGNOSIS — R23.2 FLUSHING: ICD-10-CM

## 2025-04-22 DIAGNOSIS — Z72.0 TOBACCO USE: ICD-10-CM

## 2025-04-22 DIAGNOSIS — Z11.3 ENCOUNTER FOR SCREENING FOR INFECTIONS WITH A PREDOMINANTLY SEXUAL MODE OF TRANSMISSION: ICD-10-CM

## 2025-04-22 DIAGNOSIS — Z98.89 OTHER SPECIFIED POSTPROCEDURAL STATES: Chronic | ICD-10-CM

## 2025-04-22 DIAGNOSIS — F32.A DEPRESSION, UNSPECIFIED: ICD-10-CM

## 2025-04-22 DIAGNOSIS — Z98.890 OTHER SPECIFIED POSTPROCEDURAL STATES: Chronic | ICD-10-CM

## 2025-04-22 DIAGNOSIS — F31.9 BIPOLAR DISORDER, UNSPECIFIED: ICD-10-CM

## 2025-04-22 DIAGNOSIS — Z00.00 ENCOUNTER FOR GENERAL ADULT MEDICAL EXAMINATION WITHOUT ABNORMAL FINDINGS: ICD-10-CM

## 2025-04-22 DIAGNOSIS — G47.00 INSOMNIA, UNSPECIFIED: ICD-10-CM

## 2025-04-22 DIAGNOSIS — Z12.39 ENCOUNTER FOR OTHER SCREENING FOR MALIGNANT NEOPLASM OF BREAST: ICD-10-CM

## 2025-04-22 DIAGNOSIS — Z86.018 PERSONAL HISTORY OF OTHER BENIGN NEOPLASM: ICD-10-CM

## 2025-04-22 DIAGNOSIS — J45.20 MILD INTERMITTENT ASTHMA, UNCOMPLICATED: ICD-10-CM

## 2025-04-22 DIAGNOSIS — R13.12 DYSPHAGIA, OROPHARYNGEAL PHASE: ICD-10-CM

## 2025-04-22 DIAGNOSIS — F43.10 POST-TRAUMATIC STRESS DISORDER, UNSPECIFIED: ICD-10-CM

## 2025-04-22 DIAGNOSIS — K59.04 CHRONIC IDIOPATHIC CONSTIPATION: ICD-10-CM

## 2025-04-22 DIAGNOSIS — F12.90 CANNABIS USE, UNSPECIFIED, UNCOMPLICATED: ICD-10-CM

## 2025-04-22 DIAGNOSIS — G47.19 OTHER HYPERSOMNIA: ICD-10-CM

## 2025-04-22 DIAGNOSIS — Z86.0100 PERSONAL HISTORY OF COLON POLYPS, UNSPECIFIED: ICD-10-CM

## 2025-04-22 PROCEDURE — 87491 CHLMYD TRACH DNA AMP PROBE: CPT

## 2025-04-22 PROCEDURE — 87624 HPV HI-RISK TYP POOLED RSLT: CPT

## 2025-04-22 PROCEDURE — 99386 PREV VISIT NEW AGE 40-64: CPT

## 2025-04-22 PROCEDURE — 87625 HPV TYPES 16 & 18 ONLY: CPT

## 2025-04-22 PROCEDURE — 87661 TRICHOMONAS VAGINALIS AMPLIF: CPT

## 2025-04-22 PROCEDURE — 87481 CANDIDA DNA AMP PROBE: CPT

## 2025-04-22 PROCEDURE — G0463: CPT

## 2025-04-22 PROCEDURE — 87591 N.GONORRHOEAE DNA AMP PROB: CPT

## 2025-04-22 PROCEDURE — 81513 NFCT DS BV RNA VAG FLU ALG: CPT

## 2025-04-24 ENCOUNTER — NON-APPOINTMENT (OUTPATIENT)
Age: 50
End: 2025-04-24

## 2025-04-24 DIAGNOSIS — N76.0 ACUTE VAGINITIS: ICD-10-CM

## 2025-04-24 LAB
BV BACTERIA RRNA VAG QL NAA+PROBE: DETECTED
C GLABRATA RNA VAG QL NAA+PROBE: NOT DETECTED
C TRACH RRNA SPEC QL NAA+PROBE: NOT DETECTED
CANDIDA RRNA VAG QL PROBE: DETECTED
HPV 16 E6+E7 MRNA CVX QL NAA+PROBE: NOT DETECTED
HPV HIGH+LOW RISK DNA PNL CVX: NOT DETECTED
HPV18+45 E6+E7 MRNA CVX QL NAA+PROBE: NOT DETECTED
N GONORRHOEA RRNA SPEC QL NAA+PROBE: NOT DETECTED
T VAGINALIS RRNA SPEC QL NAA+PROBE: NOT DETECTED

## 2025-04-24 RX ORDER — TERCONAZOLE 4 MG/G
0.4 CREAM VAGINAL DAILY
Qty: 1 | Refills: 1 | Status: ACTIVE | COMMUNITY
Start: 2025-04-24 | End: 1900-01-01

## 2025-04-24 RX ORDER — METRONIDAZOLE 7.5 MG/G
0.75 GEL VAGINAL
Qty: 1 | Refills: 1 | Status: ACTIVE | COMMUNITY
Start: 2025-04-24 | End: 1900-01-01

## 2025-04-25 ENCOUNTER — NON-APPOINTMENT (OUTPATIENT)
Age: 50
End: 2025-04-25

## 2025-04-29 DIAGNOSIS — N95.1 MENOPAUSAL AND FEMALE CLIMACTERIC STATES: ICD-10-CM

## 2025-04-29 DIAGNOSIS — Z12.39 ENCOUNTER FOR OTHER SCREENING FOR MALIGNANT NEOPLASM OF BREAST: ICD-10-CM

## 2025-04-29 DIAGNOSIS — Z01.419 ENCOUNTER FOR GYNECOLOGICAL EXAMINATION (GENERAL) (ROUTINE) WITHOUT ABNORMAL FINDINGS: ICD-10-CM

## 2025-04-29 DIAGNOSIS — F32.A DEPRESSION, UNSPECIFIED: ICD-10-CM

## 2025-04-29 DIAGNOSIS — Z90.710 ACQUIRED ABSENCE OF BOTH CERVIX AND UTERUS: ICD-10-CM

## 2025-04-29 DIAGNOSIS — N76.0 ACUTE VAGINITIS: ICD-10-CM

## 2025-04-29 DIAGNOSIS — Z11.3 ENCOUNTER FOR SCREENING FOR INFECTIONS WITH A PREDOMINANTLY SEXUAL MODE OF TRANSMISSION: ICD-10-CM

## 2025-04-29 LAB — CYTOLOGY CVX/VAG DOC THIN PREP: ABNORMAL

## 2025-04-30 ENCOUNTER — APPOINTMENT (OUTPATIENT)
Dept: THORACIC SURGERY | Facility: CLINIC | Age: 50
End: 2025-04-30
Payer: COMMERCIAL

## 2025-04-30 VITALS
OXYGEN SATURATION: 99 % | BODY MASS INDEX: 25.68 KG/M2 | RESPIRATION RATE: 17 BRPM | WEIGHT: 136 LBS | SYSTOLIC BLOOD PRESSURE: 142 MMHG | DIASTOLIC BLOOD PRESSURE: 88 MMHG | HEIGHT: 61 IN | HEART RATE: 79 BPM

## 2025-04-30 DIAGNOSIS — R91.1 SOLITARY PULMONARY NODULE: ICD-10-CM

## 2025-04-30 PROCEDURE — 99205 OFFICE O/P NEW HI 60 MIN: CPT

## 2025-04-30 RX ORDER — BACILLUS COAGULANS/INULIN 1B-250 MG
CAPSULE ORAL
Refills: 0 | Status: ACTIVE | COMMUNITY

## 2025-04-30 RX ORDER — CHROMIUM 200 MCG
TABLET ORAL
Refills: 0 | Status: ACTIVE | COMMUNITY

## 2025-04-30 RX ORDER — ASCORBIC ACID 500 MG
TABLET ORAL
Refills: 0 | Status: ACTIVE | COMMUNITY

## 2025-05-06 ENCOUNTER — APPOINTMENT (OUTPATIENT)
Dept: DERMATOLOGY | Facility: CLINIC | Age: 50
End: 2025-05-06
Payer: COMMERCIAL

## 2025-05-06 VITALS — HEIGHT: 61 IN | BODY MASS INDEX: 25.68 KG/M2 | WEIGHT: 136 LBS

## 2025-05-06 DIAGNOSIS — L71.9 ROSACEA, UNSPECIFIED: ICD-10-CM

## 2025-05-06 DIAGNOSIS — Z78.9 OTHER SPECIFIED HEALTH STATUS: ICD-10-CM

## 2025-05-06 PROCEDURE — 99203 OFFICE O/P NEW LOW 30 MIN: CPT

## 2025-05-06 RX ORDER — AZELAIC ACID 0.15 G/G
15 AEROSOL, FOAM TOPICAL
Qty: 1 | Refills: 11 | Status: ACTIVE | COMMUNITY
Start: 2025-05-06 | End: 1900-01-01

## 2025-05-06 RX ORDER — METRONIDAZOLE 10 MG/G
1 GEL TOPICAL
Qty: 1 | Refills: 2 | Status: ACTIVE | COMMUNITY
Start: 2025-05-06 | End: 1900-01-01

## 2025-05-06 RX ORDER — DOXYCYCLINE HYCLATE 100 MG/1
100 TABLET ORAL
Qty: 14 | Refills: 0 | Status: ACTIVE | COMMUNITY
Start: 2025-05-06 | End: 1900-01-01

## 2025-05-13 ENCOUNTER — APPOINTMENT (OUTPATIENT)
Dept: OBGYN | Facility: CLINIC | Age: 50
End: 2025-05-13

## 2025-05-14 ENCOUNTER — APPOINTMENT (OUTPATIENT)
Dept: PULMONOLOGY | Facility: CLINIC | Age: 50
End: 2025-05-14

## 2025-05-28 ENCOUNTER — NON-APPOINTMENT (OUTPATIENT)
Age: 50
End: 2025-05-28

## 2025-05-28 ENCOUNTER — APPOINTMENT (OUTPATIENT)
Dept: THORACIC SURGERY | Facility: CLINIC | Age: 50
End: 2025-05-28
Payer: COMMERCIAL

## 2025-05-28 VITALS
DIASTOLIC BLOOD PRESSURE: 108 MMHG | SYSTOLIC BLOOD PRESSURE: 173 MMHG | RESPIRATION RATE: 17 BRPM | OXYGEN SATURATION: 98 % | HEART RATE: 90 BPM | HEIGHT: 61 IN | BODY MASS INDEX: 26.43 KG/M2 | WEIGHT: 140 LBS

## 2025-05-28 VITALS — DIASTOLIC BLOOD PRESSURE: 96 MMHG | SYSTOLIC BLOOD PRESSURE: 178 MMHG

## 2025-05-28 DIAGNOSIS — R91.1 SOLITARY PULMONARY NODULE: ICD-10-CM

## 2025-05-28 PROCEDURE — 99215 OFFICE O/P EST HI 40 MIN: CPT

## 2025-06-17 ENCOUNTER — APPOINTMENT (OUTPATIENT)
Dept: DERMATOLOGY | Facility: CLINIC | Age: 50
End: 2025-06-17

## 2025-06-18 ENCOUNTER — OUTPATIENT (OUTPATIENT)
Dept: OUTPATIENT SERVICES | Facility: HOSPITAL | Age: 50
LOS: 1 days | End: 2025-06-18

## 2025-06-18 VITALS
OXYGEN SATURATION: 99 % | SYSTOLIC BLOOD PRESSURE: 120 MMHG | HEART RATE: 80 BPM | HEIGHT: 61 IN | WEIGHT: 138.89 LBS | RESPIRATION RATE: 16 BRPM | DIASTOLIC BLOOD PRESSURE: 86 MMHG | TEMPERATURE: 98 F

## 2025-06-18 DIAGNOSIS — I10 ESSENTIAL (PRIMARY) HYPERTENSION: ICD-10-CM

## 2025-06-18 DIAGNOSIS — Z98.89 OTHER SPECIFIED POSTPROCEDURAL STATES: Chronic | ICD-10-CM

## 2025-06-18 DIAGNOSIS — F41.9 ANXIETY DISORDER, UNSPECIFIED: ICD-10-CM

## 2025-06-18 DIAGNOSIS — D36.9 BENIGN NEOPLASM, UNSPECIFIED SITE: Chronic | ICD-10-CM

## 2025-06-18 DIAGNOSIS — Z98.890 OTHER SPECIFIED POSTPROCEDURAL STATES: Chronic | ICD-10-CM

## 2025-06-18 DIAGNOSIS — R91.1 SOLITARY PULMONARY NODULE: ICD-10-CM

## 2025-06-18 DIAGNOSIS — Z98.51 TUBAL LIGATION STATUS: Chronic | ICD-10-CM

## 2025-06-18 DIAGNOSIS — J44.9 CHRONIC OBSTRUCTIVE PULMONARY DISEASE, UNSPECIFIED: ICD-10-CM

## 2025-06-18 DIAGNOSIS — Z90.710 ACQUIRED ABSENCE OF BOTH CERVIX AND UTERUS: Chronic | ICD-10-CM

## 2025-06-18 LAB
BASOPHILS # BLD AUTO: 0.02 K/UL — SIGNIFICANT CHANGE UP (ref 0–0.2)
BASOPHILS NFR BLD AUTO: 0.4 % — SIGNIFICANT CHANGE UP (ref 0–2)
BLD GP AB SCN SERPL QL: NEGATIVE — SIGNIFICANT CHANGE UP
EOSINOPHIL # BLD AUTO: 0.08 K/UL — SIGNIFICANT CHANGE UP (ref 0–0.5)
EOSINOPHIL NFR BLD AUTO: 1.7 % — SIGNIFICANT CHANGE UP (ref 0–6)
HCT VFR BLD CALC: 38.9 % — SIGNIFICANT CHANGE UP (ref 34.5–45)
HGB BLD-MCNC: 13.2 G/DL — SIGNIFICANT CHANGE UP (ref 11.5–15.5)
IMM GRANULOCYTES NFR BLD AUTO: 0.2 % — SIGNIFICANT CHANGE UP (ref 0–0.9)
LYMPHOCYTES # BLD AUTO: 1.47 K/UL — SIGNIFICANT CHANGE UP (ref 1–3.3)
LYMPHOCYTES # BLD AUTO: 30.6 % — SIGNIFICANT CHANGE UP (ref 13–44)
MCHC RBC-ENTMCNC: 30.8 PG — SIGNIFICANT CHANGE UP (ref 27–34)
MCHC RBC-ENTMCNC: 33.9 G/DL — SIGNIFICANT CHANGE UP (ref 32–36)
MCV RBC AUTO: 90.7 FL — SIGNIFICANT CHANGE UP (ref 80–100)
MONOCYTES # BLD AUTO: 0.24 K/UL — SIGNIFICANT CHANGE UP (ref 0–0.9)
MONOCYTES NFR BLD AUTO: 5 % — SIGNIFICANT CHANGE UP (ref 2–14)
NEUTROPHILS # BLD AUTO: 2.99 K/UL — SIGNIFICANT CHANGE UP (ref 1.8–7.4)
NEUTROPHILS NFR BLD AUTO: 62.1 % — SIGNIFICANT CHANGE UP (ref 43–77)
PLATELET # BLD AUTO: 315 K/UL — SIGNIFICANT CHANGE UP (ref 150–400)
RBC # BLD: 4.29 M/UL — SIGNIFICANT CHANGE UP (ref 3.8–5.2)
RBC # FLD: 12.4 % — SIGNIFICANT CHANGE UP (ref 10.3–14.5)
RH IG SCN BLD-IMP: POSITIVE — SIGNIFICANT CHANGE UP
RH IG SCN BLD-IMP: POSITIVE — SIGNIFICANT CHANGE UP
WBC # BLD: 4.81 K/UL — SIGNIFICANT CHANGE UP (ref 3.8–10.5)
WBC # FLD AUTO: 4.81 K/UL — SIGNIFICANT CHANGE UP (ref 3.8–10.5)

## 2025-06-18 RX ORDER — SODIUM CHLORIDE 9 G/1000ML
1000 INJECTION, SOLUTION INTRAVENOUS
Refills: 0 | Status: DISCONTINUED | OUTPATIENT
Start: 2025-06-24 | End: 2025-06-24

## 2025-06-18 NOTE — H&P PST ADULT - LAST ECHOCARDIOGRAM
8/25/2021 - LV systolic function is normal, EF 76% - done for chest pain and SOB - results in [place]-

## 2025-06-18 NOTE — H&P PST ADULT - RESPIRATORY AND THORAX COMMENTS
h/o COPD Last hospitalization for COPD Exacerbation 3/2025 - at [facility] admitted for 3 days- treated with IV Steroids and nebulizer treatment.  no h/o intubation.  Denies symptoms at present

## 2025-06-18 NOTE — H&P PST ADULT - LAST STRESS TEST
8/26/21 - Normal Myocardial perfusion scan, No evidence of infarction or inducible ischemia- done for chest pain and SOB-  results in [place]-

## 2025-06-18 NOTE — H&P PST ADULT - MUSCULOSKELETAL
details… no joint erythema/strength 5/5 bilateral upper extremities/strength 5/5 bilateral lower extremities

## 2025-06-18 NOTE — H&P PST ADULT - HISTORY OF PRESENT ILLNESS
50 year old female with PMH of COPD, bipolar disorder, HTN, anxiety/depression, GERD,  Recent Workup CT revealed RUL lesion. presents to Presurgical testing withscheduled for  50 year old female with PMH of COPD, bipolar disorder, HTN, anxiety/depression, GERD,  Recent Workup CT revealed RUL lesion  presents to Presurgical testing scheduled for Robotic bronchoscopy with interventional pulmonary (IP) marking , robotic - assisted , right Video- assisted thoracoscopic surgery, right upper lobe wedge resection.  Dr Machado: Robotic bronchoscopy with interventional pulmonary (IP) marking , robotic - assisted , right Video- assisted thoracoscopic surgery, right upper lobe wedge resection.   50 year old female with PMH of COPD, bipolar disorder, HTN, anxiety/depression, GERD,  Recent Workup CT revealed RUL lesion  presents to Presurgical testing scheduled for Robotic bronchoscopy with interventional pulmonary (IP) marking , robotic - assisted , right Video- assisted thoracoscopic surgery, right upper lobe wedge resection.  Dr Machado: Robotic bronchoscopy with interventional pulmonary (IP) marking , robotic - assisted , right Video- assisted thoracoscopic surgery, right upper lobe wedge resection.    Last hospitalization for COPD Exacerbation 3/2025 - at [facility] admitted for 3 days- treated with iv antibiotics and nebulizer treatment.  no h/o intubation.     50 year old female with PMH of COPD, bipolar disorder, HTN, anxiety/depression, GERD,  Recent Workup CT revealed RUL lesion  presents to Presurgical testing scheduled for Robotic bronchoscopy with interventional pulmonary (IP) marking , robotic - assisted , right Video- assisted thoracoscopic surgery, right upper lobe wedge resection.  Dr Machado: Robotic bronchoscopy with interventional pulmonary (IP) marking , robotic - assisted , right Video- assisted thoracoscopic surgery, right upper lobe wedge resection.    Last hospitalization for COPD Exacerbation 3/2025 - at [facility] admitted for 3 days- treated with IV Steroids and nebulizer treatment.  no h/o intubation.  Denies symptoms at present

## 2025-06-18 NOTE — H&P PST ADULT - PROBLEM SELECTOR PLAN 3
Last hospitalization for COPD Exacerbation 3/2025 - at [facility] admitted for 3 days- treated with IV Steroids and nebulizer treatment.  no h/o intubation.  Denies symptoms at present     Patient instructed to continue all inhalers as prescribed.

## 2025-06-18 NOTE — H&P PST ADULT - PROBLEM SELECTOR PLAN 1
Patient tentatively scheduled for Robotic bronchoscopy with interventional pulmonary (IP) marking , robotic - assisted , right Video- assisted thoracoscopic surgery, right upper lobe wedge resection.  Dr Machado: Robotic bronchoscopy with interventional pulmonary (IP) marking , robotic - assisted , right Video- assisted thoracoscopic surgery, right upper lobe wedge resection on 6/24/25.  Pre-op instructions provided. Pt given verbal and written instructions with teach back on chlorhexidine wash and pepcid. Pt verbalized understanding with return demonstration.     PST CBC anemia w/ reflex, T&S -pending Patient tentatively scheduled for Robotic bronchoscopy with interventional pulmonary (IP) marking , robotic - assisted , right Video- assisted thoracoscopic surgery, right upper lobe wedge resection.  Dr Machado: Robotic bronchoscopy with interventional pulmonary (IP) marking , robotic - assisted , right Video- assisted thoracoscopic surgery, right upper lobe wedge resection on 6/24/25.  Pre-op instructions provided. Pt given verbal and written instructions with teach back on chlorhexidine wash and pepcid. Pt verbalized understanding with return demonstration.     PST CBC anemia w/ reflex, T&S -pending  BMP done at PCP 6/16/25- Na 139, K 4.6, BUN 9, Cr 0.68 ( results seen on patient portal)

## 2025-06-18 NOTE — H&P PST ADULT - PROBLEM SELECTOR PLAN 2
Patient instructed to take Metoprolol with a sip of water on the morning of procedure. Patient verbalized understanding.

## 2025-06-18 NOTE — H&P PST ADULT - ENDOCRINE
----- Message from Christine Drew sent at 4/14/2023  9:17 AM CDT -----  Contact: christine Huerta is calling in regards to her appt on 04/14.Please call back at [phone]          Thanks  MADDIE     negative

## 2025-06-18 NOTE — H&P PST ADULT - NSICDXPASTSURGICALHX_GEN_ALL_CORE_FT
PAST SURGICAL HISTORY:  Carcinoid Tumor of Ovary, Benign     Dermoid cyst ovarian, bilateral    H/O left wrist surgery     H/O total hysterectomy     Previous  Delivery, Delivered     S/P      S/P foot surgery, right     S/P ORIF (open reduction internal fixation) fracture     S/P ovarian cystectomy bilateral    S/P tubal ligation     Status Post Ovarian Cystectomy     Tubal Ligation

## 2025-06-23 RX ORDER — HEPARIN SODIUM 1000 [USP'U]/ML
5000 INJECTION INTRAVENOUS; SUBCUTANEOUS ONCE
Refills: 0 | Status: DISCONTINUED | OUTPATIENT
Start: 2025-06-24 | End: 2025-06-24

## 2025-06-24 ENCOUNTER — APPOINTMENT (OUTPATIENT)
Dept: PULMONOLOGY | Facility: HOSPITAL | Age: 50
End: 2025-06-24

## 2025-06-24 ENCOUNTER — INPATIENT (INPATIENT)
Facility: HOSPITAL | Age: 50
LOS: 0 days | Discharge: HOME | End: 2025-06-24
Attending: THORACIC SURGERY (CARDIOTHORACIC VASCULAR SURGERY) | Admitting: THORACIC SURGERY (CARDIOTHORACIC VASCULAR SURGERY)

## 2025-06-24 VITALS
OXYGEN SATURATION: 99 % | TEMPERATURE: 98 F | RESPIRATION RATE: 17 BRPM | WEIGHT: 138.89 LBS | DIASTOLIC BLOOD PRESSURE: 91 MMHG | SYSTOLIC BLOOD PRESSURE: 126 MMHG | HEIGHT: 61 IN | HEART RATE: 78 BPM

## 2025-06-24 DIAGNOSIS — R91.1 SOLITARY PULMONARY NODULE: ICD-10-CM

## 2025-06-24 DIAGNOSIS — Z98.890 OTHER SPECIFIED POSTPROCEDURAL STATES: Chronic | ICD-10-CM

## 2025-06-24 DIAGNOSIS — Z98.89 OTHER SPECIFIED POSTPROCEDURAL STATES: Chronic | ICD-10-CM

## 2025-06-24 DIAGNOSIS — D36.9 BENIGN NEOPLASM, UNSPECIFIED SITE: Chronic | ICD-10-CM

## 2025-06-24 DIAGNOSIS — Z90.710 ACQUIRED ABSENCE OF BOTH CERVIX AND UTERUS: Chronic | ICD-10-CM

## 2025-06-24 DIAGNOSIS — Z98.51 TUBAL LIGATION STATUS: Chronic | ICD-10-CM

## 2025-06-24 RX ORDER — ACETAMINOPHEN 500 MG/5ML
975 LIQUID (ML) ORAL ONCE
Refills: 0 | Status: COMPLETED | OUTPATIENT
Start: 2025-06-24 | End: 2025-06-24

## 2025-06-24 RX ORDER — GABAPENTIN 400 MG/1
300 CAPSULE ORAL ONCE
Refills: 0 | Status: COMPLETED | OUTPATIENT
Start: 2025-06-24 | End: 2025-06-24

## 2025-06-24 RX ADMIN — GABAPENTIN 300 MILLIGRAM(S): 400 CAPSULE ORAL at 07:30

## 2025-06-24 RX ADMIN — Medication 975 MILLIGRAM(S): at 07:43

## 2025-06-24 RX ADMIN — Medication 975 MILLIGRAM(S): at 07:30

## 2025-06-24 NOTE — ASU PREOP CHECKLIST - PATIENT'S PERSONAL PROPERTY GIVEN TO
Pediatric Neurosurgery Consultation Note    ADMISSION DATE:  12/18/2022  ATTENDING PHYSICIAN:  Timmy Dunaway DO  PRIMARY CARE PHYSICIAN:  Jaron Nance MD    CHIEF COMPLAINT/REASON FOR ADMISSION: Emesis     HISTORY OF PRESENT ILLNESS:    Vini Suarez is a 14 year old male who presents to the ED for evaluation due to one day history of NBNB emesis.  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 OR cultures (+) Streptococcus intermedius.  He went back to the OR on 11/09/2022 for left craniotomy for repeat evacuation of subdural empyema and EVD placement and again on 11/11/2022 for a posterior craniotomy and empyema evacuation. EVD removed on 11/29/2022.  Today, he is accompanied by his mother and father.  Family reports Vini has been doing very well up to yesterday where he had 6 episodes of NBNB emesis.  This is now resolved.  He denies headaches.  Family reports mild right sided dependent edema on cranial flap; however, it has remained soft.  Family denies fevers.  Of note, Vini did test positive for influenza A.    MEDICATIONS PRIOR TO ADMISSION:    Current Facility-Administered Medications   Medication   • sodium chloride (PF) 0.9 % injection 0.6-4.6 mL   • sodium chloride 0.9 % flush bag 25 mL   • sodium chloride (PF) 0.9 % injection 0.5-10 mL   • cefTRIAXone (ROCEPHIN) syringe 2,000 mg   • lacosamide ORAL (VIMPAT) 10 MG/ML oral solution 50 mg   • lacosamide ORAL (VIMPAT) 10 MG/ML oral solution 100 mg   • levETIRAcetam ORAL (KepPRA) 100 MG/ML oral solution 1,200 mg   • metroNIDAZOLE 50 MG/ML (compounded) oral suspension 400 mg     ALLERGIES:    ALLERGIES:   Allergen Reactions   • Amoxicillin HIVES   • Seasonal Runny Nose     PAST MEDICAL HISTORY:    Past Medical History:   Diagnosis Date   • Subdural empyema 10/28/2022    Streptococcus  intermedius   • Thrombosis of sagittal sinus 10/28/2022       SURGICAL HISTORY:    Past Surgical History:   Procedure Laterality Date   • Anes craniectomy; exc brain abscess-supr Left 10/28/2022    Bifrontotemporoparietal craniectomy for evacuation of bilateral subdural empyema   • Craniotomy Left 11/09/2022    Left craniotomy for subdural and abscess. Placement of EVD   • Craniotomy Left 11/11/2022    Left occipital craniotomy and evacuation of intracerebral abscess   • Intracranial pressure / external ventricular device management Left 11/09/2022    Removed 11/29/2022 at bedside     FAMILY HISTORY:    History reviewed. No pertinent family history.    SOCIAL HISTORY:    Social History     Tobacco Use   • Smoking status: Never   • Smokeless tobacco: Never   Vaping Use   • Vaping Use: never used   Substance Use Topics   • Alcohol use: Never   • Drug use: Never     REVIEW OF SYSTEMS:    Review of Systems   Constitutional: Negative for fever and irritability.   HENT: Negative for congestion and rhinorrhea.    Eyes: Negative for photophobia and visual disturbance.   Respiratory: Negative for apnea, shortness of breath and wheezing.    Cardiovascular: Negative for chest pain and leg swelling.   Gastrointestinal: Positive for nausea and vomiting.        Now resolved on physical exam   Genitourinary: Negative for decreased urine volume and difficulty urinating.   Musculoskeletal: Negative for gait problem, neck pain and neck stiffness.   Skin: Negative for rash and wound.   Neurological: Negative for seizures and headaches.   Hematological: Negative for adenopathy. Does not bruise/bleed easily.   Psychiatric/Behavioral: Negative for behavioral problems and confusion.     OBJECTIVE:    VITAL SIGNS:    Vital Last Value 24 Hour Range   Temperature 98.8 °F (37.1 °C) (12/19/22 0755) Temp  Min: 98.4 °F (36.9 °C)  Max: 99 °F (37.2 °C)   Pulse 88 (12/19/22 0755) Pulse  Min: 78  Max: 111   Respiratory 20 (12/19/22 0755) Resp  Min:  16  Max: 28   Non-Invasive  Blood Pressure 110/68 (12/19/22 0755) BP  Min: 100/49  Max: 126/76   Pulse Oximetry 100 % (12/19/22 0755) SpO2  Min: 95 %  Max: 100 %     Vital Today Admitted   Weight 36.1 kg (79 lb 9.4 oz) (12/19/22 0300) Weight: 36.5 kg (80 lb 7.5 oz) (12/18/22 1820)   Height N/A Height: 4' 1.61\" (126 cm) (12/19/22 0300)   BMI N/A BMI (Calculated): 22.74 (12/19/22 0300)     PHYSICAL EXAMINATION:  Vitals reviewed. Afebrile  Constitutional:    He is in no acute distress.  He is awake in bed and very active and talkative this morning./  HENT:   Bifrontal craniectomy is open to air and healing well.  Posterior craniotomy is open to air and healing well.  Frontal flap is soft and flat, there is mild swelling right greater than left   Neurological Exam  He is awake and oriented x 3.   Speech is clear.   Pupils are equal, round, and reactive (3 mm)  Face is symmetric.    He moves all extremities spontaneously.  He withdraws all extremities to noxious stimuli     Imaging:   · MRI brain W/WO 12/19/2022 IMPRESSION:Evolving blood products in the left frontal lobe (more T1 hyperintense) Similar bifrontal and left temporal FLAIR abnormality. Similar dural thickening and enhancement of the left middle cranial fossa  and left tentorial leaflet. No hydrocephalus. No acute infarct. No new areas of enhancement    Impression/Diagnoses:  Vini is a 14 year old male who presents with emesis with history of bilateral subdural empyemas who underwent multiple surgical procedures including a bifrontotemporoparietal craniectomy, repeat left craniotomy and most recently a  posterior craniotomy for evacuation.  Presenting symptoms are now resolved and he is essentially asymptomatic.  He is clinically well appearing with a normal neurological exam.  He had repeat neuroimaging yesterday that was very stable, there is no hydrocephalus.  He still has mild bilateral frontal/temporal swelling (right > left) on exam but the flap is  very soft.  There is no acute neurosurgical intervention recommended immediately at this time.     Plans/Recommendations:  · ENT input, appreciate  · ID input, appreciate  · Advance diet as tolerated  · Advance activity as tolerated - Helmet ON when out of bed please     Anjana Cardenas DNP, ANA MNP-  Pediatric Neurosurgery     Patient seen and examined. I reviewed the history, pertinent labs and imaging as documented by the APN. Plan of care discussed with team and agree with the note as outlined with continued re-assessment and adjustment of plan of care as needed.   Vini looked great.  Talkative, pleasant, really doing well.  AVSS, no fevers  Neurologically at his baseline  Hemicraniectomy flap looked similar compared with last time I saw him  Pertinent data reviewed and discussed thoroughly with family.  I recommended no neurosurgical procedure for now.  ID and ENT to evaluate.  Follow symptoms after diet is liberated.  Rationale was discussed and all questions answered.  DCN    to hold belongings

## 2025-06-30 ENCOUNTER — APPOINTMENT (OUTPATIENT)
Dept: THORACIC SURGERY | Facility: CLINIC | Age: 50
End: 2025-06-30
Payer: COMMERCIAL

## 2025-06-30 PROCEDURE — 99213 OFFICE O/P EST LOW 20 MIN: CPT | Mod: 93

## 2025-07-03 NOTE — ASU PATIENT PROFILE, ADULT - NS TRANSFER EYEGLASSES PAIRS
78 y/o F with no significant PMH presenting for eval of non-productive cough, intermittent fever/chills and generalized weakness x 1 week. Fever today was tmax 102, pt took Advil before coming to ED and is currently afebrile. No nausea or vomiting. No urinary SX. On exam pt had mild wheezing, otherwise exam as above. Plan: Will get albuterol inhaler with a spacer, follow up urine and discharge.
with family

## 2025-07-03 NOTE — ASU PATIENT PROFILE, ADULT - AS SC BRADEN FRICTION
Disc Photos stable ou. Will continue care w/O.D in EW. If changes, send back to [facility]. (3) no apparent problem

## 2025-07-07 ENCOUNTER — APPOINTMENT (OUTPATIENT)
Dept: THORACIC SURGERY | Facility: HOSPITAL | Age: 50
End: 2025-07-07

## 2025-07-07 ENCOUNTER — RESULT REVIEW (OUTPATIENT)
Age: 50
End: 2025-07-07

## 2025-07-07 ENCOUNTER — INPATIENT (INPATIENT)
Facility: HOSPITAL | Age: 50
LOS: 2 days | Discharge: HOME | End: 2025-07-10
Attending: THORACIC SURGERY (CARDIOTHORACIC VASCULAR SURGERY) | Admitting: THORACIC SURGERY (CARDIOTHORACIC VASCULAR SURGERY)
Payer: COMMERCIAL

## 2025-07-07 ENCOUNTER — APPOINTMENT (OUTPATIENT)
Dept: PULMONOLOGY | Facility: HOSPITAL | Age: 50
End: 2025-07-07

## 2025-07-07 VITALS
DIASTOLIC BLOOD PRESSURE: 93 MMHG | OXYGEN SATURATION: 98 % | HEART RATE: 80 BPM | SYSTOLIC BLOOD PRESSURE: 121 MMHG | RESPIRATION RATE: 15 BRPM | TEMPERATURE: 98 F | WEIGHT: 133.82 LBS | HEIGHT: 61 IN

## 2025-07-07 DIAGNOSIS — Z98.51 TUBAL LIGATION STATUS: Chronic | ICD-10-CM

## 2025-07-07 DIAGNOSIS — R91.1 SOLITARY PULMONARY NODULE: ICD-10-CM

## 2025-07-07 DIAGNOSIS — Z98.890 OTHER SPECIFIED POSTPROCEDURAL STATES: Chronic | ICD-10-CM

## 2025-07-07 DIAGNOSIS — D36.9 BENIGN NEOPLASM, UNSPECIFIED SITE: Chronic | ICD-10-CM

## 2025-07-07 DIAGNOSIS — Z98.89 OTHER SPECIFIED POSTPROCEDURAL STATES: Chronic | ICD-10-CM

## 2025-07-07 DIAGNOSIS — Z90.710 ACQUIRED ABSENCE OF BOTH CERVIX AND UTERUS: Chronic | ICD-10-CM

## 2025-07-07 PROCEDURE — 88331 PATH CONSLTJ SURG 1 BLK 1SPC: CPT | Mod: 26

## 2025-07-07 PROCEDURE — 71045 X-RAY EXAM CHEST 1 VIEW: CPT | Mod: 26

## 2025-07-07 PROCEDURE — 88305 TISSUE EXAM BY PATHOLOGIST: CPT | Mod: 26

## 2025-07-07 PROCEDURE — 88334 PATH CONSLTJ SURG CYTO XM EA: CPT | Mod: 26,59

## 2025-07-07 PROCEDURE — 32674 THORACOSCOPY LYMPH NODE EXC: CPT | Mod: RT

## 2025-07-07 PROCEDURE — 31626 BRONCHOSCOPY W/MARKERS: CPT | Mod: GC

## 2025-07-07 PROCEDURE — 32663 THORACOSCOPY W/LOBECTOMY: CPT | Mod: RT

## 2025-07-07 PROCEDURE — 31645 BRNCHSC W/THER ASPIR 1ST: CPT | Mod: GC

## 2025-07-07 PROCEDURE — 88313 SPECIAL STAINS GROUP 2: CPT | Mod: 26

## 2025-07-07 PROCEDURE — 88309 TISSUE EXAM BY PATHOLOGIST: CPT | Mod: 26

## 2025-07-07 PROCEDURE — 31627 NAVIGATIONAL BRONCHOSCOPY: CPT | Mod: GC

## 2025-07-07 PROCEDURE — 99291 CRITICAL CARE FIRST HOUR: CPT | Mod: GC,25

## 2025-07-07 PROCEDURE — S2900 ROBOTIC SURGICAL SYSTEM: CPT | Mod: NC

## 2025-07-07 PROCEDURE — 99233 SBSQ HOSP IP/OBS HIGH 50: CPT

## 2025-07-07 PROCEDURE — 88307 TISSUE EXAM BY PATHOLOGIST: CPT | Mod: 26

## 2025-07-07 DEVICE — CLIP LIG TI SM/WIDE 24/BX: Type: IMPLANTABLE DEVICE | Site: RIGHT | Status: FUNCTIONAL

## 2025-07-07 DEVICE — LIGATING CLIPS WECK HEMOLOK POLYMER LARGE (PURPLE) 6: Type: IMPLANTABLE DEVICE | Site: RIGHT | Status: FUNCTIONAL

## 2025-07-07 DEVICE — STAPLER COVIDIEN TRI-STAPLE 60MM PURPLE RELOAD: Type: IMPLANTABLE DEVICE | Site: RIGHT | Status: FUNCTIONAL

## 2025-07-07 DEVICE — CHEST DRAIN THORACIC ARGYLE PVC 24FR STRAIGHT: Type: IMPLANTABLE DEVICE | Site: RIGHT | Status: FUNCTIONAL

## 2025-07-07 DEVICE — STAPLER COVIDIEN TRI-STAPLE CURVED 30MM TAN RELOAD: Type: IMPLANTABLE DEVICE | Site: RIGHT | Status: FUNCTIONAL

## 2025-07-07 DEVICE — STAPLER COVIDIEN TRI-STAPLE 45MM PURPLE RELOAD: Type: IMPLANTABLE DEVICE | Site: RIGHT | Status: FUNCTIONAL

## 2025-07-07 DEVICE — SYS GALAXY BRONCHOSCOPE SINGLE USE: Type: IMPLANTABLE DEVICE | Site: RIGHT | Status: FUNCTIONAL

## 2025-07-07 DEVICE — STAPLER COVIDIEN TRI-STAPLE CURVED 45MM PURPLE RELOAD: Type: IMPLANTABLE DEVICE | Site: RIGHT | Status: FUNCTIONAL

## 2025-07-07 DEVICE — STAPLER COVIDIEN TRI-STAPLE CURVED 45MM TAN RELOAD: Type: IMPLANTABLE DEVICE | Site: RIGHT | Status: FUNCTIONAL

## 2025-07-07 RX ORDER — DIAZEPAM 5 MG/1
5 TABLET ORAL EVERY 8 HOURS
Refills: 0 | Status: DISCONTINUED | OUTPATIENT
Start: 2025-07-07 | End: 2025-07-08

## 2025-07-07 RX ORDER — SODIUM CHLORIDE 9 G/1000ML
1000 INJECTION, SOLUTION INTRAVENOUS
Refills: 0 | Status: DISCONTINUED | OUTPATIENT
Start: 2025-07-07 | End: 2025-07-08

## 2025-07-07 RX ORDER — HYDROMORPHONE/SOD CHLOR,ISO/PF 2 MG/10 ML
0.5 SYRINGE (ML) INJECTION
Refills: 0 | Status: DISCONTINUED | OUTPATIENT
Start: 2025-07-07 | End: 2025-07-09

## 2025-07-07 RX ORDER — QUETIAPINE FUMARATE 25 MG/1
1 TABLET ORAL
Refills: 0 | DISCHARGE

## 2025-07-07 RX ORDER — LEVALBUTEROL HYDROCHLORIDE 1.25 MG/3ML
0.63 SOLUTION RESPIRATORY (INHALATION) EVERY 6 HOURS
Refills: 0 | Status: DISCONTINUED | OUTPATIENT
Start: 2025-07-07 | End: 2025-07-10

## 2025-07-07 RX ORDER — NALOXONE HYDROCHLORIDE 0.4 MG/ML
0.1 INJECTION, SOLUTION INTRAMUSCULAR; INTRAVENOUS; SUBCUTANEOUS
Refills: 0 | Status: DISCONTINUED | OUTPATIENT
Start: 2025-07-07 | End: 2025-07-10

## 2025-07-07 RX ORDER — SIMETHICONE 80 MG
80 TABLET,CHEWABLE ORAL
Refills: 0 | Status: DISCONTINUED | OUTPATIENT
Start: 2025-07-07 | End: 2025-07-10

## 2025-07-07 RX ORDER — HEPARIN SODIUM 1000 [USP'U]/ML
5000 INJECTION INTRAVENOUS; SUBCUTANEOUS ONCE
Refills: 0 | Status: COMPLETED | OUTPATIENT
Start: 2025-07-07 | End: 2025-07-07

## 2025-07-07 RX ORDER — NICOTINE POLACRILEX 4 MG/1
1 GUM, CHEWING ORAL DAILY
Refills: 0 | Status: DISCONTINUED | OUTPATIENT
Start: 2025-07-07 | End: 2025-07-10

## 2025-07-07 RX ORDER — FLUTICASONE FUROATE AND VILANTEROL TRIFENATATE 100; 25 UG/1; UG/1
1 POWDER RESPIRATORY (INHALATION)
Refills: 0 | DISCHARGE

## 2025-07-07 RX ORDER — METOPROLOL SUCCINATE 50 MG/1
1 TABLET, EXTENDED RELEASE ORAL
Refills: 0 | DISCHARGE

## 2025-07-07 RX ORDER — ALBUTEROL SULFATE 2.5 MG/3ML
2 VIAL, NEBULIZER (ML) INHALATION
Refills: 0 | DISCHARGE

## 2025-07-07 RX ORDER — POLYETHYLENE GLYCOL 3350 17 G/17G
17 POWDER, FOR SOLUTION ORAL AT BEDTIME
Refills: 0 | Status: DISCONTINUED | OUTPATIENT
Start: 2025-07-07 | End: 2025-07-10

## 2025-07-07 RX ORDER — DIAZEPAM 5 MG/1
1 TABLET ORAL
Refills: 0 | DISCHARGE

## 2025-07-07 RX ORDER — LIDOCAINE HYDROCHLORIDE 20 MG/ML
1 JELLY TOPICAL DAILY
Refills: 0 | Status: DISCONTINUED | OUTPATIENT
Start: 2025-07-07 | End: 2025-07-10

## 2025-07-07 RX ORDER — LAMOTRIGINE 150 MG/1
300 TABLET ORAL
Refills: 0 | Status: DISCONTINUED | OUTPATIENT
Start: 2025-07-07 | End: 2025-07-10

## 2025-07-07 RX ORDER — GABAPENTIN 400 MG/1
300 CAPSULE ORAL ONCE
Refills: 0 | Status: COMPLETED | OUTPATIENT
Start: 2025-07-07 | End: 2025-07-07

## 2025-07-07 RX ORDER — ALBUTEROL SULFATE 2.5 MG/3ML
2.5 VIAL, NEBULIZER (ML) INHALATION EVERY 6 HOURS
Refills: 0 | Status: DISCONTINUED | OUTPATIENT
Start: 2025-07-07 | End: 2025-07-07

## 2025-07-07 RX ORDER — HEPARIN SODIUM 1000 [USP'U]/ML
5000 INJECTION INTRAVENOUS; SUBCUTANEOUS EVERY 8 HOURS
Refills: 0 | Status: DISCONTINUED | OUTPATIENT
Start: 2025-07-07 | End: 2025-07-10

## 2025-07-07 RX ORDER — SENNA 187 MG
2 TABLET ORAL AT BEDTIME
Refills: 0 | Status: DISCONTINUED | OUTPATIENT
Start: 2025-07-07 | End: 2025-07-10

## 2025-07-07 RX ORDER — QUETIAPINE FUMARATE 25 MG/1
300 TABLET ORAL AT BEDTIME
Refills: 0 | Status: DISCONTINUED | OUTPATIENT
Start: 2025-07-07 | End: 2025-07-10

## 2025-07-07 RX ORDER — LAMOTRIGINE 150 MG/1
3 TABLET ORAL
Refills: 0 | DISCHARGE

## 2025-07-07 RX ORDER — ACETAMINOPHEN 500 MG/5ML
650 LIQUID (ML) ORAL ONCE
Refills: 0 | Status: COMPLETED | OUTPATIENT
Start: 2025-07-07 | End: 2025-07-07

## 2025-07-07 RX ORDER — ONDANSETRON HCL/PF 4 MG/2 ML
4 VIAL (ML) INJECTION EVERY 6 HOURS
Refills: 0 | Status: DISCONTINUED | OUTPATIENT
Start: 2025-07-07 | End: 2025-07-10

## 2025-07-07 RX ORDER — HYDROMORPHONE/SOD CHLOR,ISO/PF 2 MG/10 ML
30 SYRINGE (ML) INJECTION
Refills: 0 | Status: DISCONTINUED | OUTPATIENT
Start: 2025-07-07 | End: 2025-07-08

## 2025-07-07 RX ORDER — NALOXONE HYDROCHLORIDE 0.4 MG/ML
1 INJECTION, SOLUTION INTRAMUSCULAR; INTRAVENOUS; SUBCUTANEOUS
Refills: 0 | DISCHARGE

## 2025-07-07 RX ORDER — ACETAMINOPHEN 500 MG/5ML
1000 LIQUID (ML) ORAL EVERY 6 HOURS
Refills: 0 | Status: COMPLETED | OUTPATIENT
Start: 2025-07-07 | End: 2025-07-08

## 2025-07-07 RX ADMIN — Medication 2 TABLET(S): at 21:37

## 2025-07-07 RX ADMIN — Medication 30 MILLILITER(S): at 19:36

## 2025-07-07 RX ADMIN — LIDOCAINE HYDROCHLORIDE 1 PATCH: 20 JELLY TOPICAL at 20:27

## 2025-07-07 RX ADMIN — SODIUM CHLORIDE 30 MILLILITER(S): 9 INJECTION, SOLUTION INTRAVENOUS at 13:23

## 2025-07-07 RX ADMIN — GABAPENTIN 300 MILLIGRAM(S): 400 CAPSULE ORAL at 06:42

## 2025-07-07 RX ADMIN — DIAZEPAM 5 MILLIGRAM(S): 5 TABLET ORAL at 13:33

## 2025-07-07 RX ADMIN — HEPARIN SODIUM 5000 UNIT(S): 1000 INJECTION INTRAVENOUS; SUBCUTANEOUS at 06:41

## 2025-07-07 RX ADMIN — LIDOCAINE HYDROCHLORIDE 1 PATCH: 20 JELLY TOPICAL at 13:22

## 2025-07-07 RX ADMIN — Medication 4 MILLILITER(S): at 21:58

## 2025-07-07 RX ADMIN — Medication 2.5 MILLIGRAM(S): at 21:57

## 2025-07-07 RX ADMIN — Medication 650 MILLIGRAM(S): at 06:42

## 2025-07-07 RX ADMIN — LAMOTRIGINE 300 MILLIGRAM(S): 150 TABLET ORAL at 21:36

## 2025-07-07 RX ADMIN — QUETIAPINE FUMARATE 300 MILLIGRAM(S): 25 TABLET ORAL at 21:37

## 2025-07-07 RX ADMIN — SODIUM CHLORIDE 30 MILLILITER(S): 9 INJECTION, SOLUTION INTRAVENOUS at 19:36

## 2025-07-07 RX ADMIN — Medication 0.5 MILLIGRAM(S): at 18:13

## 2025-07-07 RX ADMIN — Medication 400 MILLIGRAM(S): at 23:21

## 2025-07-07 RX ADMIN — HEPARIN SODIUM 5000 UNIT(S): 1000 INJECTION INTRAVENOUS; SUBCUTANEOUS at 13:23

## 2025-07-07 RX ADMIN — POLYETHYLENE GLYCOL 3350 17 GRAM(S): 17 POWDER, FOR SOLUTION ORAL at 21:37

## 2025-07-07 RX ADMIN — DIAZEPAM 5 MILLIGRAM(S): 5 TABLET ORAL at 23:20

## 2025-07-07 RX ADMIN — HEPARIN SODIUM 5000 UNIT(S): 1000 INJECTION INTRAVENOUS; SUBCUTANEOUS at 21:37

## 2025-07-07 RX ADMIN — Medication 2.5 MILLIGRAM(S): at 15:15

## 2025-07-07 RX ADMIN — NICOTINE POLACRILEX 1 PATCH: 4 GUM, CHEWING ORAL at 20:27

## 2025-07-07 RX ADMIN — NICOTINE POLACRILEX 1 PATCH: 4 GUM, CHEWING ORAL at 14:51

## 2025-07-07 RX ADMIN — Medication 400 MILLIGRAM(S): at 13:23

## 2025-07-07 RX ADMIN — Medication 30 MILLILITER(S): at 12:33

## 2025-07-07 RX ADMIN — Medication 400 MILLIGRAM(S): at 18:12

## 2025-07-07 NOTE — ASU PREOP CHECKLIST - LAST TOOK
Detail Level: Zone Sunscreen Recommendations: Continual photo protection with zinc based sunscreen, and photo protective clothing Detail Level: Detailed solids

## 2025-07-07 NOTE — CONSULT NOTE ADULT - ATTENDING COMMENTS
Patient seen and examined with team at bedside during rounds after lab data, medical records and radiology reports reviewed. I have read and agreeable in general with the documented note, assessment, and management plan which reflects my opinions from bedside rounds. Agree with note above and will highlight the followinF with PMH of COPD bipolar disorder GERD here for VATS resection of RUL cystic lesion. Planned dye marking to allow resection. Risk and benefits discussed CT reviewed with appropriate airway. near lateral lesion for regional marking.     I have personally provided 54 minutes of noncritical care time concurrently with the resident/fellow/NP/PA. This time excludes time spent on separate procedures and time spent teaching. I have reviewed the resident/fellow/NP/PA’s documentation and I agree with the assessment and plan of care. I have personally reviewed laboratory data, radiology results, discussion with primary team\patient, discussion with consulting services, and monitoring for potential decompensation. Interventional Pulmonology services provided to the patient were separate from general pulmonary service due to complexity of issues and interventions required.  Complex patient requiring advanced services.    Claus Machado MD  Interventional Pulmonology & Critical Care Medicine

## 2025-07-07 NOTE — BRIEF OPERATIVE NOTE - NSICDXBRIEFPROCEDURE_GEN_ALL_CORE_FT
PROCEDURES:  Robot-assisted video-assisted thoracoscopic surgery (VATS) for lobectomy 07-Jul-2025 12:30:18  Sunshine Turcios  Robot-assisted mediastinal lymphadenectomy 07-Jul-2025 12:30:28  Sunshine Turcios  Flexible bronchoscopy 07-Jul-2025 12:30:36  Sunshine Turcios

## 2025-07-07 NOTE — CONSULT NOTE ADULT - ASSESSMENT
50F former smoker, family hx of lung Ca presenting for RUL cystic lesion. IP consulted for RAB fiducial placement.     # Lung lesion  s/p fiducial placement to RUL lesion under robot assisted bronchoscopic guidance 7/7    Intervent Pulmonology to follow

## 2025-07-07 NOTE — CONSULT NOTE ADULT - SUBJECTIVE AND OBJECTIVE BOX
INTERVENTIONAL PULMONOLOGY CONSULTATION NOTE    NAME: EWA MARTIN  MEDICAL RECORD NUMBER: [id number]    CHIEF COMPLAINT: Patient is a 50y old  Female who presents with a chief complaint of lung lesion    HISTORY OF PRESENT ILLNESS:   50F former smoker (30 pack years, quit 2025), family hx of lung Ca, asthma, COPD, HTN, anxiety/depression, GERD presented for abnormality on CT scan. She initially complained of dyspnea and CT showed RUL cystic lesion. Patient admitted today for VATS for removal of this lesion. IP consulted for RAB fiducial placement. Patient denying any new complaints this morning.       ====================BACKGROUND INFORMATION====================    FAMILY HISTORY: FAMILY HISTORY:  Family history of lung cancer    Family history of cervical cancer    Family history of carcinoma in situ of anal canal    Family history of hypertension    Family history of hyperlipidemia    Family history of acute myocardial infarction (Mother)        PAST MEDICAL AND SURGICAL HISTORY: PAST MEDICAL & SURGICAL HISTORY:  Benign Hypertension      Carcinoid Tumor of Ovary, Benign      Ovarian Cyst      Depression with anxiety      Anemia  bitamin b12 deficiency      PTSD (post-traumatic stress disorder)      GERD (gastroesophageal reflux disease)      Cardiac syndrome X      COPD (chronic obstructive pulmonary disease)      Complex regional pain syndrome I, unspecified      Carcinoid Tumor of Ovary, Benign      Previous  Delivery, Delivered      Tubal Ligation      Status Post Ovarian Cystectomy      S/P ovarian cystectomy  bilateral      Dermoid cyst  ovarian, bilateral      S/P         S/P ORIF (open reduction internal fixation) fracture      S/P tubal ligation      H/O total hysterectomy      S/P foot surgery, right      H/O left wrist surgery          ALLERGIES:Allergies    Celexa (Rash)  Cipro (Short breath)  fluoroquinolone antibiotics (Rash)    Intolerances        HOME MEDICATIONS:     OUTPATIENT PULMONARY DOCTOR:     PFTs:     SOCIAL HISTORY:  TOBACCO USE:  EMPLOYMENT:  CODE STATUS:    ========================REVIEW OF SYSTEMS========================  [x] ROS negative except as per HPI    ========================MEDICATIONS=============================  NEURO    CARDIO    PULM    FEN/GI    HEME/ONC    ANTIMICROBIALS    ENDO    OTHER      PRN      Drips      ========================PHYSICAL EXAM============================    VITALS: Vital Signs Last 24 Hrs  T(C): 36.7 (2025 05:53), Max: 36.7 (2025 05:53)  T(F): 98 (2025 05:53), Max: 98 (2025 05:53)  HR: 80 (2025 05:53) (80 - 80)  BP: 121/93 (2025 05:53) (121/93 - 121/93)  BP(mean): --  RR: 15 (2025 05:53) (15 - 15)  SpO2: 98% (:53) (98% - 98%)        INTAKE and OUTPUT: I&O's Detail      WEIGHTHeight (cm): 154.9 (25 @ 05:53)  Weight (kg): 60.7 (25 @ 05:53)  BMI (kg/m2): 25.3 (25 @ 05:53)  BSA (m2): 1.59 (25 @ 05:53)    VENTILATOR SETTINGS:     Physical Exam  CONST:        NAD, well-developed, awake and alert  ENMT:         MMM, no pharyngeal injection or exudates; no LAD  RESP:           Normal effort and expansion. Normal and equal air entry. No WRR.  CHEST:        No tenderness. No cardiac devices, lines, or chest tubes.   CARD:          RRR, normal S1,S2. no MRG.  VASC:          No appreciable JVD; no LE edema  ABD:            Soft, nontender, nondistended  MSK:            No clubbing or cyanosis of digits  EXT:             WWP; cap refill <2s; pulses are 2+ B/L  PSYCH:         Mood and affect appropriate  NEURO:       Non-focal; moving all extremities   SKIN:            No visible rashes on exposed skin       ======================LABORATORY RESULTS AND IMAGING=============                                           ABG Trend    VBG Trend  23 @ 20:52 FiO2--  - 7.38/39/34/23/54.9 Lactate 0.8  21 @ 06:55 FiO2--  - 7.39/39/40/24/69.5 Lactate 0.9  19 @ 19:11 FiO2--  - 7.37/47/23/27/39 Lactate 1.0  18 @ 18:27 FiO2--  - 7.36/46/30/23/55.8 Lactate 0.9        [x] RADIOLOGY REVIEWED AND INTERPRETED BY ME

## 2025-07-07 NOTE — BRIEF OPERATIVE NOTE - COMMENTS
I, EVANGELINA Lin provided direct first assist support to the surgeon during this surgical procedure. My involvement included positioning, prepping and draping the patient prior to surgery,  robotic port placement, Robotic docking, robotic instrument insertion and removal, ensuring clear visibility and exposure for the surgeon by using instruments such as retractors, suction and sponges, retrieving specimens from the operative field, closing surgical incisions, undocking the robot, stapling tissues and vessels, and dressing wounds. As well as other tasks as directed by the surgeon.

## 2025-07-07 NOTE — PROGRESS NOTE ADULT - SUBJECTIVE AND OBJECTIVE BOX
EWA MARTIN                     [id number]    HPI: 50 year old female with PMH of COPD, bipolar disorder, HTN, anxiety/depression, GERD,  Recent Workup CT revealed RUL lesion  presents to Presurgical testing scheduled for Robotic bronchoscopy with interventional pulmonary (IP) marking , robotic - assisted , right Video- assisted thoracoscopic surgery, right upper lobe wedge resection.  Dr Machado: Robotic bronchoscopy with interventional pulmonary (IP) marking , robotic - assisted , right Video- assisted thoracoscopic surgery, right upper lobe wedge resection.    Last hospitalization for COPD Exacerbation 3/2025 - at [facility] admitted for 3 days- treated with IV Steroids and nebulizer treatment.  no h/o intubation.  Denies symptoms at present      Procedure: flexible bronchoscopy RA RVATS RULobectomy MLND    Issues:  Lesion of lung   HTN  Anemia  Post op pain  Chest tube in place  Anxiety    PAST MEDICAL & SURGICAL HISTORY:  Benign Hypertension      Carcinoid Tumor of Ovary, Benign      Ovarian Cyst      Depression with anxiety      Anemia  bitamin b12 deficiency      PTSD (post-traumatic stress disorder)      GERD (gastroesophageal reflux disease)      Cardiac syndrome X      COPD (chronic obstructive pulmonary disease)      Complex regional pain syndrome I, unspecified      Carcinoid Tumor of Ovary, Benign      Previous  Delivery, Delivered      Tubal Ligation      Status Post Ovarian Cystectomy      S/P ovarian cystectomy  bilateral      Dermoid cyst  ovarian, bilateral      S/P         S/P ORIF (open reduction internal fixation) fracture      S/P tubal ligation      H/O total hysterectomy      S/P foot surgery, right      H/O left wrist surgery                VITAL SIGNS:  Vital Signs Last 24 Hrs  T(C): 36.4 (2025 12:30), Max: 36.7 (2025 05:53)  T(F): 97.5 (2025 12:30), Max: 98 (2025 05:53)  HR: 88 (2025 15:00) (80 - 96)  BP: 131/99 (2025 15:00) (108/80 - 153/87)  BP(mean): 109 (2025 15:00) (90 - 117)  RR: 13 (2025 15:00) (11 - 20)  SpO2: 99% (2025 15:00) (97% - 99%)    Parameters below as of 2025 15:00  Patient On (Oxygen Delivery Method): nasal cannula w/ humidification  O2 Flow (L/min): 2      I/Os:   I&O's Detail    2025 07:01  -  2025 15:07  --------------------------------------------------------  IN:    IV PiggyBack: 100 mL    Lactated Ringers: 120 mL    Oral Fluid: 100 mL  Total IN: 320 mL    OUT:    Chest Tube (mL): 35 mL  Total OUT: 35 mL    Total NET: 285 mL          CAPILLARY BLOOD GLUCOSE          =======================MEDICATIONS===================  MEDICATIONS  (STANDING):  acetaminophen   IVPB .. 1000 milliGRAM(s) IV Intermittent every 6 hours  albuterol    0.083% 2.5 milliGRAM(s) Nebulizer every 6 hours  chlorhexidine 2% Cloths 1 Application(s) Topical daily  fluticasone propionate/ salmeterol 250-50 MICROgram(s) Diskus 1 Dose(s) Inhalation <User Schedule>  heparin   Injectable 5000 Unit(s) SubCutaneous every 8 hours  HYDROmorphone PCA (1 mG/mL) 30 milliLiter(s) PCA Continuous PCA Continuous  lactated ringers. 1000 milliLiter(s) (30 mL/Hr) IV Continuous <Continuous>  lamoTRIgine 300 milliGRAM(s) Oral two times a day  lidocaine   4% Patch 1 Patch Transdermal daily  nicotine - 21 mG/24Hr(s) Patch 1 Patch Transdermal daily  pantoprazole    Tablet 40 milliGRAM(s) Oral before breakfast  polyethylene glycol 3350 17 Gram(s) Oral at bedtime  QUEtiapine 300 milliGRAM(s) Oral at bedtime  senna 2 Tablet(s) Oral at bedtime  sodium chloride 3%  Inhalation 4 milliLiter(s) Inhalation every 12 hours    MEDICATIONS  (PRN):  diazepam    Tablet 5 milliGRAM(s) Oral every 8 hours PRN anxiety  naloxone Injectable 0.1 milliGRAM(s) IV Push every 3 minutes PRN For ANY of the following changes in patient status:  A. RR LESS THAN 10 breaths per minute, B. Oxygen saturation LESS THAN 90%, C. Sedation score of 6  ondansetron Injectable 4 milliGRAM(s) IV Push every 6 hours PRN Nausea  simethicone 80 milliGRAM(s) Chew two times a day PRN Gas      PHYSICAL EXAM============================  General:                         Awake, alert, not in any distress  Neuro:                            Moving all extremities to commands.   Respiratory:	Air entry fair and  bilateral conducted sounds                                           Effort even and unlabored.  CV:		Regular rate and rhythm. Normal S1/S2                                          Distal pulses present.  Abdomen:	                     Soft, non-distended. Bowel sounds present   Skin:		No rash.  Extremities:	Warm, no cyanosis or edema.  Palpable pulses      A/P:   50 year old female with PMH of COPD, bipolar disorder, HTN, anxiety/depression, GERD,  Recent Workup CT revealed RUL lesion , s/p  robotic - assisted , right Video- assisted thoracoscopic surgery, rRULobectomy MLND    =============================NEUROLOGY============================  Pain control with PCA / Tylenol IV / Toradol  Anxiety: resume home meds, Valium  ==============================RESPIRATORY========================  Pt is on  3  L nasal canula   Using incentive spirometry   Monitor chest tube output  Chest tube to suction   Continue bronchodilators, pulmonary toilet    ============================CARDIOVASCULAR======================  Continue hemodynamic monitoring.  Not on any pressors    =====================RENAL===================  Continue LR 30CC/hr    Monitor I/Os and electrolytes    ====================GASTROINTESTINAL===================  On clears, tolerating  GERD: Continue GI prophylaxis  Protonix  Continue Zofran / Reglan for nausea - PRN	    ========================HEMATOLOGIC/ONCOLOGIC====================  Monitor chest tube output. No signs of active bleeding.   Follow CBC in AM    ============================INFECTIOUS DISEASE========================  Monitor for fever / leukocytosis.  All surgical incision / chest tube  sites look clean      Pt is on GI & DVT prophylaxis  OOB & ambulate       Pertinent clinical, laboratory, radiographic, hemodynamic, echocardiographic, respiratory data, microbiologic data and chart were reviewed and analyzed frequently throughout the course of the day and night  Patient seen, examined and plan discussed with CT Surgery / CTICU team during rounds.    Pt's status discussed with family at bedside, updated status        Antony Dwyer DO FACEP

## 2025-07-07 NOTE — PATIENT PROFILE ADULT - NSTOBACCOCESSATIONEDU1_GEN_A_NUR
IMAGING STUDIES:  Images taken in the clinic today were reviewed.  Images of the bilateral knees severe arthritis right knee with bone-on-bone articulation laterally and a valgus deformity.   These were compared to previous exams .       ASSESSMENT:  1. Acute pain of right knee          PLAN:  He has had a 30-some degree motion loss over the last 3 years.  Lg needs his knee replaced.  He has severe arthritis.  While we could treat with a cortisone injection, he needs replacement.  Lg agrees.       SURGICAL PLANNING:  Lg is given an Marcella today.  Risks, benefits and options were discussed.  Informed consent was obtained.  The patient is given a card with contact information for Gilda, our surgery scheduler.   He would attend [employer] .  He will need a prehab visit.  A motion machine would be delivered to his home.  He has an ice machine at home from a previous surgery.  The day of surgery he will get  Nerve block in the thigh.  I will give him aspirin for prophylaxis.  He will have a [employer] VanGuard replacement.  Risks were discussed in detail.  Risks, benefits and options were discussed.  Informed consent was obtained.  The patient is given a card with contact information for Gilda, our surgery scheduler.       Recognize danger situations.  For example, stress, drinking alcohol, urges to smoke, smoking cues, cigarette availability

## 2025-07-08 LAB
ANION GAP SERPL CALC-SCNC: 13 MMOL/L — SIGNIFICANT CHANGE UP (ref 7–14)
BUN SERPL-MCNC: 8 MG/DL — SIGNIFICANT CHANGE UP (ref 7–23)
CALCIUM SERPL-MCNC: 8.6 MG/DL — SIGNIFICANT CHANGE UP (ref 8.4–10.5)
CHLORIDE SERPL-SCNC: 105 MMOL/L — SIGNIFICANT CHANGE UP (ref 98–107)
CO2 SERPL-SCNC: 20 MMOL/L — LOW (ref 22–31)
CREAT SERPL-MCNC: 0.62 MG/DL — SIGNIFICANT CHANGE UP (ref 0.5–1.3)
EGFR: 110 ML/MIN/1.73M2 — SIGNIFICANT CHANGE UP
EGFR: 110 ML/MIN/1.73M2 — SIGNIFICANT CHANGE UP
GLUCOSE SERPL-MCNC: 128 MG/DL — HIGH (ref 70–99)
HCT VFR BLD CALC: 36.5 % — SIGNIFICANT CHANGE UP (ref 34.5–45)
HGB BLD-MCNC: 12.7 G/DL — SIGNIFICANT CHANGE UP (ref 11.5–15.5)
MAGNESIUM SERPL-MCNC: 1.8 MG/DL — SIGNIFICANT CHANGE UP (ref 1.6–2.6)
MCHC RBC-ENTMCNC: 30.6 PG — SIGNIFICANT CHANGE UP (ref 27–34)
MCHC RBC-ENTMCNC: 34.8 G/DL — SIGNIFICANT CHANGE UP (ref 32–36)
MCV RBC AUTO: 88 FL — SIGNIFICANT CHANGE UP (ref 80–100)
MRSA PCR RESULT.: SIGNIFICANT CHANGE UP
NRBC # BLD AUTO: 0 K/UL — SIGNIFICANT CHANGE UP (ref 0–0)
NRBC # FLD: 0 K/UL — SIGNIFICANT CHANGE UP (ref 0–0)
NRBC BLD AUTO-RTO: 0 /100 WBCS — SIGNIFICANT CHANGE UP (ref 0–0)
PHOSPHATE SERPL-MCNC: 3.3 MG/DL — SIGNIFICANT CHANGE UP (ref 2.5–4.5)
PLATELET # BLD AUTO: 309 K/UL — SIGNIFICANT CHANGE UP (ref 150–400)
PMV BLD: 8.9 FL — SIGNIFICANT CHANGE UP (ref 7–13)
POTASSIUM SERPL-MCNC: 3.3 MMOL/L — LOW (ref 3.5–5.3)
POTASSIUM SERPL-SCNC: 3.3 MMOL/L — LOW (ref 3.5–5.3)
RBC # BLD: 4.15 M/UL — SIGNIFICANT CHANGE UP (ref 3.8–5.2)
RBC # FLD: 11.8 % — SIGNIFICANT CHANGE UP (ref 10.3–14.5)
S AUREUS DNA NOSE QL NAA+PROBE: SIGNIFICANT CHANGE UP
SODIUM SERPL-SCNC: 138 MMOL/L — SIGNIFICANT CHANGE UP (ref 135–145)
WBC # BLD: 9.53 K/UL — SIGNIFICANT CHANGE UP (ref 3.8–10.5)
WBC # FLD AUTO: 9.53 K/UL — SIGNIFICANT CHANGE UP (ref 3.8–10.5)

## 2025-07-08 PROCEDURE — 71045 X-RAY EXAM CHEST 1 VIEW: CPT | Mod: 26,76

## 2025-07-08 PROCEDURE — 99233 SBSQ HOSP IP/OBS HIGH 50: CPT

## 2025-07-08 RX ORDER — TAMSULOSIN HYDROCHLORIDE 0.4 MG/1
0.4 CAPSULE ORAL ONCE
Refills: 0 | Status: COMPLETED | OUTPATIENT
Start: 2025-07-08 | End: 2025-07-08

## 2025-07-08 RX ORDER — ACETAMINOPHEN 500 MG/5ML
1000 LIQUID (ML) ORAL EVERY 6 HOURS
Refills: 0 | Status: DISCONTINUED | OUTPATIENT
Start: 2025-07-08 | End: 2025-07-09

## 2025-07-08 RX ORDER — DIAZEPAM 5 MG/1
5 TABLET ORAL EVERY 8 HOURS
Refills: 0 | Status: DISCONTINUED | OUTPATIENT
Start: 2025-07-08 | End: 2025-07-10

## 2025-07-08 RX ORDER — MAGNESIUM SULFATE 500 MG/ML
2 SYRINGE (ML) INJECTION ONCE
Refills: 0 | Status: COMPLETED | OUTPATIENT
Start: 2025-07-08 | End: 2025-07-08

## 2025-07-08 RX ORDER — HYDROMORPHONE/SOD CHLOR,ISO/PF 2 MG/10 ML
30 SYRINGE (ML) INJECTION
Refills: 0 | Status: DISCONTINUED | OUTPATIENT
Start: 2025-07-08 | End: 2025-07-09

## 2025-07-08 RX ORDER — CALCIUM CARBONATE 750 MG/1
1 TABLET ORAL EVERY 6 HOURS
Refills: 0 | Status: DISCONTINUED | OUTPATIENT
Start: 2025-07-08 | End: 2025-07-10

## 2025-07-08 RX ORDER — SODIUM CHLORIDE 9 G/1000ML
1000 INJECTION, SOLUTION INTRAVENOUS
Refills: 0 | Status: DISCONTINUED | OUTPATIENT
Start: 2025-07-08 | End: 2025-07-09

## 2025-07-08 RX ORDER — KETOROLAC TROMETHAMINE 30 MG/ML
15 INJECTION, SOLUTION INTRAMUSCULAR; INTRAVENOUS ONCE
Refills: 0 | Status: DISCONTINUED | OUTPATIENT
Start: 2025-07-08 | End: 2025-07-08

## 2025-07-08 RX ORDER — METOPROLOL SUCCINATE 50 MG/1
25 TABLET, EXTENDED RELEASE ORAL EVERY 12 HOURS
Refills: 0 | Status: DISCONTINUED | OUTPATIENT
Start: 2025-07-08 | End: 2025-07-10

## 2025-07-08 RX ORDER — KETOROLAC TROMETHAMINE 30 MG/ML
15 INJECTION, SOLUTION INTRAMUSCULAR; INTRAVENOUS EVERY 6 HOURS
Refills: 0 | Status: DISCONTINUED | OUTPATIENT
Start: 2025-07-08 | End: 2025-07-09

## 2025-07-08 RX ORDER — FUROSEMIDE 10 MG/ML
10 INJECTION INTRAMUSCULAR; INTRAVENOUS ONCE
Refills: 0 | Status: COMPLETED | OUTPATIENT
Start: 2025-07-08 | End: 2025-07-08

## 2025-07-08 RX ORDER — ACETAMINOPHEN 500 MG/5ML
1000 LIQUID (ML) ORAL ONCE
Refills: 0 | Status: COMPLETED | OUTPATIENT
Start: 2025-07-08

## 2025-07-08 RX ORDER — ACETAMINOPHEN 500 MG/5ML
1000 LIQUID (ML) ORAL ONCE
Refills: 0 | Status: COMPLETED | OUTPATIENT
Start: 2025-07-08 | End: 2025-07-08

## 2025-07-08 RX ADMIN — Medication 30 MILLILITER(S): at 11:20

## 2025-07-08 RX ADMIN — KETOROLAC TROMETHAMINE 15 MILLIGRAM(S): 30 INJECTION, SOLUTION INTRAMUSCULAR; INTRAVENOUS at 08:50

## 2025-07-08 RX ADMIN — TAMSULOSIN HYDROCHLORIDE 0.4 MILLIGRAM(S): 0.4 CAPSULE ORAL at 17:24

## 2025-07-08 RX ADMIN — Medication 400 MILLIGRAM(S): at 17:24

## 2025-07-08 RX ADMIN — Medication 400 MILLIGRAM(S): at 05:32

## 2025-07-08 RX ADMIN — NICOTINE POLACRILEX 1 PATCH: 4 GUM, CHEWING ORAL at 13:15

## 2025-07-08 RX ADMIN — KETOROLAC TROMETHAMINE 15 MILLIGRAM(S): 30 INJECTION, SOLUTION INTRAMUSCULAR; INTRAVENOUS at 15:37

## 2025-07-08 RX ADMIN — Medication 1 DOSE(S): at 08:57

## 2025-07-08 RX ADMIN — Medication 4 MILLIGRAM(S): at 06:05

## 2025-07-08 RX ADMIN — SODIUM CHLORIDE 30 MILLILITER(S): 9 INJECTION, SOLUTION INTRAVENOUS at 23:06

## 2025-07-08 RX ADMIN — Medication 40 MILLIEQUIVALENT(S): at 05:32

## 2025-07-08 RX ADMIN — Medication 30 MILLILITER(S): at 18:21

## 2025-07-08 RX ADMIN — NICOTINE POLACRILEX 1 PATCH: 4 GUM, CHEWING ORAL at 19:19

## 2025-07-08 RX ADMIN — TAMSULOSIN HYDROCHLORIDE 0.4 MILLIGRAM(S): 0.4 CAPSULE ORAL at 06:00

## 2025-07-08 RX ADMIN — Medication 1000 MILLIGRAM(S): at 17:33

## 2025-07-08 RX ADMIN — DIAZEPAM 5 MILLIGRAM(S): 5 TABLET ORAL at 21:13

## 2025-07-08 RX ADMIN — LEVALBUTEROL HYDROCHLORIDE 0.63 MILLIGRAM(S): 1.25 SOLUTION RESPIRATORY (INHALATION) at 08:57

## 2025-07-08 RX ADMIN — QUETIAPINE FUMARATE 300 MILLIGRAM(S): 25 TABLET ORAL at 22:19

## 2025-07-08 RX ADMIN — LEVALBUTEROL HYDROCHLORIDE 0.63 MILLIGRAM(S): 1.25 SOLUTION RESPIRATORY (INHALATION) at 21:33

## 2025-07-08 RX ADMIN — KETOROLAC TROMETHAMINE 15 MILLIGRAM(S): 30 INJECTION, SOLUTION INTRAMUSCULAR; INTRAVENOUS at 15:45

## 2025-07-08 RX ADMIN — LAMOTRIGINE 300 MILLIGRAM(S): 150 TABLET ORAL at 22:19

## 2025-07-08 RX ADMIN — KETOROLAC TROMETHAMINE 15 MILLIGRAM(S): 30 INJECTION, SOLUTION INTRAMUSCULAR; INTRAVENOUS at 04:00

## 2025-07-08 RX ADMIN — SODIUM CHLORIDE 30 MILLILITER(S): 9 INJECTION, SOLUTION INTRAVENOUS at 08:54

## 2025-07-08 RX ADMIN — FUROSEMIDE 10 MILLIGRAM(S): 10 INJECTION INTRAMUSCULAR; INTRAVENOUS at 10:33

## 2025-07-08 RX ADMIN — Medication 1 APPLICATION(S): at 05:34

## 2025-07-08 RX ADMIN — POLYETHYLENE GLYCOL 3350 17 GRAM(S): 17 POWDER, FOR SOLUTION ORAL at 21:17

## 2025-07-08 RX ADMIN — LEVALBUTEROL HYDROCHLORIDE 0.63 MILLIGRAM(S): 1.25 SOLUTION RESPIRATORY (INHALATION) at 03:57

## 2025-07-08 RX ADMIN — LIDOCAINE HYDROCHLORIDE 1 PATCH: 20 JELLY TOPICAL at 00:13

## 2025-07-08 RX ADMIN — Medication 30 MILLILITER(S): at 19:30

## 2025-07-08 RX ADMIN — Medication 30 MILLILITER(S): at 08:55

## 2025-07-08 RX ADMIN — NICOTINE POLACRILEX 1 PATCH: 4 GUM, CHEWING ORAL at 13:07

## 2025-07-08 RX ADMIN — Medication 80 MILLIGRAM(S): at 17:28

## 2025-07-08 RX ADMIN — CALCIUM CARBONATE 1 TABLET(S): 750 TABLET ORAL at 21:11

## 2025-07-08 RX ADMIN — METOPROLOL SUCCINATE 25 MILLIGRAM(S): 50 TABLET, EXTENDED RELEASE ORAL at 06:25

## 2025-07-08 RX ADMIN — HEPARIN SODIUM 5000 UNIT(S): 1000 INJECTION INTRAVENOUS; SUBCUTANEOUS at 05:33

## 2025-07-08 RX ADMIN — METOPROLOL SUCCINATE 25 MILLIGRAM(S): 50 TABLET, EXTENDED RELEASE ORAL at 17:24

## 2025-07-08 RX ADMIN — Medication 40 MILLIGRAM(S): at 05:33

## 2025-07-08 RX ADMIN — Medication 80 MILLIGRAM(S): at 08:51

## 2025-07-08 RX ADMIN — KETOROLAC TROMETHAMINE 15 MILLIGRAM(S): 30 INJECTION, SOLUTION INTRAMUSCULAR; INTRAVENOUS at 03:38

## 2025-07-08 RX ADMIN — KETOROLAC TROMETHAMINE 15 MILLIGRAM(S): 30 INJECTION, SOLUTION INTRAMUSCULAR; INTRAVENOUS at 21:43

## 2025-07-08 RX ADMIN — Medication 4 MILLILITER(S): at 21:33

## 2025-07-08 RX ADMIN — Medication 25 GRAM(S): at 05:32

## 2025-07-08 RX ADMIN — LIDOCAINE HYDROCHLORIDE 1 PATCH: 20 JELLY TOPICAL at 13:08

## 2025-07-08 RX ADMIN — HEPARIN SODIUM 5000 UNIT(S): 1000 INJECTION INTRAVENOUS; SUBCUTANEOUS at 13:07

## 2025-07-08 RX ADMIN — LAMOTRIGINE 300 MILLIGRAM(S): 150 TABLET ORAL at 08:57

## 2025-07-08 RX ADMIN — LIDOCAINE HYDROCHLORIDE 1 PATCH: 20 JELLY TOPICAL at 19:19

## 2025-07-08 RX ADMIN — KETOROLAC TROMETHAMINE 15 MILLIGRAM(S): 30 INJECTION, SOLUTION INTRAMUSCULAR; INTRAVENOUS at 21:13

## 2025-07-08 RX ADMIN — NICOTINE POLACRILEX 1 PATCH: 4 GUM, CHEWING ORAL at 07:14

## 2025-07-08 RX ADMIN — HEPARIN SODIUM 5000 UNIT(S): 1000 INJECTION INTRAVENOUS; SUBCUTANEOUS at 21:17

## 2025-07-08 RX ADMIN — Medication 4 MILLILITER(S): at 08:57

## 2025-07-08 RX ADMIN — Medication 2 TABLET(S): at 21:15

## 2025-07-08 RX ADMIN — LEVALBUTEROL HYDROCHLORIDE 0.63 MILLIGRAM(S): 1.25 SOLUTION RESPIRATORY (INHALATION) at 15:30

## 2025-07-08 RX ADMIN — KETOROLAC TROMETHAMINE 15 MILLIGRAM(S): 30 INJECTION, SOLUTION INTRAMUSCULAR; INTRAVENOUS at 09:54

## 2025-07-08 RX ADMIN — DIAZEPAM 5 MILLIGRAM(S): 5 TABLET ORAL at 10:51

## 2025-07-08 RX ADMIN — Medication 40 MILLIGRAM(S): at 23:07

## 2025-07-08 NOTE — SBIRT NOTE ADULT - NSSBIRTALCPOSREINDET_GEN_A_CORE
SW provided positive reinforcement to pt, encouraging her to continue exercising good judgement and decision-making with regards to alcohol consumption.

## 2025-07-08 NOTE — PROGRESS NOTE ADULT - SUBJECTIVE AND OBJECTIVE BOX
CHIEF COMPLAINT: FOLLOW UP IN ICU FOR POSTOPERATIVE CARE OF PATIENT WHO IS S/P RUL lobectomy      PROCEDURES:     Flexible bronchoscopy RVATS RULobectomy MLND. 07-Jul-2025    ISSUES:     Lung nodule  Postoperative pain  Chest tube in place  COPD  Bipolar disorder  Anxiety/Depression  Complex regional pain syndrome      INTERVAL EVENTS:     Chest tube placed on water seal, forced expiratory air leak.      HISTORY:   Patient reports moderate pain at chest wall incision sites which is worse with coughing and deep breathing without associated fever or dyspnea. Pain is improved with use of pain meds.       PHYSICAL EXAM:   Gen: Comfortable, No acute distress  Eyes: Sclera white, Conjunctiva normal, Eyelids normal, Pupils symmetrical   ENT: Mucous membranes moist,  ,  ,    Neck: Trachea midline,  ,  ,  ,  ,  ,    CV: Rate regular, Rhythm regular,  ,  ,    Resp: Breath sounds clear, No accessory muscles use, R chest tube in place,  ,    Abd: Soft, Non-distended, Non-tender, Bowel sounds normal,  ,  ,    Skin: Warm, No peripheral edema of lower extremities,  ,    : Castro  Neuro: Moving all 4 extremities,    Psych: A&Ox3      ASSESSMENT AND PLAN:     NEURO:  Post-operative Pain - Stable. Pain control with PCA and Tylenol IV PRN.            RESPIRATORY:  Hypoxia - Wean nasal cannula for goal O2sat above 92. Obtain CXR. Incentive spirometry. Chest PT and frequent suctioning. Continue bronchodilators. OOB to chair & ambulate w/ assistance. Continuous pulse oximetry for support & to prevent decompensation.  Chest tube – Pleurevac regulated water seal. Monitor chest tube output.    COPD - continue Advair, Xopenox             CARDIOVASCULAR:  Hemodynamically stable - Not on pressors. Continue hemodynamic monitoring.  Telemetry (medical test) - Reviewed by me today independently. Normal sinus rhythm.  HTN - Restarted lopressor 25mg POq 12h            RENAL:  Stable - Monitor IOs and electrolytes. Keep K above 4.0 and Mg above 2.0. D/c Castro.            GASTROINTESTINAL:  GI prophylaxis not indicated  Zofran and Reglan IV PRN for nausea  Regular consistency diet               HEMATOLOGIC:  No signs of active bleeding. Monitor Hgb in CBC in AM  DVT prophylaxis with heparin subQ and SCDs.               INFECTIOUS DISEASE:  All surgical sites appear clean. No signs of active infection. Will monitor for fever and leukocytosis.                ENDOCRINE:  Stable – Monitor glucose fingersticks for goal 120-180.           ONCOLOGY:  Lung nodule - Improved. S/P resection. Follow up final pathology.      Pertinent clinical, laboratory, radiographic, hemodynamic, echocardiographic, respiratory data, microbiologic data and chart were reviewed by myself and analyzed frequently throughout the course of the day and night by myself.    Plan discussed at length with the CTICU staff and Attending CT Surgeon -   Dr Jairon Oneill    Patient's status was discussed with patient at bedside.    I have spent 50 minutes of time with this patient monitoring hemodynamic status, respiratory status, and coordinating care in the ICU.    _________________________  VITAL SIGNS:  Vital Signs Last 24 Hrs  T(C): 36.7 (08 Jul 2025 12:00), Max: 37 (08 Jul 2025 08:00)  T(F): 98 (08 Jul 2025 12:00), Max: 98.6 (08 Jul 2025 08:00)  HR: 92 (08 Jul 2025 12:00) (82 - 99)  BP: 101/65 (08 Jul 2025 12:00) (101/65 - 149/116)  BP(mean): 77 (08 Jul 2025 12:00) (77 - 127)  RR: 20 (08 Jul 2025 12:00) (8 - 22)  SpO2: 97% (08 Jul 2025 12:00) (93% - 100%)    Parameters below as of 08 Jul 2025 13:00  Patient On (Oxygen Delivery Method): room air      I/Os:   I&O's Detail    07 Jul 2025 07:01  -  08 Jul 2025 07:00  --------------------------------------------------------  IN:    IV PiggyBack: 500 mL    Lactated Ringers: 600 mL    Oral Fluid: 1060 mL  Total IN: 2160 mL    OUT:    Chest Tube (mL): 260 mL    Indwelling Catheter - Urethral (mL): 1625 mL  Total OUT: 1885 mL    Total NET: 275 mL      08 Jul 2025 07:01  -  08 Jul 2025 13:09  --------------------------------------------------------  IN:    Lactated Ringers: 120 mL    Oral Fluid: 480 mL  Total IN: 600 mL    OUT:    Chest Tube (mL): 80 mL    Indwelling Catheter - Urethral (mL): 850 mL  Total OUT: 930 mL    Total NET: -330 mL              MEDICATIONS:  MEDICATIONS  (STANDING):  chlorhexidine 2% Cloths 1 Application(s) Topical daily  diazepam    Tablet 5 milliGRAM(s) Oral every 8 hours  fluticasone propionate/ salmeterol 250-50 MICROgram(s) Diskus 1 Dose(s) Inhalation <User Schedule>  heparin   Injectable 5000 Unit(s) SubCutaneous every 8 hours  HYDROmorphone PCA (1 mG/mL) 30 milliLiter(s) PCA Continuous PCA Continuous  ketorolac   Injectable 15 milliGRAM(s) IV Push every 6 hours  lamoTRIgine 300 milliGRAM(s) Oral two times a day  levalbuterol Inhalation 0.63 milliGRAM(s) Inhalation every 6 hours  lidocaine   4% Patch 1 Patch Transdermal daily  metoprolol tartrate 25 milliGRAM(s) Oral every 12 hours  nicotine - 21 mG/24Hr(s) Patch 1 Patch Transdermal daily  pantoprazole    Tablet 40 milliGRAM(s) Oral before breakfast  polyethylene glycol 3350 17 Gram(s) Oral at bedtime  QUEtiapine 300 milliGRAM(s) Oral at bedtime  senna 2 Tablet(s) Oral at bedtime  sodium chloride 3%  Inhalation 4 milliLiter(s) Inhalation every 12 hours    MEDICATIONS  (PRN):  HYDROmorphone PCA (1 mG/mL) Rescue Clinician Bolus 0.5 milliGRAM(s) IV Push every 15 minutes PRN for Pain Scale GREATER THAN 6  naloxone Injectable 0.1 milliGRAM(s) IV Push every 3 minutes PRN For ANY of the following changes in patient status:  A. RR LESS THAN 10 breaths per minute, B. Oxygen saturation LESS THAN 90%, C. Sedation score of 6  ondansetron Injectable 4 milliGRAM(s) IV Push every 6 hours PRN Nausea  simethicone 80 milliGRAM(s) Chew two times a day PRN Gas      LABS:  Laboratory data was independently reviewed by me today.                           12.7   9.53  )-----------( 309      ( 08 Jul 2025 03:25 )             36.5     07-08    138  |  105  |  8   ----------------------------<  128[H]  3.3[L]   |  20[L]  |  0.62    Ca    8.6      08 Jul 2025 03:25  Phos  3.3     07-08  Mg     1.80     07-08            Urinalysis Basic - ( 08 Jul 2025 03:25 )    Color: x / Appearance: x / SG: x / pH: x  Gluc: 128 mg/dL / Ketone: x  / Bili: x / Urobili: x   Blood: x / Protein: x / Nitrite: x   Leuk Esterase: x / RBC: x / WBC x   Sq Epi: x / Non Sq Epi: x / Bacteria: x        RADIOLOGY:   Radiology images were independently reviewed by me today. Reports were reviewed by me today.    Xray Chest 1 View- PORTABLE-Urgent:   ACC: [id number] EXAM:  XR CHEST PORTABLE URGENT 1V   ORDERED BY: ÓSCAR DUFF     PROCEDURE DATE:  07/08/2025          INTERPRETATION:  CLINICAL INFORMATION: Chest tube to waterseal.    TIME OF EXAMINATION: July 8 at 12:24 PM    EXAM: Portable chest    FINDINGS:  Clear lungs with right chest tube in place.  No effusions or pneumothorax post waterseal.  The heart size is normal.        COMPARISON: July 8 at 4:51 AM        IMPRESSION: Status post chest tube to waterseal.    --- End of Report ---            MICHELLE DE MD; Attending Radiologist  This document has been electronically signed. Jul 8 2025 12:45PM (07-08-25 @ 12:37)  Xray Chest 1 View AP/PA:   ACC: [id number] EXAM:  XR CHEST AP OR PA 1V   ORDERED BY: ULISES SANTIAGO     PROCEDURE DATE:  07/08/2025          INTERPRETATION:  CLINICAL INFORMATION: Follow-up post thoracotomy.    TIME OF EXAMINATION: July 8, 2025 at 4:51 AM    EXAM: Portable chest    FINDINGS:    Right-sided chest tube with tip over the apex unchanged.  The lungs are clear and the heart is not enlarged.  Tiny right apical pneumothorax.  No effusions.        COMPARISON: July 7        IMPRESSION: Status post right thoracotomy with chest tube and clear lungs.    --- End of Report ---            MICHELLE DE MD; Attending Radiologist  This document has been electronically signed. Jul 8 2025 10:29AM (07-08-25 @ 05:22)  Xray Chest 1 View- PORTABLE-Urgent:   ACC: [id number] EXAM:  XR CHEST PORTABLE URGENT 1V   ORDERED BY: ULISES SANTIAGO     PROCEDURE DATE:  07/07/2025          INTERPRETATION:  EXAMINATION: XR CHEST URGENT    CLINICAL INDICATION: s/p thoracic surgery    TECHNIQUE: Single frontal, portable view of the chest was obtained.    COMPARISON: Chest x-ray 10/2/2023 and CT chest 5/23/2025.    FINDINGS:  Right-sided chest tube.  Chain sutures in the right upper lobe.  The heart is normal in size.  Hazy airspace opacities overlying the medial aspect of the right upper   lung zone.  There is no pneumothorax or pleural effusion.    IMPRESSION:  Status post thoracic surgery with a right-sided chest tube. No   pneumothorax.    --- End of Report ---          MAYUR MARQUEZ MD; Resident Radiologist  This document has been electronically signed.  DIONTE FLORES MD; Attending Radiologist  This document has been electronically signed. Jul 7 2025  2:34PM (07-07-25 @ 13:19)

## 2025-07-08 NOTE — PROGRESS NOTE ADULT - SUBJECTIVE AND OBJECTIVE BOX
Anesthesia Pain Management Service    SUBJECTIVE: Patient is doing well with IV PCA and no significant problems reported. Reports having CRPS on right ankle/foot and has been taking Oxycodone 10mg TID since 2021. She states she was on Lyrica in the past but had difficulties with withdrawal and spinal stimulator complications. Patient changed pain management provider to Dr. Brandon Corea and there is no active spinal stimulator, but she would like to try again in the future. She also vapes but quit prior to surgery. Uses marijuana and edibles but does not find them to be helpful.    Pain Scale Score	At rest: ___ 	With Activity: ___ 	[X ] Refer to charted pain scores    THERAPY:    [ ] IV PCA Morphine		[ ] 5 mg/mL	[ ] 1 mg/mL  [X ] IV PCA Hydromorphone	[ ] 5 mg/mL	[X ] 1 mg/mL  [ ] IV PCA Fentanyl		[ ] 50 micrograms/mL    Demand dose __0.4_ lockout __6_ (minutes) Continuous Rate _0__ Total: __21.4_  mg used (in past 24 hours)      MEDICATIONS  (STANDING):  chlorhexidine 2% Cloths 1 Application(s) Topical daily  diazepam    Tablet 5 milliGRAM(s) Oral every 8 hours  fluticasone propionate/ salmeterol 250-50 MICROgram(s) Diskus 1 Dose(s) Inhalation <User Schedule>  heparin   Injectable 5000 Unit(s) SubCutaneous every 8 hours  HYDROmorphone PCA (1 mG/mL) 30 milliLiter(s) PCA Continuous PCA Continuous  ketorolac   Injectable 15 milliGRAM(s) IV Push every 6 hours  lactated ringers. 1000 milliLiter(s) (30 mL/Hr) IV Continuous <Continuous>  lamoTRIgine 300 milliGRAM(s) Oral two times a day  levalbuterol Inhalation 0.63 milliGRAM(s) Inhalation every 6 hours  lidocaine   4% Patch 1 Patch Transdermal daily  metoprolol tartrate 25 milliGRAM(s) Oral every 12 hours  nicotine - 21 mG/24Hr(s) Patch 1 Patch Transdermal daily  pantoprazole    Tablet 40 milliGRAM(s) Oral before breakfast  polyethylene glycol 3350 17 Gram(s) Oral at bedtime  QUEtiapine 300 milliGRAM(s) Oral at bedtime  senna 2 Tablet(s) Oral at bedtime  sodium chloride 3%  Inhalation 4 milliLiter(s) Inhalation every 12 hours    MEDICATIONS  (PRN):  HYDROmorphone PCA (1 mG/mL) Rescue Clinician Bolus 0.5 milliGRAM(s) IV Push every 15 minutes PRN for Pain Scale GREATER THAN 6  naloxone Injectable 0.1 milliGRAM(s) IV Push every 3 minutes PRN For ANY of the following changes in patient status:  A. RR LESS THAN 10 breaths per minute, B. Oxygen saturation LESS THAN 90%, C. Sedation score of 6  ondansetron Injectable 4 milliGRAM(s) IV Push every 6 hours PRN Nausea  simethicone 80 milliGRAM(s) Chew two times a day PRN Gas      OBJECTIVE: Patient sitting in bed, CTx1  Sedation Score:	[ X] Alert	[ ] Drowsy 	[ ] Arousable	[ ] Asleep	[ ] Unresponsive    Side Effects:	[X ] None	[ ] Nausea	[ ] Vomiting	[ ] Pruritus  		[ ] Other:    Vital Signs Last 24 Hrs  T(C): 37 (08 Jul 2025 08:00), Max: 37 (08 Jul 2025 08:00)  T(F): 98.6 (08 Jul 2025 08:00), Max: 98.6 (08 Jul 2025 08:00)  HR: 89 (08 Jul 2025 11:00) (82 - 99)  BP: 121/76 (08 Jul 2025 11:00) (104/85 - 153/87)  BP(mean): 89 (08 Jul 2025 11:00) (85 - 127)  RR: 12 (08 Jul 2025 11:00) (8 - 22)  SpO2: 97% (08 Jul 2025 11:00) (93% - 100%)    Parameters below as of 08 Jul 2025 12:00  Patient On (Oxygen Delivery Method): room air        ASSESSMENT/ PLAN    Therapy to  be:	[ X] Continue   [ ] Discontinued   [ ] Change to prn Analgesics    Documentation and Verification of current medications:   [X] Done	[ ] Not done, not elligible    Comments: Discussed with CTICU, will continue PCA for today and likely to d/c tomorrow. Demand dose to 0.35mg, 4hr limit to 6mg. Diazepam to standing. Patient refusing to take Gabapentin. Recommend non-opioid adjuvant analgesics to be used when possible and when allowed by primary surgical team.  [place]  [place]ed.   Diazepam 5mg tablet. Quantity 90 x 30 days. Last dispensed 06/27/2025  Oxycodone 10mg tablet. Quantity 90 x 30 days. Last dispensed 06/27/2025    Progress Note written now but Patient was seen earlier.

## 2025-07-08 NOTE — SBIRT NOTE ADULT - NSSBIRTDRGBRIEFINTDET_GEN_A_CORE
SW and pt discussed pt's use of marijuana. Pt expressed that, while she was utilizing marijuana recreationally in the past, she is now intending to cease her use of it in order to protect her physical health. SW actively listened and provided pt with positive reinforcement.

## 2025-07-09 LAB
ANION GAP SERPL CALC-SCNC: 10 MMOL/L — SIGNIFICANT CHANGE UP (ref 7–14)
BUN SERPL-MCNC: 7 MG/DL — SIGNIFICANT CHANGE UP (ref 7–23)
CALCIUM SERPL-MCNC: 9.2 MG/DL — SIGNIFICANT CHANGE UP (ref 8.4–10.5)
CHLORIDE SERPL-SCNC: 105 MMOL/L — SIGNIFICANT CHANGE UP (ref 98–107)
CO2 SERPL-SCNC: 22 MMOL/L — SIGNIFICANT CHANGE UP (ref 22–31)
CREAT SERPL-MCNC: 0.62 MG/DL — SIGNIFICANT CHANGE UP (ref 0.5–1.3)
EGFR: 110 ML/MIN/1.73M2 — SIGNIFICANT CHANGE UP
EGFR: 110 ML/MIN/1.73M2 — SIGNIFICANT CHANGE UP
GLUCOSE SERPL-MCNC: 109 MG/DL — HIGH (ref 70–99)
HCT VFR BLD CALC: 34.7 % — SIGNIFICANT CHANGE UP (ref 34.5–45)
HGB BLD-MCNC: 11.7 G/DL — SIGNIFICANT CHANGE UP (ref 11.5–15.5)
MAGNESIUM SERPL-MCNC: 1.9 MG/DL — SIGNIFICANT CHANGE UP (ref 1.6–2.6)
MCHC RBC-ENTMCNC: 30.3 PG — SIGNIFICANT CHANGE UP (ref 27–34)
MCHC RBC-ENTMCNC: 33.7 G/DL — SIGNIFICANT CHANGE UP (ref 32–36)
MCV RBC AUTO: 89.9 FL — SIGNIFICANT CHANGE UP (ref 80–100)
NRBC # BLD AUTO: 0 K/UL — SIGNIFICANT CHANGE UP (ref 0–0)
NRBC # FLD: 0 K/UL — SIGNIFICANT CHANGE UP (ref 0–0)
NRBC BLD AUTO-RTO: 0 /100 WBCS — SIGNIFICANT CHANGE UP (ref 0–0)
PHOSPHATE SERPL-MCNC: 3 MG/DL — SIGNIFICANT CHANGE UP (ref 2.5–4.5)
PLATELET # BLD AUTO: 282 K/UL — SIGNIFICANT CHANGE UP (ref 150–400)
PMV BLD: 9.2 FL — SIGNIFICANT CHANGE UP (ref 7–13)
POTASSIUM SERPL-MCNC: 4.5 MMOL/L — SIGNIFICANT CHANGE UP (ref 3.5–5.3)
POTASSIUM SERPL-SCNC: 4.5 MMOL/L — SIGNIFICANT CHANGE UP (ref 3.5–5.3)
RBC # BLD: 3.86 M/UL — SIGNIFICANT CHANGE UP (ref 3.8–5.2)
RBC # FLD: 12 % — SIGNIFICANT CHANGE UP (ref 10.3–14.5)
SODIUM SERPL-SCNC: 137 MMOL/L — SIGNIFICANT CHANGE UP (ref 135–145)
WBC # BLD: 6.37 K/UL — SIGNIFICANT CHANGE UP (ref 3.8–10.5)
WBC # FLD AUTO: 6.37 K/UL — SIGNIFICANT CHANGE UP (ref 3.8–10.5)

## 2025-07-09 PROCEDURE — 71045 X-RAY EXAM CHEST 1 VIEW: CPT | Mod: 26

## 2025-07-09 RX ORDER — KETOROLAC TROMETHAMINE 30 MG/ML
15 INJECTION, SOLUTION INTRAMUSCULAR; INTRAVENOUS EVERY 6 HOURS
Refills: 0 | Status: DISCONTINUED | OUTPATIENT
Start: 2025-07-09 | End: 2025-07-10

## 2025-07-09 RX ORDER — ACETAMINOPHEN 500 MG/5ML
1000 LIQUID (ML) ORAL EVERY 6 HOURS
Refills: 0 | Status: DISCONTINUED | OUTPATIENT
Start: 2025-07-09 | End: 2025-07-10

## 2025-07-09 RX ORDER — HYDROMORPHONE/SOD CHLOR,ISO/PF 2 MG/10 ML
0.5 SYRINGE (ML) INJECTION
Refills: 0 | Status: DISCONTINUED | OUTPATIENT
Start: 2025-07-09 | End: 2025-07-10

## 2025-07-09 RX ORDER — OXYCODONE HYDROCHLORIDE 30 MG/1
15 TABLET ORAL
Refills: 0 | Status: DISCONTINUED | OUTPATIENT
Start: 2025-07-09 | End: 2025-07-09

## 2025-07-09 RX ORDER — OXYCODONE HYDROCHLORIDE 30 MG/1
15 TABLET ORAL
Refills: 0 | Status: DISCONTINUED | OUTPATIENT
Start: 2025-07-09 | End: 2025-07-10

## 2025-07-09 RX ORDER — ACETAMINOPHEN 500 MG/5ML
650 LIQUID (ML) ORAL EVERY 6 HOURS
Refills: 0 | Status: DISCONTINUED | OUTPATIENT
Start: 2025-07-09 | End: 2025-07-09

## 2025-07-09 RX ORDER — HYDROMORPHONE/SOD CHLOR,ISO/PF 2 MG/10 ML
1 SYRINGE (ML) INJECTION
Refills: 0 | Status: DISCONTINUED | OUTPATIENT
Start: 2025-07-09 | End: 2025-07-10

## 2025-07-09 RX ADMIN — NICOTINE POLACRILEX 1 PATCH: 4 GUM, CHEWING ORAL at 13:25

## 2025-07-09 RX ADMIN — Medication 2 TABLET(S): at 22:28

## 2025-07-09 RX ADMIN — OXYCODONE HYDROCHLORIDE 15 MILLIGRAM(S): 30 TABLET ORAL at 17:00

## 2025-07-09 RX ADMIN — CALCIUM CARBONATE 1 TABLET(S): 750 TABLET ORAL at 20:00

## 2025-07-09 RX ADMIN — OXYCODONE HYDROCHLORIDE 15 MILLIGRAM(S): 30 TABLET ORAL at 16:29

## 2025-07-09 RX ADMIN — POLYETHYLENE GLYCOL 3350 17 GRAM(S): 17 POWDER, FOR SOLUTION ORAL at 22:28

## 2025-07-09 RX ADMIN — METOPROLOL SUCCINATE 25 MILLIGRAM(S): 50 TABLET, EXTENDED RELEASE ORAL at 18:04

## 2025-07-09 RX ADMIN — CALCIUM CARBONATE 1 TABLET(S): 750 TABLET ORAL at 08:10

## 2025-07-09 RX ADMIN — HEPARIN SODIUM 5000 UNIT(S): 1000 INJECTION INTRAVENOUS; SUBCUTANEOUS at 05:18

## 2025-07-09 RX ADMIN — LEVALBUTEROL HYDROCHLORIDE 0.63 MILLIGRAM(S): 1.25 SOLUTION RESPIRATORY (INHALATION) at 21:28

## 2025-07-09 RX ADMIN — DIAZEPAM 5 MILLIGRAM(S): 5 TABLET ORAL at 13:25

## 2025-07-09 RX ADMIN — NICOTINE POLACRILEX 1 PATCH: 4 GUM, CHEWING ORAL at 14:00

## 2025-07-09 RX ADMIN — OXYCODONE HYDROCHLORIDE 15 MILLIGRAM(S): 30 TABLET ORAL at 19:58

## 2025-07-09 RX ADMIN — Medication 650 MILLIGRAM(S): at 16:29

## 2025-07-09 RX ADMIN — Medication 1000 MILLIGRAM(S): at 05:48

## 2025-07-09 RX ADMIN — LIDOCAINE HYDROCHLORIDE 1 PATCH: 20 JELLY TOPICAL at 12:41

## 2025-07-09 RX ADMIN — Medication 4 MILLILITER(S): at 15:58

## 2025-07-09 RX ADMIN — HEPARIN SODIUM 5000 UNIT(S): 1000 INJECTION INTRAVENOUS; SUBCUTANEOUS at 22:28

## 2025-07-09 RX ADMIN — OXYCODONE HYDROCHLORIDE 15 MILLIGRAM(S): 30 TABLET ORAL at 20:09

## 2025-07-09 RX ADMIN — Medication 40 MILLIGRAM(S): at 22:27

## 2025-07-09 RX ADMIN — LIDOCAINE HYDROCHLORIDE 1 PATCH: 20 JELLY TOPICAL at 01:08

## 2025-07-09 RX ADMIN — KETOROLAC TROMETHAMINE 15 MILLIGRAM(S): 30 INJECTION, SOLUTION INTRAMUSCULAR; INTRAVENOUS at 13:26

## 2025-07-09 RX ADMIN — KETOROLAC TROMETHAMINE 15 MILLIGRAM(S): 30 INJECTION, SOLUTION INTRAMUSCULAR; INTRAVENOUS at 20:09

## 2025-07-09 RX ADMIN — KETOROLAC TROMETHAMINE 15 MILLIGRAM(S): 30 INJECTION, SOLUTION INTRAMUSCULAR; INTRAVENOUS at 19:58

## 2025-07-09 RX ADMIN — Medication 1 DOSE(S): at 08:14

## 2025-07-09 RX ADMIN — DIAZEPAM 5 MILLIGRAM(S): 5 TABLET ORAL at 05:17

## 2025-07-09 RX ADMIN — Medication 400 MILLIGRAM(S): at 00:13

## 2025-07-09 RX ADMIN — LAMOTRIGINE 300 MILLIGRAM(S): 150 TABLET ORAL at 10:34

## 2025-07-09 RX ADMIN — KETOROLAC TROMETHAMINE 15 MILLIGRAM(S): 30 INJECTION, SOLUTION INTRAMUSCULAR; INTRAVENOUS at 04:14

## 2025-07-09 RX ADMIN — LEVALBUTEROL HYDROCHLORIDE 0.63 MILLIGRAM(S): 1.25 SOLUTION RESPIRATORY (INHALATION) at 15:58

## 2025-07-09 RX ADMIN — KETOROLAC TROMETHAMINE 15 MILLIGRAM(S): 30 INJECTION, SOLUTION INTRAMUSCULAR; INTRAVENOUS at 03:44

## 2025-07-09 RX ADMIN — Medication 4 MILLILITER(S): at 21:28

## 2025-07-09 RX ADMIN — LEVALBUTEROL HYDROCHLORIDE 0.63 MILLIGRAM(S): 1.25 SOLUTION RESPIRATORY (INHALATION) at 04:25

## 2025-07-09 RX ADMIN — Medication 650 MILLIGRAM(S): at 10:33

## 2025-07-09 RX ADMIN — OXYCODONE HYDROCHLORIDE 15 MILLIGRAM(S): 30 TABLET ORAL at 23:09

## 2025-07-09 RX ADMIN — HEPARIN SODIUM 5000 UNIT(S): 1000 INJECTION INTRAVENOUS; SUBCUTANEOUS at 13:26

## 2025-07-09 RX ADMIN — NICOTINE POLACRILEX 1 PATCH: 4 GUM, CHEWING ORAL at 19:09

## 2025-07-09 RX ADMIN — OXYCODONE HYDROCHLORIDE 15 MILLIGRAM(S): 30 TABLET ORAL at 22:27

## 2025-07-09 RX ADMIN — Medication 40 MILLIGRAM(S): at 05:17

## 2025-07-09 RX ADMIN — DIAZEPAM 5 MILLIGRAM(S): 5 TABLET ORAL at 22:27

## 2025-07-09 RX ADMIN — LAMOTRIGINE 300 MILLIGRAM(S): 150 TABLET ORAL at 22:28

## 2025-07-09 RX ADMIN — METOPROLOL SUCCINATE 25 MILLIGRAM(S): 50 TABLET, EXTENDED RELEASE ORAL at 05:17

## 2025-07-09 RX ADMIN — LIDOCAINE HYDROCHLORIDE 1 PATCH: 20 JELLY TOPICAL at 19:38

## 2025-07-09 RX ADMIN — Medication 400 MILLIGRAM(S): at 22:28

## 2025-07-09 RX ADMIN — NICOTINE POLACRILEX 1 PATCH: 4 GUM, CHEWING ORAL at 07:34

## 2025-07-09 RX ADMIN — Medication 30 MILLILITER(S): at 07:18

## 2025-07-09 RX ADMIN — Medication 40 MILLIGRAM(S): at 10:33

## 2025-07-09 RX ADMIN — Medication 1000 MILLIGRAM(S): at 00:43

## 2025-07-09 RX ADMIN — Medication 80 MILLIGRAM(S): at 20:00

## 2025-07-09 RX ADMIN — Medication 400 MILLIGRAM(S): at 05:18

## 2025-07-09 RX ADMIN — QUETIAPINE FUMARATE 300 MILLIGRAM(S): 25 TABLET ORAL at 22:27

## 2025-07-09 NOTE — PROGRESS NOTE ADULT - SUBJECTIVE AND OBJECTIVE BOX
Anesthesia Pain Management Service    SUBJECTIVE: Patient is doing well with IV PCA and no significant problems reported.  Pt reports 7/10 pain from chest tube site.  PCA helps when used, but pain relief lasts about 6 min.  Tolerating regular diet.     Pain Scale Score	At rest: ___ 	With Activity: ___ 	[X ] Refer to charted pain scores    THERAPY:    [ ] IV PCA Morphine		[ ] 5 mg/mL	[ ] 1 mg/mL  [X ] IV PCA Hydromorphone	[ ] 5 mg/mL	[X ] 1 mg/mL  [ ] IV PCA Fentanyl		[ ] 50 micrograms/mL    Demand dose __0.2_ lockout __6_ (minutes) Continuous Rate _0__ Total: __18.1_   mg used (in past 24 hrs)      MEDICATIONS  (STANDING):  acetaminophen     Tablet .. 650 milliGRAM(s) Oral every 6 hours  chlorhexidine 2% Cloths 1 Application(s) Topical daily  diazepam    Tablet 5 milliGRAM(s) Oral every 8 hours  famotidine    Tablet 40 milliGRAM(s) Oral two times a day  fluticasone propionate/ salmeterol 250-50 MICROgram(s) Diskus 1 Dose(s) Inhalation <User Schedule>  heparin   Injectable 5000 Unit(s) SubCutaneous every 8 hours  ketorolac   Injectable 15 milliGRAM(s) IV Push every 6 hours  lamoTRIgine 300 milliGRAM(s) Oral two times a day  levalbuterol Inhalation 0.63 milliGRAM(s) Inhalation every 6 hours  lidocaine   4% Patch 1 Patch Transdermal daily  metoprolol tartrate 25 milliGRAM(s) Oral every 12 hours  nicotine - 21 mG/24Hr(s) Patch 1 Patch Transdermal daily  pantoprazole    Tablet 40 milliGRAM(s) Oral before breakfast  polyethylene glycol 3350 17 Gram(s) Oral at bedtime  QUEtiapine 300 milliGRAM(s) Oral at bedtime  senna 2 Tablet(s) Oral at bedtime  sodium chloride 3%  Inhalation 4 milliLiter(s) Inhalation every 12 hours    MEDICATIONS  (PRN):  calcium carbonate    500 mG (Tums) Chewable 1 Tablet(s) Chew every 6 hours PRN Heartburn  HYDROmorphone  Injectable 0.5 milliGRAM(s) IV Push every 3 hours PRN Moderate breakthrough Pain (4 - 6)  HYDROmorphone  Injectable 1 milliGRAM(s) IV Push every 3 hours PRN Severe breakthrough Pain (7 - 10)  naloxone Injectable 0.1 milliGRAM(s) IV Push every 3 minutes PRN For ANY of the following changes in patient status:  A. RR LESS THAN 10 breaths per minute, B. Oxygen saturation LESS THAN 90%, C. Sedation score of 6  ondansetron Injectable 4 milliGRAM(s) IV Push every 6 hours PRN Nausea  oxyCODONE    IR 15 milliGRAM(s) Oral every 3 hours PRN Moderate to Severe Pain (4 - 10)  simethicone 80 milliGRAM(s) Chew two times a day PRN Gas      OBJECTIVE:  Pt seen sitting up in chair, CT X 1    Sedation Score:	[ X] Alert	[ ] Drowsy 	[ ] Arousable	[ ] Asleep	[ ] Unresponsive    Side Effects:	[X ] None	[ ] Nausea	[ ] Vomiting	[ ] Pruritus  		[ ] Other:    Vital Signs Last 24 Hrs  T(C): 36.3 (09 Jul 2025 12:20), Max: 37 (09 Jul 2025 04:25)  T(F): 97.4 (09 Jul 2025 12:20), Max: 98.6 (09 Jul 2025 04:25)  HR: 92 (09 Jul 2025 12:20) (92 - 117)  BP: 95/56 (09 Jul 2025 12:20) (95/56 - 137/97)  BP(mean): 89 (08 Jul 2025 18:00) (73 - 111)  RR: 18 (09 Jul 2025 12:20) (11 - 19)  SpO2: 98% (09 Jul 2025 12:20) (96% - 100%)    Parameters below as of 09 Jul 2025 12:20  Patient On (Oxygen Delivery Method): room air        ASSESSMENT/ PLAN    Therapy to  be:	[ ] Continue   [ X] Discontinued   [X ] Change to prn Analgesics    Documentation and Verification of current medications:   [X] Done	[ ] Not done, not elligible    Comments: IV PCA discontinued.  Will transition to to PRN Oral/IV opioids and/or Adjuvant non-opioid medications.  Primary team aware and agrees with plan.  Progress Note written now but Patient was seen earlier.

## 2025-07-09 NOTE — PROGRESS NOTE ADULT - SUBJECTIVE AND OBJECTIVE BOX
Subjective:   Patient seen and examined by thoracic surgery team.  Case discussed and imaging reviewed with Dr Oneill.  No acute complaints. Reports some pain around surgical site worse with movement and improvement with pain medication.  Denies CP, SOB, N/V.  Gates placed in CTICU, removed at midnight, voided without complications.  OOB ab anurag, using incentive spirometer.    Physical Exam:  General: WN/WD NAD  Neurology: Awake, nonfocal, ACOSTA x 4  Eyes: Scleras clear, PERRLA/ EOMI, Gross vision intact  ENT: Gross hearing intact, grossly patent pharynx, no stridor  Neck: Neck supple, trachea midline  Respiratory: CTA B/L, No wheezing, rales, rhonchi  CV: RRR  Abdominal: Soft, NT, ND  Extremities: No edema, + peripheral pulses  Skin: No Rashes, Hematoma, Ecchymosis  Lymphatic: No Neck, axilla LAD  Psych: Oriented x 3, normal affect  Incisions: dressing c/d/i  Tubes: R CT to WS, no AL, SS output 150cc/24hr    I&O's Summary    2025 07:01  -  2025 07:00  --------------------------------------------------------  IN: 2550 mL / OUT: 4425 mL / NET: -1875 mL        Vitals:  Vital Signs Last 24 Hrs  T(C): 37 (2025 04:25), Max: 37 (2025 08:00)  T(F): 98.6 (2025 04:25), Max: 98.6 (2025 08:00)  HR: 98 (2025 04:25) (82 - 117)  BP: 104/60 (2025 04:25) (95/62 - 137/97)  BP(mean): 89 (2025 18:00) (73 - 111)  RR: 18 (2025 04:25) (10 - 20)  SpO2: 97% (2025 04:25) (95% - 100%)    Parameters below as of 2025 04:25  Patient On (Oxygen Delivery Method): room air    Labs:                        11.7   6.37  )-----------( 282      ( 2025 05:28 )             34.7     07-09    137  |  105  |  7   ----------------------------<  109[H]  4.5   |  22  |  0.62    Ca    9.2      2025 05:28  Phos  3.0     07-09  Mg     1.90           Medications:  MEDICATIONS  (STANDING):  acetaminophen   IVPB .. 1000 milliGRAM(s) IV Intermittent every 6 hours  chlorhexidine 2% Cloths 1 Application(s) Topical daily  diazepam    Tablet 5 milliGRAM(s) Oral every 8 hours  famotidine    Tablet 40 milliGRAM(s) Oral two times a day  fluticasone propionate/ salmeterol 250-50 MICROgram(s) Diskus 1 Dose(s) Inhalation <User Schedule>  heparin   Injectable 5000 Unit(s) SubCutaneous every 8 hours  HYDROmorphone PCA (1 mG/mL) 30 milliLiter(s) PCA Continuous PCA Continuous  lactated ringers. 1000 milliLiter(s) (30 mL/Hr) IV Continuous <Continuous>  lamoTRIgine 300 milliGRAM(s) Oral two times a day  levalbuterol Inhalation 0.63 milliGRAM(s) Inhalation every 6 hours  lidocaine   4% Patch 1 Patch Transdermal daily  metoprolol tartrate 25 milliGRAM(s) Oral every 12 hours  nicotine - 21 mG/24Hr(s) Patch 1 Patch Transdermal daily  pantoprazole    Tablet 40 milliGRAM(s) Oral before breakfast  polyethylene glycol 3350 17 Gram(s) Oral at bedtime  QUEtiapine 300 milliGRAM(s) Oral at bedtime  senna 2 Tablet(s) Oral at bedtime  sodium chloride 3%  Inhalation 4 milliLiter(s) Inhalation every 12 hours    MEDICATIONS  (PRN):  calcium carbonate    500 mG (Tums) Chewable 1 Tablet(s) Chew every 6 hours PRN Heartburn  HYDROmorphone PCA (1 mG/mL) Rescue Clinician Bolus 0.5 milliGRAM(s) IV Push every 15 minutes PRN for Pain Scale GREATER THAN 6  naloxone Injectable 0.1 milliGRAM(s) IV Push every 3 minutes PRN For ANY of the following changes in patient status:  A. RR LESS THAN 10 breaths per minute, B. Oxygen saturation LESS THAN 90%, C. Sedation score of 6  ondansetron Injectable 4 milliGRAM(s) IV Push every 6 hours PRN Nausea  simethicone 80 milliGRAM(s) Chew two times a day PRN Gas    Allergies:    Celexa (Rash)  Cipro (Short breath)  fluoroquinolone antibiotics (Rash)    PMHx:  Benign Hypertension      Carcinoid Tumor of Ovary, Benign      Ovarian Cyst      Depression with anxiety      Anemia  Vitamin b12 deficiency      PTSD (post-traumatic stress disorder)      GERD (gastroesophageal reflux disease)      Cardiac syndrome X      COPD (chronic obstructive pulmonary disease)      Complex regional pain syndrome I, unspecified      Carcinoid Tumor of Ovary, Benign      Previous  Delivery, Delivered      Tubal Ligation      Status Post Ovarian Cystectomy      S/P ovarian cystectomy  bilateral      Dermoid cyst  ovarian, bilateral      S/P         S/P ORIF (open reduction internal fixation) fracture      S/P tubal ligation      H/O total hysterectomy      S/P foot surgery, right      H/O left wrist surgery      Assessment:  Patient is a 50y Female with PMHx COPD, bipolar disorder, anxiety, depression, CRPS, HTN, GERD found to have RUL nodule on CT scan now s/p Robotic assisted RVATS, RUL wedge completion lobectomy with IP marking.  Post-op course significant for FEAL, no air leak observed on exam this morning. Additionally, gates placed post-op in CTICU for urinary retention. Gates removed at midnight, voided 1L per flowsheets.    Plan:  Neuro: Pain management continued.  Pulm: Encourage coughing, deep breathing and use of incentive spirometry. Daily CXR.   Cardio: Monitor telemetry/alarms.  GI: Tolerating diet. Continue stool softeners as needed.  Renal: Monitor urine output, supplement electrolytes as needed.  Vasc: Heparin SC & SCDs for DVT prophylaxis  Heme: Stable H/H.  ID: Stable.  Therapy: OOB/ambulate ad anurag.  Tubes: Maintain CT for another day. Monitor for air leak. Monitor Chest tube output and record qshift.  Disposition: Aim to D/C to home tomorrow pending CT removal.  Discussed with Cardiothoracic Team at AM rounds and Dr. Oneill.   Subjective:   Patient seen and examined by thoracic surgery team.  Case discussed and imaging reviewed with Dr Oneill.  No acute complaints. Reports some pain around surgical site worse with movement and improvement with pain medication.  Denies CP, SOB, N/V.  Gates placed in CTICU, removed at midnight, voided without complications.  OOB ab anurag, using incentive spirometer.    Physical Exam:  General: WN/WD NAD  Neurology: Awake, nonfocal, ACOSTA x 4  Eyes: Scleras clear, PERRLA/ EOMI, Gross vision intact  ENT: Gross hearing intact, grossly patent pharynx, no stridor  Neck: Neck supple, trachea midline  Respiratory: CTA B/L, No wheezing, rales, rhonchi  CV: RRR  Abdominal: Soft, NT, ND  Extremities: No edema, + peripheral pulses  Skin: No Rashes, Hematoma, Ecchymosis  Lymphatic: No Neck, axilla LAD  Psych: Oriented x 3, normal affect  Incisions: dressing c/d/i  Tubes: R CT to WS, no AL, SS output 150cc/24hr    I&O's Summary    2025 07:01  -  2025 07:00  --------------------------------------------------------  IN: 2550 mL / OUT: 4425 mL / NET: -1875 mL        Vitals:  Vital Signs Last 24 Hrs  T(C): 37 (2025 04:25), Max: 37 (2025 08:00)  T(F): 98.6 (2025 04:25), Max: 98.6 (2025 08:00)  HR: 98 (2025 04:25) (82 - 117)  BP: 104/60 (2025 04:25) (95/62 - 137/97)  BP(mean): 89 (2025 18:00) (73 - 111)  RR: 18 (2025 04:25) (10 - 20)  SpO2: 97% (2025 04:25) (95% - 100%)    Parameters below as of 2025 04:25  Patient On (Oxygen Delivery Method): room air    Labs:                        11.7   6.37  )-----------( 282      ( 2025 05:28 )             34.7     07-09    137  |  105  |  7   ----------------------------<  109[H]  4.5   |  22  |  0.62    Ca    9.2      2025 05:28  Phos  3.0     07-09  Mg     1.90           Medications:  MEDICATIONS  (STANDING):  acetaminophen   IVPB .. 1000 milliGRAM(s) IV Intermittent every 6 hours  chlorhexidine 2% Cloths 1 Application(s) Topical daily  diazepam    Tablet 5 milliGRAM(s) Oral every 8 hours  famotidine    Tablet 40 milliGRAM(s) Oral two times a day  fluticasone propionate/ salmeterol 250-50 MICROgram(s) Diskus 1 Dose(s) Inhalation <User Schedule>  heparin   Injectable 5000 Unit(s) SubCutaneous every 8 hours  HYDROmorphone PCA (1 mG/mL) 30 milliLiter(s) PCA Continuous PCA Continuous  lactated ringers. 1000 milliLiter(s) (30 mL/Hr) IV Continuous <Continuous>  lamoTRIgine 300 milliGRAM(s) Oral two times a day  levalbuterol Inhalation 0.63 milliGRAM(s) Inhalation every 6 hours  lidocaine   4% Patch 1 Patch Transdermal daily  metoprolol tartrate 25 milliGRAM(s) Oral every 12 hours  nicotine - 21 mG/24Hr(s) Patch 1 Patch Transdermal daily  pantoprazole    Tablet 40 milliGRAM(s) Oral before breakfast  polyethylene glycol 3350 17 Gram(s) Oral at bedtime  QUEtiapine 300 milliGRAM(s) Oral at bedtime  senna 2 Tablet(s) Oral at bedtime  sodium chloride 3%  Inhalation 4 milliLiter(s) Inhalation every 12 hours    MEDICATIONS  (PRN):  calcium carbonate    500 mG (Tums) Chewable 1 Tablet(s) Chew every 6 hours PRN Heartburn  HYDROmorphone PCA (1 mG/mL) Rescue Clinician Bolus 0.5 milliGRAM(s) IV Push every 15 minutes PRN for Pain Scale GREATER THAN 6  naloxone Injectable 0.1 milliGRAM(s) IV Push every 3 minutes PRN For ANY of the following changes in patient status:  A. RR LESS THAN 10 breaths per minute, B. Oxygen saturation LESS THAN 90%, C. Sedation score of 6  ondansetron Injectable 4 milliGRAM(s) IV Push every 6 hours PRN Nausea  simethicone 80 milliGRAM(s) Chew two times a day PRN Gas    Allergies:    Celexa (Rash)  Cipro (Short breath)  fluoroquinolone antibiotics (Rash)    PMHx:  Benign Hypertension      Carcinoid Tumor of Ovary, Benign      Ovarian Cyst      Depression with anxiety      Anemia  Vitamin b12 deficiency      PTSD (post-traumatic stress disorder)      GERD (gastroesophageal reflux disease)      Cardiac syndrome X      COPD (chronic obstructive pulmonary disease)      Complex regional pain syndrome I, unspecified      Carcinoid Tumor of Ovary, Benign      Previous  Delivery, Delivered      Tubal Ligation      Status Post Ovarian Cystectomy      S/P ovarian cystectomy  bilateral      Dermoid cyst  ovarian, bilateral      S/P         S/P ORIF (open reduction internal fixation) fracture      S/P tubal ligation      H/O total hysterectomy      S/P foot surgery, right      H/O left wrist surgery      Assessment:  Patient is a 50y Female with PMHx COPD, bipolar disorder, anxiety, depression, CRPS, HTN, GERD found to have RUL nodule on CT scan now s/p Robotic assisted RVATS, RUL wedge completion lobectomy with IP marking.  Post-op course significant for FEAL, no air leak observed on exam this morning. Additionally, gates placed post-op in CTICU for urinary retention. Gates removed at midnight, voided 1L per flowsheets.    Plan:  Neuro: Pain management continued. Will d/c PCA and transition to PO pain meds. Recs per pain management.  Pulm: Encourage coughing, deep breathing and use of incentive spirometry. Daily CXR.   Cardio: Monitor telemetry/alarms.  GI: Tolerating diet. Continue stool softeners as needed.  Renal: Monitor urine output, supplement electrolytes as needed.  Vasc: Heparin SC & SCDs for DVT prophylaxis  Heme: Stable H/H.  ID: Stable.  Therapy: OOB/ambulate ad anurag.  Tubes: Maintain CT for another day. Will clamp CT at 10pm tonight and eval AM CXR. Monitor for air leak. Monitor Chest tube output and record qshift.  Disposition: Aim to D/C to home tomorrow pending CT removal.  Discussed with Cardiothoracic Team at AM rounds and Dr. Oneill.

## 2025-07-10 ENCOUNTER — TRANSCRIPTION ENCOUNTER (OUTPATIENT)
Age: 50
End: 2025-07-10

## 2025-07-10 VITALS
HEART RATE: 94 BPM | OXYGEN SATURATION: 95 % | DIASTOLIC BLOOD PRESSURE: 66 MMHG | RESPIRATION RATE: 18 BRPM | SYSTOLIC BLOOD PRESSURE: 107 MMHG | TEMPERATURE: 99 F

## 2025-07-10 LAB
ANION GAP SERPL CALC-SCNC: 11 MMOL/L — SIGNIFICANT CHANGE UP (ref 7–14)
BUN SERPL-MCNC: 6 MG/DL — LOW (ref 7–23)
CALCIUM SERPL-MCNC: 9.1 MG/DL — SIGNIFICANT CHANGE UP (ref 8.4–10.5)
CHLORIDE SERPL-SCNC: 106 MMOL/L — SIGNIFICANT CHANGE UP (ref 98–107)
CO2 SERPL-SCNC: 22 MMOL/L — SIGNIFICANT CHANGE UP (ref 22–31)
CREAT SERPL-MCNC: 0.61 MG/DL — SIGNIFICANT CHANGE UP (ref 0.5–1.3)
EGFR: 110 ML/MIN/1.73M2 — SIGNIFICANT CHANGE UP
EGFR: 110 ML/MIN/1.73M2 — SIGNIFICANT CHANGE UP
GLUCOSE SERPL-MCNC: 102 MG/DL — HIGH (ref 70–99)
HCT VFR BLD CALC: 32.2 % — LOW (ref 34.5–45)
HGB BLD-MCNC: 10.9 G/DL — LOW (ref 11.5–15.5)
MAGNESIUM SERPL-MCNC: 1.8 MG/DL — SIGNIFICANT CHANGE UP (ref 1.6–2.6)
MCHC RBC-ENTMCNC: 30.2 PG — SIGNIFICANT CHANGE UP (ref 27–34)
MCHC RBC-ENTMCNC: 33.9 G/DL — SIGNIFICANT CHANGE UP (ref 32–36)
MCV RBC AUTO: 89.2 FL — SIGNIFICANT CHANGE UP (ref 80–100)
NRBC # BLD AUTO: 0 K/UL — SIGNIFICANT CHANGE UP (ref 0–0)
NRBC # FLD: 0 K/UL — SIGNIFICANT CHANGE UP (ref 0–0)
NRBC BLD AUTO-RTO: 0 /100 WBCS — SIGNIFICANT CHANGE UP (ref 0–0)
PHOSPHATE SERPL-MCNC: 3.8 MG/DL — SIGNIFICANT CHANGE UP (ref 2.5–4.5)
PLATELET # BLD AUTO: 268 K/UL — SIGNIFICANT CHANGE UP (ref 150–400)
PMV BLD: 9.2 FL — SIGNIFICANT CHANGE UP (ref 7–13)
POTASSIUM SERPL-MCNC: 4.1 MMOL/L — SIGNIFICANT CHANGE UP (ref 3.5–5.3)
POTASSIUM SERPL-SCNC: 4.1 MMOL/L — SIGNIFICANT CHANGE UP (ref 3.5–5.3)
RBC # BLD: 3.61 M/UL — LOW (ref 3.8–5.2)
RBC # FLD: 11.9 % — SIGNIFICANT CHANGE UP (ref 10.3–14.5)
SODIUM SERPL-SCNC: 139 MMOL/L — SIGNIFICANT CHANGE UP (ref 135–145)
WBC # BLD: 4.49 K/UL — SIGNIFICANT CHANGE UP (ref 3.8–10.5)
WBC # FLD AUTO: 4.49 K/UL — SIGNIFICANT CHANGE UP (ref 3.8–10.5)

## 2025-07-10 PROCEDURE — 71045 X-RAY EXAM CHEST 1 VIEW: CPT | Mod: 26

## 2025-07-10 RX ORDER — SENNA 187 MG
2 TABLET ORAL
Qty: 0 | Refills: 0 | DISCHARGE
Start: 2025-07-10

## 2025-07-10 RX ORDER — IBUPROFEN 200 MG
1 TABLET ORAL
Refills: 0 | DISCHARGE

## 2025-07-10 RX ORDER — POLYETHYLENE GLYCOL 3350 17 G/17G
17 POWDER, FOR SOLUTION ORAL
Qty: 0 | Refills: 0 | DISCHARGE
Start: 2025-07-10

## 2025-07-10 RX ORDER — NICOTINE POLACRILEX 4 MG/1
1 GUM, CHEWING ORAL
Qty: 14 | Refills: 1
Start: 2025-07-10 | End: 2025-08-06

## 2025-07-10 RX ORDER — OXYCODONE HYDROCHLORIDE 30 MG/1
15 TABLET ORAL
Refills: 0 | Status: DISCONTINUED | OUTPATIENT
Start: 2025-07-10 | End: 2025-07-10

## 2025-07-10 RX ORDER — OXYCODONE HYDROCHLORIDE 30 MG/1
1 TABLET ORAL
Qty: 42 | Refills: 0
Start: 2025-07-10 | End: 2025-07-23

## 2025-07-10 RX ORDER — OXYCODONE HYDROCHLORIDE 30 MG/1
1 TABLET ORAL
Refills: 0 | DISCHARGE

## 2025-07-10 RX ORDER — OXYCODONE HYDROCHLORIDE 30 MG/1
15 TABLET ORAL EVERY 4 HOURS
Refills: 0 | Status: DISCONTINUED | OUTPATIENT
Start: 2025-07-10 | End: 2025-07-10

## 2025-07-10 RX ORDER — OXYCODONE HYDROCHLORIDE 30 MG/1
10 TABLET ORAL EVERY 4 HOURS
Refills: 0 | Status: DISCONTINUED | OUTPATIENT
Start: 2025-07-10 | End: 2025-07-10

## 2025-07-10 RX ORDER — HYDROMORPHONE/SOD CHLOR,ISO/PF 2 MG/10 ML
0.5 SYRINGE (ML) INJECTION
Refills: 0 | Status: DISCONTINUED | OUTPATIENT
Start: 2025-07-10 | End: 2025-07-10

## 2025-07-10 RX ADMIN — DIAZEPAM 5 MILLIGRAM(S): 5 TABLET ORAL at 14:13

## 2025-07-10 RX ADMIN — LEVALBUTEROL HYDROCHLORIDE 0.63 MILLIGRAM(S): 1.25 SOLUTION RESPIRATORY (INHALATION) at 03:44

## 2025-07-10 RX ADMIN — OXYCODONE HYDROCHLORIDE 15 MILLIGRAM(S): 30 TABLET ORAL at 05:50

## 2025-07-10 RX ADMIN — Medication 1000 MILLIGRAM(S): at 05:50

## 2025-07-10 RX ADMIN — Medication 1000 MILLIGRAM(S): at 11:40

## 2025-07-10 RX ADMIN — OXYCODONE HYDROCHLORIDE 15 MILLIGRAM(S): 30 TABLET ORAL at 13:57

## 2025-07-10 RX ADMIN — METOPROLOL SUCCINATE 25 MILLIGRAM(S): 50 TABLET, EXTENDED RELEASE ORAL at 06:27

## 2025-07-10 RX ADMIN — KETOROLAC TROMETHAMINE 15 MILLIGRAM(S): 30 INJECTION, SOLUTION INTRAMUSCULAR; INTRAVENOUS at 01:26

## 2025-07-10 RX ADMIN — LAMOTRIGINE 300 MILLIGRAM(S): 150 TABLET ORAL at 11:07

## 2025-07-10 RX ADMIN — OXYCODONE HYDROCHLORIDE 15 MILLIGRAM(S): 30 TABLET ORAL at 14:33

## 2025-07-10 RX ADMIN — HEPARIN SODIUM 5000 UNIT(S): 1000 INJECTION INTRAVENOUS; SUBCUTANEOUS at 06:26

## 2025-07-10 RX ADMIN — KETOROLAC TROMETHAMINE 15 MILLIGRAM(S): 30 INJECTION, SOLUTION INTRAMUSCULAR; INTRAVENOUS at 01:46

## 2025-07-10 RX ADMIN — OXYCODONE HYDROCHLORIDE 15 MILLIGRAM(S): 30 TABLET ORAL at 11:40

## 2025-07-10 RX ADMIN — OXYCODONE HYDROCHLORIDE 15 MILLIGRAM(S): 30 TABLET ORAL at 08:30

## 2025-07-10 RX ADMIN — LIDOCAINE HYDROCHLORIDE 1 PATCH: 20 JELLY TOPICAL at 00:09

## 2025-07-10 RX ADMIN — LIDOCAINE HYDROCHLORIDE 1 PATCH: 20 JELLY TOPICAL at 11:09

## 2025-07-10 RX ADMIN — OXYCODONE HYDROCHLORIDE 15 MILLIGRAM(S): 30 TABLET ORAL at 01:47

## 2025-07-10 RX ADMIN — Medication 1 DOSE(S): at 08:01

## 2025-07-10 RX ADMIN — DIAZEPAM 5 MILLIGRAM(S): 5 TABLET ORAL at 06:27

## 2025-07-10 RX ADMIN — OXYCODONE HYDROCHLORIDE 15 MILLIGRAM(S): 30 TABLET ORAL at 01:25

## 2025-07-10 RX ADMIN — LEVALBUTEROL HYDROCHLORIDE 0.63 MILLIGRAM(S): 1.25 SOLUTION RESPIRATORY (INHALATION) at 10:53

## 2025-07-10 RX ADMIN — Medication 1000 MILLIGRAM(S): at 00:09

## 2025-07-10 RX ADMIN — NICOTINE POLACRILEX 1 PATCH: 4 GUM, CHEWING ORAL at 14:16

## 2025-07-10 RX ADMIN — KETOROLAC TROMETHAMINE 15 MILLIGRAM(S): 30 INJECTION, SOLUTION INTRAMUSCULAR; INTRAVENOUS at 08:00

## 2025-07-10 RX ADMIN — OXYCODONE HYDROCHLORIDE 15 MILLIGRAM(S): 30 TABLET ORAL at 07:59

## 2025-07-10 RX ADMIN — Medication 40 MILLIGRAM(S): at 11:06

## 2025-07-10 RX ADMIN — HEPARIN SODIUM 5000 UNIT(S): 1000 INJECTION INTRAVENOUS; SUBCUTANEOUS at 14:17

## 2025-07-10 RX ADMIN — NICOTINE POLACRILEX 1 PATCH: 4 GUM, CHEWING ORAL at 08:00

## 2025-07-10 RX ADMIN — OXYCODONE HYDROCHLORIDE 15 MILLIGRAM(S): 30 TABLET ORAL at 10:59

## 2025-07-10 RX ADMIN — OXYCODONE HYDROCHLORIDE 15 MILLIGRAM(S): 30 TABLET ORAL at 04:59

## 2025-07-10 RX ADMIN — KETOROLAC TROMETHAMINE 15 MILLIGRAM(S): 30 INJECTION, SOLUTION INTRAMUSCULAR; INTRAVENOUS at 08:30

## 2025-07-10 RX ADMIN — Medication 4 MILLILITER(S): at 10:53

## 2025-07-10 RX ADMIN — Medication 400 MILLIGRAM(S): at 04:59

## 2025-07-10 RX ADMIN — Medication 400 MILLIGRAM(S): at 11:09

## 2025-07-10 RX ADMIN — Medication 40 MILLIGRAM(S): at 06:27

## 2025-07-10 NOTE — DISCHARGE NOTE PROVIDER - NSDCMRMEDTOKEN_GEN_ALL_CORE_FT
Albuterol (Eqv-ProAir HFA) 90 mcg/inh inhalation aerosol: 2 inhaled prn as needed for  shortness of breath and/or wheezing  Breo Ellipta 200 mcg-25 mcg/inh inhalation powder: 1 puff(s) inhaled once a day am  diazePAM 5 mg oral tablet: 1 tab(s) orally 3 times a day  ibuprofen 600 mg oral tablet: 1 tab(s) orally prn as needed for  headache  lamoTRIgine 100 mg oral tablet: 3 tab(s) orally 2 times a day  metoprolol succinate 50 mg oral capsule, extended release: 1 cap(s) orally once a day am  naloxone 4 mg/0.1 mL nasal spray: 1 spray(s) intranasally prn as needed for  allergy symptoms  oxyCODONE 10 mg oral tablet: 1 tab(s) orally 3 times a day  pantoprazole 40 mg oral delayed release tablet: 1 tab(s) orally once a day  polyethylene glycol 3350 oral powder for reconstitution: 17 gram(s) orally once a day (at bedtime)  QUEtiapine 300 mg oral tablet: 1 tab(s) orally once a day (at bedtime)  senna leaf extract oral tablet: 2 tab(s) orally once a day (at bedtime)   Albuterol (Eqv-ProAir HFA) 90 mcg/inh inhalation aerosol: 2 inhaled prn as needed for  shortness of breath and/or wheezing  Breo Ellipta 200 mcg-25 mcg/inh inhalation powder: 1 puff(s) inhaled once a day am  diazePAM 5 mg oral tablet: 1 tab(s) orally 3 times a day  lamoTRIgine 100 mg oral tablet: 3 tab(s) orally 2 times a day  metoprolol succinate 50 mg oral capsule, extended release: 1 cap(s) orally once a day am  naloxone 4 mg/0.1 mL nasal spray: 1 spray(s) intranasally prn as needed for  allergy symptoms  nicotine 21 mg/24 hr transdermal film, extended release: 1 patch transdermal once a day  oxyCODONE 10 mg oral tablet: 1 tab(s) orally 3 times a day as needed for  severe pain MDD: 3  pantoprazole 40 mg oral delayed release tablet: 1 tab(s) orally once a day  polyethylene glycol 3350 oral powder for reconstitution: 17 gram(s) orally once a day (at bedtime)  QUEtiapine 300 mg oral tablet: 1 tab(s) orally once a day (at bedtime)  senna leaf extract oral tablet: 2 tab(s) orally once a day (at bedtime)

## 2025-07-10 NOTE — DISCHARGE NOTE PROVIDER - CARE PROVIDERS DIRECT ADDRESSES
[email or web address]

## 2025-07-10 NOTE — DISCHARGE NOTE PROVIDER - HOSPITAL COURSE
Patient is a 50y Female with PMHx COPD, bipolar disorder, anxiety, depression, CRPS, HTN, GERD found to have RUL nodule on CT scan now s/p Robotic assisted RVATS, RUL wedge completion lobectomy with IP marking. Postop course routine. Pt seen and examined by thoracic team and presented to Dr Oneill on day of discharge.     Vital Signs Last 24 Hrs  T(C): 37.1 (10 Jul 2025 12:46), Max: 37.3 (09 Jul 2025 20:18)  T(F): 98.7 (10 Jul 2025 12:46), Max: 99.1 (09 Jul 2025 20:18)  HR: 94 (10 Jul 2025 12:46) (93 - 104)  BP: 107/66 (10 Jul 2025 12:46) (106/61 - 134/76)  BP(mean): --  RR: 18 (10 Jul 2025 12:46) (18 - 18)  SpO2: 95% (10 Jul 2025 12:46) (95% - 100%)    Parameters below as of 10 Jul 2025 12:46  Patient On (Oxygen Delivery Method): room air    PHYSICAL EXAM:  Gen: NAD  Resp: Respirations unlabored. Bilateral chest rise.   Card: RRR.   GI: Soft. Nontender. Nondistended.   Skin: Warm, well perfused, no masses or organomegaly  EXT: No clubbing, cyanosis, or edema  site c,d,i

## 2025-07-10 NOTE — DISCHARGE NOTE NURSING/CASE MANAGEMENT/SOCIAL WORK - PATIENT PORTAL LINK FT
You can access the FollowMyHealth Patient Portal offered by [facility] by registering at the following website: [email or web address]. By joining [facility]’s FollowMyHealth portal, you will also be able to view your health information using other applications (apps) compatible with our system.

## 2025-07-10 NOTE — DISCHARGE NOTE PROVIDER - NSDCCPTREATMENT_GEN_ALL_CORE_FT
PRINCIPAL PROCEDURE  Procedure: Robot-assisted video-assisted thoracoscopic surgery (VATS) for lobectomy  Findings and Treatment:

## 2025-07-10 NOTE — DISCHARGE NOTE PROVIDER - CARE PROVIDER_API CALL
Jairon Oneill  Thoracic and Cardiac Surgery  [id number] [place] [id number]  Phone: [phone]  Fax: [phone]  Follow Up Time:

## 2025-07-10 NOTE — DISCHARGE NOTE NURSING/CASE MANAGEMENT/SOCIAL WORK - NSDCPEEMAIL_GEN_ALL_CORE
[facility] for Tobacco Control email [email or web address]

## 2025-07-10 NOTE — DISCHARGE NOTE NURSING/CASE MANAGEMENT/SOCIAL WORK - FINANCIAL ASSISTANCE
[facility] provides services at a reduced cost to those who are determined to be eligible through [facility]’s financial assistance program. Information regarding [facility]’s financial assistance program can be found by going to [email or web address] or by calling [phone].

## 2025-07-10 NOTE — DISCHARGE NOTE NURSING/CASE MANAGEMENT/SOCIAL WORK - [email or web address]_ALL_CORE
[facility] website --- [email or web address] --- [email or web address]

## 2025-07-10 NOTE — DISCHARGE NOTE PROVIDER - DISCHARGE DATE
Subjective  Patient interviewed and examined.    Completed EGD.  Still no appetite.  Labs are not improving    Objective    Last Recorded Vitals  Blood pressure 137/75, pulse 86, temperature 97.7 °F (36.5 °C), temperature source Temporal, resp. rate 16, height 5' 4.02\" (1.626 m), weight (!) 221.1 kg (487 lb 7 oz), last menstrual period 03/28/2023, SpO2 95%.  Body mass index is 83.63 kg/m².    Physical Exam  Vitals reviewed.   Constitutional:       Appearance: Normal appearance.   HENT:      Head: Normocephalic.      Nose: Nose normal.      Mouth/Throat:      Mouth: Mucous membranes are moist.      Neck: Normal range of motion and neck supple.   Eyes:      Extraocular Movements: Extraocular movements intact.      Conjunctiva/sclera: Conjunctivae normal.   Cardiovascular:      Rate and Rhythm: Normal rate.      Pulses: Normal pulses.      Heart sounds: Normal heart sounds.   Pulmonary:      Effort: Pulmonary effort is normal.      Breath sounds: Normal breath sounds.   Abdominal:      Comments: Epigastric and right upper quadrant tenderness to palpation   Musculoskeletal:         General: Normal range of motion.   Skin:     General: Skin is warm and dry.   Neurological:      General: No focal deficit present.      Mental Status: She is alert and oriented to person, place, and time.   Psychiatric:         Mood and Affect: Mood normal.         Behavior: Behavior normal.         Labs   Recent Labs   Lab 04/14/24  0908 04/13/24  0643 04/12/24  2221 04/12/24  1307 04/12/24  0626   WBC 6.6 7.5  --   --  9.3   HGB 11.6* 11.5*  --   --  11.7*   HCT 37.0 36.1  --   --  36.7   MCV 86.9 85.5  --   --  86.8    246  --   --  258   SODIUM 139 138  --   --  136   POTASSIUM 3.7 3.6 3.8   < > 3.4   CHLORIDE 109 109  --   --  106   CO2 25 22  --   --  24   BUN 3* 3*  --   --  5*   CREATININE 0.75 0.71  --   --  0.88   GLUCOSE 116* 115*  --   --  107*   CALCIUM 9.0 8.8  --   --  9.1   ALBUMIN 2.8* 2.8*  --   --  3.0*   ALKPT 177*  167*  --   --  174*   AST 36 28  --   --  42*   GPT 59 53  --   --  58   BILIRUBIN 4.5* 4.6*  --   --  3.9*    < > = values in this interval not displayed.         Assessment and Plan  Missy is a 34 year old female with a history of morbid obesity, GERD, hypothyroidism who presents with abdominal pain.     RUQ Pain  Abnormal LFTs  Concern for choledocholithiasis.  Ultrasound negative but likely a technically limited examination.  Patient unable to fit in the MRI scanner.  HIDA scan completed, incomplete emptying.  US with normal sized CBD.  EGD with mild gastritis, no etiology found.    -LFTs are obstructive in pattern, I believe ERCP is necessary.  Will await GI input.    -IV opioid therapy is available for severe, breakthrough pain only and the patient will be closely monitoring after any dose.  -Viral Hepatitis panel sent, lower suspicion given LFTs are not hepatocellular pattern     Morbid Obesity  Patient is following with the bariatric clinic     GERD  Pepcid switched to Protonix. EGD with only mild gastritis.       Hypothyroidism  Does not appear to be on Synthroid replacement at this time.  -Check TSH as hypothyroidism has been shown to cause cholestasis.       Anxiety  Appears well-controlled  Continue Wellbutrin    Elevated BP  No diagnosis of HTN  -Monitor, avoid prn medications unless > 180    Nutrition status: Does Not Meet Criteria for Malnutrition     DVT Prophylaxis  SCD    Disposition: 1-2 days          10-Jul-2025

## 2025-07-14 LAB — SURGICAL PATHOLOGY STUDY: SIGNIFICANT CHANGE UP

## 2025-07-22 ENCOUNTER — NON-APPOINTMENT (OUTPATIENT)
Age: 50
End: 2025-07-22

## 2025-07-23 ENCOUNTER — APPOINTMENT (OUTPATIENT)
Dept: THORACIC SURGERY | Facility: CLINIC | Age: 50
End: 2025-07-23
Payer: COMMERCIAL

## 2025-07-23 VITALS
DIASTOLIC BLOOD PRESSURE: 110 MMHG | SYSTOLIC BLOOD PRESSURE: 139 MMHG | HEART RATE: 86 BPM | HEIGHT: 61 IN | OXYGEN SATURATION: 99 % | WEIGHT: 137 LBS | BODY MASS INDEX: 25.86 KG/M2 | RESPIRATION RATE: 17 BRPM

## 2025-07-23 DIAGNOSIS — C80.1 MALIGNANT (PRIMARY) NEOPLASM, UNSPECIFIED: ICD-10-CM

## 2025-07-23 PROCEDURE — 99024 POSTOP FOLLOW-UP VISIT: CPT

## 2025-08-21 ENCOUNTER — NON-APPOINTMENT (OUTPATIENT)
Age: 50
End: 2025-08-21

## 2025-08-26 ENCOUNTER — NON-APPOINTMENT (OUTPATIENT)
Age: 50
End: 2025-08-26

## 2025-08-27 ENCOUNTER — APPOINTMENT (OUTPATIENT)
Dept: THORACIC SURGERY | Facility: CLINIC | Age: 50
End: 2025-08-27

## 2025-08-29 ENCOUNTER — APPOINTMENT (OUTPATIENT)
Dept: OBGYN | Facility: CLINIC | Age: 50
End: 2025-08-29

## 2025-09-03 ENCOUNTER — APPOINTMENT (OUTPATIENT)
Dept: THORACIC SURGERY | Facility: CLINIC | Age: 50
End: 2025-09-03

## 2025-09-03 ENCOUNTER — APPOINTMENT (OUTPATIENT)
Dept: THORACIC SURGERY | Facility: CLINIC | Age: 50
End: 2025-09-03
Payer: COMMERCIAL

## 2025-09-03 VITALS
WEIGHT: 135 LBS | HEIGHT: 61 IN | OXYGEN SATURATION: 98 % | DIASTOLIC BLOOD PRESSURE: 82 MMHG | BODY MASS INDEX: 25.49 KG/M2 | SYSTOLIC BLOOD PRESSURE: 107 MMHG | HEART RATE: 96 BPM

## 2025-09-03 DIAGNOSIS — C80.1 MALIGNANT (PRIMARY) NEOPLASM, UNSPECIFIED: ICD-10-CM

## 2025-09-03 PROCEDURE — 99213 OFFICE O/P EST LOW 20 MIN: CPT | Mod: 24

## 2025-09-16 ENCOUNTER — APPOINTMENT (OUTPATIENT)
Dept: PULMONOLOGY | Facility: CLINIC | Age: 50
End: 2025-09-16
Payer: COMMERCIAL

## 2025-09-16 VITALS
SYSTOLIC BLOOD PRESSURE: 116 MMHG | HEART RATE: 83 BPM | OXYGEN SATURATION: 99 % | DIASTOLIC BLOOD PRESSURE: 76 MMHG | RESPIRATION RATE: 16 BRPM | WEIGHT: 135 LBS | TEMPERATURE: 98.4 F | HEIGHT: 61 IN | BODY MASS INDEX: 25.49 KG/M2

## 2025-09-16 DIAGNOSIS — J30.1 ALLERGIC RHINITIS DUE TO POLLEN: ICD-10-CM

## 2025-09-16 DIAGNOSIS — Z72.0 TOBACCO USE: ICD-10-CM

## 2025-09-16 DIAGNOSIS — C80.1 MALIGNANT (PRIMARY) NEOPLASM, UNSPECIFIED: ICD-10-CM

## 2025-09-16 DIAGNOSIS — J44.89 OTHER SPECIFIED CHRONIC OBSTRUCTIVE PULMONARY DISEASE: ICD-10-CM

## 2025-09-16 DIAGNOSIS — Z23 ENCOUNTER FOR IMMUNIZATION: ICD-10-CM

## 2025-09-16 PROCEDURE — 99214 OFFICE O/P EST MOD 30 MIN: CPT | Mod: 25

## 2025-09-16 PROCEDURE — 90656 IIV3 VACC NO PRSV 0.5 ML IM: CPT

## 2025-09-16 PROCEDURE — G0008: CPT

## 2025-09-16 RX ORDER — FLUTICASONE PROPIONATE 50 UG/1
50 SPRAY NASAL TWICE DAILY
Qty: 1 | Refills: 5 | Status: ACTIVE | COMMUNITY
Start: 2025-09-16 | End: 1900-01-01

## (undated) DEVICE — WARMING BLANKET UPPER ADULT

## (undated) DEVICE — Device

## (undated) DEVICE — POSITIONER FOAM HEAD CRADLE (PINK)

## (undated) DEVICE — SOL INJ NS 0.9% 500ML 2 PORT

## (undated) DEVICE — SUT POLYSORB 4-0 P-12 UNDYED

## (undated) DEVICE — BLADE SURGICAL #15 CARBON

## (undated) DEVICE — TROCAR COVIDIEN VERSASTEP PLUS 15MM STANDARD

## (undated) DEVICE — TUBING STRYKEFLOW II SUCTION / IRRIGATOR

## (undated) DEVICE — XI ARM PERMANENT CAUTERY SPATULA

## (undated) DEVICE — XI ARM CLIP APPLIER SMALL

## (undated) DEVICE — ENDOCATCH GENERAL 15MM (PURPLE)

## (undated) DEVICE — IRRIGATOR BIO SHIELD

## (undated) DEVICE — DRSG CURITY GAUZE SPONGE 4 X 4" 12-PLY

## (undated) DEVICE — XI SEAL UNIVERSIAL 5-12MM

## (undated) DEVICE — PACK BRONCHOSCOPY

## (undated) DEVICE — ELECTRO LUBE ANTI-STICK SOLUTION FOR CAUTERY TIP

## (undated) DEVICE — BLADE SURGICAL #10 STAINLESS

## (undated) DEVICE — VENODYNE/SCD SLEEVE CALF MEDIUM

## (undated) DEVICE — XI ARM FORCEP FENESTRATED BIPOLAR 8MM

## (undated) DEVICE — BIOPSY FORCEP RADIAL JAW 4 STANDARD WITH NEEDLE

## (undated) DEVICE — DRAPE TOWEL BLUE 17" X 24"

## (undated) DEVICE — ELCTR BOVIE PENCIL SMOKE EVACUATION

## (undated) DEVICE — SUT SURGIPRO 0 30" GS-22

## (undated) DEVICE — XI ARM FORCEP CADIERE 8MM

## (undated) DEVICE — DRSG TEGADERM 2.5 X 3"

## (undated) DEVICE — XI ARM CLIP APPLIER LARGE

## (undated) DEVICE — BRONCHOSCOPE GALAXY CONN IRR ASPIR TUBE SCP GUIDE

## (undated) DEVICE — CATH IV SAFE BC 22G X 1" (BLUE)

## (undated) DEVICE — SUCTION YANKAUER NO CONTROL VENT

## (undated) DEVICE — SENSOR O2 FINGER ADULT

## (undated) DEVICE — BIOPSY FORCEP OLYMPUS ALLIGATOR 2.0MM

## (undated) DEVICE — SUT MONOCRYL 4-0 27" PS-2 UNDYED

## (undated) DEVICE — D HELP - CLEARVIEW CLEARIFY SYSTEM

## (undated) DEVICE — XI ARM GRASPER TIP UP FENESTRATED

## (undated) DEVICE — DRAPE 3/4 SHEET 52X76"

## (undated) DEVICE — SUT SILK 0 24" SH DA

## (undated) DEVICE — SYR LUER LOK 50CC

## (undated) DEVICE — BRUSH COLONOSCOPY CYTOLOGY

## (undated) DEVICE — DRSG GAUZE PACKTNER ROLL

## (undated) DEVICE — FORCEP RADIAL JAW 4 JUMBO 2.8MM 3.2MM 240CM ORANGE DISP

## (undated) DEVICE — TUBING SUCTION 20FT

## (undated) DEVICE — BIOPSY FORCEP J&J MONARCH SMOOTH CUP

## (undated) DEVICE — CATH IV SAFE BC 20G X 1.16" (PINK)

## (undated) DEVICE — PACK ROBOTIC

## (undated) DEVICE — CLAMP BX HOT RAD JAW 3

## (undated) DEVICE — DRAPE INSTRUMENT POUCH 6.75" X 11"

## (undated) DEVICE — ELCTR BOVIE TIP BLADE INSULATED 2.75" EDGE

## (undated) DEVICE — XI OBTURATOR OPTICAL BLADELESS 8MM

## (undated) DEVICE — SPECIMEN CONTAINER 100ML

## (undated) DEVICE — TUBING OLYMPUS INSUFFLATION

## (undated) DEVICE — PACK IV START WITH CHG

## (undated) DEVICE — FOLEY HOLDER STATLOCK 2 WAY ADULT

## (undated) DEVICE — PROBE FIAPC CIRC O.D. 2.3MM/6.9FR LNTH 220CM/7.2FT

## (undated) DEVICE — FOLEY TRAY 16FR 5CC LTX UMETER CLOSED

## (undated) DEVICE — TUBING IV SET GRAVITY 3Y 100" MACRO

## (undated) DEVICE — SUT VICRYL 0 27" UR-6

## (undated) DEVICE — GOWN XL

## (undated) DEVICE — WARMING BLANKET LOWER ADULT

## (undated) DEVICE — TUBING SUCTION CONN 6FT STERILE

## (undated) DEVICE — SUT POLYSORB 2-0 30" V-20 UNDYED

## (undated) DEVICE — STAPLER COVIDIEN ENDO GIA STANDARD HANDLE

## (undated) DEVICE — TRAP SPECIMEN SPUTUM 40CC

## (undated) DEVICE — ELCTR GROUNDING PAD ADULT COVIDIEN

## (undated) DEVICE — POLY TRAP ETRAP

## (undated) DEVICE — ENDOCATCH GENERAL 10MM (PURPLE)

## (undated) DEVICE — XI ARM GRASPER BIPOLAR LONG 8MM

## (undated) DEVICE — NDL HYPO REGULAR BEVEL 22G X 1.5" (TURQUOISE)

## (undated) DEVICE — NDL ARCHPOINT PULMONARY 21G

## (undated) DEVICE — GRASPER LAPA 5MMX35CM

## (undated) DEVICE — SUT PDS II 2-0 27" SH

## (undated) DEVICE — SYR SLIP 10CC

## (undated) DEVICE — SOL IRR POUR NS 0.9% 500ML